# Patient Record
Sex: MALE | Race: BLACK OR AFRICAN AMERICAN
[De-identification: names, ages, dates, MRNs, and addresses within clinical notes are randomized per-mention and may not be internally consistent; named-entity substitution may affect disease eponyms.]

---

## 2017-01-01 ENCOUNTER — HOSPITAL ENCOUNTER (EMERGENCY)
Dept: HOSPITAL 71 - ER | Age: 22
Discharge: HOME | End: 2017-01-01
Payer: OTHER GOVERNMENT

## 2017-01-01 DIAGNOSIS — K52.9: Primary | ICD-10-CM

## 2018-03-07 ENCOUNTER — OFFICE VISIT CONVERTED (OUTPATIENT)
Dept: PODIATRY | Facility: CLINIC | Age: 23
End: 2018-03-07
Attending: PODIATRIST

## 2018-07-10 ENCOUNTER — OFFICE VISIT CONVERTED (OUTPATIENT)
Dept: PODIATRY | Facility: CLINIC | Age: 23
End: 2018-07-10
Attending: PODIATRIST

## 2019-03-28 ENCOUNTER — OFFICE VISIT CONVERTED (OUTPATIENT)
Dept: FAMILY MEDICINE CLINIC | Facility: CLINIC | Age: 24
End: 2019-03-28
Attending: FAMILY MEDICINE

## 2019-03-28 ENCOUNTER — CONVERSION ENCOUNTER (OUTPATIENT)
Dept: FAMILY MEDICINE CLINIC | Facility: CLINIC | Age: 24
End: 2019-03-28

## 2019-05-08 ENCOUNTER — OFFICE VISIT CONVERTED (OUTPATIENT)
Dept: FAMILY MEDICINE CLINIC | Facility: CLINIC | Age: 24
End: 2019-05-08
Attending: FAMILY MEDICINE

## 2019-05-15 ENCOUNTER — OFFICE VISIT CONVERTED (OUTPATIENT)
Dept: PODIATRY | Facility: CLINIC | Age: 24
End: 2019-05-15
Attending: PODIATRIST

## 2019-06-27 ENCOUNTER — OFFICE VISIT CONVERTED (OUTPATIENT)
Dept: FAMILY MEDICINE CLINIC | Facility: CLINIC | Age: 24
End: 2019-06-27
Attending: FAMILY MEDICINE

## 2019-06-27 ENCOUNTER — HOSPITAL ENCOUNTER (OUTPATIENT)
Dept: FAMILY MEDICINE CLINIC | Facility: CLINIC | Age: 24
Discharge: HOME | End: 2019-06-27
Attending: FAMILY MEDICINE

## 2019-06-27 LAB
ALBUMIN SERPL-MCNC: 3.8 G/DL (ref 3.5–5)
ALBUMIN/GLOB SERPL: 1.2 {RATIO} (ref 1.4–2.6)
ALP SERPL-CCNC: 68 U/L (ref 53–128)
ALT SERPL-CCNC: 27 U/L (ref 10–40)
ANION GAP SERPL CALC-SCNC: 20 MMOL/L (ref 8–19)
AST SERPL-CCNC: 30 U/L (ref 15–50)
BASOPHILS # BLD AUTO: 0.08 10*3/UL (ref 0–0.2)
BASOPHILS NFR BLD AUTO: 1.8 % (ref 0–3)
BILIRUB SERPL-MCNC: 0.51 MG/DL (ref 0.2–1.3)
BUN SERPL-MCNC: 14 MG/DL (ref 5–25)
BUN/CREAT SERPL: 9 {RATIO} (ref 6–20)
CALCIUM SERPL-MCNC: 9.1 MG/DL (ref 8.7–10.4)
CHLORIDE SERPL-SCNC: 100 MMOL/L (ref 99–111)
CONV ABS IMM GRAN: 0.02 10*3/UL (ref 0–0.2)
CONV CO2: 26 MMOL/L (ref 22–32)
CONV IMMATURE GRAN: 0.4 % (ref 0–1.8)
CONV TOTAL PROTEIN: 7 G/DL (ref 6.3–8.2)
CREAT UR-MCNC: 1.49 MG/DL (ref 0.7–1.2)
DEPRECATED RDW RBC AUTO: 56.2 FL (ref 35.1–43.9)
EOSINOPHIL # BLD AUTO: 0.03 10*3/UL (ref 0–0.7)
EOSINOPHIL # BLD AUTO: 0.7 % (ref 0–7)
ERYTHROCYTE [DISTWIDTH] IN BLOOD BY AUTOMATED COUNT: 15.4 % (ref 11.6–14.4)
GFR SERPLBLD BASED ON 1.73 SQ M-ARVRAT: >60 ML/MIN/{1.73_M2}
GLOBULIN UR ELPH-MCNC: 3.2 G/DL (ref 2–3.5)
GLUCOSE SERPL-MCNC: 82 MG/DL (ref 70–99)
HBA1C MFR BLD: 17.9 G/DL (ref 14–18)
HCT VFR BLD AUTO: 56.9 % (ref 42–52)
LYMPHOCYTES # BLD AUTO: 1.44 10*3/UL (ref 1–5)
MCH RBC QN AUTO: 31.1 PG (ref 27–31)
MCHC RBC AUTO-ENTMCNC: 31.5 G/DL (ref 33–37)
MCV RBC AUTO: 98.8 FL (ref 80–96)
MONOCYTES # BLD AUTO: 0.61 10*3/UL (ref 0.2–1.2)
MONOCYTES NFR BLD AUTO: 13.6 % (ref 3–10)
NEUTROPHILS # BLD AUTO: 2.3 10*3/UL (ref 2–8)
NEUTROPHILS NFR BLD AUTO: 51.4 % (ref 30–85)
NRBC CBCN: 0 % (ref 0–0.7)
OSMOLALITY SERPL CALC.SUM OF ELEC: 292 MOSM/KG (ref 273–304)
PLATELET # BLD AUTO: 198 10*3/UL (ref 130–400)
PMV BLD AUTO: 11.4 FL (ref 9.4–12.4)
POTASSIUM SERPL-SCNC: 5.1 MMOL/L (ref 3.5–5.3)
RBC # BLD AUTO: 5.76 10*6/UL (ref 4.7–6.1)
SODIUM SERPL-SCNC: 141 MMOL/L (ref 135–147)
VARIANT LYMPHS NFR BLD MANUAL: 32.1 % (ref 20–45)
WBC # BLD AUTO: 4.48 10*3/UL (ref 4.8–10.8)

## 2019-08-07 ENCOUNTER — HOSPITAL ENCOUNTER (OUTPATIENT)
Dept: FAMILY MEDICINE CLINIC | Facility: CLINIC | Age: 24
Discharge: HOME | End: 2019-08-07
Attending: FAMILY MEDICINE

## 2019-09-26 ENCOUNTER — OFFICE VISIT CONVERTED (OUTPATIENT)
Dept: FAMILY MEDICINE CLINIC | Facility: CLINIC | Age: 24
End: 2019-09-26
Attending: FAMILY MEDICINE

## 2019-09-30 ENCOUNTER — HOSPITAL ENCOUNTER (OUTPATIENT)
Dept: ULTRASOUND IMAGING | Facility: HOSPITAL | Age: 24
Discharge: HOME | End: 2019-09-30
Attending: FAMILY MEDICINE

## 2019-09-30 LAB
ALBUMIN SERPL-MCNC: 4.3 G/DL (ref 3.5–5)
ALBUMIN/GLOB SERPL: 1.3 {RATIO} (ref 1.4–2.6)
ALP SERPL-CCNC: 67 U/L (ref 53–128)
ALT SERPL-CCNC: 34 U/L (ref 10–40)
ANION GAP SERPL CALC-SCNC: 19 MMOL/L (ref 8–19)
AST SERPL-CCNC: 30 U/L (ref 15–50)
BASOPHILS # BLD AUTO: 0.07 10*3/UL (ref 0–0.2)
BASOPHILS NFR BLD AUTO: 2.2 % (ref 0–3)
BILIRUB SERPL-MCNC: 0.65 MG/DL (ref 0.2–1.3)
BUN SERPL-MCNC: 13 MG/DL (ref 5–25)
BUN/CREAT SERPL: 9 {RATIO} (ref 6–20)
CALCIUM SERPL-MCNC: 9.1 MG/DL (ref 8.7–10.4)
CHLORIDE SERPL-SCNC: 100 MMOL/L (ref 99–111)
CONV ABS IMM GRAN: 0.01 10*3/UL (ref 0–0.2)
CONV CO2: 28 MMOL/L (ref 22–32)
CONV IMMATURE GRAN: 0.3 % (ref 0–1.8)
CONV TOTAL PROTEIN: 7.7 G/DL (ref 6.3–8.2)
CREAT UR-MCNC: 1.52 MG/DL (ref 0.7–1.2)
DEPRECATED RDW RBC AUTO: 55.2 FL (ref 35.1–43.9)
EOSINOPHIL # BLD AUTO: 0.03 10*3/UL (ref 0–0.7)
EOSINOPHIL # BLD AUTO: 0.9 % (ref 0–7)
ERYTHROCYTE [DISTWIDTH] IN BLOOD BY AUTOMATED COUNT: 17.1 % (ref 11.6–14.4)
GFR SERPLBLD BASED ON 1.73 SQ M-ARVRAT: >60 ML/MIN/{1.73_M2}
GLOBULIN UR ELPH-MCNC: 3.4 G/DL (ref 2–3.5)
GLUCOSE SERPL-MCNC: 92 MG/DL (ref 70–99)
HCT VFR BLD AUTO: 57 % (ref 42–52)
HGB BLD-MCNC: 18.6 G/DL (ref 14–18)
LYMPHOCYTES # BLD AUTO: 1.59 10*3/UL (ref 1–5)
LYMPHOCYTES NFR BLD AUTO: 50.2 % (ref 20–45)
MCH RBC QN AUTO: 30.4 PG (ref 27–31)
MCHC RBC AUTO-ENTMCNC: 32.6 G/DL (ref 33–37)
MCV RBC AUTO: 93.3 FL (ref 80–96)
MONOCYTES # BLD AUTO: 0.39 10*3/UL (ref 0.2–1.2)
MONOCYTES NFR BLD AUTO: 12.3 % (ref 3–10)
NEUTROPHILS # BLD AUTO: 1.08 10*3/UL (ref 2–8)
NEUTROPHILS NFR BLD AUTO: 34.1 % (ref 30–85)
NRBC CBCN: 0 % (ref 0–0.7)
OSMOLALITY SERPL CALC.SUM OF ELEC: 296 MOSM/KG (ref 273–304)
PLATELET # BLD AUTO: 243 10*3/UL (ref 130–400)
PMV BLD AUTO: 10.6 FL (ref 9.4–12.4)
POTASSIUM SERPL-SCNC: 4.4 MMOL/L (ref 3.5–5.3)
RBC # BLD AUTO: 6.11 10*6/UL (ref 4.7–6.1)
SODIUM SERPL-SCNC: 143 MMOL/L (ref 135–147)
WBC # BLD AUTO: 3.17 10*3/UL (ref 4.8–10.8)

## 2019-11-19 ENCOUNTER — HOSPITAL ENCOUNTER (OUTPATIENT)
Dept: ONCOLOGY | Facility: HOSPITAL | Age: 24
Discharge: HOME | End: 2019-11-19

## 2019-11-19 ENCOUNTER — OFFICE VISIT CONVERTED (OUTPATIENT)
Dept: ONCOLOGY | Facility: HOSPITAL | Age: 24
End: 2019-11-19

## 2019-11-19 LAB
BASOPHILS # BLD AUTO: 0.1 10*3/UL (ref 0–0.2)
BASOPHILS # BLD: 3 % (ref 0–3)
BASOPHILS NFR BLD AUTO: 2.4 % (ref 0–3)
CONV ABS BANDS: 0 % (ref 1–5)
CONV ABS IMM GRAN: 0.03 10*3/UL (ref 0–0.2)
CONV ANISOCYTES: SLIGHT
CONV ATYPICAL LYMPHOCYTES: 6 % (ref 0–5)
CONV IMMATURE GRAN: 0.7 % (ref 0–1.8)
CONV SEGMENTED NEUTROPHILS: 58 % (ref 45–70)
DEPRECATED RDW RBC AUTO: 54.9 FL (ref 35.1–43.9)
EOSINOPHIL # BLD AUTO: 0.04 10*3/UL (ref 0–0.7)
EOSINOPHIL # BLD AUTO: 1 % (ref 0–7)
EOSINOPHIL NFR BLD AUTO: 2 % (ref 0–7)
ERYTHROCYTE [DISTWIDTH] IN BLOOD BY AUTOMATED COUNT: 16.3 % (ref 11.6–14.4)
HCT VFR BLD AUTO: 55.9 % (ref 42–52)
HGB BLD-MCNC: 18.6 G/DL (ref 14–18)
LYMPHOCYTES # BLD AUTO: 1.3 10*3/UL (ref 1–5)
LYMPHOCYTES NFR BLD AUTO: 30.9 % (ref 20–45)
MACROCYTES BLD QL SMEAR: SLIGHT
MCH RBC QN AUTO: 31.3 PG (ref 27–31)
MCHC RBC AUTO-ENTMCNC: 33.3 G/DL (ref 33–37)
MCV RBC AUTO: 93.9 FL (ref 80–96)
MONOCYTES # BLD AUTO: 0.51 10*3/UL (ref 0.2–1.2)
MONOCYTES NFR BLD AUTO: 12.1 % (ref 3–10)
MONOCYTES NFR BLD MANUAL: 9 % (ref 3–10)
NEUTROPHILS # BLD AUTO: 2.23 10*3/UL (ref 2–8)
NEUTROPHILS NFR BLD AUTO: 52.9 % (ref 30–85)
NRBC CBCN: 0 % (ref 0–0.7)
NUC CELL # PRT MANUAL: 0 /100{WBCS}
PLAT MORPH BLD: NORMAL
PLATELET # BLD AUTO: 197 10*3/UL (ref 130–400)
PMV BLD AUTO: 10.7 FL (ref 9.4–12.4)
POLYCHROMASIA BLD QL SMEAR: ABNORMAL
RBC # BLD AUTO: 5.95 10*6/UL (ref 4.7–6.1)
SMALL PLATELETS BLD QL SMEAR: ADEQUATE
VARIANT LYMPHS NFR BLD MANUAL: 22 % (ref 20–45)
WBC # BLD AUTO: 4.21 10*3/UL (ref 4.8–10.8)

## 2019-11-21 LAB — COHGB MFR BLD: 2.5 % (ref 0–3.6)

## 2019-11-22 LAB — JAK2 GENE QUAL: NORMAL

## 2019-12-02 LAB — JAK2 EXONS 12-15 MUT DET PCR: NORMAL

## 2020-01-07 ENCOUNTER — OFFICE VISIT CONVERTED (OUTPATIENT)
Dept: FAMILY MEDICINE CLINIC | Facility: CLINIC | Age: 25
End: 2020-01-07
Attending: FAMILY MEDICINE

## 2020-02-04 ENCOUNTER — HOSPITAL ENCOUNTER (OUTPATIENT)
Dept: OTHER | Facility: HOSPITAL | Age: 25
Discharge: HOME | End: 2020-02-04
Attending: INTERNAL MEDICINE

## 2020-02-04 LAB
25(OH)D3 SERPL-MCNC: 9.3 NG/ML (ref 30–100)
APPEARANCE UR: CLEAR
BASOPHILS # BLD AUTO: 0.01 10*3/UL (ref 0–0.2)
BASOPHILS NFR BLD AUTO: 0.1 % (ref 0–3)
BILIRUB UR QL: NEGATIVE
COLOR UR: YELLOW
CONV ABS IMM GRAN: 0.04 10*3/UL (ref 0–0.2)
CONV COLLECTION SOURCE (UA): NORMAL
CONV CREATININE URINE, RANDOM: 88.4 MG/DL (ref 10–300)
CONV IMMATURE GRAN: 0.4 % (ref 0–1.8)
CONV PROTEIN TO CREATININE RATIO (RANDOM URINE): 0.06 {RATIO} (ref 0–0.1)
CONV UROBILINOGEN IN URINE BY AUTOMATED TEST STRIP: 0.2 {EHRLICHU}/DL (ref 0.1–1)
DEPRECATED RDW RBC AUTO: 53 FL (ref 35.1–43.9)
EOSINOPHIL # BLD AUTO: 0 % (ref 0–7)
EOSINOPHIL # BLD AUTO: 0 10*3/UL (ref 0–0.7)
ERYTHROCYTE [DISTWIDTH] IN BLOOD BY AUTOMATED COUNT: 15.3 % (ref 11.6–14.4)
GLUCOSE UR QL: NEGATIVE MG/DL
HCT VFR BLD AUTO: 54.7 % (ref 42–52)
HGB BLD-MCNC: 18.5 G/DL (ref 14–18)
HGB UR QL STRIP: NEGATIVE
KETONES UR QL STRIP: NEGATIVE MG/DL
LEUKOCYTE ESTERASE UR QL STRIP: NEGATIVE
LYMPHOCYTES # BLD AUTO: 1.34 10*3/UL (ref 1–5)
LYMPHOCYTES NFR BLD AUTO: 14.5 % (ref 20–45)
MCH RBC QN AUTO: 32 PG (ref 27–31)
MCHC RBC AUTO-ENTMCNC: 33.8 G/DL (ref 33–37)
MCV RBC AUTO: 94.5 FL (ref 80–96)
MONOCYTES # BLD AUTO: 0.78 10*3/UL (ref 0.2–1.2)
MONOCYTES NFR BLD AUTO: 8.5 % (ref 3–10)
NEUTROPHILS # BLD AUTO: 7.04 10*3/UL (ref 2–8)
NEUTROPHILS NFR BLD AUTO: 76.5 % (ref 30–85)
NITRITE UR QL STRIP: NEGATIVE
NRBC CBCN: 0 % (ref 0–0.7)
PH UR STRIP.AUTO: 5.5 [PH] (ref 5–8)
PLATELET # BLD AUTO: 237 10*3/UL (ref 130–400)
PMV BLD AUTO: 11 FL (ref 9.4–12.4)
PROT UR QL: NEGATIVE MG/DL
PROT UR-MCNC: 5.2 MG/DL
PTH-INTACT SERPL-MCNC: 32.8 PG/ML (ref 11.1–79.5)
RBC # BLD AUTO: 5.79 10*6/UL (ref 4.7–6.1)
SP GR UR: 1.01 (ref 1–1.03)
WBC # BLD AUTO: 9.21 10*3/UL (ref 4.8–10.8)

## 2020-03-05 ENCOUNTER — HOSPITAL ENCOUNTER (OUTPATIENT)
Dept: ONCOLOGY | Facility: HOSPITAL | Age: 25
Discharge: HOME | End: 2020-03-05
Attending: INTERNAL MEDICINE

## 2020-03-05 ENCOUNTER — OFFICE VISIT CONVERTED (OUTPATIENT)
Dept: ONCOLOGY | Facility: HOSPITAL | Age: 25
End: 2020-03-05
Attending: INTERNAL MEDICINE

## 2020-07-07 ENCOUNTER — CONVERSION ENCOUNTER (OUTPATIENT)
Dept: FAMILY MEDICINE CLINIC | Facility: CLINIC | Age: 25
End: 2020-07-07

## 2020-07-07 ENCOUNTER — OFFICE VISIT CONVERTED (OUTPATIENT)
Dept: FAMILY MEDICINE CLINIC | Facility: CLINIC | Age: 25
End: 2020-07-07
Attending: FAMILY MEDICINE

## 2020-07-27 ENCOUNTER — HOSPITAL ENCOUNTER (OUTPATIENT)
Dept: OTHER | Facility: HOSPITAL | Age: 25
Discharge: HOME | End: 2020-07-27
Attending: FAMILY MEDICINE

## 2020-07-27 LAB
ALBUMIN SERPL-MCNC: 4.1 G/DL (ref 3.5–5)
ALBUMIN/GLOB SERPL: 1.2 {RATIO} (ref 1.4–2.6)
ALP SERPL-CCNC: 64 U/L (ref 53–128)
ALT SERPL-CCNC: 42 U/L (ref 10–40)
ANION GAP SERPL CALC-SCNC: 19 MMOL/L (ref 8–19)
AST SERPL-CCNC: 29 U/L (ref 15–50)
BASOPHILS # BLD AUTO: 0.08 10*3/UL (ref 0–0.2)
BASOPHILS NFR BLD AUTO: 2.4 % (ref 0–3)
BILIRUB SERPL-MCNC: 0.63 MG/DL (ref 0.2–1.3)
BUN SERPL-MCNC: 15 MG/DL (ref 5–25)
BUN/CREAT SERPL: 10 {RATIO} (ref 6–20)
CALCIUM SERPL-MCNC: 10.1 MG/DL (ref 8.7–10.4)
CHLORIDE SERPL-SCNC: 102 MMOL/L (ref 99–111)
CHOLEST SERPL-MCNC: 134 MG/DL (ref 107–200)
CHOLEST/HDLC SERPL: 4.1 {RATIO} (ref 3–6)
CONV ABS IMM GRAN: 0 10*3/UL (ref 0–0.2)
CONV CO2: 28 MMOL/L (ref 22–32)
CONV IMMATURE GRAN: 0 % (ref 0–1.8)
CONV TOTAL PROTEIN: 7.4 G/DL (ref 6.3–8.2)
CREAT UR-MCNC: 1.51 MG/DL (ref 0.7–1.2)
DEPRECATED RDW RBC AUTO: 51.7 FL (ref 35.1–43.9)
EOSINOPHIL # BLD AUTO: 0.02 10*3/UL (ref 0–0.7)
EOSINOPHIL # BLD AUTO: 0.6 % (ref 0–7)
ERYTHROCYTE [DISTWIDTH] IN BLOOD BY AUTOMATED COUNT: 15 % (ref 11.6–14.4)
FOLATE SERPL-MCNC: 18 NG/ML (ref 4.8–20)
GFR SERPLBLD BASED ON 1.73 SQ M-ARVRAT: >60 ML/MIN/{1.73_M2}
GLOBULIN UR ELPH-MCNC: 3.3 G/DL (ref 2–3.5)
GLUCOSE SERPL-MCNC: 100 MG/DL (ref 70–99)
HCT VFR BLD AUTO: 54.6 % (ref 42–52)
HDLC SERPL-MCNC: 33 MG/DL (ref 40–60)
HGB BLD-MCNC: 18.4 G/DL (ref 14–18)
LDLC SERPL CALC-MCNC: 86 MG/DL (ref 70–100)
LYMPHOCYTES # BLD AUTO: 1.33 10*3/UL (ref 1–5)
LYMPHOCYTES NFR BLD AUTO: 40.1 % (ref 20–45)
MCH RBC QN AUTO: 31.4 PG (ref 27–31)
MCHC RBC AUTO-ENTMCNC: 33.7 G/DL (ref 33–37)
MCV RBC AUTO: 93.2 FL (ref 80–96)
MONOCYTES # BLD AUTO: 0.41 10*3/UL (ref 0.2–1.2)
MONOCYTES NFR BLD AUTO: 12.3 % (ref 3–10)
NEUTROPHILS # BLD AUTO: 1.48 10*3/UL (ref 2–8)
NEUTROPHILS NFR BLD AUTO: 44.6 % (ref 30–85)
NRBC CBCN: 0 % (ref 0–0.7)
OSMOLALITY SERPL CALC.SUM OF ELEC: 301 MOSM/KG (ref 273–304)
PLATELET # BLD AUTO: 199 10*3/UL (ref 130–400)
PMV BLD AUTO: 10.7 FL (ref 9.4–12.4)
POTASSIUM SERPL-SCNC: 4.4 MMOL/L (ref 3.5–5.3)
RBC # BLD AUTO: 5.86 10*6/UL (ref 4.7–6.1)
SODIUM SERPL-SCNC: 145 MMOL/L (ref 135–147)
TRIGL SERPL-MCNC: 74 MG/DL (ref 40–150)
URATE SERPL-MCNC: 8.1 MG/DL (ref 3.5–8.5)
VIT B12 SERPL-MCNC: 955 PG/ML (ref 211–911)
VLDLC SERPL-MCNC: 15 MG/DL (ref 5–37)
WBC # BLD AUTO: 3.32 10*3/UL (ref 4.8–10.8)

## 2020-07-29 LAB — CONV VITAMIN B6, TOTAL PLASMA: 154.1 UG/L (ref 5.3–46.7)

## 2020-09-10 ENCOUNTER — HOSPITAL ENCOUNTER (OUTPATIENT)
Dept: OTHER | Facility: HOSPITAL | Age: 25
Discharge: HOME | End: 2020-09-10
Attending: INTERNAL MEDICINE

## 2020-09-10 LAB
BASOPHILS # BLD AUTO: 0.08 10*3/UL (ref 0–0.2)
BASOPHILS NFR BLD AUTO: 1.8 % (ref 0–3)
CONV ABS IMM GRAN: 0.02 10*3/UL (ref 0–0.2)
CONV IMMATURE GRAN: 0.5 % (ref 0–1.8)
DEPRECATED RDW RBC AUTO: 52.9 FL (ref 35.1–43.9)
EOSINOPHIL # BLD AUTO: 0.06 10*3/UL (ref 0–0.7)
EOSINOPHIL # BLD AUTO: 1.4 % (ref 0–7)
ERYTHROCYTE [DISTWIDTH] IN BLOOD BY AUTOMATED COUNT: 15.6 % (ref 11.6–14.4)
HCT VFR BLD AUTO: 57 % (ref 42–52)
HGB BLD-MCNC: 19 G/DL (ref 14–18)
LYMPHOCYTES # BLD AUTO: 1.51 10*3/UL (ref 1–5)
LYMPHOCYTES NFR BLD AUTO: 34.1 % (ref 20–45)
MCH RBC QN AUTO: 31.4 PG (ref 27–31)
MCHC RBC AUTO-ENTMCNC: 33.3 G/DL (ref 33–37)
MCV RBC AUTO: 94.2 FL (ref 80–96)
MONOCYTES # BLD AUTO: 0.68 10*3/UL (ref 0.2–1.2)
MONOCYTES NFR BLD AUTO: 15.3 % (ref 3–10)
NEUTROPHILS # BLD AUTO: 2.08 10*3/UL (ref 2–8)
NEUTROPHILS NFR BLD AUTO: 46.9 % (ref 30–85)
NRBC CBCN: 0 % (ref 0–0.7)
PLATELET # BLD AUTO: 207 10*3/UL (ref 130–400)
PMV BLD AUTO: 10.5 FL (ref 9.4–12.4)
RBC # BLD AUTO: 6.05 10*6/UL (ref 4.7–6.1)
WBC # BLD AUTO: 4.43 10*3/UL (ref 4.8–10.8)

## 2020-09-11 LAB — EPO SERPL-ACNC: 38 MIU/ML (ref 2.6–18.5)

## 2020-09-17 ENCOUNTER — OFFICE VISIT CONVERTED (OUTPATIENT)
Dept: ONCOLOGY | Facility: HOSPITAL | Age: 25
End: 2020-09-17
Attending: NURSE PRACTITIONER

## 2020-09-21 ENCOUNTER — HOSPITAL ENCOUNTER (OUTPATIENT)
Dept: GENERAL RADIOLOGY | Facility: HOSPITAL | Age: 25
Discharge: HOME | End: 2020-09-21
Attending: FAMILY MEDICINE

## 2020-09-21 ENCOUNTER — OFFICE VISIT CONVERTED (OUTPATIENT)
Dept: FAMILY MEDICINE CLINIC | Facility: CLINIC | Age: 25
End: 2020-09-21
Attending: FAMILY MEDICINE

## 2020-09-29 ENCOUNTER — OFFICE VISIT CONVERTED (OUTPATIENT)
Dept: FAMILY MEDICINE CLINIC | Facility: CLINIC | Age: 25
End: 2020-09-29
Attending: FAMILY MEDICINE

## 2020-10-13 ENCOUNTER — OFFICE VISIT CONVERTED (OUTPATIENT)
Dept: PODIATRY | Facility: CLINIC | Age: 25
End: 2020-10-13
Attending: PODIATRIST

## 2021-01-07 ENCOUNTER — OFFICE VISIT CONVERTED (OUTPATIENT)
Dept: FAMILY MEDICINE CLINIC | Facility: CLINIC | Age: 26
End: 2021-01-07
Attending: FAMILY MEDICINE

## 2021-04-06 ENCOUNTER — OFFICE VISIT CONVERTED (OUTPATIENT)
Dept: FAMILY MEDICINE CLINIC | Facility: CLINIC | Age: 26
End: 2021-04-06
Attending: FAMILY MEDICINE

## 2021-04-06 ENCOUNTER — CONVERSION ENCOUNTER (OUTPATIENT)
Dept: FAMILY MEDICINE CLINIC | Facility: CLINIC | Age: 26
End: 2021-04-06

## 2021-04-06 ENCOUNTER — HOSPITAL ENCOUNTER (OUTPATIENT)
Dept: FAMILY MEDICINE CLINIC | Facility: CLINIC | Age: 26
Discharge: HOME | End: 2021-04-06
Attending: FAMILY MEDICINE

## 2021-04-06 LAB
ALBUMIN SERPL-MCNC: 3.8 G/DL (ref 3.5–5)
ALBUMIN/GLOB SERPL: 1.2 {RATIO} (ref 1.4–2.6)
ALP SERPL-CCNC: 68 U/L (ref 53–128)
ALT SERPL-CCNC: 29 U/L (ref 10–40)
ANION GAP SERPL CALC-SCNC: 18 MMOL/L (ref 8–19)
AST SERPL-CCNC: 31 U/L (ref 15–50)
BASOPHILS # BLD AUTO: 0.07 10*3/UL (ref 0–0.2)
BASOPHILS NFR BLD AUTO: 2.1 % (ref 0–3)
BILIRUB SERPL-MCNC: 0.53 MG/DL (ref 0.2–1.3)
BUN SERPL-MCNC: 18 MG/DL (ref 5–25)
BUN/CREAT SERPL: 11 {RATIO} (ref 6–20)
CALCIUM SERPL-MCNC: 9.2 MG/DL (ref 8.7–10.4)
CHLORIDE SERPL-SCNC: 102 MMOL/L (ref 99–111)
CONV ABS IMM GRAN: 0.01 10*3/UL (ref 0–0.2)
CONV CO2: 28 MMOL/L (ref 22–32)
CONV IMMATURE GRAN: 0.3 % (ref 0–1.8)
CONV TOTAL PROTEIN: 6.9 G/DL (ref 6.3–8.2)
CREAT UR-MCNC: 1.64 MG/DL (ref 0.7–1.2)
DEPRECATED RDW RBC AUTO: 54.4 FL (ref 35.1–43.9)
EOSINOPHIL # BLD AUTO: 0.02 10*3/UL (ref 0–0.7)
EOSINOPHIL # BLD AUTO: 0.6 % (ref 0–7)
ERYTHROCYTE [DISTWIDTH] IN BLOOD BY AUTOMATED COUNT: 15.3 % (ref 11.6–14.4)
GFR SERPLBLD BASED ON 1.73 SQ M-ARVRAT: >60 ML/MIN/{1.73_M2}
GLOBULIN UR ELPH-MCNC: 3.1 G/DL (ref 2–3.5)
GLUCOSE SERPL-MCNC: 84 MG/DL (ref 70–99)
HCT VFR BLD AUTO: 53.9 % (ref 42–52)
HGB BLD-MCNC: 17.7 G/DL (ref 14–18)
LYMPHOCYTES # BLD AUTO: 1.32 10*3/UL (ref 1–5)
LYMPHOCYTES NFR BLD AUTO: 39.2 % (ref 20–45)
MCH RBC QN AUTO: 31.9 PG (ref 27–31)
MCHC RBC AUTO-ENTMCNC: 32.8 G/DL (ref 33–37)
MCV RBC AUTO: 97.1 FL (ref 80–96)
MONOCYTES # BLD AUTO: 0.45 10*3/UL (ref 0.2–1.2)
MONOCYTES NFR BLD AUTO: 13.4 % (ref 3–10)
NEUTROPHILS # BLD AUTO: 1.5 10*3/UL (ref 2–8)
NEUTROPHILS NFR BLD AUTO: 44.4 % (ref 30–85)
NRBC CBCN: 0 % (ref 0–0.7)
OSMOLALITY SERPL CALC.SUM OF ELEC: 297 MOSM/KG (ref 273–304)
PLATELET # BLD AUTO: 218 10*3/UL (ref 130–400)
PMV BLD AUTO: 10.5 FL (ref 9.4–12.4)
POTASSIUM SERPL-SCNC: 4.9 MMOL/L (ref 3.5–5.3)
RBC # BLD AUTO: 5.55 10*6/UL (ref 4.7–6.1)
SODIUM SERPL-SCNC: 143 MMOL/L (ref 135–147)
T4 FREE SERPL-MCNC: 1.1 NG/DL (ref 0.9–1.8)
TSH SERPL-ACNC: 2.02 M[IU]/L (ref 0.27–4.2)
URATE SERPL-MCNC: 9.2 MG/DL (ref 3.5–8.5)
WBC # BLD AUTO: 3.37 10*3/UL (ref 4.8–10.8)

## 2021-04-08 ENCOUNTER — HOSPITAL ENCOUNTER (OUTPATIENT)
Dept: GENERAL RADIOLOGY | Facility: HOSPITAL | Age: 26
Discharge: HOME | End: 2021-04-08
Attending: FAMILY MEDICINE

## 2021-04-14 ENCOUNTER — CONVERSION ENCOUNTER (OUTPATIENT)
Dept: FAMILY MEDICINE CLINIC | Facility: CLINIC | Age: 26
End: 2021-04-14

## 2021-04-14 ENCOUNTER — OFFICE VISIT CONVERTED (OUTPATIENT)
Dept: FAMILY MEDICINE CLINIC | Facility: CLINIC | Age: 26
End: 2021-04-14
Attending: NURSE PRACTITIONER

## 2021-04-15 ENCOUNTER — HOSPITAL ENCOUNTER (OUTPATIENT)
Dept: OTHER | Facility: HOSPITAL | Age: 26
Discharge: HOME | End: 2021-04-15
Attending: FAMILY MEDICINE

## 2021-04-22 LAB — CONV VITAMIN B6, TOTAL PLASMA: 105.2 UG/L (ref 5.3–46.7)

## 2021-04-29 ENCOUNTER — OFFICE VISIT CONVERTED (OUTPATIENT)
Dept: PODIATRY | Facility: CLINIC | Age: 26
End: 2021-04-29
Attending: PODIATRIST

## 2021-05-13 NOTE — PROGRESS NOTES
Progress Note      Patient Name: Kathi Bernard   Patient ID: [id number]   Sex: Male   YOB: 1995    Primary Care Provider: Jesus Manuel Sloan DO   Referring Provider: Jesus Manuel Sloan DO    Visit Date: October 13, 2020    Provider: Josias Daniel DPM   Location: [facility]   Location Address: [address] [place] [address]  [id number]   Location Phone: [phone]          Chief Complaint  · Right Foot Pain      History Of Present Illness  Kathi Bernard is a 25 year old /Black male who presents to the [facility] today a follow up for:      New, Established, New Problem: Established  Location: Right medial midfoot  Duration:  3 weeks  Onset: Insidious  Nature: Sore, achy  Stable, worsening, improving: Stable  Aggravating factors:   Patient relates pain is aggravated by shoe gear and ambulation.   Previous Treatment: Patient's mother relates that he is using a cam walker from her previous injury.  She states he has a current prescription full for Voltaren gel.  She states that he was recently prescribed new custom-made orthotics but has not been molded for them yet.    Patient denies any fevers, chills, nausea, vomiting, shortness of breathe, nor any other constitutional signs nor symptoms.         Past Medical History  Acquired bilateral flat feet; Anemia; Ankle pain; Arthritis pain; Asthma; Athletes foot; Blood clot in vein; Bronchitis; Diabetes mellitus; Difficulty walking; Down syndrome; Fatigue; Flat feet, bilateral; Foot pain, left; Gout; Heel pain; Hypothyroidism; Mental disability; Obesity; Primary osteoarthritis, left ankle and foot; Right knee pain, chronic; Sinus trouble         Past Surgical History  EAR Surgery         Medication List  allopurinol 100 mg oral tablet; aspirin 81 mg oral tablet,delayed release (DR/EC); Boudreauxs Butt Paste 16 % topical ointment; Stevea-Klealessandro Gentle topical cleanser; Claritin 10 mg oral tablet;  clindamycin phosphate 1 % topical lotion; Custom made orthotics; cyanocobalamin (vitamin B-12) 1,000 mcg oral tablet; docusate sodium 100 mg oral capsule; folic acid 1 mg oral tablet; Lotrisone 1-0.05 % topical cream; Pataday 0.1 % ophthalmic (eye) drops; pyridoxine (vitamin B6) 50 mg oral tablet; Synthroid 25 mcg oral tablet; urea 20 % topical cream; Voltaren 1 % topical gel         Allergy List  NO KNOWN DRUG ALLERGIES         Family Medical History  Sickle-cell/Hb-C Disease; Diabetes, unspecified type; Bone Neoplasm, Malignant; Family history of certain chronic disabling diseases; arthritis         Social History  Alcohol Use (Never); Claustophobic (Unknown); lives with parents; Recreational Drug Use (Never); Student.; Tobacco (Never)         Immunizations  Name Date Admin   Influenza 09/21/2020   Influenza 11/06/2019   Tdap 03/28/2019         Review of Systems  · Constitutional  o Denies  o : fatigue, night sweats  · Eyes  o Denies  o : double vision, blurred vision  · HENT  o Denies  o : vertigo, recent head injury  · Cardiovascular  o Denies  o : chest pain, irregular heart beats  · Respiratory  o Denies  o : shortness of breath, productive cough  · Gastrointestinal  o Denies  o : nausea, vomiting  · Genitourinary  o Denies  o : dysuria, urinary retention  · Integument  o Denies  o : hair growth change, new skin lesions  · Neurologic  o Denies  o : altered mental status, seizures  · Musculoskeletal  o * See HPI  · Endocrine  o Denies  o : cold intolerance, heat intolerance  · Heme-Lymph  o Denies  o : petechiae, lymph node enlargement or tenderness  · Allergic-Immunologic  o Denies  o : frequent illnesses      Vitals  Date Time BP Position Site L\R Cuff Size HR RR TEMP (F) WT  HT  BMI kg/m2 BSA m2 O2 Sat FR L/min FiO2        10/13/2020 07:44 /84 Sitting    77 - R  97.1 312lbs 16oz 5'   61.13 2.45 99 %            Physical Examination  · Constitutional  o Appearance  o : overweight, well developed.  Down's Syndrome.  · Cardiovascular  o Peripheral Vascular System  o :   § Pedal Pulses  § : pulses 2 + and symmetrical  § Extremities  § : no edema in lower extremities  · Musculoskeletal  o General  o :   § General Musculoskeletal  § : Lower extremity muscle and strength and range of motion is equal and symmetrical bilaterally. The knees are noted to be normal in alignment. Significant for pes planus b/l. Ambulating in CAM walker on Right.  · Skin and Subcutaneous Tissue  o General Inspection  o : Skin is noted to have normal texture and turgor, with no excrescences noted.   o Digits and Nails  o : The toenails are noted to be without disese.  · Neurologic  o Sensation  o : Epicritic sensations intact bilaterally.  · Right Ankle/Foot  o Inspection  o : Right foot: Positive tenderness to palpation to posterior tibial tendon. No signs of edema, erythema, lymphangitis, nor signs of infection.   o Neurovascular  o : Sharp/dull sensation is decreased metatarsal bilaterally. Monofilament sensation examination of the right foot is decreased Monofilament sensation examination of the left foot is decreased   o Special Tests  o : +TTP to posterior tibial tendon along right medial ankle and foot.      Dr. Daniel reviewed radiographs and results from [facility] and discussed them with the patient.  These are significant for no periosteal reactions nor osteolytic changes seen.  No occult fractures seen.           Assessment  · Foot pain, right     729.5/M79.671  · Tibial tendonitis, posterior, right     726.72/M76.821      Plan  · Orders  o PHYSICAL THERAPY CONSULTATION (PHYT) - - 10/13/2020  · Medications  o Medications have been Reconciled  o Transition of Care or Provider Policy  · Instructions  o Follow Up in 3 weeks.  o Discuss Findings: I have discussed the findings of this evaluation with the patient. The discussion included a complete verbal explanation of any changes in the examination results,  diagnosis, and the current treatment plan. A schedule for future care needs was explained. If any questions should arise after returning home, I have encouraged the patient to feel free to contact Dr. Daniel. The patient states understanding and agreement with this plan.  o Continue CAM walker as needed on Right.  o Instructed to use Voltaren Gel QID PRN.  o RICE Therapy  o Electronically Identified Patient Education Materials Provided Electronically  · Disposition  o Call or Return if symptoms worsen or persist.  · Referrals  o ID: [id number] Date: 09/29/2020 Type: Inbound  Specialty: Podiatry            Electronically Signed by: Josias Daniel DPM -Author on October 13, 2020 01:05:13 PM

## 2021-05-13 NOTE — PROGRESS NOTES
Progress Note      Patient Name: Kathi Bernard   Patient ID: [id number]   Sex: Male   YOB: 1995    Primary Care Provider: Jesus Manuel Sloan DO   Referring Provider: Jesus Manuel Sloan DO    Visit Date: July 7, 2020    Provider: Jesus Manuel Sloan DO   Location: [facility]   Location Address: [address] [place] [address], [address]  [id number]   Location Phone: [phone]          Chief Complaint  · check up      History Of Present Illness  Kathi Bernard is a 25 year old /Black male who presents for evaluation and treatment of:      Patient presents today requesting refills.  He is accompanied by his mother, Sun.  Patient overall is doing well.  He is using his CPAP now and he is more awake throughout the day.  I encouraged them to continue with compliance for this.  He is still seeing ENT for ruptured tympanic membrane on the right.  Patient is still taking clindamycin phosphate for hidradenitis suppurativa.  If he has a start of the lesion he puts this on and it takes care of it.  They are requesting a refill of this.  He is due for labs.  He was seeing nephrology before the COVID pandemic occurred.  They will schedule follow-up appointment.  His creatinine is 1.52 when I last checked it back in September.  We will repeat this today.  He is being followed by hematology as well.  He last saw Dr. Crawley back in March 2020.  His hemoglobin was elevated around 18.5 and is likely secondary to obstructive sleep apnea.  He will have a follow-up in September to reassess.  No further work-up planned for leukopenia per hematology at this time.  His last WBC count was within normal limits at 9.21.  His last hemoglobin was 18.5.       Past Medical History  Acquired bilateral flat feet; Anemia; Ankle pain; Arthritis pain; Asthma; Athletes foot; Blood clot in vein; Bronchitis; Diabetes mellitus; Difficulty walking; Down syndrome; Fatigue; Flat feet, bilateral; Foot pain, left;  Gout; Heel pain; Hypothyroidism; Mental disability; Obesity; Primary osteoarthritis, left ankle and foot; Right knee pain, chronic; Sinus trouble         Past Surgical History  EAR Surgery         Medication List  allopurinol 100 mg oral tablet; aspirin 81 mg oral tablet,delayed release (DR/EC); Boudreauxs Butt Paste 16 % topical ointment; Stevea-Klealessandro Gentle topical cleanser; Claritin 10 mg oral tablet; clindamycin phosphate 1 % topical lotion; Custom made orthotics; cyanocobalamin (vitamin B-12) 1,000 mcg oral tablet; docusate sodium 100 mg oral capsule; folic acid 1 mg oral tablet; Lotrisone 1-0.05 % topical cream; pyridoxine (vitamin B6) 50 mg oral tablet; Synthroid 25 mcg oral tablet; urea 20 % topical cream; Voltaren 1 % topical gel         Allergy List  NO KNOWN DRUG ALLERGIES       Allergies Reconciled  Family Medical History  Sickle-cell/Hb-C Disease; Diabetes, unspecified type; Bone Neoplasm, Malignant; Family history of certain chronic disabling diseases; arthritis         Social History  Alcohol Use (Never); Claustophobic (Unknown); lives with parents; Recreational Drug Use (Never); Student.; Tobacco (Never)         Immunizations  Name Date Admin   Influenza    Tdap          Review of Systems     General: Denies any fever, chills  HEENT:  Denies any vision or hearing changes. Denies any neck tenderness. Denies any headaches. Denies nasal congestion  Cardiovascular: Denies any chest pain or palpitations  respiratory: Denies any cough or wheezing. Denies any shortness of breath  Gastrointestinal: Denies any nausea vomiting or diarrhea, Denies constipation  Extremities: Denies any edema  Psychiatric: Denies any changes in mood or affect  Neurologic: Denies any neurologic deficits  skin: Denies any rashes or lesions.  endocrine: Denies any weight loss, fever, night sweats  Musculoskeletal: Denies any weakness       Vitals  Date Time BP Position Site L\R Cuff Size HR RR TEMP (F) WT  HT  BMI kg/m2 BSA m2 O2 Sat         07/07/2020 10:10 /82 Sitting    78 - R  98.1 324lbs 2oz 5'   63.3 2.49 99 %          Physical Examination     General: no acute distress, pleasant  HEENT: Normocephalic, atraumatic  Cardiovascular: Regular rate and rhythm without appreciable murmur  Respiratory: Clear to auscultation bilaterally no RRW  Gastrointestinal: Soft nontender nondistended with bowel sounds present  extremities: No clubbing, cyanosis or edema  Neurologic: CN II through XII grossly intact   Psychiatric: Normal mood and affect           Assessment  · Screening for depression     V79.0/Z13.89  · Hypothyroid     244.9/E03.9  · Elevated hemoglobin     282.7/D58.2  · Leukopenia     288.50/D72.819  · TAMIKO (obstructive sleep apnea)     327.23/G47.33  · Hidradenitis suppurativa     705.83/L73.2    Problems Reconciled  Plan  · Orders  o Thyroid Profile (53193, 90812, THYII) - 244.9/E03.9 - 07/07/2020  o ACO-39: Current medications updated and reviewed () - - 07/07/2020  o ACO-18: Negative screen for clinical depression using a standardized tool () - V79.0/Z13.89 - 07/07/2020  · Medications  o Boudreauxs Butt Paste 16 % topical ointment   SIG: apply to affected area by external route QD PRN   DISP: (1) 113 gm tube with 3 refills  Refilled on 07/07/2020     o Claritin 10 mg oral tablet   SIG: take 1 tablet (10 mg) by oral route once daily for 90 days   DISP: (90) tablets with 3 refills  Refilled on 07/07/2020     o clindamycin phosphate 1 % topical lotion   SIG: apply thin layer to groin area by topical route BID PRN   DISP: (1) 60 ml bottle with 4 refills  Refilled on 07/07/2020     o cetirizine 10 mg oral tablet   SIG: take 1 tablet (10 mg) by oral route once daily for 90 days   DISP: (90) tablet with 3 refills  Discontinued on 07/07/2020     o Medications have been Reconciled  o Transition of Care or Provider Policy  · Instructions  o Depression Screen completed and scanned into the EMR under the designated folder within the  patient's documents.  o Today's PHQ-9 result is _0__  o Patient was educated/instructed on their diagnosis, treatment and medications prior to discharge from the clinic today.  o Patient instructed to seek medical attention urgently for new or worsening symptoms.  o Call the office with any concerns or questions.  o Depression screening has been reviewed today and it is negative. Plan as documented above. Medications to be refilled. I will stop his cetirizine as he is not always taking this and the Claritin seems to work better. He was taking Claritin in the morning and Zyrtec at nighttime. Discussed with him that this is redundant. They are agreeable to stopping the cetirizine. I will see patient back in 6 months or sooner if needed. They are to call with any questions or concerns.  · Disposition  o Follow Up in 6 months.            Electronically Signed by: Jesus Manuel Sloan DO -Author on July 7, 2020 12:15:51 PM

## 2021-05-13 NOTE — PROGRESS NOTES
Progress Note      Patient Name: Kathi Bernard   Patient ID: [id number]   Sex: Male   YOB: 1995    Primary Care Provider: Jesus Manuel Sloan DO   Referring Provider: Jesus Manuel Sloan DO    Visit Date: September 29, 2020    Provider: Jesus Manuel Sloan DO   [place] [facility]   Location Address: [address] [place] [address], [address]  [id number]   Location Phone: [phone]          Chief Complaint  · right foot pain      History Of Present Illness  Kathi Bernard is a 25 year old /Black male who presents for evaluation and treatment of:      Presents today for one-week follow-up.  He is still having pain in his right foot.  It is unchanged.  Reviewing his history he does have a history of pes planus.  He is not currently using any arch supports.  I will give him a prescription for this.  He was previous seeing podiatry so we will work on getting him back in to be seen by Dr. Daniel.  X-ray of the right foot showed no acute fracture or dislocation.  Stable pes planus.  Denies any worsening leg swelling.  Pain that he has he points to the bottom of the right foot near the calcaneus heel but most of his pain is in the midfoot on the dorsal aspect.       Past Medical History  Acquired bilateral flat feet; Anemia; Ankle pain; Arthritis pain; Asthma; Athletes foot; Blood clot in vein; Bronchitis; Diabetes mellitus; Difficulty walking; Down syndrome; Fatigue; Flat feet, bilateral; Foot pain, left; Gout; Heel pain; Hypothyroidism; Mental disability; Obesity; Primary osteoarthritis, left ankle and foot; Right knee pain, chronic; Sinus trouble         Past Surgical History  EAR Surgery         Medication List  allopurinol 100 mg oral tablet; aspirin 81 mg oral tablet,delayed release (/EC); Boudreauxs Butt Paste 16 % topical ointment; Stevea-Maximus Gentle topical cleanser; Claritin 10 mg oral tablet; clindamycin phosphate 1 % topical lotion; Custom made  orthotics; cyanocobalamin (vitamin B-12) 1,000 mcg oral tablet; docusate sodium 100 mg oral capsule; folic acid 1 mg oral tablet; Lotrisone 1-0.05 % topical cream; Pataday 0.1 % ophthalmic (eye) drops; pyridoxine (vitamin B6) 50 mg oral tablet; Synthroid 25 mcg oral tablet; urea 20 % topical cream; Voltaren 1 % topical gel         Allergy List  NO KNOWN DRUG ALLERGIES         Family Medical History  Sickle-cell/Hb-C Disease; Diabetes, unspecified type; Bone Neoplasm, Malignant; Family history of certain chronic disabling diseases; arthritis         Social History  Alcohol Use (Never); Claustophobic (Unknown); lives with parents; Recreational Drug Use (Never); Student.; Tobacco (Never)         Immunizations  Name Date Admin   Influenza    Influenza    Tdap          Review of Systems     Gen: Denies any fever, chills, or weight changes  HEENT: Denies any changes in vision or hearing, denies nasal congestion, denies any neck tenderness or lymphadenopathy  Extremities: Denies edema  Psychiatric: Denies any changes in mood or affect  Neurologic: Denies any deficits  Skin: Denies any rashes  Musculoskeletal: Right foot pain as discussed above       Vitals  Date Time BP Position Site L\R Cuff Size HR RR TEMP (F) WT  HT  BMI kg/m2 BSA m2 O2 Sat        09/29/2020 09:35 /72 Sitting    103 - R  97.1 311lbs 0oz 5'   60.74 2.44 94 %          Physical Examination     General: AAO 3, no acute distress, pleasant  HEENT: Normocephalic, atraumatic  Musculoskeletal: Tenderness to palpation over the calcaneus of the right foot on the plantar aspect.  He also has tenderness to palpation over the midfoot on the dorsal aspect.  Flatfoot noted  extremities: No clubbing, cyanosis or edema  Neurologic: CN II through XII grossly intact   Psychiatric: Normal mood and affect           Assessment  · Pes planus of both feet       Flat foot [pes planus] (acquired), right foot     734/M21.41  Flat foot [pes planus] (acquired), left  foot     734/M21.42  · Plantar fasciitis of right foot     728.71/M72.2    Problems Reconciled  Plan  · Orders  o ACO-39: Current medications updated and reviewed (1159F, ) - - 09/29/2020  o PODIATRY CONSULTATION (PODIA) - 734/M21.41, 734/M21.42, 728.71/M72.2 - 09/29/2020   needs renewal of referral to dr. Daniel and appointment  · Medications  o Medications have been Reconciled  o Transition of Care or Provider Policy  · Instructions  o Patient was educated/instructed on their diagnosis, treatment and medications prior to discharge from the clinic today.  o Patient instructed to seek medical attention urgently for new or worsening symptoms.  o Call the office with any concerns or questions.  o Discussed continued use Voltaren gel as needed. I will give him prescription for arch supports. Discussed getting him back into see podiatry. No fracture or dislocation. I will see him back for his next regular scheduled appointment.  · Disposition  o Call or Return if symptoms worsen or persist.  o Keep next appointment            Electronically Signed by: Jesus Manuel Sloan DO -Author on September 29, 2020 10:11:01 AM

## 2021-05-13 NOTE — PROGRESS NOTES
Progress Note      Patient Name: Kathi Bernard   Patient ID: [id number]   Sex: Male   YOB: 1995    Primary Care Provider: Jesus Manuel Sloan DO   Referring Provider: Jesus Manuel Sloan DO    Visit Date: September 21, 2020    Provider: Jesus Manuel Sloan DO   [place] [facility] [place] [address] [place] [address], [address] [place] [address]  [id number]   Location Phone: [phone]          Chief Complaint  · right foot pain      History Of Present Illness  Kathi Bernard is a 25 year old /Black male who presents for evaluation and treatment of:      Patient presents today for an acute visit.  He is accompanied by his mother.  They report for the past week and a half he has experienced right foot pain.  Patient points to the pain on the midfoot on the dorsal aspect but also pain around the calcaneus on the plantar aspect of his right foot.  He did have a history of a blood clot remotely several years ago.  He has not had any recurrence of this.  Denies any right leg swelling.  Denies any right calf pain or tenderness.  He does have flat feet and was seeing Dr. Daniel.  Last seen on 5/15/2019.  At that time he was diagnosed with flatfeet and tinea pedis as well as a foot sprain.  He was given a cam walking boot but has not needed it.  About a week and a half ago he woke up with the pain.  First steps in the morning are painful particularly around his right heel.  He is walking better today than he was.  They are using the cam walker boot.  They are interested in the flu vaccine today.       Past Medical History  Acquired bilateral flat feet; Anemia; Ankle pain; Arthritis pain; Asthma; Athletes foot; Blood clot in vein; Bronchitis; Diabetes mellitus; Difficulty walking; Down syndrome; Fatigue; Flat feet, bilateral; Foot pain, left; Gout; Heel pain; Hypothyroidism; Mental disability; Obesity; Primary osteoarthritis, left ankle and foot; Right knee pain, chronic;  Sinus trouble         Past Surgical History  EAR Surgery         Medication List  allopurinol 100 mg oral tablet; aspirin 81 mg oral tablet,delayed release (DR/EC); Boudreauxs Butt Paste 16 % topical ointment; Stevea-Maximus Gentle topical cleanser; Claritin 10 mg oral tablet; clindamycin phosphate 1 % topical lotion; Custom made orthotics; cyanocobalamin (vitamin B-12) 1,000 mcg oral tablet; docusate sodium 100 mg oral capsule; folic acid 1 mg oral tablet; Lotrisone 1-0.05 % topical cream; Pataday 0.1 % ophthalmic (eye) drops; pyridoxine (vitamin B6) 50 mg oral tablet; Synthroid 25 mcg oral tablet; urea 20 % topical cream; Voltaren 1 % topical gel         Allergy List  NO KNOWN DRUG ALLERGIES       Allergies Reconciled  Family Medical History  Sickle-cell/Hb-C Disease; Diabetes, unspecified type; Bone Neoplasm, Malignant; Family history of certain chronic disabling diseases; arthritis         Social History  Alcohol Use (Never); Claustophobic (Unknown); lives with parents; Recreational Drug Use (Never); Student.; Tobacco (Never)         Immunizations  Name Date Admin   Influenza    Influenza    Tdap          Review of Systems     Gen: Denies any fever, chills, or weight changes  HEENT: Denies any changes in vision or hearing, denies nasal congestion, denies any neck tenderness or lymphadenopathy  Extremities: Denies edema  Psychiatric: Denies any changes in mood or affect  Neurologic: Denies any deficits  Skin: Denies any rashes       Vitals  Date Time BP Position Site L\R Cuff Size HR RR TEMP (F) WT  HT  BMI kg/m2 BSA m2 O2 Sat        09/21/2020 08:31 /84 Sitting    81 - R   319lbs 0oz 5'   62.3 2.47 98 %          Physical Examination     General: AAO 3, no acute distress, pleasant  HEENT: Normocephalic, atraumatic  Extremities: Pedal pulses intact on the right.  Good capillary refill.  Tenderness to palpation near the calcaneus on the plantar aspect of his right foot.  He is flat-footed.  No significant pain  to palpation where he has pain noted on the dorsal aspect of the mid right foot.  No calf tenderness.  No pitting edema.           Assessment  · Need for influenza vaccination     V04.81/Z23  · Right foot pain     729.5/M79.671  · Plantar fasciitis of right foot     728.71/M72.2    Problems Reconciled  Plan  · Orders  o Immunization Admin Fee (Single) ([facility]) (79266) - V04.81/Z23 - 09/21/2020  o Fluzone Quadrivalent Vaccine, age 6 months + (81050) - V04.81/Z23 - 09/21/2020   Vaccine - Influenza; Dose: 0.5; Site: Left Deltoid; Route: Intramuscular; Date: 09/21/2020 09:12:00; Exp: 06/30/2020; Lot: [id number]; Mfg: sanofi pasteur; TradeName: Fluzone Quadrivalent; Administered By: Fariha Ken MA; Comment: TOLERATED WELL, LEFT STABLE  o ACO-39: Current medications updated and reviewed () - - 09/21/2020  o Xray foot right [facility] Preferred View (15372-SQ) - 729.5/M79.671, 728.71/M72.2 - 09/21/2020   1 1/2 weeks pain on mid foot and near calcaneus.  · Medications  o Medications have been Reconciled  o Transition of Care or Provider Policy  · Instructions  o Patient was educated/instructed on their diagnosis, treatment and medications prior to discharge from the clinic today.  o Patient instructed to seek medical attention urgently for new or worsening symptoms.  o Call the office with any concerns or questions.  o Discussed getting x-ray of his right foot. I suspect he has plantar fasciitis. Discussed conservative treatment for plantar fasciitis including heel cups, stretching, and exercises. If symptoms continue to persist may consider corticosteroid injection. I would like to see him back in 1 week for close follow-up. I will get an x-ray to rule out any fractures. He is to bear weight as tolerated. For now he will continue with his walker boot. He does have inserts for his flat feet so I encouraged them to use these.  o I do not suspect at this time that he has a DVT in the right lower extremity. There is no calf  tenderness. No abnormal edema. His pain is localized. If symptoms worsen he is instructed to call or return. Mother is present today and verbalized understanding and is in agreement with treatment and management plan.  · Disposition  o Follow Up in 1 week            Electronically Signed by: Jesus Manuel Sloan DO -Author on September 21, 2020 09:30:32 AM

## 2021-05-14 VITALS
DIASTOLIC BLOOD PRESSURE: 70 MMHG | OXYGEN SATURATION: 84 % | HEART RATE: 111 BPM | WEIGHT: 315 LBS | BODY MASS INDEX: 61.84 KG/M2 | TEMPERATURE: 99.1 F | SYSTOLIC BLOOD PRESSURE: 102 MMHG | HEIGHT: 60 IN

## 2021-05-14 VITALS
TEMPERATURE: 97.1 F | HEART RATE: 103 BPM | SYSTOLIC BLOOD PRESSURE: 104 MMHG | HEIGHT: 60 IN | OXYGEN SATURATION: 94 % | DIASTOLIC BLOOD PRESSURE: 72 MMHG | WEIGHT: 311 LBS | BODY MASS INDEX: 61.06 KG/M2

## 2021-05-14 VITALS
HEIGHT: 60 IN | OXYGEN SATURATION: 95 % | TEMPERATURE: 97.3 F | DIASTOLIC BLOOD PRESSURE: 45 MMHG | HEART RATE: 88 BPM | SYSTOLIC BLOOD PRESSURE: 108 MMHG

## 2021-05-14 VITALS
OXYGEN SATURATION: 97 % | DIASTOLIC BLOOD PRESSURE: 72 MMHG | TEMPERATURE: 97.9 F | WEIGHT: 315 LBS | SYSTOLIC BLOOD PRESSURE: 118 MMHG | HEART RATE: 94 BPM | HEIGHT: 60 IN | BODY MASS INDEX: 61.84 KG/M2

## 2021-05-14 VITALS
WEIGHT: 315 LBS | HEIGHT: 60 IN | SYSTOLIC BLOOD PRESSURE: 128 MMHG | HEART RATE: 81 BPM | OXYGEN SATURATION: 98 % | BODY MASS INDEX: 61.84 KG/M2 | DIASTOLIC BLOOD PRESSURE: 84 MMHG

## 2021-05-14 VITALS
HEIGHT: 60 IN | SYSTOLIC BLOOD PRESSURE: 129 MMHG | OXYGEN SATURATION: 99 % | WEIGHT: 313 LBS | BODY MASS INDEX: 61.45 KG/M2 | TEMPERATURE: 97.1 F | HEART RATE: 77 BPM | DIASTOLIC BLOOD PRESSURE: 84 MMHG

## 2021-05-14 VITALS
WEIGHT: 300 LBS | OXYGEN SATURATION: 99 % | HEIGHT: 60 IN | HEART RATE: 85 BPM | SYSTOLIC BLOOD PRESSURE: 130 MMHG | TEMPERATURE: 98.4 F | DIASTOLIC BLOOD PRESSURE: 82 MMHG | BODY MASS INDEX: 58.9 KG/M2

## 2021-05-14 NOTE — PROGRESS NOTES
Progress Note      Patient Name: Kathi Bernard   Patient ID: [id number]   Sex: Male   YOB: 1995    Primary Care Provider: Jesus Manuel Sloan DO   Referring Provider: Jesus Manuel Sloan DO    Visit Date: January 7, 2021    Provider: Jesus Manuel Sloan DO   [place] [facility] [place]   Location Address: [address] [place] [address], [address] [place] [address]  [id number]   Location Phone: [phone]          Chief Complaint  · check up      History Of Present Illness  Kathi Bernard is a 25 year old /Black male who presents for evaluation and treatment of:      Presents for checkup.  He is accompanied by his mother, Sun.  Overall doing well.  He is having some dry skin on his back.  He scratches a lot to the point where sometimes he will bleed.  He does need meds refilled today.  He will be due for labs when he returns for follow-up in 3 months.  He is still seeing Dr. Daniel for right foot pain.  He still has Voltaren gel.       Past Medical History  Acquired bilateral flat feet; Anemia; Ankle pain; Arthritis pain; Asthma; Athletes foot; Blood clot in vein; Bronchitis; Diabetes mellitus; Difficulty walking; Down syndrome; Fatigue; Flat feet, bilateral; Foot pain, left; Gout; Heel pain; Hypothyroidism; Mental disability; Obesity; Primary osteoarthritis, left ankle and foot; Right knee pain, chronic; Sinus trouble         Past Surgical History  EAR Surgery         Medication List  allopurinol 100 mg oral tablet; aspirin 81 mg oral tablet,delayed release (DR/EC); Boudreauxs Butt Paste 16 % topical ointment; Ceta-Kleabigailz Gentle topical cleanser; Claritin 10 mg oral tablet; clindamycin phosphate 1 % topical lotion; Custom made orthotics; cyanocobalamin (vitamin B-12) 1,000 mcg oral tablet; docusate sodium 100 mg oral capsule; folic acid 1 mg oral tablet; Lotrisone 1-0.05 % topical cream; Pataday 0.1 % ophthalmic (eye) drops; pyridoxine (vitamin B6) 50 mg oral tablet; Synthroid 25  mcg oral tablet; urea 20 % topical cream; Voltaren 1 % topical gel         Allergy List  NO KNOWN DRUG ALLERGIES         Family Medical History  Sickle-cell/Hb-C Disease; Diabetes, unspecified type; Bone Neoplasm, Malignant; Family history of certain chronic disabling diseases; arthritis         Social History  Alcohol Use (Never); Claustophobic (Unknown); lives with parents; Recreational Drug Use (Never); Student.; Tobacco (Never)         Immunizations  Name Date Admin   Influenza 09/21/2020   Influenza 11/06/2019   Tdap 03/28/2019         Review of Systems     Gen: Denies any fever, chills, or weight changes  HEENT: Reports having some congestion as well as a cough at nighttime.  He has not been taking his Claritin regularly  Extremities: Denies edema  Psychiatric: Denies any changes in mood or affect  Neurologic: Denies any deficits  Skin: As discussed above       Vitals  Date Time BP Position Site L\R Cuff Size HR RR TEMP (F) WT  HT  BMI kg/m2 BSA m2 O2 Sat FR L/min FiO2 HC       01/07/2021 09:55 /82 Sitting    85 - R  98.4 300lbs 0oz 5'   58.59 2.4 99 %            Physical Examination     General: AAO 3, no acute distress, pleasant  HEENT: Normocephalic, atraumatic. Nasal congestion noted with no oropharyngeal erythema or exudates.  There is Oral candidiasis noted  Cardiovascular: Regular rate and rhythm without appreciable murmur  Respiratory: Clear to auscultation bilaterally no RRW  Gastrointestinal: Soft nontender nondistended with bowel sounds present  Skin: Dry skin noted on his upper back area.  No excoriations noted.  extremities: No edema  Neurologic: CN II through XII grossly intact   Psychiatric: Normal mood and affect           Assessment  · Dry skin     701.1/L85.3  · Medication monitoring encounter     V58.83/Z51.81  · Allergic rhinitis     477.9/J30.9  · Oral candidiasis     112.0/B37.0  · Hypothyroid     244.9/E03.9  · Vitamin B6  deficiency     266.1/E53.1  · Gout     274.9/M10.9  · Elevated hemoglobin     282.7/D58.2      Plan  · Orders  o CBC with Auto Diff [facility] (60440) - V58.83/Z51.81, 282.7/D58.2 - 04/07/2021  o CMP [facility] (77434) - V58.83/Z51.81, 274.9/M10.9, 282.7/D58.2 - 04/07/2021  o Thyroid Profile (72202, 43532, THYII) - 244.9/E03.9 - 04/07/2021  o ACO-14: Influenza immunization administered or previously received [facility] () - - 01/07/2021  o ACO-39: Current medications updated and reviewed (, 1159F) - - 01/07/2021  o Uric Acid Serum [facility] (93681) - 274.9/M10.9 - 04/07/2021  o Vitamin B6 measurement (83367) - 266.1/E53.1 - 04/07/2021  · Medications  o Cetaphil topical cream   SIG: apply to affected area(s) by topical route 2 times a day for 30 days   DISP: (16) Ounce with 2 refills  Prescribed on 01/07/2021     o nystatin 100,000 unit/mL oral suspension   SIG: take 5 milliliters (500,000 unit) by oral route swish and swallow 4 times per day   DISP: (140) Milliliter with 0 refills  Prescribed on 01/07/2021     o Boudreauxs Butt Paste 16 % topical ointment   SIG: apply to affected area by external route QD PRN   DISP: (1) Tube with 3 refills  Refilled on 01/07/2021     o Ceta-Klenz Gentle topical cleanser   SIG: apply to affected area(s) by topical route daily for 30 days   DISP: (2) Bottle with 2 refills  Refilled on 01/07/2021     o Claritin 10 mg oral tablet   SIG: take 1 tablet (10 mg) by oral route once daily for 90 days   DISP: (90) Tablet with 3 refills  Refilled on 01/07/2021     o Medications have been Reconciled  o Transition of Care or Provider Policy  · Instructions  o Patient was educated/instructed on their diagnosis, treatment and medications prior to discharge from the clinic today.  o Patient instructed to seek medical attention urgently for new or worsening symptoms.  o Call the office with any concerns or questions.  o I will send in nystatin swish and swallow. I also given Cetaphil for dry skin. Patient will have  labs updated prior to next appointment. He is instructed to call with any questions or concerns.  · Disposition  o Follow Up in 3 months.            Electronically Signed by: Jesus Manuel Sloan DO - on January 7, 2021 10:30:32 AM

## 2021-05-14 NOTE — PROGRESS NOTES
Progress Note      Patient Name: Kathi Bernard   Patient ID: [id number]   Sex: Male   YOB: 1995    Primary Care Provider: Jesus Manuel Sloan DO   Referring Provider: Jesus Manuel Sloan DO    Visit Date: April 14, 2021    Provider: BERTHA Colon   Location: [facility]   Location Address: [address] [place] [address], [address] [place] [address]  [id number]   Location Phone: [phone]          Chief Complaint  · ACUTE       History Of Present Illness  Kathi Bernard is a 25 year old /Black male who presents for evaluation and treatment of:      He is here for an acute visit today, he is a patient of Dr. Sloan.  Patient has down syndrome was accompained by mother.   Mother states that patient has been avoiding using the right foot for 3-4 days and has noticed that it has been swollen. Mother states that she doesn't think that he injured the foot and that they had been seen for a similar issue last year and were told that there were no fractures. She states that they have tried elevating foot, have tried putting voltaren gel without relief. Mother stated that she was unsure if she could use ibuprofen or tylenol for pain management.  He has been wearing a walking boot that he received from the podiatrist in the past.    Patient was also seen by Ilene Mcneill, NP student.       Past Medical History  Disease Name Date Onset Notes   Acquired bilateral flat feet 07/10/2018 --    Anemia --  --    Ankle pain --  --    Arthritis pain --  --    Asthma --  --    Athletes foot --  --    Blood clot in vein --  --    Bronchitis --  --    Diabetes mellitus --  --    Difficulty walking --  --    Down syndrome --  --    Fatigue --  --    Flat feet, bilateral --  --    Foot pain, left 07/10/2018 --    Gout --  --    Heel pain --  --    Hidradenitis suppurativa 04/06/2021 --    Hypothyroidism --  --    Mental disability --  --    Obesity --  --    Primary osteoarthritis, left  ankle and foot --  --    Right knee pain, chronic 08/14/2017 --    Sinus trouble --  --          Past Surgical History  Procedure Name Date Notes   EAR Surgery 1999/2005 --          Medication List  Name Date Started Instructions   allopurinol 100 mg oral tablet 07/16/2020 take 1 tablet (100 mg) by oral route once daily for 90 days   aspirin 81 mg oral tablet,delayed release (DR/EC) 07/16/2020 take 1 tablet (81 mg) by oral route once daily for 90 days   azithromycin 250 mg oral tablet 04/08/2021 take 2 tablets (500 mg) by oral route once daily for 1 day then 1 tablet (250 mg) by oral route once daily for 4 days   Boudreauxs Butt Paste 16 % topical ointment 01/07/2021 apply to affected area by external route QD PRN   Ceta-Klenz Gentle topical cleanser 01/07/2021 apply to affected area(s) by topical route daily for 30 days   Cetaphil topical cream 01/07/2021 apply to affected area(s) by topical route 2 times a day for 30 days   Claritin 10 mg oral tablet 01/07/2021 take 1 tablet (10 mg) by oral route once daily for 90 days   clindamycin phosphate 1 % topical lotion 04/06/2021 apply thin layer to affected area by topical route BID PRN   Custom made orthotics 03/07/2018 1 pair of custom made orthotics to be worn daily.   cyanocobalamin (vitamin B-12) 1,000 mcg oral tablet 07/16/2020 take 1 tablet by oral route daily for 90 days   docusate sodium 100 mg oral capsule 07/16/2020 take 1 capsule (100 mg) by oral route once daily for 90 days   [place] 952-1,347 mg topical powder in packet 04/06/2021 dissolve 1 packet in 1 quart of warm water- soak daily for 15 minutes   folic acid 1 mg oral tablet 07/16/2020 take 1 tablet (1 mg) by oral route once daily for 90 days   Lotrisone 1-0.05 % topical cream 05/15/2019 apply to affected area(s) by topical route BID.   nystatin 100,000 unit/mL oral suspension 01/07/2021 take 5 milliliters (500,000 unit) by oral route swish and swallow 4 times per day   Pataday 0.1 % ophthalmic (eye)  drops  --    ProAir HFA 90 mcg/actuation inhalation HFA aerosol inhaler 04/06/2021 inhale 1 - 2 puffs (90 - 180 mcg) by inhalation route every 6 hours as needed for cough/shortness of breath   pyridoxine (vitamin B6) 50 mg oral tablet 07/16/2020 take 1 tablet by oral route daily for 90 days   Synthroid 25 mcg oral tablet 07/16/2020 take 1 tablet (25 mcg) by oral route once daily for 90 days   urea 20 % topical cream 07/16/2020 apply to affected area(s) by topical route daily for 30 days   Voltaren 1 % topical gel 07/16/2020 apply 2 gram to the affected area(s) by topical route 4 times per day for 30 days         Allergy List  Allergen Name Date Reaction Notes   NO KNOWN DRUG ALLERGIES --  --  --        Allergies Reconciled  Family Medical History  Disease Name Relative/Age Notes   Sickle-cell/Hb-C Disease Brother/   --    Diabetes, unspecified type Father/  Mother/   Mother; Father   Bone Neoplasm, Malignant  grandparent   Family history of certain chronic disabling diseases; arthritis Father/  Mother/   Mother; Father         Social History  Finding Status Start/Stop Quantity Notes   Alcohol Use Never --/-- --  does not drink   Claustophobic Unknown --/-- --  yes   lives with parents --  --/-- --  --    Recreational Drug Use Never --/-- --  no   Student. --  --/-- --  --    Tobacco Never --/-- --  never smoker         Immunizations  NameDate Admin Mfg Trade Name Lot Number Route Inj VIS Given VIS Publication   Jnixaigck11/21/2020 PMC Fluzone Quadrivalent [id number] IM LD 09/21/2020    Comments: TOLERATED WELL, LEFT STABLE   Tdap03/28/2019 [facility] BOOSTRIX [id number] RD 03/28/2019    Comments: TOLERATED WELL, STABLE CONDITION         Review of Systems  · Constitutional  o Denies  o : fever, fatigue, weight loss, weight gain  · Cardiovascular  o Denies  o : lower extremity edema, claudication, chest pressure, palpitations  · Respiratory  o Denies  o : shortness of breath, wheezing, cough, hemoptysis, dyspnea on  exertion  · Gastrointestinal  o Denies  o : nausea, vomiting, diarrhea, constipation, abdominal pain  · Integument  o Denies  o : rash, itching  · Musculoskeletal  o Admits  o : joint swelling, foot pain  · Endocrine  o Denies  o : polyuria, polydipsia, cold intolerance, heat intolerance  · Psychiatric  o Denies  o : anxiety, depression      Vitals  Date Time BP Position Site L\R Cuff Size HR RR TEMP (F) WT  HT  BMI kg/m2 BSA m2 O2 Sat FR L/min FiO2 HC       04/14/2021 10:11 /70 Sitting    111 - R  99.1 321lbs 0oz 5'   62.69 2.48 84 %            Physical Examination  · Constitutional  o Appearance  o : no acute distress, well-nourished  · Head and Face  o Head  o :   § Inspection  § : atraumatic, normocephalic  · Neck  o Thyroid  o : gland size normal, nontender, no nodules or masses present on palpation, symmetric  · Respiratory  o Respiratory Effort  o : breathing comfortably, symmetric chest rise  o Auscultation of Lungs  o : clear to asculatation bilaterally, no wheezes, rales, or rhonchii  · Cardiovascular  o Heart  o :   § Auscultation of Heart  § : regular rate and rhythm, no murmurs, rubs, or gallops  o Peripheral Vascular System  o :   § Extremities  § : no edema  · Lymphatic  o Neck  o : no lymphadenopathy present  · Psychiatric  o General  o : normal mood and affect  · Right Ankle/Foot  o Inspection  o : no redness, swelling present, no atrophy  o Palpation  o : non-tender          Assessment  · Right foot pain     729.5/M79.671  Patient referred to podiatrist Dr. Salmeron. Advised to try OTC tylenol for pain.      Plan  · Orders  o ACO-39: Current medications updated and reviewed (1159F, ) - - 04/14/2021  o PODIATRY CONSULTATION (PODIA) - 729.5/M79.671 - 04/14/2021   Dr. Daniel  · Medications  o Medications have been Reconciled  o Transition of Care or Provider Policy  · Instructions  o Patient was educated/instructed on their diagnosis, treatment and medications prior to discharge from the  clinic today.  o Patient instructed to seek medical attention urgently for new or worsening symptoms.  o Call the office with any concerns or questions.  · Disposition  o Call or Return if symptoms worsen or persist.            Electronically Signed by: BERTHA Colon -Author on April 14, 2021 05:15:58 PM

## 2021-05-14 NOTE — PROGRESS NOTES
Progress Note      Patient Name: Kathi Bernard   Patient ID: [id number]   Sex: Male   YOB: 1995    Primary Care Provider: Jesus Manuel Sloan DO   Referring Provider: Jesus Manuel Sloan DO    Visit Date: April 29, 2021    Provider: Josias Daniel DPM   Location: [facility]   Location Address: [address] [place] [address]  [id number]   Location Phone: [phone]          Chief Complaint  · Right Foot Pain      History Of Present Illness  Kathi Bernard is a 25 year old /Black male who presents to the [facility] today a follow up for:      New, Established, New Problem: Established  Location: Right medial midfoot  Duration:    Onset: Insidious  Nature: Sore, achy  Stable, worsening, improving: recurring  Aggravating factors:   Patient relates pain is aggravated by shoe gear and ambulation.   Previous Treatment: Voltaren Gel, Physical Therapy, CAM walker (current one is worn out)    Patient denies any fevers, chills, nausea, vomiting, shortness of breathe, nor any other constitutional signs nor symptoms.    Patient relates no medical changes since their last visit.         Past Medical History  Acquired bilateral flat feet; Anemia; Ankle pain; Arthritis pain; Asthma; Athletes foot; Blood clot in vein; Bronchitis; Diabetes mellitus; Difficulty walking; Down syndrome; Fatigue; Flat feet, bilateral; Foot pain, left; Gout; Heel pain; Hidradenitis suppurativa; Hypothyroidism; Mental disability; Obesity; Primary osteoarthritis, left ankle and foot; Right knee pain, chronic; Sinus trouble         Past Surgical History  EAR Surgery         Medication List  allopurinol 100 mg oral tablet; aspirin 81 mg oral tablet,delayed release (DR/EC); azithromycin 250 mg oral tablet; Boudreauxs Butt Paste 16 % topical ointment; Ceta-Klenz Gentle topical cleanser; Cetaphil topical cream; Claritin 10 mg oral tablet; clindamycin phosphate 1 % topical lotion; Custom  made orthotics; cyanocobalamin (vitamin B-12) 1,000 mcg oral tablet; docusate sodium 100 mg oral capsule; Domeboro 952-1,347 mg topical powder in packet; folic acid 1 mg oral tablet; Lotrisone 1-0.05 % topical cream; nystatin 100,000 unit/mL oral suspension; Pataday 0.1 % ophthalmic (eye) drops; ProAir HFA 90 mcg/actuation inhalation HFA aerosol inhaler; pyridoxine (vitamin B6) 50 mg oral tablet; Synthroid 25 mcg oral tablet; urea 20 % topical cream; Voltaren 1 % topical gel         Allergy List  NO KNOWN DRUG ALLERGIES       Allergies Reconciled  Family Medical History  Sickle-cell/Hb-C Disease; Diabetes, unspecified type; Bone Neoplasm, Malignant; Family history of certain chronic disabling diseases; arthritis         Social History  Alcohol Use (Never); Claustophobic (Unknown); lives with parents; Recreational Drug Use (Never); Student.; Tobacco (Never)         Immunizations  Name Date Admin   Influenza 09/21/2020   Influenza 11/06/2019   Tdap 03/28/2019         Review of Systems  · Constitutional  o Denies  o : fatigue, night sweats  · Eyes  o Denies  o : double vision, blurred vision  · HENT  o Denies  o : vertigo, recent head injury  · Cardiovascular  o Denies  o : chest pain, irregular heart beats  · Respiratory  o Denies  o : shortness of breath, productive cough  · Gastrointestinal  o Denies  o : nausea, vomiting  · Genitourinary  o Denies  o : dysuria, urinary retention  · Integument  o Denies  o : hair growth change, new skin lesions  · Neurologic  o Denies  o : altered mental status, seizures  · Musculoskeletal  o * See HPI  · Endocrine  o Denies  o : cold intolerance, heat intolerance  · Heme-Lymph  o Denies  o : petechiae, lymph node enlargement or tenderness  · Allergic-Immunologic  o Denies  o : frequent illnesses      Vitals  Date Time BP Position Site L\R Cuff Size HR RR TEMP (F) WT  HT  BMI kg/m2 BSA m2 O2 Sat FR L/min FiO2        04/29/2021 09:17 /45 Sitting    88 - R  97.3  5'     95 %             Physical Examination  · Constitutional  o Appearance  o : overweight, well developed. Down's Syndrome.  · Cardiovascular  o Peripheral Vascular System  o :   § Pedal Pulses  § : pulses 2 + and symmetrical  § Extremities  § : no edema in lower extremities  · Musculoskeletal  o General  o :   § General Musculoskeletal  § : Lower extremity muscle and strength and range of motion is equal and symmetrical bilaterally. The knees are noted to be normal in alignment. Significant for pes planus b/l. Ambulating in CAM walker on Right.  · Skin and Subcutaneous Tissue  o General Inspection  o : Skin is noted to have normal texture and turgor, with no excrescences noted.   o Digits and Nails  o : The toenails are noted to be without disese.  · Neurologic  o Sensation  o : Epicritic sensations intact bilaterally.  · Right Ankle/Foot  o Inspection  o : Right foot: Positive tenderness to palpation to posterior tibial tendon. No signs of edema, erythema, lymphangitis, nor signs of infection.   o Neurovascular  o : Sharp/dull sensation is decreased metatarsal bilaterally. Monofilament sensation examination of the right foot is decreased Monofilament sensation examination of the left foot is decreased   o Special Tests  o : +TTP to posterior tibial tendon along right medial ankle and foot.           Assessment  · Foot pain, right     729.5/M79.671  · Tibial tendonitis, posterior, right     726.72/M76.821      Plan  · Orders  o PHYSICAL THERAPY CONSULTATION (PHYTH) - - 04/29/2021  · Medications  o [place] Brace   SIG: Please make custom Hong Braces for both ankles.   DISP: (1) with 0 refills  Prescribed on 04/29/2021     o Medications have been Reconciled  o Transition of Care or Provider Policy  · Instructions  o Follow Up in 4 weeks.  o Discuss Findings: I have discussed the findings of this evaluation with the patient. The discussion included a complete verbal explanation of any changes in the examination results,  diagnosis, and the current treatment plan. A schedule for future care needs was explained. If any questions should arise after returning home, I have encouraged the patient to feel free to contact Dr. Daniel. The patient states understanding and agreement with this plan.  o Instructed to use Voltaren Gel QID PRN.  o RICE Therapy  o CAM walker applied to Right lower leg. Pt instructed to utilize for partial-weight bearing at all time with CAM walker. The patient states understanding and agreement with this plan.   o Electronically Identified Patient Education Materials Provided Electronically  · Disposition  o Call or Return if symptoms worsen or persist.  · Referrals  o ID: [id number] Date: 09/29/2020 Type: Inbound  Specialty: Podiatry            Electronically Signed by: Josias Daniel DPM -Author on April 29, 2021 09:47:17 AM

## 2021-05-14 NOTE — PROGRESS NOTES
Progress Note      Patient Name: Kathi Bernard   Patient ID: [id number]   Sex: Male   YOB: 1995    Primary Care Provider: Jesus Manuel Sloan DO   Referring Provider: Jesus Manuel Sloan DO    Visit Date: April 6, 2021    Provider: Jesus Manuel Sloan DO   Location: [facility]   Location Address: [address] [place] [address], [address] [place] [address]  [id number]   Location Phone: [phone]          Chief Complaint  · cough      History Of Present Illness  Kathi Bernard is a 25 year old /Black male who presents for evaluation and treatment of:      Patient presents today to discuss a chronic cough that he has had for the past couple months.  He does have a history of asthma.  His father presents today.  He is not coughing up anything.  He has not had a chest x-ray to further evaluate this.  He does need a refill of Domeboro which he uses to help with the irritation from athlete's foot.  He is due for labs so they will go ahead and get these done today.  Patient does have hidradenitis suppurativa.  They are requesting refill of clindamycin which has worked well for him.  He does have some new lesions in the left axillary region as well as in the groin.       Past Medical History  Acquired bilateral flat feet; Anemia; Ankle pain; Arthritis pain; Asthma; Athletes foot; Blood clot in vein; Bronchitis; Diabetes mellitus; Difficulty walking; Down syndrome; Fatigue; Flat feet, bilateral; Foot pain, left; Gout; Heel pain; Hypothyroidism; Mental disability; Obesity; Primary osteoarthritis, left ankle and foot; Right knee pain, chronic; Sinus trouble         Past Surgical History  EAR Surgery         Medication List  allopurinol 100 mg oral tablet; aspirin 81 mg oral tablet,delayed release (/EC); Boudreauxs Butt Paste 16 % topical ointment; Stevea-Klealessandro Gentle topical cleanser; Cetaphil topical cream; Claritin 10 mg oral tablet; clindamycin phosphate 1 % topical lotion; Custom made  orthotics; cyanocobalamin (vitamin B-12) 1,000 mcg oral tablet; docusate sodium 100 mg oral capsule; folic acid 1 mg oral tablet; Lotrisone 1-0.05 % topical cream; nystatin 100,000 unit/mL oral suspension; Pataday 0.1 % ophthalmic (eye) drops; pyridoxine (vitamin B6) 50 mg oral tablet; Synthroid 25 mcg oral tablet; urea 20 % topical cream; Voltaren 1 % topical gel         Allergy List  NO KNOWN DRUG ALLERGIES         Family Medical History  Sickle-cell/Hb-C Disease; Diabetes, unspecified type; Bone Neoplasm, Malignant; Family history of certain chronic disabling diseases; arthritis         Social History  Alcohol Use (Never); Claustophobic (Unknown); lives with parents; Recreational Drug Use (Never); Student.; Tobacco (Never)         Immunizations  Name Date Admin   Influenza 09/21/2020   Influenza 11/06/2019   Tdap 03/28/2019         Review of Systems     Gen: Denies any fever, chills, or weight changes  HEENT: Denies any changes in vision or hearing, denies nasal congestion, denies any neck tenderness or lymphadenopathy  Respiratory: Denies any shortness of breath.  Cough noted in the morning.  Extremities: Denies edema  Psychiatric: Denies any changes in mood or affect  Neurologic: Denies any deficits  Skin: As discussed above.       Vitals  Date Time BP Position Site L\R Cuff Size HR RR TEMP (F) WT  HT  BMI kg/m2 BSA m2 O2 Sat FR L/min FiO2 HC       04/06/2021 02:09 /72 Sitting    94 - R  97.9 321lbs 1oz 5'   62.7 2.48 97 %            Physical Examination     General: AAO 3, no acute distress, pleasant  HEENT: Normocephalic, atraumatic  Cardiovascular: Regular rate and rhythm without appreciable murmur  Respiratory: Clear to auscultation bilaterally no RRW  Gastrointestinal: Soft nontender nondistended with bowel sounds present  Skin: Patient has nodular erythematous areas noted on the left axillary area as well as in the groin area.  No active drainage.  Lesions appear consistent with hidradenitis  suppurativa  extremities: No edema  Neurologic: CN II through XII grossly intact   Psychiatric: Normal mood and affect           Assessment  · Cough     786.2/R05  · Hidradenitis suppurativa     705.83/L73.2  · Hypothyroid     244.9/E03.9      Plan  · Orders  o ACO-39: Current medications updated and reviewed (1159F, ) - - 04/06/2021  o Xray chest 2 views [facility] Preferred View (90355) - 786.2/R05 - 04/06/2021   cough x 2 months  · Medications  o ProAir HFA 90 mcg/actuation inhalation HFA aerosol inhaler   SIG: inhale 1 - 2 puffs (90 - 180 mcg) by inhalation route every 6 hours as needed for cough/shortness of breath   DISP: (1) Inhaler with 3 refills  Prescribed on 04/06/2021     o Domeboro 952-1,347 mg topical powder in packet   SIG: dissolve 1 packet in 1 quart of warm water- soak daily for 15 minutes   DISP: (90) Packet with 0 refills  Prescribed on 04/06/2021     o clindamycin phosphate 1 % topical lotion   SIG: apply thin layer to affected area by topical route BID PRN   DISP: (60) Milliliter with 4 refills  Adjusted on 04/06/2021     · Instructions  o Patient was educated/instructed on their diagnosis, treatment and medications prior to discharge from the clinic today.  o Patient instructed to seek medical attention urgently for new or worsening symptoms.  o Call the office with any concerns or questions.  o Discussed getting a chest x-ray for further evaluation. Plan on seeing patient back in 3 months or sooner if needed. I will give him an albuterol inhaler. I will also refill his clindamycin which has worked well.  · Disposition  o Follow Up in 3 months.            Electronically Signed by: Jesus Manuel Sloan DO -Author on April 6, 2021 05:08:03 PM

## 2021-05-15 VITALS
HEIGHT: 60 IN | DIASTOLIC BLOOD PRESSURE: 62 MMHG | SYSTOLIC BLOOD PRESSURE: 114 MMHG | BODY MASS INDEX: 61.45 KG/M2 | HEART RATE: 80 BPM | WEIGHT: 313 LBS | TEMPERATURE: 98.3 F | OXYGEN SATURATION: 97 %

## 2021-05-15 VITALS
HEART RATE: 100 BPM | BODY MASS INDEX: 61.84 KG/M2 | HEIGHT: 60 IN | WEIGHT: 315 LBS | SYSTOLIC BLOOD PRESSURE: 124 MMHG | DIASTOLIC BLOOD PRESSURE: 68 MMHG | OXYGEN SATURATION: 94 % | TEMPERATURE: 98.3 F

## 2021-05-15 VITALS
OXYGEN SATURATION: 94 % | HEIGHT: 60 IN | SYSTOLIC BLOOD PRESSURE: 122 MMHG | BODY MASS INDEX: 61.84 KG/M2 | HEART RATE: 96 BPM | TEMPERATURE: 98.1 F | WEIGHT: 315 LBS | DIASTOLIC BLOOD PRESSURE: 74 MMHG

## 2021-05-15 VITALS
TEMPERATURE: 98.1 F | OXYGEN SATURATION: 99 % | HEIGHT: 60 IN | SYSTOLIC BLOOD PRESSURE: 118 MMHG | DIASTOLIC BLOOD PRESSURE: 82 MMHG | HEART RATE: 78 BPM | WEIGHT: 315 LBS | BODY MASS INDEX: 61.84 KG/M2

## 2021-05-15 VITALS
WEIGHT: 301 LBS | OXYGEN SATURATION: 96 % | HEIGHT: 60 IN | HEART RATE: 95 BPM | BODY MASS INDEX: 59.09 KG/M2 | DIASTOLIC BLOOD PRESSURE: 65 MMHG | SYSTOLIC BLOOD PRESSURE: 120 MMHG

## 2021-05-15 VITALS
OXYGEN SATURATION: 93 % | HEIGHT: 60 IN | TEMPERATURE: 98.6 F | WEIGHT: 315 LBS | DIASTOLIC BLOOD PRESSURE: 74 MMHG | BODY MASS INDEX: 61.84 KG/M2 | HEART RATE: 111 BPM | SYSTOLIC BLOOD PRESSURE: 126 MMHG

## 2021-05-15 VITALS
HEART RATE: 99 BPM | HEIGHT: 60 IN | SYSTOLIC BLOOD PRESSURE: 120 MMHG | TEMPERATURE: 98.1 F | DIASTOLIC BLOOD PRESSURE: 68 MMHG | OXYGEN SATURATION: 95 %

## 2021-05-16 VITALS — BODY MASS INDEX: 58.11 KG/M2 | HEIGHT: 60 IN | HEART RATE: 98 BPM | WEIGHT: 296 LBS | OXYGEN SATURATION: 96 %

## 2021-05-16 VITALS — WEIGHT: 298 LBS | OXYGEN SATURATION: 97 % | BODY MASS INDEX: 58.51 KG/M2 | HEIGHT: 60 IN | HEART RATE: 97 BPM

## 2021-05-28 ENCOUNTER — OFFICE VISIT CONVERTED (OUTPATIENT)
Dept: PODIATRY | Facility: CLINIC | Age: 26
End: 2021-05-28
Attending: PODIATRIST

## 2021-05-28 VITALS
SYSTOLIC BLOOD PRESSURE: 111 MMHG | BODY MASS INDEX: 62.99 KG/M2 | DIASTOLIC BLOOD PRESSURE: 74 MMHG | WEIGHT: 315 LBS | RESPIRATION RATE: 20 BRPM | TEMPERATURE: 97.8 F | HEART RATE: 97 BPM | OXYGEN SATURATION: 99 %

## 2021-05-28 VITALS
HEART RATE: 77 BPM | HEIGHT: 62 IN | SYSTOLIC BLOOD PRESSURE: 135 MMHG | WEIGHT: 315 LBS | OXYGEN SATURATION: 95 % | DIASTOLIC BLOOD PRESSURE: 73 MMHG | TEMPERATURE: 97.8 F | BODY MASS INDEX: 57.97 KG/M2

## 2021-05-28 NOTE — PROGRESS NOTES
Patient: NING NEWELL     Acct: [id number]     Report: #[id number]  UNIT #: [id number]     : 1995    Encounter Date:2019  PRIMARY CARE: B[facility] CORNELI[facility]  ***Signed***  --------------------------------------------------------------------------------------------------------------------  NURSE INTAKE      Visit Type      New Patient Visit            Chief Complaint      LEUKOPENIA AND POLYCYTHEMIA            History and Present Illness      Past History      2019: First visit for this 24-year-old with leukopenia and erythrocytosis.     He has Down syndrome and obstructive sleep apnea.  He was treated approximately    2 years ago for a lower extremity DVT.            [facility] - Hematology Interim      This is a 24-year-old -American male with Down's syndrome and obstructive    sleep apnea.  We have been asked to evaluate his abnormal blood counts including    leukopenia and erythrocytosis.  History is obtained from the father and from the    medical record.  His father is unaware of any previous hematologic     abnormalities.  The father does note that he has a chronic benign leukopenia.      He notes that the patient does not wear his CPAP consistently.  Mr. Newell     does not note any other symptoms and his father confirms this.  He did have a     deep vein thrombosis a couple of years ago and his father describes treatment     with Lovenox injections for 3 months.            Most Recent Lab Findings      2019 hemoglobin 18.6 with hematocrit 57 with white count 3.2      2019 hemoglobin 17.9 with hematocrit 56.9 with white count 4.5      2017 hemoglobin 18 with hematocrit 52.9 with white blood count 3.3      2016 hemoglobin 15.6 with hematocrit 47.7 with white blood count 4.2            PAST, FAMILY   Past Medical History      Past Medical History:  Sleep Apnea      Hematology/Oncology (M):  Leukopenia            Tobacco Use      Tobacco status:  Never  smoker            Alcohol Use      Alcohol intake:  OCCASSIONALLY            Substance Use      Substance use:  Denies use            REVIEW OF SYSTEMS      General:  Admits: Fatigue;          Denies: Appetite Change, Fever, Night Sweats, Weight Gain, Weight Loss      Eye:  Denies Blurred Vision, Denies Corrective Lenses, Denies Diplopia, Denies     Vision Changes      ENT:  Denies Headache, Denies Hearing Loss, Denies Hoarseness, Denies Sore     Throat      Cardiovascular:  Denies Chest Pain, Denies Palpitations      Respiratory:  Denies: Coughing Blood, Productive Cough, Shortness of Air,     Wheezing      Gastrointestinal:  Denies Bloody Stools, Denies Constipation, Denies Diarrhea,     Denies Nausea/Vomiting, Denies Problem Swallowing, Denies Unable to Control     Bowels      Genitourinary:  Denies Blood in Urine, Denies Incontinence, Denies Painful     Urination      Musculoskeletal:  Denies Back Pain, Denies Muscle Pain, Denies Painful Joints      Integumentary:  Denies Itching, Denies Lesions, Denies Rash      Neurologic:  Denies Dizziness, Denies Numbness\Tingling, Denies Seizures      Psychiatric:  Denies Anxiety, Denies Depression      Endocrine:  Denies Cold Intolerance, Denies Heat Intolerance      Hematologic/Lymphatic:  Denies Bruising, Denies Bleeding, Denies Enlarged Lymph     Nodes            VITAL SIGNS AND SCORES      Vitals      Height 5 ft 2.28 in / 158.2 cm      Weight 324 lbs 8.274 oz / 147.2 kg      BSA 2.36 m2      BMI 58.8 kg/m2      Temperature 97.8 F / 36.56 C - Temporal      Pulse 77      Blood Pressure 135/73 Sitting, Right Arm      Pulse Oximetry 95%, ROOM AIR            Pain Score      Experiencing any pain?:  No      Pain Scale Used:  Numerical      Pain Intensity:  0            Fatigue Score      Experiencing any fatigue?:  Yes      Fatigue (0-10 scale):  5            EXAM      General Appearance:  Positive for: Alert, Cooperative, Obese;          Negative for: Acute Distress       Other      Down's syndrome features      Eye:  Positive for: Anicteric Sclerae, Reactive to Light, Size      HEENT:  Positive for: Oropharynx clear;          Negative for: Erythema, Exudates      Neck:  Positive for: Supple;          Negative for: Masses      Respiratory:  Positive for: CTAB, Normal Respiratory Effort;          Negative for: Crackles, Diminished Breath      Abdomen/Gastro:  Positive for: Soft;          Negative for: Distention, Hepatosplenomegaly, Tenderness      Cardiovascular:  Positive for: RRR;          Negative for: Gallop, Murmur      Skin:  Negative for: Lesions, Rash, Ulcers      Musculoskeletal:  Positive for: Full ROM Upper Extremety, Full Muscle Strength,     Full Muscle Tone      Lower Extremities:  Positive for: Normal Gait and Station;          Negative for: Deformities, Edema      Upper Extremities:  Negative for: Clubbing, Digital Cyanosis      Lymphatic:  Negative for: Axillary, Cervical, Supraclavicular            PREVENTION      Hx Influenza Vaccination:  Yes      Date Influenza Vaccine Given:  Oct 1, 2019      Influenza Vaccine Declined:  No      2 or More Falls Past Year?:  No      Fall Past Year with Injury?:  No      Hx Pneumococcal Vaccination:  No      Encouraged to follow-up with:  PCP regarding preventative exams.      Chart initiated by      JUAN JOSÉ SIN [facility]            ALLERGY/MEDS      Allergies      Coded Allergies:             No Known Allergies (Unverified , 11/19/19)            Medications            Urea (Uramaxin Cream) 255 Gm Cream..g.      1 APL TOPICAL, #255 GM         Reported         11/19/19       Cetirizine Hcl (ZyrTEC) 5 Mg Tablet      10 MG PO QDAY for 30 Days, #60 TAB         Reported         11/19/19       Loratadine (Claritin) 5 Mg Tab.rapdis      5 MG PO QDAY, TAB         Reported         11/19/19       Levothyroxine (Synthroid) 0.025 Mg      25 MG PO QDAY, #15 TAB 0 Refills         Reported         11/19/19       Benzonatate (Benzonatate) 100 Mg  Capsule      100 MG PO Q8H PRN for COUGH, #30 CAP         Reported         19       Docusate Calcium (Docusate Calcium) 240 Mg Capsule      100 MG PO QDAY, CAP         Reported         19       Cyanocobalamin (B-12 DOTS) 500 Mcg Tab      1000 MCG PO QDAY, #30 TAB         Reported         19       Folic Acid (Folic Acid) 1 Mg Tablet      1 MG PO QDAY, #30 TAB 0 Refills         Reported         19      Medications Reviewed:  Changes made to meds            IMPRESSION/PLAN      Impression      1.  Erythrocytosis.  This could represent polycythemia vera but I suspect this     represents a sleep apnea.  We will want to rule out polycythemia vera before     attributing to his sleep apnea and I do want to rule out carbon monoxide     toxicity also.      2.  Leukopenia.  This is almost certainly familial.  He has had a stable mild     leukopenia with no episodes of severe leukopenia or granulocytopenia and this is    similar to the history his father reports.            Diagnosis      Polycythemia - D75.1            Notes      New Medications      * Folic Acid 1 MG TABLET: 1 MG PO QDAY #30      * CYANOCOBALAMIN (B-12 DOTS) 500 MCG TAB: 1,000 MCG PO QDAY #30      * Docusate Calcium 240 MG CAPSULE: 100 MG PO QDAY      * Benzonatate 100 MG CAPSULE: 100 MG PO Q8H PRN COUGH #30      * Levothyroxine (Synthroid) 0.025 M MG PO QDAY #15      * Loratadine (Claritin) 5 MG TAB.RAPDIS: 5 MG PO QDAY      * CETIRIZINE HCL (ZyrTEC) 5 MG TABLET: 10 MG PO QDAY 30 Days #60      * Urea (Uramaxin Cream) 255 GM CREAM..G.: 1 APL TOPICAL #255      New Diagnostics      * CBC, Routine         Dx: Polycythemia - D75.1      * Path Review Peripheral Smear, Routine         Dx: Polycythemia - D75.1      * Jak2 V617f and Jak2 Exon 12-14, Routine         Dx: Polycythemia - D75.1            Plan      1.  I will check a Louis 2 and venous carboxyhemoglobin level along with a CBC     with review of the peripheral blood smear.  We  will call if any of these require    immediate attention.      2.  If these are all acceptable, I would attribute his erythrocytosis to his     sleep apnea and recommend that they speak with the physician managing his     treatment for this.  Unless he has other findings, I would attribute the     leukopenia to a benign familial neutropenia and this can simply be monitored as     part of his regular health maintenance.      3.  I have asked him to return here in about a month to review all these     findings.  I doubt he will need regular follow-up here after that.            Patient Education      Patient Education Provided:  Yes            Electronically signed by SANTOS AVALOS  11/19/2019 12:54       Disclaimer: Converted document may not contain table formatting or lab diagrams. Please see [employer] for the authenticated document.

## 2021-05-28 NOTE — PROGRESS NOTES
Patient: NING NEWELL     Acct: [id number]     Report: #[id number]  UNIT #: [id number]     : 1995    Encounter Date:2020  PRIMARY CARE: BAM CORNELIUS  ***Signed***  --------------------------------------------------------------------------------------------------------------------  NURSE INTAKE      Visit Type      Established Patient Visit            Chief Complaint      LEUKOPENIA            History and Present Illness      Past History      Mr. Newell is a 24-year-old male with Down syndrome and obstructive sleep     apnea who continues to be cared for by his parents.  He was initially seen in     this clinic in 2019 for leukopenia and erythrocytosis.  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            Of note he was treated in 2017 for a lower extremity DVT.            [facility] - Hematology Interim      Patient comes in today with his mom to discuss the results of his recent labs.      His white blood cell count increased to 9.21 from a baseline of around 4.  His     hemoglobin has remained about the same 18.5 with hematocrit of 54.7.  His MCV is    normal at 94.5.  His platelet count remains within normal limits at 237.  The     rest of the differential is also normal.            The patient remained asleep and snoring too much of the conversation with his     mother.  She reports that he has no new concerns in his health.  He continues to    suffer from obstructive sleep apnea and refuses to wear his CPAP.  He is sleepy     much of the time and does little activity.            PAST, FAMILY   Past Medical History      Past Medical History:  Sleep Apnea      Hematology/Oncology (M):  Leukopenia            Tobacco Use      Tobacco status:  Never smoker            Alcohol Use      Alcohol intake:  OCCASSIONALLY            Substance Use      Substance use:   Denies use            REVIEW OF SYSTEMS      General:  Denies: Appetite Change, Fatigue, Fever, Night Sweats, Weight Gain,     Weight Loss      Other      Constant sleepiness      Eye:  Denies Blurred Vision, Denies Corrective Lenses, Denies Diplopia, Denies     Vision Changes      ENT:  Denies Headache, Denies Hearing Loss, Denies Hoarseness, Denies Sore     Throat      Cardiovascular:  Denies Chest Pain, Denies Palpitations      Respiratory:  Denies: Coughing Blood, Productive Cough, Shortness of Air,     Wheezing      Gastrointestinal:  Denies Bloody Stools, Denies Constipation, Denies Diarrhea,     Denies Nausea/Vomiting, Denies Problem Swallowing, Denies Unable to Control     Bowels      Genitourinary:  Denies Blood in Urine, Denies Incontinence, Denies Painful     Urination      Musculoskeletal:  Denies Back Pain, Denies Muscle Pain, Denies Painful Joints      Integumentary:  Denies Itching, Denies Lesions, Denies Rash      Neurologic:  Denies Dizziness, Denies Numbness\Tingling, Denies Seizures      Psychiatric:  Denies Anxiety, Denies Depression      Endocrine:  Denies Cold Intolerance, Denies Heat Intolerance      Hematologic/Lymphatic:  Denies Bruising, Denies Bleeding, Denies Enlarged Lymph     Nodes            VITAL SIGNS AND SCORES      Vitals      Weight 322 lbs 8.528 oz / 146.3 kg      Temperature 97.8 F / 36.56 C - Temporal      Pulse 97      Respirations 20      Blood Pressure 111/74 Sitting, Left Arm      Pulse Oximetry 99%, ROOM AIR            Pain Score      Experiencing any pain?:  No      Pain Scale Used:  Numerical      Pain Intensity:  0            Fatigue Score      Experiencing any fatigue?:  No      Fatigue (0-10 scale):  0 (none)            EXAM      General: Alert, cooperative, no acute distress, obese      Eyes: Anicteric sclera, PERRLA      HEENT: Oropharynx clear, no exudates      Respiratory: CTAB, normal respiratory effort but snoring while sleeping      Abdomen: Normal active bowel  sounds, no tenderness, no distention      Cardiovascular: RRR, no murmur, no peripheral edema      Skin: Normal tone, no rash, no lesions      Psychiatric: Appropriate affect, intact judgment      Neurologic: No focal sensory or motor deficits, falling asleep easily during     conversation      Musculoskeletal: Normal muscle strength, normal muscle tone      Extremities: No clubbing, cyanosis, or deformities            PREVENTION      Hx Influenza Vaccination:  Yes      Date Influenza Vaccine Given:  Oct 1, 2019      Influenza Vaccine Declined:  No      2 or More Falls Past Year?:  No      Fall Past Year with Injury?:  No      Hx Pneumococcal Vaccination:  No      Encouraged to follow-up with:  PCP regarding preventative exams.      Chart initiated by      HAYDER SCALES [facility]            ALLERGY/MEDS      Allergies      Coded Allergies:             NO KNOWN ALLERGIES (Unverified , 3/5/20)            Medications      Last Reconciled on 3/5/20 10:39 by RUTH FOREMAN      Pyridoxine (Pyridoxine) 50 Mg Tablet      50 MG PO QDAY for 30 Days, #30 TAB         Reported         3/5/20       Aspirin Chew (Aspirin Baby) 81 Mg Tab.chew      81 MG PO QDAY, #30 TAB.CHEW 0 Refills         Reported         3/5/20       Urea (Uramaxin Cream) 255 Gm Cream..g.      1 APL TOPICAL, #255 GM         Reported         11/19/19       Cetirizine Hcl (zyrTEC) 5 Mg Tablet      10 MG PO QDAY for 30 Days, #60 TAB         Reported         11/19/19       Loratadine (Claritin) 5 Mg Tab.rapdis      5 MG PO QDAY, TAB         Reported         11/19/19       Levothyroxine (Synthroid) 0.025 Mg      25 MG PO QDAY, #15 TAB 0 Refills         Reported         11/19/19       Docusate Calcium (Docusate Calcium) 240 Mg Capsule      100 MG PO QDAY, CAP         Reported         11/19/19       Cyanocobalamin (B-12 DOTS) 500 Mcg Tab      1000 MCG PO QDAY, #30 TAB         Reported         11/19/19       Folic Acid (Folic Acid) 1 Mg Tablet      1 MG PO QDAY, #30 TAB 0  Refills         Reported         11/19/19      Medications Reviewed:  Changes made to meds            IMPRESSION/PLAN      Impression      24-year-old male with Down syndrome and obstructive sleep apnea here for     evaluation regarding leukopenia and erythrocytosis.            Diagnosis      Elevated hemoglobin - D58.2            Abnormal CBC - R79.89            Notes      New Medications      * Aspirin Chew (Aspirin Baby) 81 MG TAB.CHEW: 81 MG PO QDAY #30      * Pyridoxine 50 MG TABLET: 50 MG PO QDAY 30 Days #30      New Diagnostics      * CBC With Auto Diff, 6 Months         Dx: Elevated hemoglobin - D58.2      * Erythropoietin, 6 Months         Dx: Elevated hemoglobin - D58.2            Plan      Erythrocytosis: The patient's hemoglobin has remained elevated around 18.5 for     least 3 years.  This is likely secondary to obstructive sleep apnea since the     patient is noncompliant with CPAP.  He was previously evaluated for Louis 2     mutations but this was negative.  Carboxyhemoglobin was also checked in the past    was found to be normal.  Patient will return to clinic in 6 months for repeat     CBC with differential and erythropoietin level.  If his erythropoietin level is     elevated then this is further evidence that his erythrocytosis is secondary to     TAMIKO.  However, if his erythropoietin level is suppressed then I will continue     evaluation for myeloproliferative disorders including test for MPL and BLAIR R as     well as BCR-ABL.            Leukopenia: Current labs show the patient's leukopenia has resolved but I     suspect this will be transient.  He was previously diagnosed with benign     familial neutropenia.  While this diagnosis is likely given his father's     reported history of leukopenia, it is also possible he has leukopenia as result     of his diagnosis of Down syndrome.  For unknown reasons Down syndrome patients     do often have hematologic abnormalities including persistent  leukopenia.  This     is unlikely to cause any significant morbidity or increase the risk of     significant infections.  No further evaluation is needed at this time.            Down syndrome: Down syndrome patients are known to have hematologic     abnormalities.  I briefly discussed this with the patient's mother so that she     is aware.  His Down syndrome complicates aspects of his care including     compliance with CPAP.            Patient Education      Patient Education Provided:  Yes            Electronically signed by RUTH FOREMAN  03/05/2020 10:39       Disclaimer: Converted document may not contain table formatting or lab diagrams. Please see [employer] for the authenticated document.   No

## 2021-05-28 NOTE — PROGRESS NOTES
Patient: NING BERNARD     Acct: [id number]     Report: #[id number]  UNIT #: [id number]     : 1995    Encounter Date:2020  PRIMARY CARE: BAM CORNELIUS  ***Signed***  --------------------------------------------------------------------------------------------------------------------  TELEHEALTH NOTE      Past Oncology Illness History      Mr. Bernard is a 24-year-old male with Down syndrome and obstructive sleep     apnea who continues to be cared for by his parents.  He was initially seen in     this clinic in 2019 for leukopenia and erythrocytosis.  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            Of note he was treated in 2017 for a lower extremity DVT.            History of Present Illness            Chief Complaint: (leukopenia)            Ning Bernard is presenting for evaluation via Telehealth visit by phone.     Verbal consent obtained before beginning visit.            Provider spent (10 minutes) minutes with the patient during telehealth visit.            The following staff were present during the visit: (Lindsey Motley, BERTHA)                         Past Med History      Mr. Bernard is a 24-year-old male with Down syndrome and obstructive sleep     apnea who continues to be cared for by his parents.  He was initially seen in     this clinic in 2019 for leukopenia and erythrocytosis.  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            Of note he was treated in 2017 for a lower extremity DVT.      Overview of Symptoms      1) Secondary Polycythemia:             This is a telehealth visit with the patient's mom. The patient has a history of     Down's syndrome. He was initially seen in the past by Dr. Stover. Most      recently seen by Dr. Crawley in March of 2020. He has a history of obstructive     sleep apnea and is not compliant with his CPAP. Now, has  intermittent elevated     hemoglobin/hematocrit dating back to 2017. This has been chronic now since for     several lab draws. His most recent lab values show an elevated hemoglobin up to     19.0 and hematocrit up to 57%. EPO level was elevated which shows this is likely    secondary to his poorly controlled sleep apnea. His mom does report he takes a     baby ASA daily.             I have discussed the etiologies of the polycythemia and have discussed with her     he needs to have better compliance with CPAP at least 4-6 hours a nite. In     addition, I have discussed they may want to follow up with pulmonary for any     other suggestions for compliance.             2) Leukopenia: He has a known history of leukopenia in the past which is likely     benign ethnic neutropenia and likely related to Down Syndrome. Mom denies son     has any fevers, chills, or recent or chronic infections.            Most Recent Lab Findings      Laboratory Tests      9/10/20 10:00            Allergies/Medications      Allergies:        Coded Allergies:             NO KNOWN ALLERGIES (Unverified , 9/17/20)      Medications    Last Reconciled on 9/17/20 11:03 by ELADIO MCFARLANE      Pyridoxine (Pyridoxine) 50 Mg Tablet      50 MG PO QDAY for 30 Days, #30 TAB         Reported         3/5/20       Aspirin Chew (Aspirin Baby) 81 Mg Tab.chew      81 MG PO QDAY, #30 TAB.CHEW 0 Refills         Reported         3/5/20       Urea (Uramaxin Cream) 255 Gm Cream..g.      1 APL TOPICAL, #255 GM         Reported         11/19/19       Cetirizine Hcl (zyrTEC) 5 Mg Tablet      10 MG PO QDAY for 30 Days, #60 TAB         Reported         11/19/19       Loratadine (Claritin) 5 Mg Tab.rapdis      5 MG PO QDAY, TAB         Reported         11/19/19       Levothyroxine (Synthroid) 0.025 Mg      25 MG PO QDAY,  #15 TAB 0 Refills         Reported         11/19/19       Docusate Calcium (Docusate Calcium) 240 Mg Capsule      100 MG PO QDAY, CAP         Reported         11/19/19       Cyanocobalamin (B-12 DOTS) 500 Mcg Tab      1000 MCG PO QDAY, #30 TAB         Reported         11/19/19       Folic Acid (Folic Acid) 1 Mg Tablet      1 MG PO QDAY, #30 TAB 0 Refills         Reported         11/19/19            Plan/Instructions      Ambulatory Assessment/Plan:        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 Patient has an elevated     epo level which is likely secondary to his poorly controlled sleep apnea. If he     had a low EPO, then this would be an indication to check for myelproliferative     disorders.             She is going to try to make him have better compliance. In addition, I discussed    they should probably follow up with pulmonary to see if there are any additional    options for his sleep apnea.             He will continue the baby ASA.             I also suggested they donate blood monthly, if possible.             He will follow up in 6 months with CBC / smear prior to appointment.             Follow up with pulmonary.             * Plan Of Care: ()            * Chronic conditions reviewed and taken into consideration for today's treatment       plan.      * Patient instructed to seek medical attention urgently for new or worsening       symptoms.      * Patient was educated/instructed on their diagnosis, treatment and medications       prior to discharge from the clinic today.      Codes:  Phone Eval 5-10 min [id number]            Electronically signed by ELADIO MCFARLANE   09/17/2020 11:03       Disclaimer: Converted document may not contain table formatting or lab diagrams. Please see [employer] for the authenticated document.

## 2021-06-05 NOTE — PROGRESS NOTES
Progress Note      Patient Name: Kathi Bernard   Patient ID: [id number]   Sex: Male   YOB: 1995    Primary Care Provider: Jesus Manuel Sloan DO   Referring Provider: Jesus Manuel Sloan DO    Visit Date: May 28, 2021    Provider: Josias Daniel DPM   Location: [facility]   Location Address: [address] [place] [address]  [id number]   Location Phone: [phone]          Chief Complaint  · Right Foot Pain      History Of Present Illness  Kathi Bernard is a 26 year old /Black male who presents to the [facility] today a follow up for:      New, Established, New Problem: Established  Location: Right medial midfoot  Duration:    Onset: Insidious  Nature: Sore, achy  Stable, worsening, improving: recurring  Aggravating factors:   Patient relates pain is aggravated by shoe gear and ambulation.   Previous Treatment: Voltaren Gel, Physical Therapy, CAM walker     Patient denies any fevers, chills, nausea, vomiting, shortness of breathe, nor any other constitutional signs nor symptoms.    Patient relates no medical changes since their last visit.    His mother states that he has been improving with physical therapy.  She states she is starting to transition him back to regular shoes.  She also states that he was casted for his braces have not come in yet.       Past Medical History  Acquired bilateral flat feet; Anemia; Ankle pain; Arthritis pain; Asthma; Athletes foot; Blood clot in vein; Bronchitis; Diabetes mellitus; Difficulty walking; Down syndrome; Fatigue; Flat feet, bilateral; Foot pain, left; Gout; Heel pain; Hidradenitis suppurativa; Hypothyroidism; Mental disability; Obesity; Primary osteoarthritis, left ankle and foot; Right knee pain, chronic; Sinus trouble         Past Surgical History  EAR Surgery         Medication List  allopurinol 100 mg oral tablet; aspirin 81 mg oral tablet,delayed release (DR/EC); azithromycin 250 mg oral  tablet; Boudreauxs Butt Paste 16 % topical ointment; Richard Gentle topical cleanser; Claritin 10 mg oral tablet; clindamycin phosphate 1 % topical lotion; Custom made orthotics; cyanocobalamin (vitamin B-12) 1,000 mcg oral tablet; docusate sodium 100 mg oral capsule; Domeboro 952-1,347 mg topical powder in packet; folic acid 1 mg oral tablet; Lotrisone 1-0.05 % topical cream; nystatin 100,000 unit/mL oral suspension; Pataday 0.1 % ophthalmic (eye) drops; ProAir HFA 90 mcg/actuation inhalation HFA aerosol inhaler; pyridoxine (vitamin B6) 50 mg oral tablet; [place] Brace; Synthroid 25 mcg oral tablet; urea 20 % topical cream; Voltaren 1 % topical gel         Allergy List  NO KNOWN DRUG ALLERGIES       Allergies Reconciled  Family Medical History  Sickle-cell/Hb-C Disease; Diabetes, unspecified type; Bone Neoplasm, Malignant; Family history of certain chronic disabling diseases; arthritis         Social History  Alcohol Use (Never); Claustophobic (Unknown); lives with parents; Recreational Drug Use (Never); Student.; Tobacco (Never)         Immunizations  Name Date Admin   Influenza 09/21/2020   Influenza 11/06/2019   Tdap 03/28/2019         Review of Systems  · Constitutional  o Denies  o : fatigue, night sweats  · Eyes  o Denies  o : double vision, blurred vision  · HENT  o Denies  o : vertigo, recent head injury  · Cardiovascular  o Denies  o : chest pain, irregular heart beats  · Respiratory  o Denies  o : shortness of breath, productive cough  · Gastrointestinal  o Denies  o : nausea, vomiting  · Genitourinary  o Denies  o : dysuria, urinary retention  · Integument  o Denies  o : hair growth change, new skin lesions  · Neurologic  o Denies  o : altered mental status, seizures  · Musculoskeletal  o * See HPI  · Endocrine  o Denies  o : cold intolerance, heat intolerance  · Heme-Lymph  o Denies  o : petechiae, lymph node enlargement or tenderness  · Allergic-Immunologic  o Denies  o : frequent  illnesses      Vitals  Date Time BP Position Site L\R Cuff Size HR RR TEMP (F) WT  HT  BMI kg/m2 BSA m2 O2 Sat FR L/min FiO2 HC       05/28/2021 10:14 /65 Sitting    87 - R  97.1  5'     97 %            Physical Examination  · Constitutional  o Appearance  o : overweight, well developed. Down's Syndrome.  · Cardiovascular  o Peripheral Vascular System  o :   § Pedal Pulses  § : pulses 2 + and symmetrical  § Extremities  § : no edema in lower extremities  · Musculoskeletal  o General  o :   § General Musculoskeletal  § : Lower extremity muscle and strength and range of motion is equal and symmetrical bilaterally. The knees are noted to be normal in alignment. Significant for pes planus b/l. Ambulating in CAM walker on Right.  · Skin and Subcutaneous Tissue  o General Inspection  o : Skin is noted to have normal texture and turgor, with no excrescences noted.   o Digits and Nails  o : The toenails are noted to be without disese.  · Neurologic  o Sensation  o : Epicritic sensations intact bilaterally.  · Right Ankle/Foot  o Inspection  o : Right foot: Minimal tenderness to palpation to posterior tibial tendon. No signs of edema, erythema, lymphangitis, nor signs of infection.   o Neurovascular  o : Sharp/dull sensation is decreased metatarsal bilaterally. Monofilament sensation examination of the right foot is decreased Monofilament sensation examination of the left foot is decreased   o Special Tests  o : +TTP to posterior tibial tendon along right medial ankle and foot.           Assessment  · Foot pain, right     729.5/M79.671  · Tibial tendonitis, posterior, right     726.72/M76.821      Plan  · Medications  o Medications have been Reconciled  o Transition of Care or Provider Policy  · Instructions  o Follow Up in 4 weeks.  o Discuss Findings: I have discussed the findings of this evaluation with the patient. The discussion included a complete verbal explanation of any changes in the examination results,  diagnosis, and the current treatment plan. A schedule for future care needs was explained. If any questions should arise after returning home, I have encouraged the patient to feel free to contact Dr. Daniel. The patient states understanding and agreement with this plan.  o Instructed to use Voltaren Gel QID PRN.  o RICE Therapy  o Patient's mother was advised for the patient to complete physical therapy and agreement with this plan.  o Electronically Identified Patient Education Materials Provided Electronically  · Disposition  o Call or Return if symptoms worsen or persist.  · Referrals  o ID: [id number] Date: 09/29/2020 Type: Inbound  Specialty: Podiatry            Electronically Signed by: Josias Daniel DPM -Author on May 28, 2021 11:07:43 AM

## 2021-07-15 VITALS
HEART RATE: 87 BPM | TEMPERATURE: 97.1 F | SYSTOLIC BLOOD PRESSURE: 102 MMHG | HEIGHT: 60 IN | OXYGEN SATURATION: 97 % | BODY MASS INDEX: 62.69 KG/M2 | DIASTOLIC BLOOD PRESSURE: 65 MMHG

## 2021-07-19 ENCOUNTER — TELEPHONE (OUTPATIENT)
Dept: FAMILY MEDICINE CLINIC | Facility: CLINIC | Age: 26
End: 2021-07-19

## 2021-07-19 NOTE — TELEPHONE ENCOUNTER
Hub staff attempted to follow warm transfer process and was unsuccessful     Caller: [facility]    Relationship to patient: REFERRAL    Best call back number: [phone]    Patient is needing: PATIENT NEEDS HIS REFERRAL TO [facility] (DIAGNOSTIC CODE ) UPDATED THROUGH HIS [employer] INSURANCE AS SOON AS POSSIBLE. I ATTEMPTED TO WARM TRANSFER AND WAS UNSUCCESSFUL. THE REPRESENTATIVE ON THE PHONE STATED THE PATIENT WAS MEANT TO BE SEEN TODAY AND SHE WILL NOW HAVE TO RESCHEDULE HIM

## 2021-08-19 ENCOUNTER — TELEPHONE (OUTPATIENT)
Dept: FAMILY MEDICINE CLINIC | Facility: CLINIC | Age: 26
End: 2021-08-19

## 2021-08-19 DIAGNOSIS — H72.91 RIGHT OTITIS MEDIA WITH SPONTANEOUS RUPTURE OF EARDRUM: Primary | ICD-10-CM

## 2021-08-19 DIAGNOSIS — H66.91 RIGHT OTITIS MEDIA WITH SPONTANEOUS RUPTURE OF EARDRUM: Primary | ICD-10-CM

## 2021-08-19 NOTE — TELEPHONE ENCOUNTER
Caller: [facility]     Relationship: MCKAYLA    Erick call back number: [phone]    What orders are you requesting (i.e. lab or imaging): OTITUS MEDIA OF RIGHT EAR     DIAGNOSIS CODE:     Where will you receive your lab/imaging services: [facility]

## 2021-08-23 NOTE — TELEPHONE ENCOUNTER
Is this a referral request? If so please go ahead and place referral order and I will cosign.  Thank you.

## 2021-10-04 ENCOUNTER — CLINICAL SUPPORT (OUTPATIENT)
Dept: FAMILY MEDICINE CLINIC | Facility: CLINIC | Age: 26
End: 2021-10-04

## 2021-10-04 DIAGNOSIS — Z23 NEED FOR INFLUENZA VACCINATION: Primary | ICD-10-CM

## 2021-10-04 PROCEDURE — 90471 IMMUNIZATION ADMIN: CPT | Performed by: FAMILY MEDICINE

## 2021-10-04 PROCEDURE — 90686 IIV4 VACC NO PRSV 0.5 ML IM: CPT | Performed by: FAMILY MEDICINE

## 2021-12-15 ENCOUNTER — OFFICE VISIT (OUTPATIENT)
Dept: FAMILY MEDICINE CLINIC | Facility: CLINIC | Age: 26
End: 2021-12-15

## 2021-12-15 VITALS
HEIGHT: 63 IN | WEIGHT: 315 LBS | OXYGEN SATURATION: 96 % | HEART RATE: 88 BPM | TEMPERATURE: 98.3 F | SYSTOLIC BLOOD PRESSURE: 102 MMHG | DIASTOLIC BLOOD PRESSURE: 62 MMHG | BODY MASS INDEX: 55.81 KG/M2

## 2021-12-15 DIAGNOSIS — E53.1 VITAMIN B6 DEFICIENCY: ICD-10-CM

## 2021-12-15 DIAGNOSIS — R21 RASH: Primary | ICD-10-CM

## 2021-12-15 DIAGNOSIS — M10.9 GOUT, UNSPECIFIED CAUSE, UNSPECIFIED CHRONICITY, UNSPECIFIED SITE: ICD-10-CM

## 2021-12-15 DIAGNOSIS — L73.2 HIDRADENITIS SUPPURATIVA: ICD-10-CM

## 2021-12-15 DIAGNOSIS — E03.9 HYPOTHYROIDISM, UNSPECIFIED TYPE: ICD-10-CM

## 2021-12-15 DIAGNOSIS — Z51.81 MEDICATION MONITORING ENCOUNTER: ICD-10-CM

## 2021-12-15 PROCEDURE — 99214 OFFICE O/P EST MOD 30 MIN: CPT | Performed by: FAMILY MEDICINE

## 2021-12-15 RX ORDER — BUDESONIDE 0.5 MG/2ML
2 INHALANT ORAL
COMMUNITY

## 2021-12-15 RX ORDER — SODIUM PHOSPHATE,MONO-DIBASIC 19G-7G/118
3 ENEMA (ML) RECTAL DAILY
COMMUNITY
End: 2022-06-01

## 2021-12-15 RX ORDER — ASPIRIN 325 MG
TABLET ORAL
COMMUNITY
End: 2022-06-01 | Stop reason: SDUPTHER

## 2021-12-15 RX ORDER — LANOLIN ALCOHOL/MO/W.PET/CERES
CREAM (GRAM) TOPICAL
COMMUNITY
End: 2022-09-06 | Stop reason: SDUPTHER

## 2021-12-15 RX ORDER — KETOCONAZOLE 20 MG/G
CREAM TOPICAL
COMMUNITY
End: 2022-03-16 | Stop reason: SDUPTHER

## 2021-12-15 RX ORDER — MENTHOL 40 MG/ML
GEL TOPICAL
COMMUNITY
End: 2022-06-01

## 2021-12-15 RX ORDER — ALBUTEROL SULFATE 1.25 MG/3ML
1 SOLUTION RESPIRATORY (INHALATION)
COMMUNITY

## 2021-12-15 RX ORDER — MINOCYCLINE HYDROCHLORIDE 50 MG/1
100 CAPSULE ORAL
COMMUNITY
End: 2022-06-01

## 2021-12-15 RX ORDER — FOLIC ACID 1 MG/1
TABLET ORAL
COMMUNITY
End: 2022-06-01 | Stop reason: SDUPTHER

## 2021-12-15 RX ORDER — SKIN CLEANSER COMB NO.42
1 CLEANSER (ML) TOPICAL DAILY
Qty: 237 ML | Refills: 3 | Status: SHIPPED | OUTPATIENT
Start: 2021-12-15 | End: 2022-06-01

## 2021-12-15 RX ORDER — OLOPATADINE HYDROCHLORIDE 1 MG/ML
SOLUTION/ DROPS OPHTHALMIC
COMMUNITY

## 2021-12-15 RX ORDER — PSEUDOEPHEDRINE HCL 30 MG
100 TABLET ORAL
COMMUNITY
End: 2023-01-17 | Stop reason: SDUPTHER

## 2021-12-15 RX ORDER — IPRATROPIUM BROMIDE 21 UG/1
2 SPRAY, METERED NASAL
COMMUNITY

## 2021-12-15 RX ORDER — LEVOTHYROXINE SODIUM 0.03 MG/1
TABLET ORAL
COMMUNITY
End: 2022-09-06 | Stop reason: SDUPTHER

## 2021-12-15 RX ORDER — CETIRIZINE HYDROCHLORIDE 10 MG/1
TABLET ORAL
COMMUNITY
End: 2023-01-17 | Stop reason: SDUPTHER

## 2021-12-15 RX ORDER — ALLOPURINOL 100 MG/1
100 TABLET ORAL DAILY
Qty: 90 TABLET | Refills: 3 | Status: SHIPPED | OUTPATIENT
Start: 2021-12-15 | End: 2023-02-09 | Stop reason: SDUPTHER

## 2021-12-15 RX ORDER — ALLOPURINOL 100 MG/1
TABLET ORAL
COMMUNITY
End: 2021-12-15 | Stop reason: SDUPTHER

## 2021-12-15 RX ORDER — AMOXICILLIN AND CLAVULANATE POTASSIUM 875; 125 MG/1; MG/1
1 TABLET, FILM COATED ORAL
COMMUNITY
End: 2021-12-15

## 2021-12-15 RX ORDER — DICYCLOMINE HCL 20 MG
TABLET ORAL
COMMUNITY
End: 2022-06-01

## 2021-12-15 RX ORDER — UBIDECARENONE 75 MG
CAPSULE ORAL
COMMUNITY

## 2021-12-15 RX ORDER — LEVOTHYROXINE SODIUM 0.05 MG/1
50 TABLET ORAL DAILY
COMMUNITY
End: 2021-12-15

## 2021-12-15 RX ORDER — LORATADINE 10 MG/1
TABLET ORAL
COMMUNITY

## 2021-12-15 RX ORDER — COLCHICINE 0.6 MG/1
TABLET ORAL
COMMUNITY
End: 2023-02-06 | Stop reason: SDUPTHER

## 2021-12-16 ENCOUNTER — TELEPHONE (OUTPATIENT)
Dept: FAMILY MEDICINE CLINIC | Facility: CLINIC | Age: 26
End: 2021-12-16

## 2021-12-16 NOTE — TELEPHONE ENCOUNTER
Patient mother came in today, the medication prescribed to the patient pharmacy [facility] does not carry it. They would like to know if we can send it to the [facility] here in [place].  CETAPHILL GENTLE CLEANSER EXTERNAL LIQUID.

## 2022-03-01 ENCOUNTER — OFFICE VISIT (OUTPATIENT)
Dept: FAMILY MEDICINE CLINIC | Facility: CLINIC | Age: 27
End: 2022-03-01

## 2022-03-01 VITALS
OXYGEN SATURATION: 96 % | WEIGHT: 315 LBS | TEMPERATURE: 97.3 F | HEIGHT: 63 IN | BODY MASS INDEX: 55.81 KG/M2 | DIASTOLIC BLOOD PRESSURE: 72 MMHG | HEART RATE: 94 BPM | SYSTOLIC BLOOD PRESSURE: 116 MMHG

## 2022-03-01 DIAGNOSIS — M79.672 FOOT PAIN, LEFT: Primary | ICD-10-CM

## 2022-03-01 PROCEDURE — 99213 OFFICE O/P EST LOW 20 MIN: CPT | Performed by: NURSE PRACTITIONER

## 2022-03-01 NOTE — PROGRESS NOTES
"Chief Complaint  Foot Pain    Subjective          Mi-Junie Bernard presents to [facility]   Presents today for for an acute visit for left foot pain.  Foot Pain  This is a new problem. The current episode started in the past 7 days. The problem occurs constantly. The problem has been gradually improving. The symptoms are aggravated by walking. He has tried ice and NSAIDs for the symptoms. The treatment provided moderate relief.   She has been applying ice to his foot and giving him ibuprofen.  Symptoms are improving.  He was using a walker and now is using a cane.  Pain is on the top left side of foot.  Mom states there is some swelling no redness.  She is also been applying Voltaren gel.    Objective   Vital Signs:   /72   Pulse 94   Temp 97.3 °F (36.3 °C)   Ht 160 cm (63\")   Wt (!) 153 kg (338 lb 6.4 oz)   SpO2 96%   BMI 59.94 kg/m²     Physical Exam  Vitals reviewed.   Constitutional:       Appearance: Normal appearance. He is well-developed. He is obese.   HENT:      Head: Normocephalic and atraumatic.      Right Ear: External ear normal.      Left Ear: External ear normal.      Mouth/Throat:      Pharynx: No oropharyngeal exudate.   Eyes:      Conjunctiva/sclera: Conjunctivae normal.      Pupils: Pupils are equal, round, and reactive to light.   Cardiovascular:      Rate and Rhythm: Normal rate and regular rhythm.      Heart sounds: No murmur heard.  No friction rub. No gallop.    Pulmonary:      Effort: Pulmonary effort is normal.      Breath sounds: Normal breath sounds. No wheezing or rhonchi.   Musculoskeletal:      Left foot: Normal range of motion. Bunion and tenderness present. No swelling, deformity, foot drop or laceration.   Skin:     General: Skin is warm and dry.   Neurological:      Mental Status: He is alert and oriented to person, place, and time.   Psychiatric:         Mood and Affect: Affect normal.        Result Review :               "   Assessment and Plan    Diagnoses and all orders for this visit:    1. Foot pain, left (Primary)  -     XR Foot 3+ View Left; Future    Will order an x-ray of his left foot.  Discussed RICE, rest, ice, compress, and elevate.  His creatinine levels were elevated 10 months ago and previously the year before.  Instructed her not to give him ibuprofen.  May take Tylenol 1000 mg 4 times daily as needed for pain relief.  He has a follow-up appointment with Dr. Sloan in 2 weeks.      Follow Up   Return in about 1 week (around 3/8/2022), or if symptoms worsen or fail to improve, for Next scheduled follow up with Dr. Sloan.  Patient was given instructions and counseling regarding his condition or for health maintenance advice. Please see specific information pulled into the AVS if appropriate.

## 2022-03-02 ENCOUNTER — HOSPITAL ENCOUNTER (OUTPATIENT)
Dept: GENERAL RADIOLOGY | Facility: HOSPITAL | Age: 27
Discharge: HOME | End: 2022-03-02

## 2022-03-02 ENCOUNTER — LAB (OUTPATIENT)
Dept: LAB | Facility: HOSPITAL | Age: 27
End: 2022-03-02

## 2022-03-02 DIAGNOSIS — Z51.81 MEDICATION MONITORING ENCOUNTER: ICD-10-CM

## 2022-03-02 DIAGNOSIS — M10.9 GOUT, UNSPECIFIED CAUSE, UNSPECIFIED CHRONICITY, UNSPECIFIED SITE: ICD-10-CM

## 2022-03-02 DIAGNOSIS — M79.672 FOOT PAIN, LEFT: ICD-10-CM

## 2022-03-02 DIAGNOSIS — E53.1 VITAMIN B6 DEFICIENCY: ICD-10-CM

## 2022-03-02 DIAGNOSIS — E03.9 HYPOTHYROIDISM, UNSPECIFIED TYPE: ICD-10-CM

## 2022-03-02 LAB
ALBUMIN SERPL-MCNC: 3.5 G/DL (ref 3.5–5.2)
ALBUMIN/GLOB SERPL: 1 G/DL
ALP SERPL-CCNC: 64 U/L (ref 39–117)
ALT SERPL W P-5'-P-CCNC: 31 U/L (ref 1–41)
ANION GAP SERPL CALCULATED.3IONS-SCNC: 11 MMOL/L (ref 5–15)
AST SERPL-CCNC: 27 U/L (ref 1–40)
BASOPHILS # BLD AUTO: 0.07 10*3/MM3 (ref 0–0.2)
BASOPHILS NFR BLD AUTO: 1.9 % (ref 0–1.5)
BILIRUB SERPL-MCNC: 0.5 MG/DL (ref 0–1.2)
BUN SERPL-MCNC: 14 MG/DL (ref 6–20)
BUN/CREAT SERPL: 9.3 (ref 7–25)
CALCIUM SPEC-SCNC: 9.3 MG/DL (ref 8.6–10.5)
CHLORIDE SERPL-SCNC: 103 MMOL/L (ref 98–107)
CO2 SERPL-SCNC: 28 MMOL/L (ref 22–29)
CREAT SERPL-MCNC: 1.5 MG/DL (ref 0.76–1.27)
DEPRECATED RDW RBC AUTO: 48.1 FL (ref 37–54)
EGFRCR SERPLBLD CKD-EPI 2021: 66.3 ML/MIN/1.73
EOSINOPHIL # BLD AUTO: 0.06 10*3/MM3 (ref 0–0.4)
EOSINOPHIL NFR BLD AUTO: 1.6 % (ref 0.3–6.2)
ERYTHROCYTE [DISTWIDTH] IN BLOOD BY AUTOMATED COUNT: 14.7 % (ref 12.3–15.4)
GLOBULIN UR ELPH-MCNC: 3.5 GM/DL
GLUCOSE SERPL-MCNC: 90 MG/DL (ref 65–99)
HCT VFR BLD AUTO: 52.1 % (ref 37.5–51)
HGB BLD-MCNC: 17.9 G/DL (ref 13–17.7)
IMM GRANULOCYTES # BLD AUTO: 0.02 10*3/MM3 (ref 0–0.05)
IMM GRANULOCYTES NFR BLD AUTO: 0.5 % (ref 0–0.5)
LYMPHOCYTES # BLD AUTO: 1.3 10*3/MM3 (ref 0.7–3.1)
LYMPHOCYTES NFR BLD AUTO: 34.7 % (ref 19.6–45.3)
MCH RBC QN AUTO: 31.1 PG (ref 26.6–33)
MCHC RBC AUTO-ENTMCNC: 34.4 G/DL (ref 31.5–35.7)
MCV RBC AUTO: 90.6 FL (ref 79–97)
MONOCYTES # BLD AUTO: 0.47 10*3/MM3 (ref 0.1–0.9)
MONOCYTES NFR BLD AUTO: 12.5 % (ref 5–12)
NEUTROPHILS NFR BLD AUTO: 1.83 10*3/MM3 (ref 1.7–7)
NEUTROPHILS NFR BLD AUTO: 48.8 % (ref 42.7–76)
NRBC BLD AUTO-RTO: 0 /100 WBC (ref 0–0.2)
PLATELET # BLD AUTO: 217 10*3/MM3 (ref 140–450)
PMV BLD AUTO: 10.9 FL (ref 6–12)
POTASSIUM SERPL-SCNC: 4.3 MMOL/L (ref 3.5–5.2)
PROT SERPL-MCNC: 7 G/DL (ref 6–8.5)
RBC # BLD AUTO: 5.75 10*6/MM3 (ref 4.14–5.8)
SODIUM SERPL-SCNC: 142 MMOL/L (ref 136–145)
T4 FREE SERPL-MCNC: 1.14 NG/DL (ref 0.93–1.7)
TSH SERPL DL<=0.05 MIU/L-ACNC: 1.75 UIU/ML (ref 0.27–4.2)
URATE SERPL-MCNC: 8.6 MG/DL (ref 3.4–7)
WBC NRBC COR # BLD: 3.75 10*3/MM3 (ref 3.4–10.8)

## 2022-03-02 PROCEDURE — 85025 COMPLETE CBC W/AUTO DIFF WBC: CPT

## 2022-03-02 PROCEDURE — 84207 ASSAY OF VITAMIN B-6: CPT

## 2022-03-02 PROCEDURE — 80053 COMPREHEN METABOLIC PANEL: CPT

## 2022-03-02 PROCEDURE — 84439 ASSAY OF FREE THYROXINE: CPT

## 2022-03-02 PROCEDURE — 84550 ASSAY OF BLOOD/URIC ACID: CPT

## 2022-03-02 PROCEDURE — 84443 ASSAY THYROID STIM HORMONE: CPT

## 2022-03-02 PROCEDURE — 73630 X-RAY EXAM OF FOOT: CPT

## 2022-03-07 ENCOUNTER — TELEPHONE (OUTPATIENT)
Dept: FAMILY MEDICINE CLINIC | Facility: CLINIC | Age: 27
End: 2022-03-07

## 2022-03-07 NOTE — TELEPHONE ENCOUNTER
Caller: RAFIA NEWELL    Relationship: Mother    Best call back number: [phone]    Caller requesting test results: YES     What test was performed: XRAY AND LABS     When was the test performed: 2022    Where was the test performed: [facility]     Additional notes: MOTHER STATES THAT SHE RECEIVED TWO PHONE CALLS FROM THE OFFICE WHILE SHE WAS OUT TOWN ATTENDING A     RAFIA NEWELL IS NOT ON A  VERBAL

## 2022-03-08 ENCOUNTER — TELEPHONE (OUTPATIENT)
Dept: FAMILY MEDICINE CLINIC | Facility: CLINIC | Age: 27
End: 2022-03-08

## 2022-03-08 LAB — VIT B6 SERPL-MCNC: 70.4 UG/L (ref 5.3–46.7)

## 2022-03-08 NOTE — TELEPHONE ENCOUNTER
PATIENT'S MOTHER, RAFIA NEWELL IS REQUESTING RESULTS OF LABS AND XRAYS. PLEASE CALL HER BACK [phone].

## 2022-03-10 NOTE — TELEPHONE ENCOUNTER
Phone call placed to patient with request of labs and xray results with informing patient that the results are not released yet with stating she is fine checking at her appointment next week.

## 2022-03-16 ENCOUNTER — OFFICE VISIT (OUTPATIENT)
Dept: FAMILY MEDICINE CLINIC | Facility: CLINIC | Age: 27
End: 2022-03-16

## 2022-03-16 VITALS
DIASTOLIC BLOOD PRESSURE: 74 MMHG | OXYGEN SATURATION: 99 % | BODY MASS INDEX: 55.81 KG/M2 | SYSTOLIC BLOOD PRESSURE: 118 MMHG | WEIGHT: 315 LBS | TEMPERATURE: 98.2 F | HEART RATE: 94 BPM | HEIGHT: 63 IN

## 2022-03-16 DIAGNOSIS — M21.41 PES PLANUS OF BOTH FEET: ICD-10-CM

## 2022-03-16 DIAGNOSIS — M21.42 PES PLANUS OF BOTH FEET: ICD-10-CM

## 2022-03-16 DIAGNOSIS — M10.9 GOUT, UNSPECIFIED CAUSE, UNSPECIFIED CHRONICITY, UNSPECIFIED SITE: ICD-10-CM

## 2022-03-16 DIAGNOSIS — E03.9 HYPOTHYROIDISM, UNSPECIFIED TYPE: Primary | ICD-10-CM

## 2022-03-16 DIAGNOSIS — R73.09 ABNORMAL GLUCOSE: ICD-10-CM

## 2022-03-16 DIAGNOSIS — G47.33 OSA (OBSTRUCTIVE SLEEP APNEA): ICD-10-CM

## 2022-03-16 DIAGNOSIS — D75.1 POLYCYTHEMIA: ICD-10-CM

## 2022-03-16 DIAGNOSIS — E53.1 VITAMIN B6 DEFICIENCY: ICD-10-CM

## 2022-03-16 DIAGNOSIS — L73.2 HIDRADENITIS SUPPURATIVA: ICD-10-CM

## 2022-03-16 DIAGNOSIS — E53.8 B12 DEFICIENCY: ICD-10-CM

## 2022-03-16 PROCEDURE — 99214 OFFICE O/P EST MOD 30 MIN: CPT | Performed by: FAMILY MEDICINE

## 2022-03-16 RX ORDER — KETOCONAZOLE 20 MG/G
CREAM TOPICAL DAILY
Qty: 60 G | Refills: 1 | Status: SHIPPED | OUTPATIENT
Start: 2022-03-16

## 2022-03-16 RX ORDER — CLINDAMYCIN PHOSPHATE 11.9 MG/ML
SOLUTION TOPICAL 2 TIMES DAILY
Qty: 60 ML | Refills: 3 | Status: SHIPPED | OUTPATIENT
Start: 2022-03-16

## 2022-03-16 RX ORDER — ALBUTEROL SULFATE 90 UG/1
2 AEROSOL, METERED RESPIRATORY (INHALATION) EVERY 4 HOURS PRN
Qty: 8.5 G | Refills: 3 | Status: SHIPPED | OUTPATIENT
Start: 2022-03-16

## 2022-03-16 NOTE — PROGRESS NOTES
Chief Complaint  Rash in groin  Left foot pain/swelling  Hypothyroidism    Subjective          Mi-Junie JAKE Bernard presents to [facility]  History of Present Illness  Patient presents today for 3-month follow-up for hypothyroidism.  He is accompanied by his mother, Sun.  He takes 25 mcg of levothyroxine daily.  We discussed continue current management as his most recent TSH and free T4 levels are within appropriate limits.  His B6 level is still elevated.  He takes 1 B6 of the 50 mg every other day.  We discussed reducing this to a half a tablet every other day.  Plan to repeat labs when he returns for follow-up.  Still on B12 supplementation once daily.  Plan to repeat this again with future labs.  Uric acid level is improved at 8.6.  He is taking allopurinol.  Renal function is stable.  He has no electrolyte abnormality with normal LFTs.  His hemoglobin is elevated at 17.9 and hematocrit is 52.1.  He was previously seen by hematology/oncology.  He has secondary polycythemia with excess erythropoietin which is due to poorly controlled sleep apnea.  He has a CPAP but he does not use it and now the machine has been recalled.  He does need to get back and see sleep medicine.  Discussed placing consultation order.  He does have a rash in the groin area.  He has previously diagnosed with hidradenitis suppurativa.  He is not currently have any clindamycin which is previously helped.  They are using Bactroban.  Depression screening has been reviewed and is negative at 0 points.  He does have pain and some swelling in his left foot.  He does have flat feet.  He has previously had physical therapy for the right foot which should help out.  He had an x-ray of the left foot done recently on 3/2/2022 with the report being read as suspected pes planus with diffuse soft tissue swelling and no acute osseous abnormality.  Discussed setting up patient with physical therapy.  Objective   Vital  "Signs:   /74   Pulse 94   Temp 98.2 °F (36.8 °C)   Ht 160 cm (63\")   Wt (!) 151 kg (333 lb 3.2 oz)   SpO2 99%   BMI 59.02 kg/m²     Physical Exam   General: AAO ×3, no acute distress, pleasant  HEENT: Normocephalic, atraumatic  Cardiovascular: Regular rate and rhythm without appreciable murmur  Respiratory: Clear to auscultation bilaterally no RRW  Gastrointestinal: Soft nontender nondistended with bowel sounds present  extremities: No edema noted in the lower extremities.  Musculoskeletal: Pes planus noted of the left foot.  No mass or deformity appreciated.  No soft tissue swelling or edema noted.  Neurologic: CN II through XII grossly intact   Psychiatric: Normal mood and affect  Result Review :                 Assessment and Plan    Diagnoses and all orders for this visit:    1. Hypothyroidism, unspecified type (Primary)  -     Comprehensive Metabolic Panel; Future  -     CBC & Differential; Future    2. Hidradenitis suppurativa    3. Vitamin B6 deficiency  -     Vitamin B6; Future    4. Gout, unspecified cause, unspecified chronicity, unspecified site    5. TAMIKO (obstructive sleep apnea)  -     Ambulatory Referral to Sleep Medicine    6. Polycythemia, secondary  -     CBC & Differential; Future    7. Pes planus of both feet  -     Ambulatory Referral to Physical Therapy Evaluate and treat; Stretching, Strengthening    8. B12 deficiency  -     Vitamin B12; Future    9. Abnormal glucose  -     Hemoglobin A1c; Future    Other orders  -     albuterol sulfate  (90 Base) MCG/ACT inhaler; Inhale 2 puffs Every 4 (Four) Hours As Needed for Wheezing or Shortness of Air.  Dispense: 8.5 g; Refill: 3  -     metFORMIN (GLUCOPHAGE) 500 MG tablet; Take 1 tablet by mouth Daily.  Dispense: 90 tablet; Refill: 3  -     ketoconazole (NIZORAL) 2 % cream; Apply  topically to the appropriate area as directed Daily.  Dispense: 60 g; Refill: 1  -     mineral oil-hydrophilic petrolatum (AQUAPHOR) ointment; Apply 1 " application topically to the appropriate area as directed As Needed for Dry Skin.  Dispense: 396 g; Refill: 3  -     mupirocin (BACTROBAN) 2 % ointment; Apply  topically to the appropriate area as directed 2 (Two) Times a Day.  Dispense: 30 g; Refill: 3  -     clindamycin (Cleocin-T) 1 % external solution; Apply  topically to the appropriate area as directed 2 (Two) Times a Day.  Dispense: 60 mL; Refill: 3    Discussed continue current management for hypothyroidism.  Plan as documented above for treatment of vitamin B6 deficiency.  He does have hidradenitis suppurativa.  Discussed treatment with clindamycin.  Medications have been refilled today.  Referral has been made for physical therapy for pes planus.  Discussed checking A1c with his next lab work.  He is taking Metformin to help keep down glucose levels as there has previously been concerned about hyperglycemia.    Follow Up   Return in about 3 months (around 6/16/2022) for pes planus.  Patient was given instructions and counseling regarding his condition or for health maintenance advice. Please see specific information pulled into the AVS if appropriate.

## 2022-03-18 ENCOUNTER — TELEPHONE (OUTPATIENT)
Dept: FAMILY MEDICINE CLINIC | Facility: CLINIC | Age: 27
End: 2022-03-18

## 2022-03-18 NOTE — TELEPHONE ENCOUNTER
Mother called and stated that she had just called physical therapy and they are saying they don't have the referral yet. Please fax again.

## 2022-05-23 ENCOUNTER — OFFICE VISIT (OUTPATIENT)
Dept: SLEEP MEDICINE | Facility: HOSPITAL | Age: 27
End: 2022-05-23

## 2022-05-23 VITALS
DIASTOLIC BLOOD PRESSURE: 57 MMHG | SYSTOLIC BLOOD PRESSURE: 119 MMHG | HEART RATE: 105 BPM | BODY MASS INDEX: 55.81 KG/M2 | WEIGHT: 315 LBS | HEIGHT: 63 IN | OXYGEN SATURATION: 91 %

## 2022-05-23 DIAGNOSIS — G47.10 HYPERSOMNIA: ICD-10-CM

## 2022-05-23 DIAGNOSIS — G47.33 OSA TREATED WITH BIPAP: Primary | ICD-10-CM

## 2022-05-23 DIAGNOSIS — E66.01 CLASS 3 SEVERE OBESITY DUE TO EXCESS CALORIES WITHOUT SERIOUS COMORBIDITY WITH BODY MASS INDEX (BMI) OF 60.0 TO 69.9 IN ADULT: ICD-10-CM

## 2022-05-23 PROBLEM — E66.813 CLASS 3 SEVERE OBESITY DUE TO EXCESS CALORIES WITHOUT SERIOUS COMORBIDITY WITH BODY MASS INDEX (BMI) OF 60.0 TO 69.9 IN ADULT: Status: ACTIVE | Noted: 2022-05-23

## 2022-05-23 PROCEDURE — 99204 OFFICE O/P NEW MOD 45 MIN: CPT | Performed by: INTERNAL MEDICINE

## 2022-05-23 PROCEDURE — G0463 HOSPITAL OUTPT CLINIC VISIT: HCPCS | Performed by: INTERNAL MEDICINE

## 2022-05-23 NOTE — PROGRESS NOTES
[facility]  [address] [place], [place] [id number]  [place]   [place] [address]  Phone: [phone]  Fax: [phone]      Kathi Bernard  [id number]   1995  27 y.o.  male      Referring physician/provider and PCP Jesus Manuel Sloan DO    Type of service: Initial Sleep Medicine Consult.  Date of service: 5/23/2022      Chief Complaint   Patient presents with   • Sleep Apnea   • Obesity       History of present illness;  Thank you for asking to see Kathi Bernard, 27 y.o.. The patient was seen today on 5/23/2022 at [facility].  The patient presents today for management of obstructive sleep apnea.  He is accompanied by his mother.    I have reviewed his sleep study which was done at [facility].  This study shows very severe sleep apnea with AHI of 120/h and is on a BiPAP of 18/13.  Unfortunately he stopped using the BiPAP last year because of the recall.  His symptoms are getting worse with the daytime excessive sleepiness.  He is a new patient to [facility].    In fact patient is very sleepy in the waiting room and they will have to wake him up several times to ask questions.  He is tired and exhausted.  He has not used the so clean machine.  But when he was using the BiPAP he was feeling much better.    Patient gives the following sleep history.  Sleep schedule:  Bedtime: 11 PM  Wake time: 9 AM  Normally takes about less than 2 minutes minutes to fall asleep  Average hours of sleep 10 to 11 hours  Number of naps per day multiple naps  Symptoms   In addition to snoring, nonrestorative sleep and witnessed apneas patient gives the following associated symptoms.  Have you ever awakened gasping for breath, coughing, choking: Yes   Change in weight,  Yes   Morning headaches  Yes   Awaken with a sore throat or dry mouth  No   Leg jerking at night:  No   Crawly feeling/urge sensation to move in the legs: No   Teeth grinding:Yes   Have you ever awakened at night  "with a sour taste or burning sensation in your chest:  No   Do you have muscle weakness with laughing or anger or sleep paralysis:  No   Have you ever felt paralyzed while going to sleep or waking up:  No   Sleepwalking, nightmares, No   Nocturia (urination at night): 3 times per night  Memory Problem:Yes     MEDICAL CONDITIONS (PMH)  1. Sleep apnea    Social history:  Do you drive a commercial vehicle:  No   Shift work:  No   Tobacco use:  No   Alcohol use: 0 per week  Caffeinated drinks: 3 soda     Family Hx (your parents and siblings)  1. Sleep apnea    Medications: reviewed    Review of systems:  Sleep: Positve for snoring,witnessed apnea and daytime excessive sleepiness with [place] Sleepiness Scale of Total score: 11   Kidney/ Bladder  Difficulty In Urination: negative  Bed Wetting: negative  Frequent Urination: negative  HEENT  Recurrent Nose Bleeds: negative  Ear pain: negative  Sores In Mouth: negative  Persistent Hoarseness: negative  Nasal Congestion: negative  Post Nasal Drip: negative  Musculoskeletal:  Neck Pain: negative  Temporomandibular Joint Pain: negative  General:  Fever: negative  Fatigue: positive  Cardiovascular:  Irregular Heartbeat: negative  Swollen Ankles Or Legs: positive  Respiratory:  Shortness Of Breath: positive  Wheezing: negative  Neuro/Paych:  Fainting Spells: negative  Dizziness: negative  Anxiety: negative  Depression: negative  Gastrointestinal:  Problem Swallowing: negative  Frequent Heartburn: negative  Abdominal Bloating: negative  Skin:  Rash: negative  Change In Nails: negative  Endocrine:  Excessive Thirst: negative  Always Too Cold: negative  Always Too Warm: negative  Hem/Lymphatic:  Swollen Glands: negative  Burses/ Bleeds Easily: negative    Physical exam:  CONSTITUTINONAL:  Vitals:    05/23/22 1100   BP: 119/57   Pulse: 105   SpO2: 91%   Weight: (!) 159 kg (350 lb)   Height: 160 cm (63\")    Body mass index is 62 kg/m².   HEAD: atraumatic, normocephalic   EYES:pupils " are round, equal and reacting to light and accommodation, conjunctiva normal  NOSE:no nasal septal defects, nasal passages are clear, no nasal polyps,   THROAT: tonsils are nonenlarged, tongue normal size, oral airway Mallampati class 4  NECK: , trachea is in the midline, thyroid not enlarged  RESPIRATORY SYSTEM: Breath sounds are normal, there are no wheezes  CARDIOVASULAR SYSTEM: Heart sounds are regular rhythm and normal rate, there are no murmurs or thrills, no edema  GASTROINTESTINAL: Soft and non-tender,liver not enlarged, no evidence of ascites  MUSCULOSKELETAL SYSTEM: Full range of motion's in all the 4 extremities, neck movement not restricted, temporomandibular joint movement normal and no tenderness  SKIN: Warm and moist, no cyanosis, no clubbing,  NEUROLOGICAL SYSTEM: Oriented x 3, no gross neurological defects, No speech defect, gait is normal  PSYCHIATRIC SYSTEM: Mood is normal, thought content is normal    Office notes from care team reviewed. Office note dated March 16, 2022,reviewed    I also reviewed the sleep study which was done at [facility] which shows severe sleep apnea with AHI of 120/h and is on a BiPAP of 18/13    Labs reviewed.  TSH Results:  TSH    TSH 3/2/22   TSH 1.750                 Assessment and plan:  · Obstructive sleep apnea ( G 47.33).  Patient with severe obstructive sleep apnea with AHI of 120/h.  Unfortunately stopped using the BiPAP because of the recall.  I talked to the mother about the recall and the patient and there is no problem with the BiPAP and he needs to use the BiPAP as it was medically necessary and helps him.  I also discussed the recall and given information to register with the [employer] registry for a replacement until then he needs to use the BiPAP.  I will see him in about 3 months for follow-up and I have sent an order to [facility] to get the supplies.    .Without proper control of sleep apnea and good compliance there is a increased risk for  hypertension, diabetes mellitus and nonrestorative sleep with hypersomnia which can increase risk for motor vehicle accidents.  Untreated sleep apnea is also a risk factor for development of atrial fibrillation, pulmonary hypertension and stroke.  · Snoring (R06.83), snoring is the sound created by turbulent airflow vibrating upper airway soft tissue due to limitation of inspiratory airflow. I have also discussed factors affecting snoring including sleep deprivation, sleeping on the back and alcohol ingestion. To minimize snoring, patient is advised to have adequate sleep, sleep on the side and avoid alcohol and sedative medications before bedtime  · Daytime excessive sleepiness .  It was assessed with [place] Sleepiness Scale of Total score: 11.  There are many causes for daytime excessive sleepiness including sleep depression, shiftwork syndrome, depression and other medical disorders including heart, kidney and liver failure.  The most serious cause of excessive sleepiness is due to neurological conditions like narcolepsy/cataplexy.  But the most common cause of excessive sleepiness is due to sleep apnea with frequent awakenings during sleep time.  I have discussed safety of driving and to remain vigilant while driving.  · Obesity morbid, patient's BMI is Body mass index is 62 kg/m².. I have discussed the relationship between weight and sleep apnea.There is direct correlation between weight and severity of sleep apnea.  Weight reduction is encouraged, as it is going to reduce the severity of sleep apnea. I have also discussed with the patient diet and exercise to achieve ideal body weight. Class 3 Severe Obesity (BMI >=40). Obesity-related health conditions include the following: obstructive sleep apnea. Obesity is improving with lifestyle modifications. BMI is is above average; BMI management plan is completed. We discussed portion control and increasing exercise.    I have also discussed with the patient the  following  • Sleep hygiene: Maintaining a regular bedtime and wake time, not to watch television or work in bed, limit caffeine-containing beverages before bed time and avoid naps during the day  • Adequate amount of sleep.  Generally most people needs about 7 to 8 hours of sleep.    Return in about 3 months (around 8/23/2022) for Recheck with smart card down load..  Patient's questions were answered      I once again thank you for asking me to see this patient in consultation and I have forwarded my opinion and treatment plan.  Please do not hesitate to call me if you have any questions.     Julieth Faith MD  Sleep Medicine  Medical Director, [facility]  5/23/2022 ,

## 2022-06-01 ENCOUNTER — OFFICE VISIT (OUTPATIENT)
Dept: FAMILY MEDICINE CLINIC | Facility: CLINIC | Age: 27
End: 2022-06-01

## 2022-06-01 VITALS
HEIGHT: 63 IN | WEIGHT: 315 LBS | SYSTOLIC BLOOD PRESSURE: 120 MMHG | OXYGEN SATURATION: 94 % | TEMPERATURE: 98.2 F | DIASTOLIC BLOOD PRESSURE: 78 MMHG | BODY MASS INDEX: 55.81 KG/M2 | HEART RATE: 111 BPM

## 2022-06-01 DIAGNOSIS — J30.9 ALLERGIC RHINITIS, UNSPECIFIED SEASONALITY, UNSPECIFIED TRIGGER: ICD-10-CM

## 2022-06-01 DIAGNOSIS — R05.9 COUGH: Primary | ICD-10-CM

## 2022-06-01 PROCEDURE — 99213 OFFICE O/P EST LOW 20 MIN: CPT | Performed by: FAMILY MEDICINE

## 2022-06-01 RX ORDER — FOLIC ACID 1 MG/1
1 TABLET ORAL DAILY
Qty: 90 TABLET | Refills: 3 | Status: SHIPPED | OUTPATIENT
Start: 2022-06-01

## 2022-06-01 RX ORDER — ASPIRIN 325 MG
325 TABLET ORAL DAILY
Qty: 90 TABLET | Refills: 3 | Status: SHIPPED | OUTPATIENT
Start: 2022-06-01 | End: 2022-06-10

## 2022-06-01 RX ORDER — MONTELUKAST SODIUM 10 MG/1
10 TABLET ORAL NIGHTLY
Qty: 90 TABLET | Refills: 1 | Status: SHIPPED | OUTPATIENT
Start: 2022-06-01 | End: 2022-09-06 | Stop reason: SDUPTHER

## 2022-06-01 NOTE — PROGRESS NOTES
"Chief Complaint  Cough  Med refills    Subjective        MiSanjeev Bernard presents to [facility]  History of Present Illness  Patient presents today accompanied by his mother to discuss a cough.  This has been a persistent issue for him for the past several months.  I reviewed an office visit from 4/6/2021 where we discussed a cough that has been going on for couple months.  At that time I did order a chest x-ray.  Chest x-ray revealed that patient had per report central pneumonitis/bronchitis.  I discussed having the chest x-ray repeated today.  He does have some issues with nasal congestion.  Medication refills are requested.  Objective   Vital Signs:  /78 (BP Location: Right arm, Patient Position: Sitting, Cuff Size: Adult)   Pulse 111   Temp 98.2 °F (36.8 °C) (Oral)   Ht 160 cm (63\")   Wt (!) 151 kg (332 lb)   SpO2 94%   BMI 58.81 kg/m²           Physical Exam   General: AAO ×3, no acute distress, pleasant  HEENT: Normocephalic, atraumatic, nasal congestion noted bilaterally right worse than left, no maxillary or frontal sinus tenderness to palpation, TM intact bilaterally with no erythema, no cervical tenderness or lymphadenopathy  Cardiovascular: Regular rate and rhythm without appreciable murmur  Respiratory: Clear to auscultation bilaterally no RRW  extremities: No edema  Neurologic: CN II through XII grossly intact   Psychiatric: Normal mood and affect  Result Review :                Assessment and Plan {CC Problem List  Visit Diagnosis   ROS  Review (Popup)  Health Maintenance  Quality  BestPractice  Medications  SmartSets  SnapShot Encounters  Media :23}  Diagnoses and all orders for this visit:    1. Cough (Primary)  -     XR Chest PA & Lateral; Future    2. Allergic rhinitis, unspecified seasonality, unspecified trigger    Other orders  -     aspirin 325 MG tablet; Take 1 tablet by mouth Daily.  Dispense: 90 tablet; Refill: 3  -     Zinc Oxide " 16 % ointment; Apply 1 application topically to the appropriate area as directed Daily As Needed (rash).  Dispense: 113 g; Refill: 3  -     folic acid (FOLVITE) 1 MG tablet; Take 1 tablet by mouth Daily.  Dispense: 90 tablet; Refill: 3  -     montelukast (Singulair) 10 MG tablet; Take 1 tablet by mouth Every Night.  Dispense: 90 tablet; Refill: 1    I discussed getting a chest x-ray for further evaluation.  I will also add Singulair for treatment of allergic rhinitis.  Plan to see patient back in 3 months or sooner if needed.  Further recommendations to follow once chest x-ray returns.         Follow Up   Return in about 3 months (around 9/1/2022) for cough.  Patient was given instructions and counseling regarding his condition or for health maintenance advice. Please see specific information pulled into the AVS if appropriate.

## 2022-06-02 ENCOUNTER — HOSPITAL ENCOUNTER (OUTPATIENT)
Dept: GENERAL RADIOLOGY | Facility: HOSPITAL | Age: 27
Discharge: HOME | End: 2022-06-02
Admitting: FAMILY MEDICINE

## 2022-06-02 DIAGNOSIS — R05.9 COUGH: ICD-10-CM

## 2022-06-02 DIAGNOSIS — R93.89 ABNORMAL CHEST X-RAY: Primary | ICD-10-CM

## 2022-06-02 PROCEDURE — 71046 X-RAY EXAM CHEST 2 VIEWS: CPT

## 2022-06-02 RX ORDER — BENZONATATE 200 MG/1
200 CAPSULE ORAL 3 TIMES DAILY PRN
Qty: 30 CAPSULE | Refills: 0 | Status: SHIPPED | OUTPATIENT
Start: 2022-06-02 | End: 2022-06-12

## 2022-06-02 RX ORDER — LEVOFLOXACIN 750 MG/1
750 TABLET ORAL DAILY
Qty: 10 TABLET | Refills: 0 | Status: SHIPPED | OUTPATIENT
Start: 2022-06-02 | End: 2023-02-06

## 2022-06-09 ENCOUNTER — APPOINTMENT (OUTPATIENT)
Dept: CARDIOLOGY | Facility: HOSPITAL | Age: 27
End: 2022-06-09

## 2022-06-09 ENCOUNTER — HOSPITAL ENCOUNTER (EMERGENCY)
Facility: HOSPITAL | Age: 27
Discharge: HOME | End: 2022-06-09
Attending: EMERGENCY MEDICINE | Admitting: EMERGENCY MEDICINE

## 2022-06-09 ENCOUNTER — TELEPHONE (OUTPATIENT)
Dept: FAMILY MEDICINE CLINIC | Facility: CLINIC | Age: 27
End: 2022-06-09

## 2022-06-09 VITALS
OXYGEN SATURATION: 93 % | TEMPERATURE: 98.4 F | WEIGHT: 315 LBS | DIASTOLIC BLOOD PRESSURE: 60 MMHG | SYSTOLIC BLOOD PRESSURE: 119 MMHG | RESPIRATION RATE: 18 BRPM | HEART RATE: 97 BPM | BODY MASS INDEX: 55.81 KG/M2 | HEIGHT: 63 IN

## 2022-06-09 DIAGNOSIS — I82.462 ACUTE DEEP VEIN THROMBOSIS (DVT) OF CALF MUSCLE VEIN OF LEFT LOWER EXTREMITY: Primary | ICD-10-CM

## 2022-06-09 LAB
ALBUMIN SERPL-MCNC: 3.8 G/DL (ref 3.5–5.2)
ALBUMIN/GLOB SERPL: 1.2 G/DL
ALP SERPL-CCNC: 80 U/L (ref 39–117)
ALT SERPL W P-5'-P-CCNC: 26 U/L (ref 1–41)
ANION GAP SERPL CALCULATED.3IONS-SCNC: 9.7 MMOL/L (ref 5–15)
AST SERPL-CCNC: 25 U/L (ref 1–40)
BASOPHILS # BLD AUTO: 0.09 10*3/MM3 (ref 0–0.2)
BASOPHILS NFR BLD AUTO: 2.2 % (ref 0–1.5)
BH CV LOW VAS LEFT LESSER SAPH VESSEL: 1
BH CV LOW VAS LEFT POPLITEAL SPONT: 1
BH CV LOW VAS LEFT POSTERIOR TIBIAL VESSEL: 1
BH CV LOWER VASCULAR LEFT COMMON FEMORAL AUGMENT: NORMAL
BH CV LOWER VASCULAR LEFT COMMON FEMORAL COMPRESS: NORMAL
BH CV LOWER VASCULAR LEFT COMMON FEMORAL PHASIC: NORMAL
BH CV LOWER VASCULAR LEFT COMMON FEMORAL SPONT: NORMAL
BH CV LOWER VASCULAR LEFT DISTAL FEMORAL COMPRESS: NORMAL
BH CV LOWER VASCULAR LEFT GREATER SAPH AK COMPRESS: NORMAL
BH CV LOWER VASCULAR LEFT LESSER SAPH COMPRESS: NORMAL
BH CV LOWER VASCULAR LEFT MID FEMORAL AUGMENT: NORMAL
BH CV LOWER VASCULAR LEFT MID FEMORAL COMPRESS: NORMAL
BH CV LOWER VASCULAR LEFT MID FEMORAL PHASIC: NORMAL
BH CV LOWER VASCULAR LEFT MID FEMORAL SPONT: NORMAL
BH CV LOWER VASCULAR LEFT POPLITEAL AUGMENT: NORMAL
BH CV LOWER VASCULAR LEFT POPLITEAL COMPRESS: NORMAL
BH CV LOWER VASCULAR LEFT POPLITEAL PHASIC: NORMAL
BH CV LOWER VASCULAR LEFT POPLITEAL SPONT: NORMAL
BH CV LOWER VASCULAR LEFT POSTERIOR TIBIAL AUGMENT: NORMAL
BH CV LOWER VASCULAR LEFT POSTERIOR TIBIAL COMPRESS: NORMAL
BH CV LOWER VASCULAR LEFT POSTERIOR TIBIAL SPONT: NORMAL
BH CV LOWER VASCULAR LEFT PROXIMAL FEMORAL COMPRESS: NORMAL
BH CV LOWER VASCULAR RIGHT COMMON FEMORAL AUGMENT: NORMAL
BH CV LOWER VASCULAR RIGHT COMMON FEMORAL COMPETENT: NORMAL
BH CV LOWER VASCULAR RIGHT COMMON FEMORAL COMPRESS: NORMAL
BH CV LOWER VASCULAR RIGHT COMMON FEMORAL PHASIC: NORMAL
BH CV LOWER VASCULAR RIGHT COMMON FEMORAL SPONT: NORMAL
BILIRUB SERPL-MCNC: 0.6 MG/DL (ref 0–1.2)
BUN SERPL-MCNC: 14 MG/DL (ref 6–20)
BUN/CREAT SERPL: 9.7 (ref 7–25)
CALCIUM SPEC-SCNC: 9.3 MG/DL (ref 8.6–10.5)
CHLORIDE SERPL-SCNC: 100 MMOL/L (ref 98–107)
CO2 SERPL-SCNC: 28.3 MMOL/L (ref 22–29)
CREAT SERPL-MCNC: 1.45 MG/DL (ref 0.76–1.27)
DEPRECATED RDW RBC AUTO: 55.3 FL (ref 37–54)
EGFRCR SERPLBLD CKD-EPI 2021: 68.6 ML/MIN/1.73
EOSINOPHIL # BLD AUTO: 0.02 10*3/MM3 (ref 0–0.4)
EOSINOPHIL NFR BLD AUTO: 0.5 % (ref 0.3–6.2)
ERYTHROCYTE [DISTWIDTH] IN BLOOD BY AUTOMATED COUNT: 17.2 % (ref 12.3–15.4)
GLOBULIN UR ELPH-MCNC: 3.2 GM/DL
GLUCOSE SERPL-MCNC: 109 MG/DL (ref 65–99)
HCT VFR BLD AUTO: 53.1 % (ref 37.5–51)
HGB BLD-MCNC: 18.1 G/DL (ref 13–17.7)
HOLD SPECIMEN: NORMAL
HOLD SPECIMEN: NORMAL
IMM GRANULOCYTES # BLD AUTO: 0.02 10*3/MM3 (ref 0–0.05)
IMM GRANULOCYTES NFR BLD AUTO: 0.5 % (ref 0–0.5)
LYMPHOCYTES # BLD AUTO: 1.28 10*3/MM3 (ref 0.7–3.1)
LYMPHOCYTES NFR BLD AUTO: 31.8 % (ref 19.6–45.3)
MAXIMAL PREDICTED HEART RATE: 195 BPM
MCH RBC QN AUTO: 31.2 PG (ref 26.6–33)
MCHC RBC AUTO-ENTMCNC: 34.1 G/DL (ref 31.5–35.7)
MCV RBC AUTO: 91.4 FL (ref 79–97)
MONOCYTES # BLD AUTO: 0.49 10*3/MM3 (ref 0.1–0.9)
MONOCYTES NFR BLD AUTO: 12.2 % (ref 5–12)
NEUTROPHILS NFR BLD AUTO: 2.12 10*3/MM3 (ref 1.7–7)
NEUTROPHILS NFR BLD AUTO: 52.8 % (ref 42.7–76)
NRBC BLD AUTO-RTO: 0 /100 WBC (ref 0–0.2)
PLATELET # BLD AUTO: 196 10*3/MM3 (ref 140–450)
PMV BLD AUTO: 9.8 FL (ref 6–12)
POTASSIUM SERPL-SCNC: 5.1 MMOL/L (ref 3.5–5.2)
PROT SERPL-MCNC: 7 G/DL (ref 6–8.5)
RBC # BLD AUTO: 5.81 10*6/MM3 (ref 4.14–5.8)
SODIUM SERPL-SCNC: 138 MMOL/L (ref 136–145)
STRESS TARGET HR: 166 BPM
WBC NRBC COR # BLD: 4.02 10*3/MM3 (ref 3.4–10.8)
WHOLE BLOOD HOLD COAG: NORMAL
WHOLE BLOOD HOLD SPECIMEN: NORMAL

## 2022-06-09 PROCEDURE — 93971 EXTREMITY STUDY: CPT

## 2022-06-09 PROCEDURE — 80053 COMPREHEN METABOLIC PANEL: CPT | Performed by: EMERGENCY MEDICINE

## 2022-06-09 PROCEDURE — 99282 EMERGENCY DEPT VISIT SF MDM: CPT

## 2022-06-09 PROCEDURE — 85025 COMPLETE CBC W/AUTO DIFF WBC: CPT

## 2022-06-09 PROCEDURE — 93971 EXTREMITY STUDY: CPT | Performed by: SURGERY

## 2022-06-09 PROCEDURE — 36415 COLL VENOUS BLD VENIPUNCTURE: CPT

## 2022-06-09 NOTE — ED PROVIDER NOTES
Time: 7:23 PM EDT  Arrived by: private car  Chief Complaint: Left leg pain and swelling  History provided by: mother  History is limited by: intellectual development and speech issues    History of Present Illness:  Patient is a 27 y.o. year old male who presents to the emergency department with left leg pain and swelling.  The patient has Down's syndrome and a past history of DVT.  The patient has no chest pain or shortness of breath per his mother.    HPI    Similar Symptoms Previously: yes  Recently seen:yes      Patient Care Team  Primary Care Provider: Jesus Manuel Sloan DO    Past Medical History:     No Known Allergies  Past Medical History:   Diagnosis Date   • Asthma    • Diabetes mellitus (HCC)    • Disease of thyroid gland    • Down's syndrome      History reviewed. No pertinent surgical history.  History reviewed. No pertinent family history.    Home Medications:  Prior to Admission medications    Medication Sig Start Date End Date Taking? Authorizing Provider   albuterol (ACCUNEB) 1.25 MG/3ML nebulizer solution Inhale 1 ampule.    Madison Crow MD   albuterol sulfate  (90 Base) MCG/ACT inhaler Inhale 2 puffs Every 4 (Four) Hours As Needed for Wheezing or Shortness of Air. 3/16/22   Jesus Manuel Sloan DO   allopurinol (ZYLOPRIM) 100 MG tablet Take 1 tablet by mouth Daily. 12/15/21   Jesus Manuel Sloan DO   ALUM SULFATE-CA ACETATE EX Apply  topically to the appropriate area as directed.    Madison Crow MD   aspirin 325 MG tablet Take 1 tablet by mouth Daily. 6/1/22   Jesus Manuel Sloan DO   benzonatate (TESSALON) 200 MG capsule Take 1 capsule by mouth 3 (Three) Times a Day As Needed for Cough for up to 10 days. 6/2/22 6/12/22  Jesus Manuel Sloan DO   budesonide (PULMICORT) 0.5 MG/2ML nebulizer solution 2 mL.    Madison Crow MD   cetirizine (zyrTEC) 10 MG tablet Zyrtec 10 mg oral tablet take 1 tablet (10 mg) by oral route once daily   Suspended    Madison Crow MD    clindamycin (Cleocin-T) 1 % external solution Apply  topically to the appropriate area as directed 2 (Two) Times a Day. 3/16/22   Jesus Manuel Sloan DO   colchicine 0.6 MG tablet colchicine 0.6 mg oral tablet take 1 tablet (0.6 mg) by oral route once daily   Suspended    Madison Crow MD   Diclofenac Sodium (VOLTAREN) 1 % gel gel diclofenac sodium 1 % topical gel apply 2 gram to the affected area(s) by topical route 4 times per day   Suspended    Madison Crow MD   docusate sodium 100 MG capsule Take 100 mg by mouth.    Madison Crow MD   folic acid (FOLVITE) 1 MG tablet Take 1 tablet by mouth Daily. 6/1/22   Jesus Manuel Sloan DO   ipratropium (ATROVENT) 0.03 % nasal spray 2 sprays into the nostril(s) as directed by provider.    Madison Crow MD   ketoconazole (NIZORAL) 2 % cream Apply  topically to the appropriate area as directed Daily. 3/16/22   Jesus Manuel Sloan DO   levoFLOXacin (Levaquin) 750 MG tablet Take 1 tablet by mouth Daily. 6/2/22   Jesus Manuel Sloan DO   levothyroxine (SYNTHROID, LEVOTHROID) 25 MCG tablet Take 1 PO daily    Madison Crow MD   loratadine (CLARITIN) 10 MG tablet loratadine 10 mg oral tablet take 1 tablet (10 mg) by oral route once daily   Suspended    Madison Crow MD   metFORMIN (GLUCOPHAGE) 500 MG tablet Take 1 tablet by mouth Daily. 3/16/22   Jesus Manuel Sloan DO   mineral oil-hydrophilic petrolatum (AQUAPHOR) ointment Apply 1 application topically to the appropriate area as directed As Needed for Dry Skin. 3/16/22   Jesus Manuel Sloan DO   montelukast (Singulair) 10 MG tablet Take 1 tablet by mouth Every Night. 6/1/22   Jesus Manuel Sloan DO   mupirocin (BACTROBAN) 2 % ointment Apply  topically to the appropriate area as directed 2 (Two) Times a Day. 3/16/22   Jesus Manuel Sloan DO   olopatadine (PATANOL) 0.1 % ophthalmic solution     Madison Crow MD   Urea 50 % suspension Urea Nail Stick 50 % topical solution apply to  "nail tissue by topical route 2 times per day   Suspended    Madison Crow MD   vitamin B-12 (CYANOCOBALAMIN) 100 MCG tablet Vitamin B-12 oral takes 1 tablet daily   Suspended    Madison Crow MD   vitamin B-6 (PYRIDOXINE) 50 MG tablet Take 1/2 tablet PO QOD    Madison Crow MD   Zinc Oxide 16 % ointment Apply 1 application topically to the appropriate area as directed Daily As Needed (rash). 6/1/22   Jesus Manuel Sloan DO        Social History:   Social History     Tobacco Use   • Smoking status: Never Smoker   • Smokeless tobacco: Never Used   Vaping Use   • Vaping Use: Never used   Substance Use Topics   • Alcohol use: Never     Recent travel: no     Review of Systems:  Review of Systems   Unable to perform ROS: Other (down syndrome and poor verbal skills)        Physical Exam:  /60 (BP Location: Right arm)   Pulse 97   Temp 98.4 °F (36.9 °C) (Oral)   Resp 18   Ht 160 cm (63\")   Wt (!) 162 kg (358 lb 0.4 oz)   SpO2 93%   BMI 63.42 kg/m²     Physical Exam  Vitals and nursing note reviewed.   Constitutional:       Appearance: He is obese.   HENT:      Head: Normocephalic and atraumatic.      Right Ear: External ear normal.      Left Ear: External ear normal.      Nose: Nose normal.      Mouth/Throat:      Mouth: Mucous membranes are dry.      Pharynx: Oropharynx is clear.   Eyes:      Extraocular Movements: Extraocular movements intact.      Conjunctiva/sclera: Conjunctivae normal.      Pupils: Pupils are equal, round, and reactive to light.   Cardiovascular:      Rate and Rhythm: Normal rate and regular rhythm.      Pulses: Normal pulses.      Heart sounds: Normal heart sounds.   Pulmonary:      Effort: Pulmonary effort is normal.      Breath sounds: Normal breath sounds.   Abdominal:      General: Abdomen is flat. Bowel sounds are normal.      Palpations: Abdomen is soft.   Musculoskeletal:         General: Swelling present. No tenderness.      Cervical back: Normal range of " motion and neck supple.      Comments: Left calf edema with mild erythema   Skin:     General: Skin is warm.      Findings: Erythema present.   Neurological:      General: No focal deficit present.      Mental Status: He is alert. Mental status is at baseline.   Psychiatric:         Mood and Affect: Mood normal.         Behavior: Behavior normal.                Medications in the Emergency Department:  Medications - No data to display     Labs  Lab Results (last 24 hours)     Procedure Component Value Units Date/Time    CBC & Differential [[id number]]  (Abnormal) Collected: 06/09/22 1710    Specimen: Blood Updated: 06/09/22 1723    Narrative:      The following orders were created for panel order CBC & Differential.  Procedure                               Abnormality         Status                     ---------                               -----------         ------                     CBC Auto Differential[[id number]]        Abnormal            Final result                 Please view results for these tests on the individual orders.    Comprehensive Metabolic Panel [[id number]]  (Abnormal) Collected: 06/09/22 1710    Specimen: Blood Updated: 06/09/22 1748     Glucose 109 mg/dL      BUN 14 mg/dL      Creatinine 1.45 mg/dL      Sodium 138 mmol/L      Potassium 5.1 mmol/L      Chloride 100 mmol/L      CO2 28.3 mmol/L      Calcium 9.3 mg/dL      Total Protein 7.0 g/dL      Albumin 3.80 g/dL      ALT (SGPT) 26 U/L      AST (SGOT) 25 U/L      Alkaline Phosphatase 80 U/L      Total Bilirubin 0.6 mg/dL      Globulin 3.2 gm/dL      A/G Ratio 1.2 g/dL      BUN/Creatinine Ratio 9.7     Anion Gap 9.7 mmol/L      eGFR 68.6 mL/min/1.73      Comment: National Kidney Foundation and American Society of Nephrology (ASN) Task Force recommended calculation based on the Chronic Kidney Disease Epidemiology Collaboration (CKD-EPI) equation refit without adjustment for race.       Narrative:      GFR Normal >60  Chronic Kidney Disease  <60  Kidney Failure <15      CBC Auto Differential [[id number]]  (Abnormal) Collected: 06/09/22 1710    Specimen: Blood Updated: 06/09/22 1723     WBC 4.02 10*3/mm3      RBC 5.81 10*6/mm3      Hemoglobin 18.1 g/dL      Hematocrit 53.1 %      MCV 91.4 fL      MCH 31.2 pg      MCHC 34.1 g/dL      RDW 17.2 %      RDW-SD 55.3 fl      MPV 9.8 fL      Platelets 196 10*3/mm3      Neutrophil % 52.8 %      Lymphocyte % 31.8 %      Monocyte % 12.2 %      Eosinophil % 0.5 %      Basophil % 2.2 %      Immature Grans % 0.5 %      Neutrophils, Absolute 2.12 10*3/mm3      Lymphocytes, Absolute 1.28 10*3/mm3      Monocytes, Absolute 0.49 10*3/mm3      Eosinophils, Absolute 0.02 10*3/mm3      Basophils, Absolute 0.09 10*3/mm3      Immature Grans, Absolute 0.02 10*3/mm3      nRBC 0.0 /100 WBC            Imaging:    Duplex Venous Lower Extremity - Left CAR    Result Date: 6/9/2022  Narrative: · Technically limited study due to patient's breath-holding with compression maneuvers. The popliteal and posterior tibial veins do show areas of poor compressibility of high suspicion for acute thrombus. · Acute left lower extremity deep vein thrombosis noted in the popliteal and posterial tibial. · Acute-on-chronic left lower extremity superficial thrombophlebitis noted in the small saphenous.      XR Chest PA & Lateral    Result Date: 6/2/2022  Narrative: PROCEDURE: XR CHEST PA AND LATERAL  COMPARISON: [facility], CR, CHEST PA/AP & LAT 2V, 8/26/2016, 23:28.  [facility], CR, CHEST PA/AP & LAT 2V, 10/28/2017, 21:25.  [facility], CR, CHEST PA/AP & LAT 2V, 12/31/2017, 12:15.  [facility], CR, CHEST PA/AP & LAT 2V, 4/08/2021, 11:28.  INDICATIONS: cough x 6 months  FINDINGS:  There appear to be new opacities in the left upper lobe.  There is also new opacity in the medial right base along the right heart border.  This has a somewhat masslike circumscribed morphology which may be seen with  pericardial cysts.  Hernia versus right middle lobe infiltrate or mass may also be considered in the differential.  No definite pleural effusion or pneumothorax is seen.  Heart size appears grossly normal.  Mediastinal contour appears within normal limits.  No acute osseous abnormality is seen.      Impression:   1. New left upper lobe opacities which may indicate pneumonia or other infiltrate given patient's symptoms. 2. New masslike opacity in the medial right base.  Differential diagnosis may include pericardial cyst, hernia, or right middle lobe infiltrate or mass.  Recommend further evaluation with contrast enhanced chest CT.     BILLY CORONADO MD       Electronically Signed and Approved By: BILLY CORONADO MD on 6/02/2022 at 16:10               Duplex Venous Lower Extremity - Left CAR    Result Date: 6/9/2022  · Technically limited study due to patient's breath-holding with compression maneuvers. The popliteal and posterior tibial veins do show areas of poor compressibility of high suspicion for acute thrombus. · Acute left lower extremity deep vein thrombosis noted in the popliteal and posterial tibial. · Acute-on-chronic left lower extremity superficial thrombophlebitis noted in the small saphenous.        Procedures:  Procedures    Progress                            Medical Decision Making:  MDM     Final diagnoses:   Acute deep vein thrombosis (DVT) of calf muscle vein of left lower extremity (HCC)        Disposition:  ED Disposition     ED Disposition   Discharge    Condition   Stable    Comment   --             This medical record created using voice recognition software.           Gennaro Conway DO  06/10/22 1109

## 2022-06-09 NOTE — TELEPHONE ENCOUNTER
Patients mother called back for an answer. I just directed patients mother to take her son to Urgent Care or ER. She stated that she understood and will take him.

## 2022-06-09 NOTE — TELEPHONE ENCOUNTER
Patients mother called today and stated that patients legs have been swollen all night and that she has iced it. To try and get the swelling to go down. Patients mother stated that he has had a blood clot in his leg before and she is worried that this time that is what is going on . We tried to get patient an appointment to see a different provider but she is insisting that you have put the Venous Doppler order in for her before. Patient stated that this was discussed at last visit. Looked at progress note and don't see anything. Looked in [facility] and don't see where previously ordered. Please advise.

## 2022-06-09 NOTE — ED NOTES
Discharge instructions reviewed with pt family. Pt family verbalized understanding. Pt wheeled to exit in wheelchair by family.

## 2022-06-10 ENCOUNTER — HOSPITAL ENCOUNTER (OUTPATIENT)
Dept: CT IMAGING | Facility: HOSPITAL | Age: 27
Discharge: HOME | End: 2022-06-10
Admitting: FAMILY MEDICINE

## 2022-06-10 DIAGNOSIS — R05.9 COUGH: ICD-10-CM

## 2022-06-10 DIAGNOSIS — R93.89 ABNORMAL CHEST X-RAY: ICD-10-CM

## 2022-06-10 DIAGNOSIS — Q79.0 MORGAGNI HERNIA: Primary | ICD-10-CM

## 2022-06-10 PROCEDURE — 0 IOPAMIDOL PER 1 ML: Performed by: FAMILY MEDICINE

## 2022-06-10 PROCEDURE — 71260 CT THORAX DX C+: CPT

## 2022-06-10 RX ADMIN — IOPAMIDOL 100 ML: 755 INJECTION, SOLUTION INTRAVENOUS at 14:04

## 2022-06-16 ENCOUNTER — TELEPHONE (OUTPATIENT)
Dept: FAMILY MEDICINE CLINIC | Facility: CLINIC | Age: 27
End: 2022-06-16

## 2022-06-16 NOTE — TELEPHONE ENCOUNTER
MOTHER CAME IN TO LET DR. CORNELIUS KNOW THAT DR. VARELA SAID THAT THEY DIDN'T NEED TO WORRY ABOUT SURGERY FOR AT LEAST A YEAR FOR THE HERNIA

## 2022-07-07 ENCOUNTER — TELEPHONE (OUTPATIENT)
Dept: FAMILY MEDICINE CLINIC | Facility: CLINIC | Age: 27
End: 2022-07-07

## 2022-07-07 DIAGNOSIS — J30.9 ALLERGIC RHINITIS, UNSPECIFIED SEASONALITY, UNSPECIFIED TRIGGER: Primary | ICD-10-CM

## 2022-07-07 NOTE — TELEPHONE ENCOUNTER
Patient's mother is requesting a referral for doctor's office. Was Brain Nath, now Aria Yu. July 13th @ 2:00 is his appointment. Please call when this is done [phone].

## 2022-07-07 NOTE — TELEPHONE ENCOUNTER
Phone call placed to mother to clarify what type of provider the refferal needs to be with. Mother stated a ENT to clean the ears out on the patient. Referral placed.

## 2022-09-02 NOTE — DISCHARGE INSTRUCTIONS
[facility] - Emergency Dept  Cardiology  Consult Note    Patient Name: Reji Pond Jr.  MRN: [id number]  Admission Date: 9/2/2022  Hospital Length of Stay: 0 days  Code Status: Full Code   Attending Provider: Stevenson Girard MD   Consulting Provider: Syed Mujica MD  Primary Care Physician: Deangelo Rasheed MD  Principal Problem:Syncope    Patient information was obtained from patient and ER records.     Inpatient consult to Cardiology  Consult performed by: Syed Mujica MD  Consult ordered by: Daja Mann PA-C        Subjective:     Chief Complaint:  Syncope, elevated trop     HPI:   39 y.o. male with a past medical history of HTN who presented to the ED this morning s/p LOC at home.     Pt says that he was urinating when suddenly he fell to the floor.  Denies any palpitations, N/V sensation prior to syncope.  He was on the floor when his girlfriend found him and he was shaking/having awkward movements.  This lasted about 2-3min.  His girlfriend called 911 to bring him to the ED.  Denies any drug use, recent alcohol use.  In the ED he was hypertensive to 210 SBP.  Trop obtained and elevated to .04 to .05.  Ddimer was elevated but CTA negative for PE.  Cardiology consulted for elevated troponin.      Past Medical History:   Diagnosis Date    Elevated liver function tests 10/25/2018    Fatty liver 10/25/2018    Hypertension     Kidney stones        Past Surgical History:   Procedure Laterality Date    CHONDROPLASTY OF KNEE Left 7/28/2020    Procedure: CHONDROPLASTY, KNEE;  Surgeon: Santo Shepherd MD;  Location: [facility] OR;  Service: Orthopedics;  Laterality: Left;    KNEE ARTHROSCOPY W/ MENISCECTOMY Left 7/28/2020    Procedure: ARTHROSCOPY, KNEE, WITH MENISCECTOMY;  Surgeon: Santo Shepherd MD;  Location: [facility] OR;  Service: Orthopedics;  Laterality: Left;    KNEE ARTHROSCOPY W/ OATS PROCEDURE Left 7/28/2020    Procedure: REPAIR, KNEE, ARTHROSCOPIC, WITH OSTEOCHONDRAL GRAFT TRANSFER;  Surgeon:  Take medications as directed.  Return for worsening symptoms.  Follow-up with your doctor in 1 to 2 days.   Santo Shepherd MD;  Location: [facility];  Service: Orthopedics;  Laterality: Left;  Regional W/ Catheter: Epidural, Spinal, Adductor  TRACI 50cc        Review of patient's allergies indicates:  No Known Allergies    No current facility-administered medications on file prior to encounter.     Current Outpatient Medications on File Prior to Encounter   Medication Sig    amLODIPine (NORVASC) 10 MG tablet Take 1 tablet (10 mg total) by mouth once daily.    benzocaine (HURRICAINE) 20 % Gel Use as directed 1 application in the mouth or throat 4 (four) times daily as needed.    ibuprofen (ADVIL,MOTRIN) 600 MG tablet Take 1 tablet (600 mg total) by mouth every 6 (six) hours as needed for Pain.    meloxicam (MOBIC) 15 MG tablet Take 1 tablet (15 mg total) by mouth once daily.     Family History       Problem Relation (Age of Onset)    Heart failure Father          Tobacco Use    Smoking status: Former    Smokeless tobacco: Never   Substance and Sexual Activity    Alcohol use: Yes     Comment: occasionally     Drug use: No    Sexual activity: Yes     Partners: Female     Review of Systems   Constitutional: Negative for fever and malaise/fatigue.   HENT:  Negative for congestion and sore throat.    Eyes:  Negative for blurred vision, vision loss in left eye and vision loss in right eye.   Cardiovascular:  Negative for chest pain, dyspnea on exertion, irregular heartbeat, leg swelling, near-syncope, palpitations and syncope.   Respiratory:  Negative for shortness of breath and wheezing.    Endocrine: Negative.    Hematologic/Lymphatic: Negative.    Skin: Negative.    Musculoskeletal:  Negative for arthritis, back pain, joint pain and myalgias.   Gastrointestinal:  Negative for abdominal pain, hematemesis, hematochezia and melena.   Genitourinary: Negative.    Neurological:  Positive for light-headedness. Negative for loss of balance.   Psychiatric/Behavioral: Negative.     Objective:     Vital Signs (Most Recent):  Temp:  98.5 °F (36.9 °C) (09/02/22 1543)  Pulse: 67 (09/02/22 1543)  Resp: 18 (09/02/22 1543)  BP: (!) 172/92 (09/02/22 1543)  SpO2: 98 % (09/02/22 1543)   Vital Signs (24h Range):  Temp:  [98.2 °F (36.8 °C)-98.5 °F (36.9 °C)] 98.5 °F (36.9 °C)  Pulse:  [48-75] 67  Resp:  [18-24] 18  SpO2:  [95 %-99 %] 98 %  BP: (138-210)/() 172/92     Weight: 104.3 kg (230 lb)  Body mass index is 31.19 kg/m².    SpO2: 98 %  O2 Device (Oxygen Therapy): room air    No intake or output data in the 24 hours ending 09/02/22 1628    Lines/Drains/Airways       Epidural Line  Duration                  Perineural Analgesia/Anesthesia Assessment (using dermatomes) 07/28/20 1137 766 days              Peripheral Intravenous Line  Duration                  Peripheral IV - Single Lumen 09/02/22 0719 20 G Left Antecubital <1 day                    Physical Exam  Vitals and nursing note reviewed.   Constitutional:       Appearance: Normal appearance. He is normal weight. He is not toxic-appearing.   HENT:      Head: Normocephalic and atraumatic.   Eyes:      General: No scleral icterus.     Extraocular Movements: Extraocular movements intact.   Neck:      Vascular: No carotid bruit.   Cardiovascular:      Rate and Rhythm: Normal rate and regular rhythm.      Pulses: Normal pulses.      Heart sounds: Normal heart sounds. No murmur heard.    No gallop.   Pulmonary:      Effort: Pulmonary effort is normal. No respiratory distress.      Breath sounds: Normal breath sounds. No wheezing or rales.   Abdominal:      General: Abdomen is flat. Bowel sounds are normal.      Palpations: Abdomen is soft. There is no mass.   Musculoskeletal:      Cervical back: Normal range of motion.      Right lower leg: No edema.      Left lower leg: No edema.   Skin:     General: Skin is warm and dry.      Capillary Refill: Capillary refill takes less than 2 seconds.      Findings: No rash.   Neurological:      General: No focal deficit present.      Mental Status: He is  "alert and oriented to person, place, and time. Mental status is at baseline.       Significant Labs: All pertinent lab results from the last 24 hours have been reviewed.    Significant Imaging: Echocardiogram: Transthoracic echo (TTE) complete (Cupid Only):   Results for orders placed or performed during the hospital encounter of 09/02/22   Echo   Result Value Ref Range    Ascending aorta 2.78 cm    STJ 2.36 cm    AV mean gradient 4 mmHg    Ao peak blair 1.48 m/s    Ao VTI 26.83 cm    IVS 0.95 0.6 - 1.1 cm    LA size 4.05 cm    Left Atrium Major Axis 5.11 cm    Left Atrium Minor Axis 5.10 cm    LVIDd 5.12 3.5 - 6.0 cm    LVIDs 3.38 2.1 - 4.0 cm    LVOT diameter 2.01 cm    LVOT peak VTI 18.72 cm    Posterior Wall 1.00 0.6 - 1.1 cm    MV Peak A Blair 0.76 m/s    E wave deceleration time 206.72 msec    MV Peak E Blair 0.94 m/s    PV Peak D Blair 0.57 m/s    PV Peak S Blair 0.44 m/s    RA Major Axis 4.50 cm    RA Width 3.39 cm    RVDD 3.48 cm    Sinus 2.61 cm    TAPSE 1.88 cm    TDI LATERAL 0.10 m/s    TDI SEPTAL 0.08 m/s    LA WIDTH 4.31 cm    MV stenosis pressure 1/2 time 59.95 ms    LV Diastolic Volume 124.80 mL    LV Systolic Volume 46.89 mL    RV S' 10.73 cm/s    LVOT peak blair 0.87 m/s    LA volume (mod) 54.81 cm3    MV "A" wave duration 10.85 msec    LV LATERAL E/E' RATIO 9.40 m/s    LV SEPTAL E/E' RATIO 11.75 m/s    FS 34 %    LA volume 75.74 cm3    LV mass 182.95 g    Left Ventricle Relative Wall Thickness 0.39 cm    AV valve area 2.21 cm2    AV Velocity Ratio 0.59     AV index (prosthetic) 0.70     MV valve area p 1/2 method 3.67 cm2    E/A ratio 1.24     Mean e' 0.09 m/s    Pulm vein S/D ratio 0.77     LVOT area 3.2 cm2    LVOT stroke volume 59.37 cm3    AV peak gradient 9 mmHg    E/E' ratio 10.44 m/s    LV Systolic Volume Index 20.7 mL/m2    LV Diastolic Volume Index 55.22 mL/m2    LA Volume Index 33.5 mL/m2    LV Mass Index 81 g/m2    LA Volume Index (Mod) 24.3 mL/m2    BSA 2.3 m2    Right Atrial Pressure (from IVC) 3 " mmHg    EF 65 %    Narrative    · The left ventricle is normal in size with normal systolic function. The   estimated ejection fraction is 65%.  · Normal right ventricular size with normal right ventricular systolic   function.  · Normal left ventricular diastolic function.  · Normal central venous pressure (3 mmHg).        Assessment and Plan:     * Syncope  Unclear etiology however top ddx includes seizure vs vasovagal (post micturation) and not cardiogenic  - recommend neurological evaluation for seizures  - monitor on telemetry  - not unreasonable to send home with event monitor (can call cardiology consult for help ordering)    HTN (hypertension)  His BP was 210 systolic on arrival and likely the cause of his elevated troponin  - recommend tighter BP control  - no cardiac intervention necessary        VTE Risk Mitigation (From admission, onward)         Ordered     IP VTE LOW RISK PATIENT  Once         09/02/22 1053                Thank you for your consult. I will follow-up with patient. Please contact us if you have any additional questions.    Syed Mujica MD  Cardiology   [facility] - Emergency Dept

## 2022-09-06 ENCOUNTER — OFFICE VISIT (OUTPATIENT)
Dept: FAMILY MEDICINE CLINIC | Facility: CLINIC | Age: 27
End: 2022-09-06

## 2022-09-06 VITALS
OXYGEN SATURATION: 99 % | HEART RATE: 88 BPM | WEIGHT: 315 LBS | BODY MASS INDEX: 55.81 KG/M2 | TEMPERATURE: 98.2 F | HEIGHT: 63 IN | SYSTOLIC BLOOD PRESSURE: 108 MMHG | DIASTOLIC BLOOD PRESSURE: 62 MMHG

## 2022-09-06 DIAGNOSIS — E53.8 B12 DEFICIENCY: ICD-10-CM

## 2022-09-06 DIAGNOSIS — R73.09 ABNORMAL GLUCOSE: ICD-10-CM

## 2022-09-06 DIAGNOSIS — I26.99 OTHER PULMONARY EMBOLISM WITHOUT ACUTE COR PULMONALE, UNSPECIFIED CHRONICITY: ICD-10-CM

## 2022-09-06 DIAGNOSIS — E53.1 VITAMIN B6 DEFICIENCY: Primary | ICD-10-CM

## 2022-09-06 DIAGNOSIS — D75.1 POLYCYTHEMIA: ICD-10-CM

## 2022-09-06 DIAGNOSIS — E03.9 HYPOTHYROIDISM, UNSPECIFIED TYPE: ICD-10-CM

## 2022-09-06 DIAGNOSIS — G47.33 OSA (OBSTRUCTIVE SLEEP APNEA): ICD-10-CM

## 2022-09-06 DIAGNOSIS — I82.4Y2 DEEP VEIN THROMBOSIS (DVT) OF PROXIMAL VEIN OF LEFT LOWER EXTREMITY, UNSPECIFIED CHRONICITY: ICD-10-CM

## 2022-09-06 PROCEDURE — 99214 OFFICE O/P EST MOD 30 MIN: CPT | Performed by: FAMILY MEDICINE

## 2022-09-06 RX ORDER — LEVOTHYROXINE SODIUM 0.03 MG/1
25 TABLET ORAL DAILY
Qty: 90 TABLET | Refills: 3 | Status: SHIPPED | OUTPATIENT
Start: 2022-09-06

## 2022-09-06 RX ORDER — MONTELUKAST SODIUM 10 MG/1
10 TABLET ORAL NIGHTLY
Qty: 90 TABLET | Refills: 3 | Status: SHIPPED | OUTPATIENT
Start: 2022-09-06 | End: 2022-10-14 | Stop reason: SDUPTHER

## 2022-09-06 RX ORDER — ERYTHROMYCIN 5 MG/G
OINTMENT OPHTHALMIC
Qty: 3.5 G | Refills: 0 | Status: SHIPPED | OUTPATIENT
Start: 2022-09-06

## 2022-09-06 RX ORDER — LANOLIN ALCOHOL/MO/W.PET/CERES
25 CREAM (GRAM) TOPICAL EVERY OTHER DAY
Qty: 23 TABLET | Refills: 3 | Status: SHIPPED | OUTPATIENT
Start: 2022-09-06

## 2022-09-06 NOTE — PROGRESS NOTES
Chief Complaint  Follow up for DVT  PE  TAMIKO    Subjective        Mi Junie Bernard presents to [facility]  History of Present Illness  Patient presents today accompanied by his mother, Sun for follow-up for DVT and pulmonary emboli.  After the last visit we had discussed cough.  I had ordered a chest x-ray to further evaluate.  This showed per report a new masslike opacity in the right medial base with the differential including pericardial cyst, hernia or right middle lobe infiltrate or mass.  Chest CT was performed which showed a large fat-containing Morgagni hernia as well as filling defects in the bilateral upper lobes and left lower lobe pulmonary arteries compatible with pulmonary emboli.  Around that time he had also gone to the emergency room for left lower extremity edema.  This showed per report that he had acute left lower extremity DVT noted in the popliteal and posterior tibial veins as well as acute on chronic left lower extremity superficial thrombophlebitis noted in the small saphenous vein.  Patient was started on Eliquis per the emergency room.  I did discuss this with the patient's mother.  We discussed repeating a chest CT in 3 months as well as patient having been referred to general surgery for his Morgagni hernia.  He was placed on a 1 year recall for the follow-up for the hernia  Patient does not report any worsening lower extremity edema on the left.  Denies any shortness of breath.  He reports compliance with Eliquis 5 mg twice daily.  He is due for labs today.  Patient previously noted to have polycythemia which was reviewed by hematology/oncology most recently on 9/17/2020.  This was felt to be due to obstructive sleep apnea.  He does have a machine but they have not started treatment yet.  I encouraged him to start treatment.  I did discuss with them that this can contribute to DVT and PEs having elevated hemoglobin and hematocrit.  They are  "requesting refill of levothyroxine 25 mcg for hypothyroidism.  Most recently back in March 2022 TSH and free T4 levels were within normal limits.  He does need a refill B6 for deficiency today as well.  Objective   Vital Signs:  /62   Pulse 88   Temp 98.2 °F (36.8 °C)   Ht 160 cm (63\")   Wt (!) 161 kg (354 lb 3.2 oz)   SpO2 99%   BMI 62.74 kg/m²   Estimated body mass index is 62.74 kg/m² as calculated from the following:    Height as of this encounter: 160 cm (63\").    Weight as of this encounter: 161 kg (354 lb 3.2 oz).          Physical Exam   General: AAO ×3, no acute distress, pleasant  HEENT: Normocephalic, atraumatic  Cardiovascular: Regular rate and rhythm without appreciable murmur  Respiratory: Clear to auscultation bilaterally no RRW  Gastrointestinal: Soft nontender nondistended with bowel sounds present  extremities: No edema  Neurologic: CN II through XII grossly intact   Psychiatric: Normal mood and affect  Result Review :                Assessment and Plan   Diagnoses and all orders for this visit:    1. Vitamin B6 deficiency (Primary)  -     Vitamin B6    2. Polycythemia, secondary  -     CBC & Differential    3. Hypothyroidism, unspecified type  -     Comprehensive Metabolic Panel  -     CBC & Differential    4. Abnormal glucose  -     Hemoglobin A1c    5. Other pulmonary embolism without acute cor pulmonale, unspecified chronicity (HCC)  -     CT Angiogram Chest With Contrast; Future    6. TAMIKO (obstructive sleep apnea)    7. B12 deficiency  -     Vitamin B12    8. Deep vein thrombosis (DVT) of proximal vein of left lower extremity, unspecified chronicity (HCC)    Other orders  -     vitamin B-6 (PYRIDOXINE) 50 MG tablet; Take 0.5 tablets by mouth Every Other Day.  Dispense: 23 tablet; Refill: 3  -     montelukast (Singulair) 10 MG tablet; Take 1 tablet by mouth Every Night.  Dispense: 90 tablet; Refill: 3  -     levothyroxine (SYNTHROID, LEVOTHROID) 25 MCG tablet; Take 1 tablet by mouth " Daily.  Dispense: 90 tablet; Refill: 3  -     erythromycin (ROMYCIN) 5 MG/GM ophthalmic ointment; Instill 1 cm ribbon in each eye QID  Dispense: 3.5 g; Refill: 0    We discussed getting lab work done today.  Plan to continue with Eliquis indefinitely given that patient has a history of prior DVT as well as now having a new DVT on top of pulmonary emboli.  I did encourage them to start CPAP treatment for obstructive sleep apnea to help lower hematocrit and hemoglobin to reduce risk of recurrence of DVT/PE.  Plan to see patient back in 2 months for close follow-up.  They are instructed to call with any questions or concerns.         Follow Up   Return in about 2 months (around 11/6/2022) for DVT/PE.  Patient was given instructions and counseling regarding his condition or for health maintenance advice. Please see specific information pulled into the AVS if appropriate.

## 2022-09-10 ENCOUNTER — APPOINTMENT (OUTPATIENT)
Dept: GENERAL RADIOLOGY | Facility: HOSPITAL | Age: 27
End: 2022-09-10

## 2022-09-10 ENCOUNTER — HOSPITAL ENCOUNTER (EMERGENCY)
Facility: HOSPITAL | Age: 27
Discharge: HOME | End: 2022-09-10
Attending: EMERGENCY MEDICINE | Admitting: EMERGENCY MEDICINE

## 2022-09-10 VITALS
OXYGEN SATURATION: 96 % | HEART RATE: 114 BPM | RESPIRATION RATE: 20 BRPM | BODY MASS INDEX: 66.93 KG/M2 | TEMPERATURE: 97.8 F | SYSTOLIC BLOOD PRESSURE: 95 MMHG | DIASTOLIC BLOOD PRESSURE: 61 MMHG | HEIGHT: 61 IN

## 2022-09-10 DIAGNOSIS — T67.1XXA HEAT SYNCOPE, INITIAL ENCOUNTER: Primary | ICD-10-CM

## 2022-09-10 LAB
ALBUMIN SERPL-MCNC: 4.1 G/DL (ref 3.5–5.2)
ALBUMIN/GLOB SERPL: 1 G/DL
ALP SERPL-CCNC: 72 U/L (ref 39–117)
ALT SERPL W P-5'-P-CCNC: 35 U/L (ref 1–41)
ANION GAP SERPL CALCULATED.3IONS-SCNC: 12.4 MMOL/L (ref 5–15)
AST SERPL-CCNC: 39 U/L (ref 1–40)
BASOPHILS # BLD AUTO: 0.07 10*3/MM3 (ref 0–0.2)
BASOPHILS NFR BLD AUTO: 1.8 % (ref 0–1.5)
BILIRUB SERPL-MCNC: 0.7 MG/DL (ref 0–1.2)
BILIRUB UR QL STRIP: NEGATIVE
BUN SERPL-MCNC: 17 MG/DL (ref 6–20)
BUN/CREAT SERPL: 10.8 (ref 7–25)
CALCIUM SPEC-SCNC: 9.5 MG/DL (ref 8.6–10.5)
CHLORIDE SERPL-SCNC: 97 MMOL/L (ref 98–107)
CLARITY UR: CLEAR
CO2 SERPL-SCNC: 27.6 MMOL/L (ref 22–29)
COLOR UR: YELLOW
CREAT SERPL-MCNC: 1.58 MG/DL (ref 0.76–1.27)
DEPRECATED RDW RBC AUTO: 52.8 FL (ref 37–54)
EGFRCR SERPLBLD CKD-EPI 2021: 61.9 ML/MIN/1.73
EOSINOPHIL # BLD AUTO: 0.01 10*3/MM3 (ref 0–0.4)
EOSINOPHIL NFR BLD AUTO: 0.3 % (ref 0.3–6.2)
ERYTHROCYTE [DISTWIDTH] IN BLOOD BY AUTOMATED COUNT: 17 % (ref 12.3–15.4)
GLOBULIN UR ELPH-MCNC: 4 GM/DL
GLUCOSE SERPL-MCNC: 115 MG/DL (ref 65–99)
GLUCOSE UR STRIP-MCNC: NEGATIVE MG/DL
HCT VFR BLD AUTO: 55.6 % (ref 37.5–51)
HGB BLD-MCNC: 18.9 G/DL (ref 13–17.7)
HGB UR QL STRIP.AUTO: NEGATIVE
HOLD SPECIMEN: NORMAL
HOLD SPECIMEN: NORMAL
IMM GRANULOCYTES # BLD AUTO: 0.02 10*3/MM3 (ref 0–0.05)
IMM GRANULOCYTES NFR BLD AUTO: 0.5 % (ref 0–0.5)
KETONES UR QL STRIP: NEGATIVE
LEUKOCYTE ESTERASE UR QL STRIP.AUTO: NEGATIVE
LYMPHOCYTES # BLD AUTO: 1.17 10*3/MM3 (ref 0.7–3.1)
LYMPHOCYTES NFR BLD AUTO: 30.5 % (ref 19.6–45.3)
MAGNESIUM SERPL-MCNC: 2.2 MG/DL (ref 1.6–2.6)
MCH RBC QN AUTO: 30.4 PG (ref 26.6–33)
MCHC RBC AUTO-ENTMCNC: 34 G/DL (ref 31.5–35.7)
MCV RBC AUTO: 89.5 FL (ref 79–97)
MONOCYTES # BLD AUTO: 0.39 10*3/MM3 (ref 0.1–0.9)
MONOCYTES NFR BLD AUTO: 10.2 % (ref 5–12)
NEUTROPHILS NFR BLD AUTO: 2.17 10*3/MM3 (ref 1.7–7)
NEUTROPHILS NFR BLD AUTO: 56.7 % (ref 42.7–76)
NITRITE UR QL STRIP: NEGATIVE
NRBC BLD AUTO-RTO: 0 /100 WBC (ref 0–0.2)
PH UR STRIP.AUTO: 6 [PH] (ref 5–8)
PLATELET # BLD AUTO: 217 10*3/MM3 (ref 140–450)
PMV BLD AUTO: 9.9 FL (ref 6–12)
POTASSIUM SERPL-SCNC: 4.5 MMOL/L (ref 3.5–5.2)
PROT SERPL-MCNC: 8.1 G/DL (ref 6–8.5)
PROT UR QL STRIP: NEGATIVE
RBC # BLD AUTO: 6.21 10*6/MM3 (ref 4.14–5.8)
SODIUM SERPL-SCNC: 137 MMOL/L (ref 136–145)
SP GR UR STRIP: 1.01 (ref 1–1.03)
TROPONIN T SERPL-MCNC: <0.01 NG/ML (ref 0–0.03)
UROBILINOGEN UR QL STRIP: NORMAL
WBC NRBC COR # BLD: 3.83 10*3/MM3 (ref 3.4–10.8)
WHOLE BLOOD HOLD COAG: NORMAL
WHOLE BLOOD HOLD SPECIMEN: NORMAL

## 2022-09-10 PROCEDURE — 83735 ASSAY OF MAGNESIUM: CPT | Performed by: EMERGENCY MEDICINE

## 2022-09-10 PROCEDURE — 84484 ASSAY OF TROPONIN QUANT: CPT | Performed by: EMERGENCY MEDICINE

## 2022-09-10 PROCEDURE — 71045 X-RAY EXAM CHEST 1 VIEW: CPT

## 2022-09-10 PROCEDURE — 81003 URINALYSIS AUTO W/O SCOPE: CPT

## 2022-09-10 PROCEDURE — 80053 COMPREHEN METABOLIC PANEL: CPT | Performed by: EMERGENCY MEDICINE

## 2022-09-10 PROCEDURE — 93005 ELECTROCARDIOGRAM TRACING: CPT

## 2022-09-10 PROCEDURE — 85025 COMPLETE CBC W/AUTO DIFF WBC: CPT | Performed by: EMERGENCY MEDICINE

## 2022-09-10 PROCEDURE — 99283 EMERGENCY DEPT VISIT LOW MDM: CPT

## 2022-09-10 PROCEDURE — 93005 ELECTROCARDIOGRAM TRACING: CPT | Performed by: EMERGENCY MEDICINE

## 2022-09-10 RX ORDER — SODIUM CHLORIDE 0.9 % (FLUSH) 0.9 %
10 SYRINGE (ML) INJECTION AS NEEDED
Status: DISCONTINUED | OUTPATIENT
Start: 2022-09-10 | End: 2022-09-11 | Stop reason: HOSPADM

## 2022-09-10 RX ADMIN — SODIUM CHLORIDE 1000 ML: 9 INJECTION, SOLUTION INTRAVENOUS at 22:20

## 2022-09-11 LAB — QT INTERVAL: 332 MS

## 2022-09-11 NOTE — DISCHARGE INSTRUCTIONS
Please drink lots of fluid and take medications from PCP as prescribed   Please follow up with PCP as needed    If any worsening symptoms or new concerns please return to ED

## 2022-09-11 NOTE — ED PROVIDER NOTES
Subjective   PIT    History of Present Illness  Patient is a 27-year-old male accompanied by both his parents due to presyncope.  Patient has a history of Down syndrome and diabetes.  Parents state that he was at a special needs prom earlier tonight and he was standing in line to get pictures taken when all of a sudden he became hot and diaphoretic.  Mother states that they sat him down and put cool towels on his neck.  Mother states that he looked like he was about to pass out.  Patient did not lose consciousness.  Patient is on Eliquis as of June 22 for blood clots and that is the newest medication per the mother. Pt denies any pain.   Patient is a 27-year-old male accompanied by both his parents due to presyncope.  Patient has a history of Down syndrome and diabetes.  Parents state that he was at a special needs prom earlier tonight and he was standing in line to get pictures taken when all of a sudden he became hot and diaphoretic.  Mother states that they sat him down and put cool towels on his neck.  Mother states that he looked like he was about to pass out.  Patient did not lose consciousness.  Patient is on Eliquis as of June 22 for blood clots and that is the newest medication per the mother. Pt denies any pain.       History provided by:  Parent      Review of Systems   Constitutional: Negative.    HENT: Negative.    Eyes: Negative.    Respiratory: Negative.    Cardiovascular: Negative.    Gastrointestinal: Negative.    Endocrine: Negative.    Genitourinary: Negative.    Musculoskeletal: Negative.    Skin: Negative.    Allergic/Immunologic: Negative.    Neurological: Positive for syncope (presyncope).   Hematological: Negative.    Psychiatric/Behavioral: Negative.        Past Medical History:   Diagnosis Date   • Anemia    • Asthma    • Diabetes mellitus (HCC)    • Disease of thyroid gland    • Down's syndrome    • Gout        No Known Allergies    Past Surgical History:   Procedure Laterality Date   •  CYST REMOVAL         History reviewed. No pertinent family history.    Social History     Socioeconomic History   • Marital status: Single   Tobacco Use   • Smoking status: Never Smoker   • Smokeless tobacco: Never Used   Vaping Use   • Vaping Use: Never used   Substance and Sexual Activity   • Alcohol use: Never           Objective   Physical Exam  Vitals and nursing note reviewed.   Constitutional:       General: He is not in acute distress.     Appearance: Normal appearance. He is obese. He is not ill-appearing, toxic-appearing or diaphoretic.   HENT:      Head: Normocephalic and atraumatic.      Right Ear: Tympanic membrane normal.      Left Ear: Tympanic membrane normal.      Nose: Nose normal.      Mouth/Throat:      Mouth: Mucous membranes are moist.   Eyes:      Extraocular Movements: Extraocular movements intact.      Conjunctiva/sclera: Conjunctivae normal.      Pupils: Pupils are equal, round, and reactive to light.   Cardiovascular:      Rate and Rhythm: Normal rate and regular rhythm.      Heart sounds: Normal heart sounds.   Pulmonary:      Effort: Pulmonary effort is normal.      Breath sounds: Normal breath sounds.   Musculoskeletal:         General: Normal range of motion.      Cervical back: Normal range of motion and neck supple.   Skin:     General: Skin is warm and dry.   Neurological:      General: No focal deficit present.      Mental Status: He is alert and oriented to person, place, and time.      Cranial Nerves: No cranial nerve deficit.      Sensory: No sensory deficit.      Motor: No weakness.   Psychiatric:         Mood and Affect: Mood normal.         Behavior: Behavior normal.         Procedures           ED Course                                           MDM  Number of Diagnoses or Management Options  Diagnosis management comments: I do not believe that the patient has an acute emergency medical condition requiring additional emergency management at this time. The patient is  currently stable for outpatient treatment and continuation of care. Important signs and symptoms that would warrant return to the emergency department were reviewed. The patient was provided the opportunity to ask questions. All questions were addressed and the patient was discharged from the ED. The patient demonstrated understanding and agreed to plan.       Pt is laying in bed comfortably, no diaphoresis or pain during the visit.        Amount and/or Complexity of Data Reviewed  Clinical lab tests: reviewed  Tests in the radiology section of CPT®: reviewed  Tests in the medicine section of CPT®: reviewed  Decide to obtain previous medical records or to obtain history from someone other than the patient: yes  Obtain history from someone other than the patient: yes  Independent visualization of images, tracings, or specimens: yes    Risk of Complications, Morbidity, and/or Mortality  Presenting problems: low  Diagnostic procedures: low  Management options: low    Patient Progress  Patient progress: stable      Final diagnoses:   Heat syncope, initial encounter (presyncope)       ED Disposition  ED Disposition     ED Disposition   Discharge    Condition   Stable    Comment   --             No follow-up provider specified.       Medication List      No changes were made to your prescriptions during this visit.          Jann Lazar PA-C  09/10/22 [id number]       Jann Lazar PA-C  09/23/22 0102

## 2022-09-21 ENCOUNTER — CLINICAL SUPPORT (OUTPATIENT)
Dept: FAMILY MEDICINE CLINIC | Facility: CLINIC | Age: 27
End: 2022-09-21

## 2022-09-21 DIAGNOSIS — Z23 NEED FOR INFLUENZA VACCINATION: Primary | ICD-10-CM

## 2022-09-21 PROCEDURE — 90471 IMMUNIZATION ADMIN: CPT | Performed by: FAMILY MEDICINE

## 2022-09-21 PROCEDURE — 90686 IIV4 VACC NO PRSV 0.5 ML IM: CPT | Performed by: FAMILY MEDICINE

## 2022-09-23 ENCOUNTER — HOSPITAL ENCOUNTER (OUTPATIENT)
Dept: CT IMAGING | Facility: HOSPITAL | Age: 27
Discharge: HOME | End: 2022-09-23
Admitting: FAMILY MEDICINE

## 2022-09-23 DIAGNOSIS — I26.99 OTHER PULMONARY EMBOLISM WITHOUT ACUTE COR PULMONALE, UNSPECIFIED CHRONICITY: ICD-10-CM

## 2022-09-23 PROCEDURE — 71275 CT ANGIOGRAPHY CHEST: CPT

## 2022-09-23 PROCEDURE — 0 IOPAMIDOL PER 1 ML: Performed by: FAMILY MEDICINE

## 2022-09-23 RX ADMIN — IOPAMIDOL 100 ML: 755 INJECTION, SOLUTION INTRAVENOUS at 09:46

## 2022-09-27 ENCOUNTER — TELEPHONE (OUTPATIENT)
Dept: FAMILY MEDICINE CLINIC | Facility: CLINIC | Age: 27
End: 2022-09-27

## 2022-09-27 NOTE — TELEPHONE ENCOUNTER
Phone call with mother who requested results of CT call  with follow up from provider with voiced understanding.

## 2022-09-28 ENCOUNTER — TELEPHONE (OUTPATIENT)
Dept: FAMILY MEDICINE CLINIC | Facility: CLINIC | Age: 27
End: 2022-09-28

## 2022-09-28 NOTE — TELEPHONE ENCOUNTER
CONTACTED PATIENT TO RESCHEDULE 11/8 APPOINTMENT DUE TO PROVIDER BEING OUT OF OFFICE THAT DAY. WAS ABLE TO RESCHEDULE APPOINTMENT FOR 11/9 @ 9:45AM.

## 2022-10-14 RX ORDER — MONTELUKAST SODIUM 10 MG/1
10 TABLET ORAL NIGHTLY
Qty: 90 TABLET | Refills: 3 | Status: SHIPPED | OUTPATIENT
Start: 2022-10-14

## 2022-10-14 NOTE — TELEPHONE ENCOUNTER
Caller: RAFIA NEWELL    Relationship: Mother    Best call back number: [phone]    Requested Prescriptions:   Requested Prescriptions     Pending Prescriptions Disp Refills   • montelukast (Singulair) 10 MG tablet 90 tablet 3     Sig: Take 1 tablet by mouth Every Night.        Pharm[facility] where request should be sent[facility] [place] [place]  [address] [facility] - [phone] [facility] [phone] FX     Additional details provided by patient:     Does the patient have less than a 3 day supply:  [x] Yes  [] No    Mark Mcdonough Rep   10/14/22 09:01 EDT

## 2022-11-09 ENCOUNTER — OFFICE VISIT (OUTPATIENT)
Dept: FAMILY MEDICINE CLINIC | Facility: CLINIC | Age: 27
End: 2022-11-09

## 2022-11-09 VITALS
HEART RATE: 88 BPM | SYSTOLIC BLOOD PRESSURE: 120 MMHG | HEIGHT: 61 IN | TEMPERATURE: 98.2 F | WEIGHT: 315 LBS | BODY MASS INDEX: 59.47 KG/M2 | OXYGEN SATURATION: 97 % | DIASTOLIC BLOOD PRESSURE: 74 MMHG

## 2022-11-09 DIAGNOSIS — I82.4Y2 DEEP VEIN THROMBOSIS (DVT) OF PROXIMAL VEIN OF LEFT LOWER EXTREMITY, UNSPECIFIED CHRONICITY: ICD-10-CM

## 2022-11-09 DIAGNOSIS — G47.33 OSA (OBSTRUCTIVE SLEEP APNEA): ICD-10-CM

## 2022-11-09 DIAGNOSIS — E66.01 CLASS 3 SEVERE OBESITY DUE TO EXCESS CALORIES WITH BODY MASS INDEX (BMI) OF 60.0 TO 69.9 IN ADULT, UNSPECIFIED WHETHER SERIOUS COMORBIDITY PRESENT: Primary | ICD-10-CM

## 2022-11-09 DIAGNOSIS — I26.99 OTHER PULMONARY EMBOLISM WITHOUT ACUTE COR PULMONALE, UNSPECIFIED CHRONICITY: ICD-10-CM

## 2022-11-09 DIAGNOSIS — Q79.0 MORGAGNI HERNIA: ICD-10-CM

## 2022-11-09 DIAGNOSIS — M10.9 GOUT, UNSPECIFIED CAUSE, UNSPECIFIED CHRONICITY, UNSPECIFIED SITE: ICD-10-CM

## 2022-11-09 DIAGNOSIS — R73.09 ABNORMAL GLUCOSE: ICD-10-CM

## 2022-11-09 DIAGNOSIS — E03.9 HYPOTHYROIDISM, UNSPECIFIED TYPE: ICD-10-CM

## 2022-11-09 DIAGNOSIS — D75.1 POLYCYTHEMIA: ICD-10-CM

## 2022-11-09 PROCEDURE — 99214 OFFICE O/P EST MOD 30 MIN: CPT | Performed by: FAMILY MEDICINE

## 2022-11-09 NOTE — PROGRESS NOTES
"Chief Complaint  Follow up for PE/DVT  TAMIKO  Polycythemia    Subjective        Mi Junie Bernard presents to [facility]  History of Present Illness  Patient presents today to follow-up for DVT and PE.  I saw him for a visit on 9/6/2022.  He is accompanied by his mother, Sun.  Patient has previously been evaluated by hematology/oncology for polycythemia which was felt to be due to obstructive sleep apnea.  He is using his CPAP about every other night now but he could be doing better with this.  I am concerned due to his hemoglobin level staying elevated that he may benefit from therapeutic phlebotomy as keeping an level such as this can increase risk of DVT and PE recurrence.  He has had 2 DVTs in the past.  His mother reports that the first 1 was several years ago on the left leg.  He had a venous Doppler done on 6/9/2022 that showed an acute left lower extremity deep venous thrombosis in the popliteal and posterior tibial veins.  I did discuss with them referral to hematology/oncology to consider therapeutic phlebotomy as well as to discuss recurrent DVT as well as PE.  He is currently taking Eliquis 5 mg twice daily and reports compliance with this.  He did have a CTA of the chest that was completed recently on 9/23/2022 that showed a small amount of residual clot burden with chronic pulmonary embolus in the segmental branches of the lower lobe pulmonary arteries which has significantly improved since 6/10/2022.  This is per report.  He does have a large Morgagni hernia which I have previously referred patient to Dr. Davidson in this regard and patient is on a 1 year recall to follow-up for the hernia.  He is taking allopurinol 100 mg daily to help out with gout.  Plan to check uric acid levels with his next blood work.  Objective   Vital Signs:  /74   Pulse 88   Temp 98.2 °F (36.8 °C)   Ht 154.9 cm (61\")   Wt (!) 154 kg (340 lb)   SpO2 97%   BMI 64.24 kg/m² " "  Estimated body mass index is 64.24 kg/m² as calculated from the following:    Height as of this encounter: 154.9 cm (61\").    Weight as of this encounter: 154 kg (340 lb).          Physical Exam   General: AAO ×3, no acute distress, pleasant  HEENT: Normocephalic, atraumatic  Cardiovascular: Regular rate and rhythm without appreciable murmur  Respiratory: Clear to auscultation bilaterally no RRW  Gastrointestinal: Soft nontender nondistended with bowel sounds present  extremities: No edema  Neurologic: CN II through XII grossly intact   Psychiatric: Normal mood and affect  Result Review :                Assessment and Plan   Diagnoses and all orders for this visit:    1. Class 3 severe obesity due to excess calories with body mass index (BMI) of 60.0 to 69.9 in adult, unspecified whether serious comorbidity present (HCC) (Primary)    2. Morgagni hernia    3. TAMIKO (obstructive sleep apnea)    4. Polycythemia, secondary  -     CBC & Differential; Future  -     Comprehensive Metabolic Panel; Future  -     Ambulatory Referral to Hematology / Oncology    5. Other pulmonary embolism without acute cor pulmonale, unspecified chronicity (HCC)  -     Ambulatory Referral to Hematology / Oncology    6. Deep vein thrombosis (DVT) of proximal vein of left lower extremity, unspecified chronicity ([facility])  -     Ambulatory Referral to Hematology / Oncology    7. Gout, unspecified cause, unspecified chronicity, unspecified site  -     Uric Acid; Future    8. Abnormal glucose  -     Hemoglobin A1c; Future    9. Hypothyroidism, unspecified type  -     TSH+Free T4; Future    Patient has lost 14 pounds since last visit which is great to see.  He is exercising more.  I did discuss with patient and mother continuing current management for recurrent DVT with Eliquis at this time.  I did discuss with him referral to hematology/oncology to consider possible therapeutic phlebotomy.  We discussed continue current management for hypothyroidism.  " I also plan to check an A1c with his next lab work.         Follow Up   Return in about 3 months (around 2/9/2023) for DVT/PE.  Patient was given instructions and counseling regarding his condition or for health maintenance advice. Please see specific information pulled into the AVS if appropriate.

## 2022-11-28 ENCOUNTER — TELEPHONE (OUTPATIENT)
Dept: FAMILY MEDICINE CLINIC | Facility: CLINIC | Age: 27
End: 2022-11-28

## 2022-11-28 NOTE — TELEPHONE ENCOUNTER
Received call from patient's guardian stating that the patient had a cough, runny nose, and mild fever. Patient's guardian was inquiring if ibuprofen, aspirin, or acetaminophen would be good for patient to take.  Consulted RN and told patient's guardian that ibuprofen would be okay to give him. Voiced understanding

## 2022-12-06 ENCOUNTER — OFFICE VISIT (OUTPATIENT)
Dept: ONCOLOGY | Facility: HOSPITAL | Age: 27
End: 2022-12-06

## 2022-12-06 ENCOUNTER — LAB (OUTPATIENT)
Dept: ONCOLOGY | Facility: HOSPITAL | Age: 27
End: 2022-12-06

## 2022-12-06 VITALS
TEMPERATURE: 97.6 F | SYSTOLIC BLOOD PRESSURE: 113 MMHG | WEIGHT: 315 LBS | BODY MASS INDEX: 66.19 KG/M2 | RESPIRATION RATE: 16 BRPM | DIASTOLIC BLOOD PRESSURE: 74 MMHG | HEART RATE: 101 BPM | OXYGEN SATURATION: 90 %

## 2022-12-06 DIAGNOSIS — G47.33 OSA TREATED WITH BIPAP: ICD-10-CM

## 2022-12-06 DIAGNOSIS — D75.1 ERYTHROCYTOSIS: ICD-10-CM

## 2022-12-06 DIAGNOSIS — D75.1 ERYTHROCYTOSIS: Primary | ICD-10-CM

## 2022-12-06 PROBLEM — R53.83 FATIGUE: Status: ACTIVE | Noted: 2022-12-06

## 2022-12-06 PROBLEM — M25.569 KNEE PAIN: Status: ACTIVE | Noted: 2017-08-14

## 2022-12-06 PROBLEM — R26.2 DISABILITY OF WALKING: Status: ACTIVE | Noted: 2022-12-06

## 2022-12-06 PROBLEM — J34.9 SINUS TROUBLE: Status: ACTIVE | Noted: 2022-12-06

## 2022-12-06 PROBLEM — M79.672 FOOT PAIN, LEFT: Status: ACTIVE | Noted: 2018-07-10

## 2022-12-06 PROBLEM — B35.3 ATHLETES FOOT: Status: ACTIVE | Noted: 2022-12-06

## 2022-12-06 PROBLEM — M25.579 ANKLE PAIN: Status: ACTIVE | Noted: 2022-12-06

## 2022-12-06 PROBLEM — M12.9 ARTHROPATHY: Status: ACTIVE | Noted: 2022-12-06

## 2022-12-06 PROBLEM — J40 BRONCHITIS: Status: ACTIVE | Noted: 2022-12-06

## 2022-12-06 PROBLEM — Q90.9 COMPLETE TRISOMY 21 SYNDROME: Status: ACTIVE | Noted: 2022-12-06

## 2022-12-06 PROBLEM — M21.40 PES PLANUS: Status: ACTIVE | Noted: 2018-07-10

## 2022-12-06 PROBLEM — J45.909 ASTHMA: Status: ACTIVE | Noted: 2022-12-06

## 2022-12-06 PROBLEM — E11.9 DIABETES MELLITUS: Status: ACTIVE | Noted: 2022-12-06

## 2022-12-06 PROBLEM — L73.2 HIDRADENITIS SUPPURATIVA: Status: ACTIVE | Noted: 2021-04-06

## 2022-12-06 LAB
BASOPHILS # BLD AUTO: 0.05 10*3/MM3 (ref 0–0.2)
BASOPHILS NFR BLD AUTO: 1.5 % (ref 0–1.5)
DEPRECATED RDW RBC AUTO: 55.5 FL (ref 37–54)
EOSINOPHIL # BLD AUTO: 0.02 10*3/MM3 (ref 0–0.4)
EOSINOPHIL NFR BLD AUTO: 0.6 % (ref 0.3–6.2)
ERYTHROCYTE [DISTWIDTH] IN BLOOD BY AUTOMATED COUNT: 18 % (ref 12.3–15.4)
HCT VFR BLD AUTO: 55.7 % (ref 37.5–51)
HGB BLD-MCNC: 18.6 G/DL (ref 13–17.7)
IMM GRANULOCYTES # BLD AUTO: 0.01 10*3/MM3 (ref 0–0.05)
IMM GRANULOCYTES NFR BLD AUTO: 0.3 % (ref 0–0.5)
LYMPHOCYTES # BLD AUTO: 1.51 10*3/MM3 (ref 0.7–3.1)
LYMPHOCYTES NFR BLD AUTO: 46.3 % (ref 19.6–45.3)
MCH RBC QN AUTO: 30.1 PG (ref 26.6–33)
MCHC RBC AUTO-ENTMCNC: 33.4 G/DL (ref 31.5–35.7)
MCV RBC AUTO: 90.3 FL (ref 79–97)
MONOCYTES # BLD AUTO: 0.42 10*3/MM3 (ref 0.1–0.9)
MONOCYTES NFR BLD AUTO: 12.9 % (ref 5–12)
NEUTROPHILS NFR BLD AUTO: 1.25 10*3/MM3 (ref 1.7–7)
NEUTROPHILS NFR BLD AUTO: 38.4 % (ref 42.7–76)
NRBC BLD AUTO-RTO: 0 /100 WBC (ref 0–0.2)
PLATELET # BLD AUTO: 267 10*3/MM3 (ref 140–450)
PMV BLD AUTO: 10.1 FL (ref 6–12)
RBC # BLD AUTO: 6.17 10*6/MM3 (ref 4.14–5.8)
WBC NRBC COR # BLD: 3.26 10*3/MM3 (ref 3.4–10.8)

## 2022-12-06 PROCEDURE — G0463 HOSPITAL OUTPT CLINIC VISIT: HCPCS | Performed by: NURSE PRACTITIONER

## 2022-12-06 PROCEDURE — 99214 OFFICE O/P EST MOD 30 MIN: CPT | Performed by: NURSE PRACTITIONER

## 2022-12-06 PROCEDURE — 36415 COLL VENOUS BLD VENIPUNCTURE: CPT

## 2022-12-06 PROCEDURE — 85025 COMPLETE CBC W/AUTO DIFF WBC: CPT

## 2022-12-06 NOTE — PROGRESS NOTES
Chief Complaint/Reason for Referral:  DVT, polycythemia    Jesus Manuel Sloan, Jesus Manuel Handley,        [facility] HEMATOLOGY & ONCOLOGY for polycythemia      Subjective    History of Present Illness   Mr. Rosaline Bernard presents with his mother for follow up for polycythemia. He was seen in the past as consult by Dr. Crawley and saw me in follow up. He has not been seen in follow up since 9/17/2020. At his prior visit, he was negative for any BENNIE 2 mutations and the  Elevated EPO level was consistent with further evidence the erythrocytosis was likely to be related to the sleep apnea. Mom does report he has lost 14 pounds recently.     He has a history of Down Syndrome, obesity, TAMIKO. He is sleeping at his visit today and snoring as well. His mother can arouse him easily. He has history of 2 prior DVT's in the past and is on Eliquis lifelong. His mother reports he uses CPAP machine at least every other night and apparently does not take on / off.     Current labs: 9/10/2022: WBC 3.83, Hemoglobin 18.9, hematocrit of 55.6. 6/9/2022: Hemoglobin 18.1, hematocrit of 53.1.     Oncology/Hematology History    No history exists.       Review of Systems   Constitutional: Positive for fatigue (PT fell asleep while I was asking Questions ). Negative for appetite change, diaphoresis, fever, unexpected weight gain and unexpected weight loss.   HENT: Negative for hearing loss, sore throat and voice change.    Eyes: Negative for blurred vision, double vision, pain, redness and visual disturbance.   Respiratory: Negative for cough, shortness of breath and wheezing.    Cardiovascular: Negative for chest pain, palpitations and leg swelling.   Endocrine: Negative for cold intolerance, heat intolerance, polydipsia and polyuria.   Genitourinary: Negative for decreased urine volume, difficulty urinating, frequency and urinary incontinence.   Musculoskeletal: Negative for arthralgias, back pain, joint swelling and  myalgias.   Skin: Negative for color change, rash, skin lesions and wound.   Neurological: Negative for dizziness, seizures, numbness and headache.   Hematological: Negative for adenopathy. Does not bruise/bleed easily.   Psychiatric/Behavioral: Negative for depressed mood. The patient is not nervous/anxious.    All other systems reviewed and are negative.      Current Outpatient Medications on File Prior to Visit   Medication Sig Dispense Refill   • albuterol (ACCUNEB) 1.25 MG/3ML nebulizer solution Inhale 1 ampule.     • albuterol sulfate  (90 Base) MCG/ACT inhaler Inhale 2 puffs Every 4 (Four) Hours As Needed for Wheezing or Shortness of Air. 8.5 g 3   • allopurinol (ZYLOPRIM) 100 MG tablet Take 1 tablet by mouth Daily. 90 tablet 3   • ALUM SULFATE-CA ACETATE EX Apply  topically to the appropriate area as directed.     • apixaban (ELIQUIS) 5 MG tablet tablet Take 1 tablet by mouth 2 (Two) Times a Day. 180 tablet 1   • budesonide (PULMICORT) 0.5 MG/2ML nebulizer solution 2 mL.     • cetirizine (zyrTEC) 10 MG tablet Zyrtec 10 mg oral tablet take 1 tablet (10 mg) by oral route once daily   Suspended     • clindamycin (Cleocin-T) 1 % external solution Apply  topically to the appropriate area as directed 2 (Two) Times a Day. 60 mL 3   • colchicine 0.6 MG tablet colchicine 0.6 mg oral tablet take 1 tablet (0.6 mg) by oral route once daily   Suspended     • Diclofenac Sodium (VOLTAREN) 1 % gel gel diclofenac sodium 1 % topical gel apply 2 gram to the affected area(s) by topical route 4 times per day   Suspended     • docusate sodium 100 MG capsule Take 100 mg by mouth.     • erythromycin (ROMYCIN) 5 MG/GM ophthalmic ointment Instill 1 cm ribbon in each eye QID 3.5 g 0   • folic acid (FOLVITE) 1 MG tablet Take 1 tablet by mouth Daily. 90 tablet 3   • ipratropium (ATROVENT) 0.03 % nasal spray 2 sprays into the nostril(s) as directed by provider.     • ketoconazole (NIZORAL) 2 % cream Apply  topically to the  appropriate area as directed Daily. 60 g 1   • levoFLOXacin (Levaquin) 750 MG tablet Take 1 tablet by mouth Daily. 10 tablet 0   • levothyroxine (SYNTHROID, LEVOTHROID) 25 MCG tablet Take 1 tablet by mouth Daily. 90 tablet 3   • loratadine (CLARITIN) 10 MG tablet loratadine 10 mg oral tablet take 1 tablet (10 mg) by oral route once daily   Suspended     • metFORMIN (GLUCOPHAGE) 500 MG tablet Take 1 tablet by mouth Daily. 90 tablet 3   • mineral oil-hydrophilic petrolatum (AQUAPHOR) ointment Apply 1 application topically to the appropriate area as directed As Needed for Dry Skin. 396 g 3   • montelukast (Singulair) 10 MG tablet Take 1 tablet by mouth Every Night. 90 tablet 3   • mupirocin (BACTROBAN) 2 % ointment Apply  topically to the appropriate area as directed 2 (Two) Times a Day. 30 g 3   • olopatadine (PATANOL) 0.1 % ophthalmic solution      • Urea 50 % suspension Urea Nail Stick 50 % topical solution apply to nail tissue by topical route 2 times per day   Suspended     • vitamin B-12 (CYANOCOBALAMIN) 100 MCG tablet Vitamin B-12 oral takes 1 tablet daily   Suspended     • vitamin B-6 (PYRIDOXINE) 50 MG tablet Take 0.5 tablets by mouth Every Other Day. 23 tablet 3   • Zinc Oxide 16 % ointment Apply 1 application topically to the appropriate area as directed Daily As Needed (rash). 113 g 3     No current facility-administered medications on file prior to visit.       No Known Allergies  Past Medical History:   Diagnosis Date   • Anemia    • Asthma    • Diabetes mellitus (HCC)    • Disease of thyroid gland    • Down's syndrome    • Gout      Past Surgical History:   Procedure Laterality Date   • CYST REMOVAL       Social History     Socioeconomic History   • Marital status: Single   Tobacco Use   • Smoking status: Never   • Smokeless tobacco: Never   Vaping Use   • Vaping Use: Never used   Substance and Sexual Activity   • Alcohol use: Never     History reviewed. No pertinent family history.  Immunization History    Administered Date(s) Administered   • COVID-19 (PFIZER) PURPLE CAP 03/02/2021, 03/23/2021, 11/18/2021   • FluLaval/Fluzone >6mos 10/04/2021, 09/21/2022   • Fluzone Quad >6mos (Multi-dose) 11/06/2019   • Influenza Injectable Mdck Pf Quad 09/21/2022   • Influenza, Unspecified 09/23/2016, 09/21/2020   • Tdap 03/28/2019       Tobacco Use: Low Risk    • Smoking Tobacco Use: Never   • Smokeless Tobacco Use: Never   • Passive Exposure: Not on file       Objective     Physical Exam  Vitals and nursing note reviewed.   Constitutional:       Appearance: Normal appearance. He is obese.   HENT:      Head: Normocephalic.      Mouth/Throat:      Mouth: Mucous membranes are moist.   Eyes:      Pupils: Pupils are equal, round, and reactive to light.   Cardiovascular:      Rate and Rhythm: Normal rate and regular rhythm.      Pulses: Normal pulses.      Heart sounds: Normal heart sounds.   Pulmonary:      Effort: Respiratory distress ([id number] throughout lung fields. ) present.      Breath sounds: Normal breath sounds.   Abdominal:      Palpations: Abdomen is soft.   Musculoskeletal:         General: Normal range of motion.      Cervical back: Normal range of motion and neck supple.   Skin:     General: Skin is warm and dry.      Capillary Refill: Capillary refill takes less than 2 seconds.   Neurological:      General: No focal deficit present.      Mental Status: He is oriented to person, place, and time.   Psychiatric:         Mood and Affect: Mood normal.         Behavior: Behavior normal.         Thought Content: Thought content normal.         Judgment: Judgment normal.         Vitals:    12/06/22 1355   BP: 113/74   Pulse: 101   Resp: 16   Temp: 97.6 °F (36.4 °C)   TempSrc: Temporal   SpO2: 90%   Weight: (!) 159 kg (350 lb 5 oz)   PainSc: 0-No pain       Wt Readings from Last 3 Encounters:   12/06/22 (!) 159 kg (350 lb 5 oz)   11/09/22 (!) 154 kg (340 lb)   09/06/22 (!) 161 kg (354 lb 3.2 oz)        Class 3 Severe  Obesity (BMI >=40). Obesity-related health conditions include the following: obstructive sleep apnea and polycythemia. Obesity is unchanged. BMI is is above average; BMI management plan is completed. We discussed low calorie, low carb based diet program, portion control and increasing exercise.     BMI is 66.2. today.     ECOG score: 2         ECOG: (1) Restricted in Physically Strenuous Activity, Ambulatory & Able to Do Work of Light Nature  Fall Risk Assessment was completed, and patient is at moderate risk for falls.  PHQ-9 Total Score: 0       The patient is  experiencing fatigue. Fatigue score: 10    PT/OT Functional Screening: PT fx screen: No needs identified  Speech Functional Screening: Speech fx screen: No needs identified  Rehab to be ordered: Rehab to be ordered: No needs identified        Result Review :  The following data was reviewed by: BERTHA Blair on 12/06/2022:  Lab Results   Component Value Date    HGB 18.9 (H) 09/10/2022    HCT 55.6 (H) 09/10/2022    MCV 89.5 09/10/2022     09/10/2022    WBC 3.83 09/10/2022    NEUTROABS 2.17 09/10/2022    LYMPHSABS 1.17 09/10/2022    MONOSABS 0.39 09/10/2022    EOSABS 0.01 09/10/2022    BASOSABS 0.07 09/10/2022     Lab Results   Component Value Date    GLUCOSE 115 (H) 09/10/2022    BUN 17 09/10/2022    CREATININE 1.58 (H) 09/10/2022     09/10/2022    K 4.5 09/10/2022    CL 97 (L) 09/10/2022    CO2 27.6 09/10/2022    CALCIUM 9.5 09/10/2022    PROTEINTOT 8.1 09/10/2022    ALBUMIN 4.10 09/10/2022    BILITOT 0.7 09/10/2022    ALKPHOS 72 09/10/2022    AST 39 09/10/2022    ALT 35 09/10/2022       XR Chest 1 View    Result Date: 9/10/2022    No acute infiltrate is appreciated.     COMMENT:  Part of this note is an electronic transcription of spoken language to printed text. The electronic translation/transcription may permit erroneous, or at times, nonsensical (or even sensical) words or phrases to be inadvertently transcribed or omitted;  this  has reviewed the note for such errors (as well as additional errors); however, some may still exist.  YAMIL HENDERSON JR, MD       Electronically Signed and Approved By: YAMIL HENDERSON JR, MD on 9/10/2022 at 21:59              CT Angiogram Chest Pulmonary Embolism    Result Date: 9/23/2022   1. There is a small amount of residual clot burden, chronic pulmonary embolus in the segmental branches of the lower lobe pulmonary arteries which has significantly improved since 6/10/2022.  No acute intrathoracic abnormalities. 2. Large Morgagni hernia and small sliding hiatal hernia.      NADEGE VÁZQUEZ DO       Electronically Signed and Approved By: NADEGE VÁZQUEZ DO on 9/23/2022 at 13:07                    Assessment and Plan:  Diagnoses and all orders for this visit:    1. Erythrocytosis (Primary)  -     CBC & Differential; Future  -     CBC & Differential; Future    2. TAMIKO treated with BiPAP    We discussed to continue the Eliquis lifelong. We also discussed this is secondary polycythemia secondary to obstructive sleep apnea not compliant with CPAP use. He was negative for decreased EPO level in the past and BENNIE 2 mutation testing was negative. He has been using CPAP every other night. We discussed further weight loss efforts. Also discussed compliant CPAP use every night as this will improve his secondary polycythemia.  I also suggested they contact the [employer] for blood donation PRN as well. I also encouraged them to follow up with the pulmonary specialist for the sleep apnea to see if there are any additional therapies he could qualify for. Consider testing him for narcolepsy as well.     We will recheck his CBC today.     I spent 20 minutes caring for Rosaline Zaman on this date of service. This time includes time spent by me in the following activities:preparing for the visit, reviewing tests, obtaining and/or reviewing a separately obtained history, performing a medically appropriate examination  and/or evaluation , counseling and educating the patient/family/caregiver, ordering medications, tests, or procedures, referring and communicating with other health care professionals , documenting information in the medical record and independently interpreting results and communicating that information with the patient/family/caregiver    Patient Follow Up: 4 months with MD.     Patient was given instructions and counseling regarding his condition or for health maintenance advice. Please see specific information pulled into the AVS if appropriate.     Terra Motley, APRN    12/6/2022    .tob

## 2023-01-17 ENCOUNTER — TELEPHONE (OUTPATIENT)
Dept: FAMILY MEDICINE CLINIC | Facility: CLINIC | Age: 28
End: 2023-01-17
Payer: MEDICARE

## 2023-01-17 RX ORDER — PSEUDOEPHEDRINE HCL 30 MG
100 TABLET ORAL 2 TIMES DAILY PRN
Qty: 90 EACH | Refills: 3 | Status: SHIPPED | OUTPATIENT
Start: 2023-01-17

## 2023-01-17 RX ORDER — CETIRIZINE HYDROCHLORIDE 10 MG/1
10 TABLET ORAL DAILY
Qty: 90 TABLET | Refills: 3 | Status: SHIPPED | OUTPATIENT
Start: 2023-01-17

## 2023-01-17 NOTE — TELEPHONE ENCOUNTER
PATIENT IS REQUESTING A REFILL OF THE FOLLOWING PRESCRIPTIONS;    CETIFIL   APIXABAN 5 MG ORAL  COLACE    PLEASE ADVISE,

## 2023-02-06 ENCOUNTER — LAB (OUTPATIENT)
Dept: LAB | Facility: HOSPITAL | Age: 28
End: 2023-02-06
Payer: MEDICARE

## 2023-02-06 ENCOUNTER — HOSPITAL ENCOUNTER (OUTPATIENT)
Dept: CARDIOLOGY | Facility: HOSPITAL | Age: 28
Discharge: HOME | End: 2023-02-06
Payer: MEDICARE

## 2023-02-06 ENCOUNTER — OFFICE VISIT (OUTPATIENT)
Dept: FAMILY MEDICINE CLINIC | Facility: CLINIC | Age: 28
End: 2023-02-06
Payer: MEDICARE

## 2023-02-06 VITALS
OXYGEN SATURATION: 96 % | SYSTOLIC BLOOD PRESSURE: 132 MMHG | BODY MASS INDEX: 59.47 KG/M2 | HEART RATE: 111 BPM | DIASTOLIC BLOOD PRESSURE: 74 MMHG | HEIGHT: 61 IN | TEMPERATURE: 98.4 F | WEIGHT: 315 LBS

## 2023-02-06 DIAGNOSIS — R60.0 EDEMA OF LEFT LOWER EXTREMITY: ICD-10-CM

## 2023-02-06 DIAGNOSIS — E03.9 HYPOTHYROIDISM, UNSPECIFIED TYPE: ICD-10-CM

## 2023-02-06 DIAGNOSIS — D75.1 POLYCYTHEMIA: ICD-10-CM

## 2023-02-06 DIAGNOSIS — R73.09 ABNORMAL GLUCOSE: ICD-10-CM

## 2023-02-06 DIAGNOSIS — M10.9 ACUTE GOUT OF LEFT ANKLE, UNSPECIFIED CAUSE: Primary | ICD-10-CM

## 2023-02-06 DIAGNOSIS — M10.9 GOUT, UNSPECIFIED CAUSE, UNSPECIFIED CHRONICITY, UNSPECIFIED SITE: ICD-10-CM

## 2023-02-06 LAB
BASOPHILS # BLD AUTO: 0.07 10*3/MM3 (ref 0–0.2)
BASOPHILS NFR BLD AUTO: 1.3 % (ref 0–1.5)
BH CV LOWER VASCULAR LEFT COMMON FEMORAL AUGMENT: NORMAL
BH CV LOWER VASCULAR LEFT COMMON FEMORAL COMPETENT: NORMAL
BH CV LOWER VASCULAR LEFT COMMON FEMORAL COMPRESS: NORMAL
BH CV LOWER VASCULAR LEFT COMMON FEMORAL PHASIC: NORMAL
BH CV LOWER VASCULAR LEFT COMMON FEMORAL SPONT: NORMAL
BH CV LOWER VASCULAR LEFT DISTAL FEMORAL COMPRESS: NORMAL
BH CV LOWER VASCULAR LEFT GASTRONEMIUS COMPRESS: NORMAL
BH CV LOWER VASCULAR LEFT GREATER SAPH AK COMPRESS: NORMAL
BH CV LOWER VASCULAR LEFT GREATER SAPH BK COMPRESS: NORMAL
BH CV LOWER VASCULAR LEFT LESSER SAPH COMPRESS: NORMAL
BH CV LOWER VASCULAR LEFT MID FEMORAL AUGMENT: NORMAL
BH CV LOWER VASCULAR LEFT MID FEMORAL COMPETENT: NORMAL
BH CV LOWER VASCULAR LEFT MID FEMORAL COMPRESS: NORMAL
BH CV LOWER VASCULAR LEFT MID FEMORAL PHASIC: NORMAL
BH CV LOWER VASCULAR LEFT MID FEMORAL SPONT: NORMAL
BH CV LOWER VASCULAR LEFT PERONEAL COMPRESS: NORMAL
BH CV LOWER VASCULAR LEFT POPLITEAL AUGMENT: NORMAL
BH CV LOWER VASCULAR LEFT POPLITEAL COMPETENT: NORMAL
BH CV LOWER VASCULAR LEFT POPLITEAL COMPRESS: NORMAL
BH CV LOWER VASCULAR LEFT POPLITEAL PHASIC: NORMAL
BH CV LOWER VASCULAR LEFT POPLITEAL SPONT: NORMAL
BH CV LOWER VASCULAR LEFT POSTERIOR TIBIAL COMPRESS: NORMAL
BH CV LOWER VASCULAR LEFT PROXIMAL FEMORAL COMPRESS: NORMAL
BH CV LOWER VASCULAR RIGHT COMMON FEMORAL AUGMENT: NORMAL
BH CV LOWER VASCULAR RIGHT COMMON FEMORAL COMPETENT: NORMAL
BH CV LOWER VASCULAR RIGHT COMMON FEMORAL COMPRESS: NORMAL
BH CV LOWER VASCULAR RIGHT COMMON FEMORAL PHASIC: NORMAL
BH CV LOWER VASCULAR RIGHT COMMON FEMORAL SPONT: NORMAL
DEPRECATED RDW RBC AUTO: 51.5 FL (ref 37–54)
EOSINOPHIL # BLD AUTO: 0 10*3/MM3 (ref 0–0.4)
EOSINOPHIL NFR BLD AUTO: 0 % (ref 0.3–6.2)
ERYTHROCYTE [DISTWIDTH] IN BLOOD BY AUTOMATED COUNT: 16 % (ref 12.3–15.4)
HBA1C MFR BLD: 5.4 % (ref 4.8–5.6)
HCT VFR BLD AUTO: 51.6 % (ref 37.5–51)
HGB BLD-MCNC: 17.6 G/DL (ref 13–17.7)
IMM GRANULOCYTES # BLD AUTO: 0.02 10*3/MM3 (ref 0–0.05)
IMM GRANULOCYTES NFR BLD AUTO: 0.4 % (ref 0–0.5)
LYMPHOCYTES # BLD AUTO: 1.17 10*3/MM3 (ref 0.7–3.1)
LYMPHOCYTES NFR BLD AUTO: 22 % (ref 19.6–45.3)
MAXIMAL PREDICTED HEART RATE: 195 BPM
MCH RBC QN AUTO: 30.6 PG (ref 26.6–33)
MCHC RBC AUTO-ENTMCNC: 34.1 G/DL (ref 31.5–35.7)
MCV RBC AUTO: 89.6 FL (ref 79–97)
MONOCYTES # BLD AUTO: 0.63 10*3/MM3 (ref 0.1–0.9)
MONOCYTES NFR BLD AUTO: 11.9 % (ref 5–12)
NEUTROPHILS NFR BLD AUTO: 3.42 10*3/MM3 (ref 1.7–7)
NEUTROPHILS NFR BLD AUTO: 64.4 % (ref 42.7–76)
NRBC BLD AUTO-RTO: 0 /100 WBC (ref 0–0.2)
PLATELET # BLD AUTO: 220 10*3/MM3 (ref 140–450)
PMV BLD AUTO: 10.9 FL (ref 6–12)
RBC # BLD AUTO: 5.76 10*6/MM3 (ref 4.14–5.8)
STRESS TARGET HR: 166 BPM
WBC NRBC COR # BLD: 5.31 10*3/MM3 (ref 3.4–10.8)

## 2023-02-06 PROCEDURE — 96372 THER/PROPH/DIAG INJ SC/IM: CPT | Performed by: FAMILY MEDICINE

## 2023-02-06 PROCEDURE — 83036 HEMOGLOBIN GLYCOSYLATED A1C: CPT | Performed by: FAMILY MEDICINE

## 2023-02-06 PROCEDURE — 93971 EXTREMITY STUDY: CPT

## 2023-02-06 PROCEDURE — 84443 ASSAY THYROID STIM HORMONE: CPT | Performed by: FAMILY MEDICINE

## 2023-02-06 PROCEDURE — 93971 EXTREMITY STUDY: CPT | Performed by: SURGERY

## 2023-02-06 PROCEDURE — 99213 OFFICE O/P EST LOW 20 MIN: CPT | Performed by: FAMILY MEDICINE

## 2023-02-06 PROCEDURE — 84439 ASSAY OF FREE THYROXINE: CPT | Performed by: FAMILY MEDICINE

## 2023-02-06 PROCEDURE — 85025 COMPLETE CBC W/AUTO DIFF WBC: CPT | Performed by: FAMILY MEDICINE

## 2023-02-06 PROCEDURE — 80053 COMPREHEN METABOLIC PANEL: CPT | Performed by: FAMILY MEDICINE

## 2023-02-06 PROCEDURE — 84550 ASSAY OF BLOOD/URIC ACID: CPT | Performed by: FAMILY MEDICINE

## 2023-02-06 RX ORDER — COLCHICINE 0.6 MG/1
TABLET ORAL
Qty: 30 TABLET | Refills: 3 | Status: SHIPPED | OUTPATIENT
Start: 2023-02-06

## 2023-02-06 RX ORDER — TRIAMCINOLONE ACETONIDE 40 MG/ML
40 INJECTION, SUSPENSION INTRA-ARTICULAR; INTRAMUSCULAR ONCE
Status: COMPLETED | OUTPATIENT
Start: 2023-02-06 | End: 2023-02-06

## 2023-02-06 RX ORDER — METHYLPREDNISOLONE 4 MG/1
TABLET ORAL
Qty: 21 EACH | Refills: 0 | Status: SHIPPED | OUTPATIENT
Start: 2023-02-06

## 2023-02-06 RX ORDER — KETOROLAC TROMETHAMINE 30 MG/ML
60 INJECTION, SOLUTION INTRAMUSCULAR; INTRAVENOUS ONCE
Status: COMPLETED | OUTPATIENT
Start: 2023-02-06 | End: 2023-02-06

## 2023-02-06 RX ADMIN — KETOROLAC TROMETHAMINE 60 MG: 30 INJECTION, SOLUTION INTRAMUSCULAR; INTRAVENOUS at 16:18

## 2023-02-06 RX ADMIN — TRIAMCINOLONE ACETONIDE 40 MG: 40 INJECTION, SUSPENSION INTRA-ARTICULAR; INTRAMUSCULAR at 16:19

## 2023-02-06 NOTE — PROGRESS NOTES
"Chief Complaint  Left foot pain and swelling     Subjective        Mi Junie Bernard presents to [facility]  History of Present Illness  Patient presents today accompanied by his parents complaining of left ankle pain/foot pain for the past week.  He has had swelling as well.  He does have gout.  He is currently taking allopurinol 100 mg daily.  He does have colchicine but has not taken any lately.  His last uric acid level was 8.6.  There is also concern that he may have had some Redness and swelling yesterday but his mom did use an ice pack which seemed to help out.  He did have a venous Doppler done back on 6/9/2022 showing an acute left lower extremity deep vein thrombosis as well as an acute on chronic left lower extremity superficial thrombophlebitis noted in the small saphenous.  He has had previous DVTs.  He does take Eliquis 5 mg twice daily mother reports that he has been compliant with this.  He is due for labs today so we discussed getting these done.  He has an appointment on Thursday which I would like to still see him for for close follow-up.  Objective   Vital Signs:  /74   Pulse 111   Temp 98.4 °F (36.9 °C)   Ht 154.9 cm (61\")   Wt (!) 151 kg (333 lb 14.4 oz)   SpO2 96%   BMI 63.09 kg/m²   Estimated body mass index is 63.09 kg/m² as calculated from the following:    Height as of this encounter: 154.9 cm (61\").    Weight as of this encounter: 151 kg (333 lb 14.4 oz).             Physical Exam   General: AAO ×3, no acute distress, pleasant  HEENT: Normocephalic, atraumatic  Cardiovascular: Regular rate and rhythm without appreciable murmur  Respiratory: Clear to auscultation bilaterally no RRW  Extremities: Edema noted in the left lower extremity particularly on the ankle and the dorsum of the left foot.  No calf tenderness to palpation of the left lower extremity.  Skin: No evidence to suggest cellulitis of the left lower extremity  Result Review " :                   Assessment and Plan   Diagnoses and all orders for this visit:    1. Acute gout of left ankle, unspecified cause (Primary)    2. Edema of left lower extremity  -     Duplex Venous Lower Extremity - Left CAR; Future    3. Polycythemia, secondary  -     CBC & Differential  -     Comprehensive Metabolic Panel    4. Hypothyroidism, unspecified type  -     TSH+Free T4    5. Abnormal glucose  -     Hemoglobin A1c    6. Gout, unspecified cause, unspecified chronicity, unspecified site  -     Uric Acid    Other orders  -     methylPREDNISolone (MEDROL) 4 MG dose pack; Take as directed on package instructions.  Dispense: 21 each; Refill: 0  -     colchicine 0.6 MG tablet; Take 2 PO at onset of gout flare, then take 1 tablet PO daily until symptoms resolve  Dispense: 30 tablet; Refill: 3    Patient has a Wells score of 2.  Given previous DVT I did discuss getting another Doppler done.  I do suspect that he is having a gout flare at this time.  We will treat him with Toradol 60 mg and Kenalog 40 mg and place him on a Medrol Dosepak and given colchicine.  I would like to see him back for close follow-up this week.         Follow Up   Return if symptoms worsen or fail to improve.  Patient was given instructions and counseling regarding his condition or for health maintenance advice. Please see specific information pulled into the AVS if appropriate.

## 2023-02-07 LAB
ALBUMIN SERPL-MCNC: 3.9 G/DL (ref 3.5–5.2)
ALBUMIN/GLOB SERPL: 1.1 G/DL
ALP SERPL-CCNC: 69 U/L (ref 39–117)
ALT SERPL W P-5'-P-CCNC: 30 U/L (ref 1–41)
ANION GAP SERPL CALCULATED.3IONS-SCNC: 8.4 MMOL/L (ref 5–15)
AST SERPL-CCNC: 34 U/L (ref 1–40)
BILIRUB SERPL-MCNC: 1 MG/DL (ref 0–1.2)
BUN SERPL-MCNC: 17 MG/DL (ref 6–20)
BUN/CREAT SERPL: 10.9 (ref 7–25)
CALCIUM SPEC-SCNC: 9.3 MG/DL (ref 8.6–10.5)
CHLORIDE SERPL-SCNC: 98 MMOL/L (ref 98–107)
CO2 SERPL-SCNC: 28.6 MMOL/L (ref 22–29)
CREAT SERPL-MCNC: 1.56 MG/DL (ref 0.76–1.27)
EGFRCR SERPLBLD CKD-EPI 2021: 62.8 ML/MIN/1.73
GLOBULIN UR ELPH-MCNC: 3.6 GM/DL
GLUCOSE SERPL-MCNC: 98 MG/DL (ref 65–99)
POTASSIUM SERPL-SCNC: 4.7 MMOL/L (ref 3.5–5.2)
PROT SERPL-MCNC: 7.5 G/DL (ref 6–8.5)
SODIUM SERPL-SCNC: 135 MMOL/L (ref 136–145)
T4 FREE SERPL-MCNC: 1.29 NG/DL (ref 0.93–1.7)
TSH SERPL DL<=0.05 MIU/L-ACNC: 2.46 UIU/ML (ref 0.27–4.2)
URATE SERPL-MCNC: 10.6 MG/DL (ref 3.4–7)

## 2023-02-09 ENCOUNTER — OFFICE VISIT (OUTPATIENT)
Dept: FAMILY MEDICINE CLINIC | Facility: CLINIC | Age: 28
End: 2023-02-09
Payer: MEDICARE

## 2023-02-09 VITALS
WEIGHT: 315 LBS | DIASTOLIC BLOOD PRESSURE: 72 MMHG | SYSTOLIC BLOOD PRESSURE: 128 MMHG | HEIGHT: 61 IN | OXYGEN SATURATION: 98 % | HEART RATE: 82 BPM | TEMPERATURE: 97.9 F | BODY MASS INDEX: 59.47 KG/M2

## 2023-02-09 DIAGNOSIS — E03.9 HYPOTHYROIDISM, UNSPECIFIED TYPE: ICD-10-CM

## 2023-02-09 DIAGNOSIS — G47.33 OSA (OBSTRUCTIVE SLEEP APNEA): ICD-10-CM

## 2023-02-09 DIAGNOSIS — M10.9 ACUTE GOUT OF LEFT ANKLE, UNSPECIFIED CAUSE: Primary | ICD-10-CM

## 2023-02-09 DIAGNOSIS — D75.1 POLYCYTHEMIA: ICD-10-CM

## 2023-02-09 PROCEDURE — 99214 OFFICE O/P EST MOD 30 MIN: CPT | Performed by: FAMILY MEDICINE

## 2023-02-09 RX ORDER — ALLOPURINOL 100 MG/1
200 TABLET ORAL DAILY
Qty: 180 TABLET | Refills: 3 | Status: SHIPPED | OUTPATIENT
Start: 2023-02-09

## 2023-02-09 NOTE — PROGRESS NOTES
"Chief Complaint  Follow up for gout    Subjective        Mi Junie Bernard presents to [facility]  History of Present Illness  Patient presents today accompanied by his mother to follow-up for gout flare left ankle.  He is doing much better now.  He still having some pain but it has gotten better.  I saw him on 2/6/2023.  He was given a Medrol Dosepak as well as colchicine.  He was also given an injection of Toradol 60 and Kenalog 40 mg.  He had labs drawn so we reviewed these.  His uric acid level is at 10.6.  I will increase the allopurinol to take 200 mg daily.  His secondary polycythemia has improved some which is good to see.  He is using his CPAP more for obstructive sleep apnea.  He has previously been evaluated by hematology and polycythemia that was felt to be secondary to untreated sleep apnea.  Patient did have a Doppler of the left lower extremity showing chronic left lower extremity postphlebitic changes noted in the popliteal where he previously had a DVT in June 2022.  There was tibial deep venous insufficiency with the other veins being normal.  His TSH and free T4 levels were within normal limits.  He is currently taking 25 mcg of levothyroxine daily.  Plan to continue current management.  Objective   Vital Signs:  /72 (BP Location: Right arm, Patient Position: Sitting)   Pulse 82   Temp 97.9 °F (36.6 °C) (Oral)   Ht 154.9 cm (61\")   Wt (!) 159 kg (350 lb 9.6 oz)   SpO2 98%   BMI 66.25 kg/m²   Estimated body mass index is 66.25 kg/m² as calculated from the following:    Height as of this encounter: 154.9 cm (61\").    Weight as of this encounter: 159 kg (350 lb 9.6 oz).             Physical Exam   General: AAO ×3, no acute distress, pleasant  HEENT: Normocephalic, atraumatic  Cardiovascular: Regular rate and rhythm without appreciable murmur  Respiratory: Clear to auscultation bilaterally no RRW  Skin: No erythema noted on the foot/ankle area of the " left lower extremity.  Edema has resolved.  extremities: No edema  Neurologic: CN II through XII grossly intact   Psychiatric: Normal mood and affect  Result Review :                   Assessment and Plan   Diagnoses and all orders for this visit:    1. Acute gout of left ankle, unspecified cause (Primary)    2. TAMIKO (obstructive sleep apnea)    3. Polycythemia, secondary    4. Hypothyroidism, unspecified type    Other orders  -     allopurinol (ZYLOPRIM) 100 MG tablet; Take 2 tablets by mouth Daily.  Dispense: 180 tablet; Refill: 3    I discussed with patient and mother a low purine diet.  We discussed taking allopurinol 200 mg daily which is an increase from the 100 mg.  Plan to see him back in 3 months for follow-up.         Follow Up   Return in about 3 months (around 5/9/2023) for gout.  Patient was given instructions and counseling regarding his condition or for health maintenance advice. Please see specific information pulled into the AVS if appropriate.

## 2023-02-10 ENCOUNTER — TELEPHONE (OUTPATIENT)
Dept: FAMILY MEDICINE CLINIC | Facility: CLINIC | Age: 28
End: 2023-02-10
Payer: MEDICARE

## 2023-02-10 RX ORDER — GLY/DIMETH/PETROLAT,WHT/WATER
1 CREAM (GRAM) TOPICAL 2 TIMES DAILY
Qty: 453 G | Refills: 3 | Status: SHIPPED | OUTPATIENT
Start: 2023-02-10

## 2023-02-10 NOTE — TELEPHONE ENCOUNTER
Received phone call from patient's mother stating that patient was supposed to have Cetaphil sent into the pharmacy but the pharmacy had not received an order. Mother stated that the other medications that were sent in had all been received, but the Cetaphil had not. Patient's mother requested that it be sent to [place].

## 2023-04-03 NOTE — PROGRESS NOTES
Chief Complaint/Care Team   DVT    No ref. provider found  Jesus Manuel Sloan DO    History of Present Illness     Diagnosis: Recurrent DVT    Secondary Polycythemia (pt with TAMIKO, using machine every other night), JAK2 V617F and exon 12 mutation testing negative    H/o Down Syndrome    Current Treatment: Eliquis (lifelong)  Previous Treatment: None    Rosaline Bernard is a 27 y.o. male who presents to [facility] HEMATOLOGY [facility] ONCOLOGY for follow-up regarding history of recurrent DVT as well as secondary positive anemia from obstructive sleep apnea.  Patient also history of Down syndrome and is here with his mother who provides additional medical history.  Patient's mother reports he has been compliant with taking Eliquis.  No report of blood clots, lower extremity swelling, difficulty breathing or chest discomfort.  Patient reports overall patient is tolerating Eliquis medication well.    His mother is also attempting to have him wear his sleep apnea machine consistently.  She has not been able to follow-up with pulmonology to assess for other devices/machines that would increase his compliance with use of TAMIKO machine.  Of note, per patient's mother he states up late at night until the early morning, but her and his father has been trying to have him wear the TAMIKO machine more frequently.    Review of Systems   Constitutional: Positive for fatigue (PT stays up all night so he feels fatigue throughout the day). Negative for appetite change, diaphoresis, fever, unexpected weight gain and unexpected weight loss.   HENT: Negative for hearing loss, mouth sores, sore throat, swollen glands, trouble swallowing and voice change.    Eyes: Negative for blurred vision.   Respiratory: Negative for cough, shortness of breath and wheezing.    Cardiovascular: Negative for chest pain and palpitations.   Gastrointestinal: Negative for abdominal pain, blood in stool, constipation, diarrhea, nausea and vomiting.    Endocrine: Negative for cold intolerance and heat intolerance.   Genitourinary: Negative for difficulty urinating, dysuria, frequency, hematuria and urinary incontinence.   Musculoskeletal: Negative for arthralgias, back pain and myalgias.   Skin: Negative for rash, skin lesions and wound.   Neurological: Negative for dizziness, seizures, weakness, numbness and headache.   Hematological: Does not bruise/bleed easily.   Psychiatric/Behavioral: Negative for depressed mood. The patient is not nervous/anxious.    All other systems reviewed and are negative.       Oncology/Hematology History    No history exists.       Objective     Vitals:    04/04/23 0944   BP: 118/77   Pulse: 109   Resp: 16   Temp: 98.2 °F (36.8 °C)   TempSrc: Temporal   SpO2: 93%   Weight: (!) 161 kg (354 lb 8 oz)   PainSc: 0-No pain     ECOG score: 1         PHQ-9 Total Score:         Physical Exam  Exam conducted with a chaperone present.   Constitutional:       General: He is not in acute distress.     Appearance: Normal appearance. He is obese.   HENT:      Head: Normocephalic and atraumatic.   Eyes:      Extraocular Movements: Extraocular movements intact.      Conjunctiva/sclera: Conjunctivae normal.   Cardiovascular:      Pulses: Normal pulses.      Heart sounds: Normal heart sounds.   Pulmonary:      Effort: Pulmonary effort is normal.      Breath sounds: Normal breath sounds. No rhonchi or rales.   Abdominal:      General: Bowel sounds are normal. There is no distension.      Palpations: Abdomen is soft. There is no mass.      Tenderness: There is no abdominal tenderness.   Musculoskeletal:      Cervical back: Normal range of motion and neck supple.   Skin:     General: Skin is warm and dry.   Neurological:      Mental Status: He is oriented to person, place, and time.           Past Medical History     Past Medical History:   Diagnosis Date   • Anemia    • Asthma    • Diabetes mellitus    • Disease of thyroid gland    • Down's syndrome     • Gout      Current Outpatient Medications on File Prior to Visit   Medication Sig Dispense Refill   • albuterol (ACCUNEB) 1.25 MG/3ML nebulizer solution Inhale 3 mL.     • albuterol sulfate  (90 Base) MCG/ACT inhaler Inhale 2 puffs Every 4 (Four) Hours As Needed for Wheezing or Shortness of Air. 8.5 g 3   • allopurinol (ZYLOPRIM) 100 MG tablet Take 2 tablets by mouth Daily. 180 tablet 3   • ALUM SULFATE-CA ACETATE EX Apply  topically to the appropriate area as directed.     • apixaban (ELIQUIS) 5 MG tablet tablet Take 1 tablet by mouth 2 (Two) Times a Day. 180 tablet 1   • budesonide (PULMICORT) 0.5 MG/2ML nebulizer solution 2 mL.     • cetirizine (zyrTEC) 10 MG tablet Take 1 tablet by mouth Daily. 90 tablet 3   • clindamycin (Cleocin-T) 1 % external solution Apply  topically to the appropriate area as directed 2 (Two) Times a Day. 60 mL 3   • colchicine 0.6 MG tablet Take 2 PO at onset of gout flare, then take 1 tablet PO daily until symptoms resolve 30 tablet 3   • Diclofenac Sodium (VOLTAREN) 1 % gel gel diclofenac sodium 1 % topical gel apply 2 gram to the affected area(s) by topical route 4 times per day   Suspended     • docusate sodium 100 MG capsule Take 1 capsule by mouth 2 (Two) Times a Day As Needed (constipation). 90 each 3   • Emollient (cetaphil) cream Apply 1 application topically to the appropriate area as directed 2 (Two) Times a Day. 453 g 3   • erythromycin (ROMYCIN) 5 MG/GM ophthalmic ointment Instill 1 cm ribbon in each eye QID 3.5 g 0   • folic acid (FOLVITE) 1 MG tablet Take 1 tablet by mouth Daily. 90 tablet 3   • ipratropium (ATROVENT) 0.03 % nasal spray 2 sprays into the nostril(s) as directed by provider.     • ketoconazole (NIZORAL) 2 % cream Apply  topically to the appropriate area as directed Daily. 60 g 1   • levothyroxine (SYNTHROID, LEVOTHROID) 25 MCG tablet Take 1 tablet by mouth Daily. 90 tablet 3   • loratadine (CLARITIN) 10 MG tablet loratadine 10 mg oral tablet take 1  tablet (10 mg) by oral route once daily   Suspended     • metFORMIN (GLUCOPHAGE) 500 MG tablet Take 1 tablet by mouth Daily. 90 tablet 3   • methylPREDNISolone (MEDROL) 4 MG dose pack Take as directed on package instructions. 21 each 0   • mineral oil-hydrophilic petrolatum (AQUAPHOR) ointment Apply 1 application topically to the appropriate area as directed As Needed for Dry Skin. 396 g 3   • montelukast (Singulair) 10 MG tablet Take 1 tablet by mouth Every Night. 90 tablet 3   • mupirocin (BACTROBAN) 2 % ointment Apply  topically to the appropriate area as directed 2 (Two) Times a Day. 30 g 3   • olopatadine (PATANOL) 0.1 % ophthalmic solution      • Urea 50 % suspension Urea Nail Stick 50 % topical solution apply to nail tissue by topical route 2 times per day   Suspended     • vitamin B-12 (CYANOCOBALAMIN) 100 MCG tablet Vitamin B-12 oral takes 1 tablet daily   Suspended     • vitamin B-6 (PYRIDOXINE) 50 MG tablet Take 0.5 tablets by mouth Every Other Day. 23 tablet 3   • Zinc Oxide 16 % ointment Apply 1 application topically to the appropriate area as directed Daily As Needed (rash). 113 g 3     No current facility-administered medications on file prior to visit.      No Known Allergies  Past Surgical History:   Procedure Laterality Date   • CYST REMOVAL       Social History     Socioeconomic History   • Marital status: Single   Tobacco Use   • Smoking status: Never   • Smokeless tobacco: Never   Vaping Use   • Vaping Use: Never used   Substance and Sexual Activity   • Alcohol use: Never     History reviewed. No pertinent family history.    Results     Result Review   The following data was reviewed by: Federico Sullivan MD on 04/04/2023:  Lab Results   Component Value Date    HGB 18.7 (H) 04/04/2023    HCT 55.9 (H) 04/04/2023    MCV 91.2 04/04/2023     04/04/2023    WBC 4.15 04/04/2023    NEUTROABS 1.99 04/04/2023    LYMPHSABS 1.54 04/04/2023    MONOSABS 0.53 04/04/2023    EOSABS 0.03 04/04/2023    BASOSABS  0.05 04/04/2023     Lab Results   Component Value Date    GLUCOSE 98 02/06/2023    BUN 17 02/06/2023    CREATININE 1.56 (H) 02/06/2023     (L) 02/06/2023    K 4.7 02/06/2023    CL 98 02/06/2023    CO2 28.6 02/06/2023    CALCIUM 9.3 02/06/2023    PROTEINTOT 7.5 02/06/2023    ALBUMIN 3.9 02/06/2023    BILITOT 1.0 02/06/2023    ALKPHOS 69 02/06/2023    AST 34 02/06/2023    ALT 30 02/06/2023     Lab Results   Component Value Date    MG 2.2 09/10/2022    FREET4 1.29 02/06/2023    TSH 2.460 02/06/2023           No radiology results for the last day       Assessment & Plan     Diagnoses and all orders for this visit:    1. Secondary polycythemia (Primary)    2. Deep vein thrombosis (DVT) of proximal lower extremity, unspecified chronicity, unspecified laterality  -     CBC & Differential; Future        Rosaline Bernard is a 27 y.o. male who presents to [facility]EMATOLOGY [facility] ONCOLOGY for polycythemia and history of recurrent DVT.  Patient is compliant with his Eliquis medication, mother states he does not need a refill at this time.  She is aware to contact our office if he requires refill in the future.  Of note, regarding his polycythemia recommend evaluation by pulmonologist to assess for other machines or devices where patient is could be more compliant in order to help treat his polycythemia.  Patient and patient's mother both agreed to help increase his compliance with use of the sleep apnea machine.  Plan for patient follow-up in 4 weeks to recheck RBC count and assess for response with consistent use of his TAMIKO machine.    Patient follow-up in 4 weeks with CBC.    Please note that portions of this note were completed with a voice recognition program.    Electronically signed by Federico Sullivan MD, 04/04/23, 10:11 AM EDT.        Follow Up     I spent 30 minutes caring for Rosaline Zaman on this date of service. This time includes time spent by me in the following activities:preparing for the  visit, reviewing tests, obtaining and/or reviewing a separately obtained history, performing a medically appropriate examination and/or evaluation , counseling and educating the patient/family/caregiver, ordering medications, tests, or procedures, documenting information in the medical record and care coordination.    This is an acute or chronic illness that poses a threat to life or bodily function. The above treatment plan involves a high risk of complications and/or mortality of patient management.    The patient was seen and examined. Work by the provider also included review and/or ordering of lab tests, review and/or ordering of radiology tests, review and/or ordering of medicine tests, discussion with other physicians or providers, independent review of data, obtaining old records, review/summation of old records, and/or other review.    I have reviewed the family history, social history, and past medical history for this patient. Previous information and data has been reviewed and updated as needed. I have reviewed and verified the chief complaint, history, and other documentation. The patient was interviewed and examined in the clinic and the chart reviewed. The previous observations, recommendations, and conclusions were reviewed including those of other providers.     The plan was discussed with the patient and/or family. The patient was given time to ask questions and these questions were answered. At the conclusion of their visit they had no additional questions or concerns and all questions were answered to their satisfaction.    Patient was given instructions and counseling regarding his condition or for health maintenance advice. Please see specific information pulled into the AVS if appropriate.

## 2023-04-04 ENCOUNTER — OFFICE VISIT (OUTPATIENT)
Dept: ONCOLOGY | Facility: HOSPITAL | Age: 28
End: 2023-04-04
Payer: MEDICARE

## 2023-04-04 ENCOUNTER — LAB (OUTPATIENT)
Dept: ONCOLOGY | Facility: HOSPITAL | Age: 28
End: 2023-04-04
Payer: MEDICARE

## 2023-04-04 VITALS
DIASTOLIC BLOOD PRESSURE: 77 MMHG | TEMPERATURE: 98.2 F | WEIGHT: 315 LBS | BODY MASS INDEX: 66.98 KG/M2 | HEART RATE: 109 BPM | RESPIRATION RATE: 16 BRPM | OXYGEN SATURATION: 93 % | SYSTOLIC BLOOD PRESSURE: 118 MMHG

## 2023-04-04 DIAGNOSIS — I82.4Y9 DEEP VEIN THROMBOSIS (DVT) OF PROXIMAL LOWER EXTREMITY, UNSPECIFIED CHRONICITY, UNSPECIFIED LATERALITY: ICD-10-CM

## 2023-04-04 DIAGNOSIS — D75.1 ERYTHROCYTOSIS: ICD-10-CM

## 2023-04-04 DIAGNOSIS — D75.1 SECONDARY POLYCYTHEMIA: Primary | ICD-10-CM

## 2023-04-04 LAB
BASOPHILS # BLD AUTO: 0.05 10*3/MM3 (ref 0–0.2)
BASOPHILS NFR BLD AUTO: 1.2 % (ref 0–1.5)
DEPRECATED RDW RBC AUTO: 58.2 FL (ref 37–54)
EOSINOPHIL # BLD AUTO: 0.03 10*3/MM3 (ref 0–0.4)
EOSINOPHIL NFR BLD AUTO: 0.7 % (ref 0.3–6.2)
ERYTHROCYTE [DISTWIDTH] IN BLOOD BY AUTOMATED COUNT: 18.3 % (ref 12.3–15.4)
HCT VFR BLD AUTO: 55.9 % (ref 37.5–51)
HGB BLD-MCNC: 18.7 G/DL (ref 13–17.7)
IMM GRANULOCYTES # BLD AUTO: 0.01 10*3/MM3 (ref 0–0.05)
IMM GRANULOCYTES NFR BLD AUTO: 0.2 % (ref 0–0.5)
LYMPHOCYTES # BLD AUTO: 1.54 10*3/MM3 (ref 0.7–3.1)
LYMPHOCYTES NFR BLD AUTO: 37.1 % (ref 19.6–45.3)
MCH RBC QN AUTO: 30.5 PG (ref 26.6–33)
MCHC RBC AUTO-ENTMCNC: 33.5 G/DL (ref 31.5–35.7)
MCV RBC AUTO: 91.2 FL (ref 79–97)
MONOCYTES # BLD AUTO: 0.53 10*3/MM3 (ref 0.1–0.9)
MONOCYTES NFR BLD AUTO: 12.8 % (ref 5–12)
NEUTROPHILS NFR BLD AUTO: 1.99 10*3/MM3 (ref 1.7–7)
NEUTROPHILS NFR BLD AUTO: 48 % (ref 42.7–76)
PLATELET # BLD AUTO: 198 10*3/MM3 (ref 140–450)
PMV BLD AUTO: 10.1 FL (ref 6–12)
RBC # BLD AUTO: 6.13 10*6/MM3 (ref 4.14–5.8)
WBC NRBC COR # BLD: 4.15 10*3/MM3 (ref 3.4–10.8)

## 2023-04-04 PROCEDURE — 36415 COLL VENOUS BLD VENIPUNCTURE: CPT

## 2023-04-04 PROCEDURE — 1126F AMNT PAIN NOTED NONE PRSNT: CPT | Performed by: INTERNAL MEDICINE

## 2023-04-04 PROCEDURE — 99214 OFFICE O/P EST MOD 30 MIN: CPT | Performed by: INTERNAL MEDICINE

## 2023-04-04 PROCEDURE — 85025 COMPLETE CBC W/AUTO DIFF WBC: CPT

## 2023-04-04 PROCEDURE — G0463 HOSPITAL OUTPT CLINIC VISIT: HCPCS | Performed by: INTERNAL MEDICINE

## 2023-05-01 NOTE — PROGRESS NOTES
Chief Complaint/Care Team   SECONDARY POLYCYTHEMIA    Jesus Manuel Sloan DO Loesevitz, Thomas, DO    History of Present Illness     Diagnosis: Recurrent DVT    Secondary Polycythemia (pt with TAMIKO, using machine every other night), JAK2 V617F and exon 12 mutation testing negative    H/o Down Syndrome    Current Treatment: Eliquis 5 mg p.o. twice daily (lifelong)  Previous Treatment: None    Rosaline Bernard is a 27 y.o. male who presents to [facility] HEMATOLOGY [facility] ONCOLOGY for follow-up regarding history of recurrent DVT as well as secondary positive anemia from obstructive sleep apnea.  Patient also history of Down syndrome and is here with his mother who provides additional medical history.  Patient's mother reports he has been compliant with taking Eliquis.  No report of blood clots, lower extremity swelling, difficulty breathing or chest discomfort.  Patient reports overall patient is tolerating Eliquis medication well.    His mother is also attempting to have him wear his sleep apnea machine consistently.  She has not been able to follow-up with pulmonology to assess for other devices/machines that would increase his compliance with use of TAMIKO machine.  Of note, per patient's mother he states up late at night until the early morning, but her and his father has been trying to have him wear the TAMIKO machine more frequently.    Interval history: Patient here with his mother to follow-up regarding secondary polycythemia.  Per patient's mother patient is wearing CPAP machine 25 out of the last 30 days.  No report of blood loss, melena, or blood clots.  Patient in need of refill of Eliquis 5 mg twice daily.      Review of Systems   Constitutional: Positive for fatigue (PT stays up all night so he feels fatigue throughout the day). Negative for appetite change, diaphoresis, fever, unexpected weight gain and unexpected weight loss.   HENT: Negative for hearing loss, mouth sores, sore throat, swollen  "glands, trouble swallowing and voice change.    Eyes: Negative for blurred vision.   Respiratory: Negative for cough, shortness of breath and wheezing.    Cardiovascular: Negative for chest pain and palpitations.   Gastrointestinal: Negative for abdominal pain, blood in stool, constipation, diarrhea, nausea and vomiting.   Endocrine: Negative for cold intolerance and heat intolerance.   Genitourinary: Negative for difficulty urinating, dysuria, frequency, hematuria and urinary incontinence.   Musculoskeletal: Negative for arthralgias, back pain and myalgias.   Skin: Negative for rash, skin lesions and wound.   Neurological: Negative for dizziness, seizures, weakness, numbness and headache.   Hematological: Does not bruise/bleed easily.   Psychiatric/Behavioral: Negative for depressed mood. The patient is not nervous/anxious.    All other systems reviewed and are negative.       Oncology/Hematology History    No history exists.       Objective     Vitals:    05/02/23 0919   BP: 128/83   Pulse: 90   Resp: 16   Temp: 97.7 °F (36.5 °C)   TempSrc: Temporal   SpO2: 93%   Weight: (!) 166 kg (365 lb 1.3 oz)   Height: 154.9 cm (60.98\")   PainSc:   2   PainLoc: Comment: left foot     ECOG score: 0         PHQ-9 Total Score:         Physical Exam  Constitutional:       General: He is not in acute distress.     Appearance: Normal appearance. He is obese.   HENT:      Head: Normocephalic and atraumatic.   Eyes:      Extraocular Movements: Extraocular movements intact.      Conjunctiva/sclera: Conjunctivae normal.   Cardiovascular:      Pulses: Normal pulses.      Heart sounds: Normal heart sounds.   Pulmonary:      Effort: Pulmonary effort is normal.      Breath sounds: Normal breath sounds. No rhonchi or rales.   Abdominal:      General: Bowel sounds are normal. There is no distension.      Palpations: Abdomen is soft. There is no mass.      Tenderness: There is no abdominal tenderness.   Musculoskeletal:      Cervical back: " Normal range of motion and neck supple.   Skin:     General: Skin is warm and dry.   Neurological:      Mental Status: He is oriented to person, place, and time.           Past Medical History     Past Medical History:   Diagnosis Date   • Anemia    • Asthma    • Diabetes mellitus    • Disease of thyroid gland    • Down's syndrome    • Gout      Current Outpatient Medications on File Prior to Visit   Medication Sig Dispense Refill   • albuterol (ACCUNEB) 1.25 MG/3ML nebulizer solution Inhale 3 mL.     • albuterol sulfate  (90 Base) MCG/ACT inhaler Inhale 2 puffs Every 4 (Four) Hours As Needed for Wheezing or Shortness of Air. 8.5 g 3   • allopurinol (ZYLOPRIM) 100 MG tablet Take 2 tablets by mouth Daily. 180 tablet 3   • ALUM SULFATE-CA ACETATE EX Apply  topically to the appropriate area as directed.     • budesonide (PULMICORT) 0.5 MG/2ML nebulizer solution 2 mL.     • cetirizine (zyrTEC) 10 MG tablet Take 1 tablet by mouth Daily. 90 tablet 3   • clindamycin (Cleocin-T) 1 % external solution Apply  topically to the appropriate area as directed 2 (Two) Times a Day. 60 mL 3   • colchicine 0.6 MG tablet Take 2 PO at onset of gout flare, then take 1 tablet PO daily until symptoms resolve 30 tablet 3   • Diclofenac Sodium (VOLTAREN) 1 % gel gel diclofenac sodium 1 % topical gel apply 2 gram to the affected area(s) by topical route 4 times per day   Suspended     • docusate sodium 100 MG capsule Take 1 capsule by mouth 2 (Two) Times a Day As Needed (constipation). 90 each 3   • Emollient (cetaphil) cream Apply 1 application topically to the appropriate area as directed 2 (Two) Times a Day. 453 g 3   • erythromycin (ROMYCIN) 5 MG/GM ophthalmic ointment Instill 1 cm ribbon in each eye QID 3.5 g 0   • folic acid (FOLVITE) 1 MG tablet Take 1 tablet by mouth Daily. 90 tablet 3   • ipratropium (ATROVENT) 0.03 % nasal spray 2 sprays into the nostril(s) as directed by provider.     • ketoconazole (NIZORAL) 2 % cream Apply   topically to the appropriate area as directed Daily. 60 g 1   • levothyroxine (SYNTHROID, LEVOTHROID) 25 MCG tablet Take 1 tablet by mouth Daily. 90 tablet 3   • loratadine (CLARITIN) 10 MG tablet loratadine 10 mg oral tablet take 1 tablet (10 mg) by oral route once daily   Suspended     • metFORMIN (GLUCOPHAGE) 500 MG tablet Take 1 tablet by mouth Daily. 90 tablet 3   • methylPREDNISolone (MEDROL) 4 MG dose pack Take as directed on package instructions. 21 each 0   • mineral oil-hydrophilic petrolatum (AQUAPHOR) ointment Apply 1 application topically to the appropriate area as directed As Needed for Dry Skin. 396 g 3   • montelukast (Singulair) 10 MG tablet Take 1 tablet by mouth Every Night. 90 tablet 3   • mupirocin (BACTROBAN) 2 % ointment Apply  topically to the appropriate area as directed 2 (Two) Times a Day. 30 g 3   • olopatadine (PATANOL) 0.1 % ophthalmic solution      • Soap & Cleansers (Cetaklenz) liquid      • Urea 50 % suspension Urea Nail Stick 50 % topical solution apply to nail tissue by topical route 2 times per day   Suspended     • vitamin B-12 (CYANOCOBALAMIN) 100 MCG tablet Vitamin B-12 oral takes 1 tablet daily   Suspended     • vitamin B-6 (PYRIDOXINE) 50 MG tablet Take 0.5 tablets by mouth Every Other Day. 23 tablet 3   • Zinc Oxide 16 % ointment Apply 1 application topically to the appropriate area as directed Daily As Needed (rash). 113 g 3   • [DISCONTINUED] apixaban (ELIQUIS) 5 MG tablet tablet Take 1 tablet by mouth 2 (Two) Times a Day. 180 tablet 1     No current facility-administered medications on file prior to visit.      No Known Allergies  Past Surgical History:   Procedure Laterality Date   • CYST REMOVAL       Social History     Socioeconomic History   • Marital status: Single   Tobacco Use   • Smoking status: Never   • Smokeless tobacco: Never   Vaping Use   • Vaping Use: Never used   Substance and Sexual Activity   • Alcohol use: Never     History reviewed. No pertinent  family history.    Results     Result Review   The following data was reviewed by: Federico Sullivan MD on 04/04/2023:  Lab Results   Component Value Date    HGB 18.9 (H) 05/02/2023    HCT 55.3 (H) 05/02/2023    MCV 92.5 05/02/2023     05/02/2023    WBC 3.45 05/02/2023    NEUTROABS 1.61 (L) 05/02/2023    LYMPHSABS 1.30 05/02/2023    MONOSABS 0.45 05/02/2023    EOSABS 0.03 05/02/2023    BASOSABS 0.05 05/02/2023     Lab Results   Component Value Date    GLUCOSE 98 02/06/2023    BUN 17 02/06/2023    CREATININE 1.56 (H) 02/06/2023     (L) 02/06/2023    K 4.7 02/06/2023    CL 98 02/06/2023    CO2 28.6 02/06/2023    CALCIUM 9.3 02/06/2023    PROTEINTOT 7.5 02/06/2023    ALBUMIN 3.9 02/06/2023    BILITOT 1.0 02/06/2023    ALKPHOS 69 02/06/2023    AST 34 02/06/2023    ALT 30 02/06/2023     Lab Results   Component Value Date    MG 2.2 09/10/2022    FREET4 1.29 02/06/2023    TSH 2.460 02/06/2023           No radiology results for the last day       Assessment & Plan     Diagnoses and all orders for this visit:    1. Erythrocytosis (Primary)  -     CBC & Differential; Future    Other orders  -     Discontinue: apixaban (ELIQUIS) 5 MG tablet tablet; Take 1 tablet by mouth 2 (Two) Times a Day.  Dispense: 180 tablet; Refill: 1  -     apixaban (ELIQUIS) 5 MG tablet tablet; Take 1 tablet by mouth 2 (Two) Times a Day.  Dispense: 180 tablet; Refill: 1        Mi Junie Bernard is a 27 y.o. male who presents to [facility] HEMATOLOGY [facility] ONCOLOGY for polycythemia and history of recurrent DVT.  Patient is compliant with his Eliquis medication, mother states he is in need of refill of his Eliquis medication, placed referral to patient's preferred pharmacy today. Of note, regarding his polycythemia recommend evaluation by pulmonologist to assess for other machines or devices where patient is could be more compliant in order to help treat his polycythemia.  Patient reports increased compliance with using CPAP  machine nightly, per patient's mom he has been using CPAP machine 25 over last 3 days.  Patient and patient's mother both agreed to continue with increase his compliance with use of the sleep apnea machine.  Plan for patient follow-up in 8 weeks to recheck hemoglobin count and assess for response with consistent use of his TAMIKO machine.    Plan for patient follow-up in 2 months with repeat CBC.    Patient verbalized understanding and agreed with plan.    Please note that portions of this note were completed with a voice recognition program.    Electronically signed by Federico Sullivan MD, 05/02/23, 4:02 PM EDT.          Follow Up     I spent 30 minutes caring for Rosaline Zaman on this date of service. This time includes time spent by me in the following activities:preparing for the visit, reviewing tests, obtaining and/or reviewing a separately obtained history, performing a medically appropriate examination and/or evaluation , counseling and educating the patient/family/caregiver, ordering medications, tests, or procedures, documenting information in the medical record and care coordination.    This is an acute or chronic illness that poses a threat to life or bodily function. The above treatment plan involves a high risk of complications and/or mortality of patient management.    The patient was seen and examined. Work by the provider also included review and/or ordering of lab tests, review and/or ordering of radiology tests, review and/or ordering of medicine tests, discussion with other physicians or providers, independent review of data, obtaining old records, review/summation of old records, and/or other review.    I have reviewed the family history, social history, and past medical history for this patient. Previous information and data has been reviewed and updated as needed. I have reviewed and verified the chief complaint, history, and other documentation. The patient was interviewed and examined in the clinic and  the chart reviewed. The previous observations, recommendations, and conclusions were reviewed including those of other providers.     The plan was discussed with the patient and/or family. The patient was given time to ask questions and these questions were answered. At the conclusion of their visit they had no additional questions or concerns and all questions were answered to their satisfaction.    Patient was given instructions and counseling regarding his condition or for health maintenance advice. Please see specific information pulled into the AVS if appropriate.

## 2023-05-02 ENCOUNTER — APPOINTMENT (OUTPATIENT)
Dept: ONCOLOGY | Facility: HOSPITAL | Age: 28
End: 2023-05-02
Payer: MEDICARE

## 2023-05-02 ENCOUNTER — OFFICE VISIT (OUTPATIENT)
Dept: ONCOLOGY | Facility: HOSPITAL | Age: 28
End: 2023-05-02
Payer: MEDICARE

## 2023-05-02 ENCOUNTER — LAB (OUTPATIENT)
Dept: ONCOLOGY | Facility: HOSPITAL | Age: 28
End: 2023-05-02
Payer: MEDICARE

## 2023-05-02 VITALS
SYSTOLIC BLOOD PRESSURE: 128 MMHG | RESPIRATION RATE: 16 BRPM | HEIGHT: 61 IN | DIASTOLIC BLOOD PRESSURE: 83 MMHG | TEMPERATURE: 97.7 F | HEART RATE: 90 BPM | BODY MASS INDEX: 59.47 KG/M2 | OXYGEN SATURATION: 93 % | WEIGHT: 315 LBS

## 2023-05-02 DIAGNOSIS — D75.1 ERYTHROCYTOSIS: Primary | ICD-10-CM

## 2023-05-02 DIAGNOSIS — I82.4Y9 DEEP VEIN THROMBOSIS (DVT) OF PROXIMAL LOWER EXTREMITY, UNSPECIFIED CHRONICITY, UNSPECIFIED LATERALITY: ICD-10-CM

## 2023-05-02 LAB
BASOPHILS # BLD AUTO: 0.05 10*3/MM3 (ref 0–0.2)
BASOPHILS NFR BLD AUTO: 1.4 % (ref 0–1.5)
DEPRECATED RDW RBC AUTO: 59.1 FL (ref 37–54)
EOSINOPHIL # BLD AUTO: 0.03 10*3/MM3 (ref 0–0.4)
EOSINOPHIL NFR BLD AUTO: 0.9 % (ref 0.3–6.2)
ERYTHROCYTE [DISTWIDTH] IN BLOOD BY AUTOMATED COUNT: 18.5 % (ref 12.3–15.4)
HCT VFR BLD AUTO: 55.3 % (ref 37.5–51)
HGB BLD-MCNC: 18.9 G/DL (ref 13–17.7)
IMM GRANULOCYTES # BLD AUTO: 0.01 10*3/MM3 (ref 0–0.05)
IMM GRANULOCYTES NFR BLD AUTO: 0.3 % (ref 0–0.5)
LYMPHOCYTES # BLD AUTO: 1.3 10*3/MM3 (ref 0.7–3.1)
LYMPHOCYTES NFR BLD AUTO: 37.7 % (ref 19.6–45.3)
MCH RBC QN AUTO: 31.6 PG (ref 26.6–33)
MCHC RBC AUTO-ENTMCNC: 34.2 G/DL (ref 31.5–35.7)
MCV RBC AUTO: 92.5 FL (ref 79–97)
MONOCYTES # BLD AUTO: 0.45 10*3/MM3 (ref 0.1–0.9)
MONOCYTES NFR BLD AUTO: 13 % (ref 5–12)
NEUTROPHILS NFR BLD AUTO: 1.61 10*3/MM3 (ref 1.7–7)
NEUTROPHILS NFR BLD AUTO: 46.7 % (ref 42.7–76)
PLATELET # BLD AUTO: 199 10*3/MM3 (ref 140–450)
PMV BLD AUTO: 10.3 FL (ref 6–12)
RBC # BLD AUTO: 5.98 10*6/MM3 (ref 4.14–5.8)
WBC NRBC COR # BLD: 3.45 10*3/MM3 (ref 3.4–10.8)

## 2023-05-02 PROCEDURE — 36415 COLL VENOUS BLD VENIPUNCTURE: CPT

## 2023-05-02 PROCEDURE — G0463 HOSPITAL OUTPT CLINIC VISIT: HCPCS | Performed by: INTERNAL MEDICINE

## 2023-05-02 PROCEDURE — 85025 COMPLETE CBC W/AUTO DIFF WBC: CPT

## 2023-05-02 RX ORDER — SKIN CLEANSER
CLEANSER (ML) TOPICAL
COMMUNITY
Start: 2023-04-30

## 2023-05-04 ENCOUNTER — TELEPHONE (OUTPATIENT)
Dept: ONCOLOGY | Facility: HOSPITAL | Age: 28
End: 2023-05-04
Payer: MEDICARE

## 2023-05-04 NOTE — TELEPHONE ENCOUNTER
Spoke with patient mother Sun, advised that his hemoglobin was essentially the same as last time it was checked. Advised that Dr. Sullivan really does highly recommend he follow up with pulmonary to check the setting and fittings for his CPAP to see if there are any changes that could be made to assist with him being more compliant with the machine. She states that she will work on getting a follow up and will keep the follow up date that we already have scheduled for our office. Sun verbalized understanding of all information discussed.

## 2023-05-10 ENCOUNTER — OFFICE VISIT (OUTPATIENT)
Dept: FAMILY MEDICINE CLINIC | Facility: CLINIC | Age: 28
End: 2023-05-10
Payer: MEDICARE

## 2023-05-10 VITALS
DIASTOLIC BLOOD PRESSURE: 70 MMHG | HEIGHT: 64 IN | WEIGHT: 315 LBS | SYSTOLIC BLOOD PRESSURE: 112 MMHG | BODY MASS INDEX: 53.78 KG/M2 | TEMPERATURE: 98.4 F | OXYGEN SATURATION: 95 % | HEART RATE: 92 BPM

## 2023-05-10 DIAGNOSIS — E11.65 TYPE 2 DIABETES MELLITUS WITH HYPERGLYCEMIA, WITHOUT LONG-TERM CURRENT USE OF INSULIN: ICD-10-CM

## 2023-05-10 DIAGNOSIS — E53.8 FOLATE DEFICIENCY: ICD-10-CM

## 2023-05-10 DIAGNOSIS — E53.8 B12 DEFICIENCY: ICD-10-CM

## 2023-05-10 DIAGNOSIS — M10.9 GOUT, UNSPECIFIED CAUSE, UNSPECIFIED CHRONICITY, UNSPECIFIED SITE: ICD-10-CM

## 2023-05-10 DIAGNOSIS — D75.1 POLYCYTHEMIA: ICD-10-CM

## 2023-05-10 DIAGNOSIS — Z11.59 NEED FOR HEPATITIS C SCREENING TEST: ICD-10-CM

## 2023-05-10 DIAGNOSIS — E03.9 HYPOTHYROIDISM, UNSPECIFIED TYPE: ICD-10-CM

## 2023-05-10 DIAGNOSIS — E53.1 VITAMIN B6 DEFICIENCY: ICD-10-CM

## 2023-05-10 DIAGNOSIS — J45.909 ASTHMA, UNSPECIFIED ASTHMA SEVERITY, UNSPECIFIED WHETHER COMPLICATED, UNSPECIFIED WHETHER PERSISTENT: ICD-10-CM

## 2023-05-10 DIAGNOSIS — G47.33 OBSTRUCTIVE SLEEP APNEA: Primary | ICD-10-CM

## 2023-05-10 DIAGNOSIS — I82.4Y2 DEEP VEIN THROMBOSIS (DVT) OF PROXIMAL VEIN OF LEFT LOWER EXTREMITY, UNSPECIFIED CHRONICITY: ICD-10-CM

## 2023-05-10 DIAGNOSIS — G47.33 OSA (OBSTRUCTIVE SLEEP APNEA): Primary | ICD-10-CM

## 2023-05-10 RX ORDER — ALBUTEROL SULFATE 90 UG/1
2 AEROSOL, METERED RESPIRATORY (INHALATION) EVERY 4 HOURS PRN
Qty: 8.5 G | Refills: 3 | Status: SHIPPED | OUTPATIENT
Start: 2023-05-10

## 2023-05-10 RX ORDER — BUDESONIDE 0.5 MG/2ML
0.5 INHALANT ORAL
Qty: 180 ML | Refills: 1 | Status: SHIPPED | OUTPATIENT
Start: 2023-05-10

## 2023-05-10 RX ORDER — ALBUTEROL SULFATE 1.25 MG/3ML
1 SOLUTION RESPIRATORY (INHALATION) EVERY 6 HOURS PRN
Qty: 360 ML | Refills: 3 | Status: SHIPPED | OUTPATIENT
Start: 2023-05-10

## 2023-05-10 NOTE — PROGRESS NOTES
"Chief Complaint  Med refills  Discuss sleep apnea    Subjective        Mi Junie Bernard presents to [facility]  History of Present Illness  Patient presents today accompanied by his mother for follow-up for sleep apnea and elevated hemoglobin/secondary polycythemia.  He is being seen by Dr. Sullivan.  He is now using his CPAP but does need to see sleep medicine as he had the CPAP prescribed several years ago.  Referral has been made today.  He also is being seen by hematology given recurrent DVT.  He continues to take Eliquis.  He is due for medications to be refilled today.  We discussed having labs done when he returns for follow-up.  He continues to take B6 and B12 supplementation as well as folate.  He continues to take levothyroxine 25 mcg daily.  His last TSH and free T4 levels were within normal limits.  Objective   Vital Signs:  /70 (BP Location: Right arm, Patient Position: Sitting)   Pulse 92   Temp 98.4 °F (36.9 °C) (Oral)   Ht 162.6 cm (64\")   Wt (!) 163 kg (360 lb)   SpO2 95%   BMI 61.79 kg/m²   Estimated body mass index is 61.79 kg/m² as calculated from the following:    Height as of this encounter: 162.6 cm (64\").    Weight as of this encounter: 163 kg (360 lb).             Physical Exam   General: AAO ×3, no acute distress, pleasant  HEENT: Normocephalic, atraumatic  Cardiovascular: Regular rate and rhythm without appreciable murmur  Respiratory: Clear to auscultation bilaterally no RRW  Gastrointestinal: Soft nontender nondistended with bowel sounds present  extremities: No edema  Neurologic: CN II through XII grossly intact   Psychiatric: Normal mood and affect  Result Review :                   Assessment and Plan   Diagnoses and all orders for this visit:    1. TAMIKO (obstructive sleep apnea) (Primary)  -     Ambulatory Referral to Sleep Medicine    2. Polycythemia, secondary    3. Hypothyroidism, unspecified type  -     CBC & Differential; Future  -   "   Comprehensive Metabolic Panel; Future  -     TSH+Free T4; Future    4. Folate deficiency  -     Folate; Future    5. Asthma, unspecified asthma severity, unspecified whether complicated, unspecified whether persistent    6. Type 2 diabetes mellitus with hyperglycemia, without long-term current use of insulin  -     Microalbumin / Creatinine Urine Ratio - Urine, Clean Catch; Future  -     Hemoglobin A1c; Future    7. B12 deficiency  -     Vitamin B12; Future    8. Vitamin B6 deficiency  -     Vitamin B6; Future    9. Need for hepatitis C screening test  -     Hepatitis C Antibody; Future    10. Gout, unspecified cause, unspecified chronicity, unspecified site  -     Uric Acid; Future    11. Deep vein thrombosis (DVT) of proximal vein of left lower extremity, unspecified chronicity    Other orders  -     Urea 50 % suspension; Apply topically to the affected areas TID  Dispense: 284 g; Refill: 3  -     Zinc Oxide 16 % ointment; Apply 1 application topically to the appropriate area as directed Daily As Needed (rash).  Dispense: 113 g; Refill: 3  -     budesonide (PULMICORT) 0.5 MG/2ML nebulizer solution; Take 2 mL by nebulization Daily.  Dispense: 180 mL; Refill: 1  -     albuterol (ACCUNEB) 1.25 MG/3ML nebulizer solution; Take 3 mL by nebulization Every 6 (Six) Hours As Needed for Wheezing.  Dispense: 360 mL; Refill: 3  -     albuterol sulfate  (90 Base) MCG/ACT inhaler; Inhale 2 puffs Every 4 (Four) Hours As Needed for Wheezing or Shortness of Air.  Dispense: 8.5 g; Refill: 3  -     mupirocin (BACTROBAN) 2 % ointment; Apply  topically to the appropriate area as directed 2 (Two) Times a Day.  Dispense: 30 g; Refill: 3    Plan as documented above.  I discussed with patient and mother having labs repeated prior to next appointment.  They are instructed to call with any questions or concerns.  Plan to see him back in 3 months or sooner if needed.         Follow Up   Return for diabetes.  Patient was given  instructions and counseling regarding his condition or for health maintenance advice. Please see specific information pulled into the AVS if appropriate.

## 2023-07-14 PROBLEM — D75.1 SECONDARY POLYCYTHEMIA: Status: ACTIVE | Noted: 2023-07-14

## 2023-07-31 ENCOUNTER — OFFICE VISIT (OUTPATIENT)
Dept: SLEEP MEDICINE | Facility: HOSPITAL | Age: 28
End: 2023-07-31
Payer: MEDICARE

## 2023-07-31 VITALS
SYSTOLIC BLOOD PRESSURE: 125 MMHG | DIASTOLIC BLOOD PRESSURE: 82 MMHG | HEIGHT: 63 IN | HEART RATE: 84 BPM | BODY MASS INDEX: 55.81 KG/M2 | OXYGEN SATURATION: 94 % | WEIGHT: 315 LBS

## 2023-07-31 DIAGNOSIS — I82.4Y9 DEEP VEIN THROMBOSIS (DVT) OF PROXIMAL LOWER EXTREMITY, UNSPECIFIED CHRONICITY, UNSPECIFIED LATERALITY: ICD-10-CM

## 2023-07-31 DIAGNOSIS — E66.01 CLASS 3 SEVERE OBESITY DUE TO EXCESS CALORIES WITHOUT SERIOUS COMORBIDITY WITH BODY MASS INDEX (BMI) OF 60.0 TO 69.9 IN ADULT: ICD-10-CM

## 2023-07-31 DIAGNOSIS — G47.33 OSA TREATED WITH BIPAP: Primary | ICD-10-CM

## 2023-07-31 DIAGNOSIS — Q90.9 COMPLETE TRISOMY 21 SYNDROME: ICD-10-CM

## 2023-07-31 DIAGNOSIS — D75.1 SECONDARY POLYCYTHEMIA: ICD-10-CM

## 2023-07-31 DIAGNOSIS — G47.10 HYPERSOMNIA: ICD-10-CM

## 2023-07-31 PROCEDURE — 1159F MED LIST DOCD IN RCRD: CPT | Performed by: INTERNAL MEDICINE

## 2023-07-31 PROCEDURE — 99213 OFFICE O/P EST LOW 20 MIN: CPT | Performed by: INTERNAL MEDICINE

## 2023-07-31 PROCEDURE — 1160F RVW MEDS BY RX/DR IN RCRD: CPT | Performed by: INTERNAL MEDICINE

## 2023-07-31 PROCEDURE — G0463 HOSPITAL OUTPT CLINIC VISIT: HCPCS

## 2023-08-09 ENCOUNTER — LAB (OUTPATIENT)
Dept: LAB | Facility: HOSPITAL | Age: 28
End: 2023-08-09
Payer: MEDICARE

## 2023-08-09 DIAGNOSIS — D75.1 SECONDARY POLYCYTHEMIA: ICD-10-CM

## 2023-08-09 DIAGNOSIS — M10.9 GOUT, UNSPECIFIED CAUSE, UNSPECIFIED CHRONICITY, UNSPECIFIED SITE: ICD-10-CM

## 2023-08-09 DIAGNOSIS — E53.1 VITAMIN B6 DEFICIENCY: ICD-10-CM

## 2023-08-09 DIAGNOSIS — D64.9 ANEMIA, UNSPECIFIED TYPE: ICD-10-CM

## 2023-08-09 DIAGNOSIS — E53.8 B12 DEFICIENCY: ICD-10-CM

## 2023-08-09 DIAGNOSIS — E11.65 TYPE 2 DIABETES MELLITUS WITH HYPERGLYCEMIA, WITHOUT LONG-TERM CURRENT USE OF INSULIN: ICD-10-CM

## 2023-08-09 DIAGNOSIS — Z11.59 NEED FOR HEPATITIS C SCREENING TEST: ICD-10-CM

## 2023-08-09 DIAGNOSIS — E03.9 HYPOTHYROIDISM, UNSPECIFIED TYPE: ICD-10-CM

## 2023-08-09 DIAGNOSIS — E53.8 FOLATE DEFICIENCY: ICD-10-CM

## 2023-08-09 LAB
ALBUMIN SERPL-MCNC: 3.6 G/DL (ref 3.5–5.2)
ALBUMIN UR-MCNC: <1.2 MG/DL
ALBUMIN/GLOB SERPL: 1.2 G/DL
ALP SERPL-CCNC: 79 U/L (ref 39–117)
ALT SERPL W P-5'-P-CCNC: 25 U/L (ref 1–41)
ANION GAP SERPL CALCULATED.3IONS-SCNC: 13.8 MMOL/L (ref 5–15)
AST SERPL-CCNC: 25 U/L (ref 1–40)
BASOPHILS # BLD AUTO: 0.07 10*3/MM3 (ref 0–0.2)
BASOPHILS NFR BLD AUTO: 2.1 % (ref 0–1.5)
BILIRUB SERPL-MCNC: 0.6 MG/DL (ref 0–1.2)
BUN SERPL-MCNC: 20 MG/DL (ref 6–20)
BUN/CREAT SERPL: 12.6 (ref 7–25)
CALCIUM SPEC-SCNC: 8.9 MG/DL (ref 8.6–10.5)
CHLORIDE SERPL-SCNC: 100 MMOL/L (ref 98–107)
CO2 SERPL-SCNC: 23.2 MMOL/L (ref 22–29)
CREAT SERPL-MCNC: 1.59 MG/DL (ref 0.76–1.27)
CREAT UR-MCNC: 147.3 MG/DL
DEPRECATED RDW RBC AUTO: 53.6 FL (ref 37–54)
EGFRCR SERPLBLD CKD-EPI 2021: 61 ML/MIN/1.73
EOSINOPHIL # BLD AUTO: 0.01 10*3/MM3 (ref 0–0.4)
EOSINOPHIL NFR BLD AUTO: 0.3 % (ref 0.3–6.2)
ERYTHROCYTE [DISTWIDTH] IN BLOOD BY AUTOMATED COUNT: 17.1 % (ref 12.3–15.4)
FERRITIN SERPL-MCNC: 120.5 NG/ML (ref 30–400)
FOLATE SERPL-MCNC: 7.05 NG/ML (ref 4.78–24.2)
GLOBULIN UR ELPH-MCNC: 3.1 GM/DL
GLUCOSE SERPL-MCNC: 113 MG/DL (ref 65–99)
HBA1C MFR BLD: 5.5 % (ref 4.8–5.6)
HCT VFR BLD AUTO: 54.6 % (ref 37.5–51)
HCV AB SER DONR QL: NORMAL
HGB BLD-MCNC: 18.8 G/DL (ref 13–17.7)
IMM GRANULOCYTES # BLD AUTO: 0.01 10*3/MM3 (ref 0–0.05)
IMM GRANULOCYTES NFR BLD AUTO: 0.3 % (ref 0–0.5)
IRON 24H UR-MRATE: 61 MCG/DL (ref 59–158)
IRON SATN MFR SERPL: 17 % (ref 20–50)
LYMPHOCYTES # BLD AUTO: 1.27 10*3/MM3 (ref 0.7–3.1)
LYMPHOCYTES NFR BLD AUTO: 37.2 % (ref 19.6–45.3)
MCH RBC QN AUTO: 31.3 PG (ref 26.6–33)
MCHC RBC AUTO-ENTMCNC: 34.4 G/DL (ref 31.5–35.7)
MCV RBC AUTO: 91 FL (ref 79–97)
MICROALBUMIN/CREAT UR: NORMAL MG/G{CREAT}
MONOCYTES # BLD AUTO: 0.44 10*3/MM3 (ref 0.1–0.9)
MONOCYTES NFR BLD AUTO: 12.9 % (ref 5–12)
NEUTROPHILS NFR BLD AUTO: 1.61 10*3/MM3 (ref 1.7–7)
NEUTROPHILS NFR BLD AUTO: 47.2 % (ref 42.7–76)
NRBC BLD AUTO-RTO: 0 /100 WBC (ref 0–0.2)
PLATELET # BLD AUTO: 227 10*3/MM3 (ref 140–450)
PMV BLD AUTO: 11.2 FL (ref 6–12)
POTASSIUM SERPL-SCNC: 4.1 MMOL/L (ref 3.5–5.2)
PROT SERPL-MCNC: 6.7 G/DL (ref 6–8.5)
RBC # BLD AUTO: 6 10*6/MM3 (ref 4.14–5.8)
SODIUM SERPL-SCNC: 137 MMOL/L (ref 136–145)
T4 FREE SERPL-MCNC: 1.11 NG/DL (ref 0.93–1.7)
TIBC SERPL-MCNC: 355 MCG/DL (ref 298–536)
TRANSFERRIN SERPL-MCNC: 238 MG/DL (ref 200–360)
TSH SERPL DL<=0.05 MIU/L-ACNC: 2.35 UIU/ML (ref 0.27–4.2)
URATE SERPL-MCNC: 11.2 MG/DL (ref 3.4–7)
VIT B12 BLD-MCNC: 378 PG/ML (ref 211–946)
WBC NRBC COR # BLD: 3.41 10*3/MM3 (ref 3.4–10.8)

## 2023-08-09 PROCEDURE — 80053 COMPREHEN METABOLIC PANEL: CPT

## 2023-08-09 PROCEDURE — 82746 ASSAY OF FOLIC ACID SERUM: CPT

## 2023-08-09 PROCEDURE — 82570 ASSAY OF URINE CREATININE: CPT

## 2023-08-09 PROCEDURE — 83036 HEMOGLOBIN GLYCOSYLATED A1C: CPT

## 2023-08-09 PROCEDURE — 82728 ASSAY OF FERRITIN: CPT

## 2023-08-09 PROCEDURE — 84443 ASSAY THYROID STIM HORMONE: CPT

## 2023-08-09 PROCEDURE — 85025 COMPLETE CBC W/AUTO DIFF WBC: CPT

## 2023-08-09 PROCEDURE — 83540 ASSAY OF IRON: CPT

## 2023-08-09 PROCEDURE — 82607 VITAMIN B-12: CPT

## 2023-08-09 PROCEDURE — 84439 ASSAY OF FREE THYROXINE: CPT

## 2023-08-09 PROCEDURE — 36415 COLL VENOUS BLD VENIPUNCTURE: CPT

## 2023-08-09 PROCEDURE — 82043 UR ALBUMIN QUANTITATIVE: CPT

## 2023-08-09 PROCEDURE — 84466 ASSAY OF TRANSFERRIN: CPT

## 2023-08-09 PROCEDURE — 84550 ASSAY OF BLOOD/URIC ACID: CPT

## 2023-08-09 PROCEDURE — 86803 HEPATITIS C AB TEST: CPT

## 2023-08-10 ENCOUNTER — TELEPHONE (OUTPATIENT)
Dept: ONCOLOGY | Facility: HOSPITAL | Age: 28
End: 2023-08-10

## 2023-08-10 ENCOUNTER — TELEPHONE (OUTPATIENT)
Dept: ONCOLOGY | Facility: HOSPITAL | Age: 28
End: 2023-08-10
Payer: MEDICARE

## 2023-08-10 DIAGNOSIS — D75.1 SECONDARY POLYCYTHEMIA: Primary | ICD-10-CM

## 2023-08-10 DIAGNOSIS — D64.9 ANEMIA, UNSPECIFIED TYPE: ICD-10-CM

## 2023-08-10 NOTE — TELEPHONE ENCOUNTER
Left message, advised that labs for Dr. Sullivan were actually drawn yesterday when other labs were completed. Instructed to maintain all follow up visits and to contact the office with any questions or concerns.

## 2023-08-10 NOTE — TELEPHONE ENCOUNTER
Caller: RAFIA NEWELL    Relationship: Mother    Best call back number: [phone]    What is the best time to reach you: ASAP    Who are you requesting to speak with (clinical staff, provider,  specific staff member): CLINICAL    What was the call regarding: REQUESTING A C/B FOR BLOOD WORK ORDERS    Is it okay if the provider responds through MyChart: N/A

## 2023-08-10 NOTE — TELEPHONE ENCOUNTER
Caller: RAFIA NEWELL    Relationship to patient: Mother    Best call back number: [phone]    Chief complaint: R/S    Type of visit: F/U    Requested date: 9-18 AROUND 10    If rescheduling, when is the original appointment: 8-16

## 2023-08-11 NOTE — TELEPHONE ENCOUNTER
Spoke with Sun, advised that we are working on getting patient scheduled for phlebotomy and she should receive a call from them to get that set up    Denies drug use

## 2023-08-11 NOTE — TELEPHONE ENCOUNTER
PATIENT'S MOTHER CALLED BACK, SHE STATES ITS NOT LABS HE NEEDS, HE ACTUALLY HAS TO GIVE BLOOD FOR AN HOUR AND THEY NEED AN ORDER FOR THAT. PATIENTS MOTHER STATES THE PLACE WHERE THE ORDER NEEDS TO GO IS THE BASEMENT OF DR OCONNOR OFFICE. PATIENT NEEDS TO GIVE BLOOD, FOR HIS RED BLOOD CELL COUNT           PLEASE CALL MOTHER [phone] TO ADVISE

## 2023-08-14 ENCOUNTER — OFFICE VISIT (OUTPATIENT)
Dept: FAMILY MEDICINE CLINIC | Facility: CLINIC | Age: 28
End: 2023-08-14
Payer: MEDICARE

## 2023-08-14 VITALS
DIASTOLIC BLOOD PRESSURE: 70 MMHG | BODY MASS INDEX: 55.81 KG/M2 | HEIGHT: 63 IN | TEMPERATURE: 98.6 F | SYSTOLIC BLOOD PRESSURE: 118 MMHG | WEIGHT: 315 LBS | HEART RATE: 84 BPM | OXYGEN SATURATION: 95 %

## 2023-08-14 DIAGNOSIS — Q90.9 DOWN SYNDROME: ICD-10-CM

## 2023-08-14 DIAGNOSIS — M21.41 FLAT FEET, BILATERAL: ICD-10-CM

## 2023-08-14 DIAGNOSIS — D75.1 POLYCYTHEMIA: ICD-10-CM

## 2023-08-14 DIAGNOSIS — E03.9 HYPOTHYROIDISM, UNSPECIFIED TYPE: ICD-10-CM

## 2023-08-14 DIAGNOSIS — G47.33 OBSTRUCTIVE SLEEP APNEA: ICD-10-CM

## 2023-08-14 DIAGNOSIS — M21.42 FLAT FEET, BILATERAL: ICD-10-CM

## 2023-08-14 DIAGNOSIS — I82.4Y2 DEEP VEIN THROMBOSIS (DVT) OF PROXIMAL VEIN OF LEFT LOWER EXTREMITY, UNSPECIFIED CHRONICITY: ICD-10-CM

## 2023-08-14 DIAGNOSIS — Z00.00 MEDICARE ANNUAL WELLNESS VISIT, SUBSEQUENT: Primary | ICD-10-CM

## 2023-08-14 DIAGNOSIS — E53.8 FOLATE DEFICIENCY: ICD-10-CM

## 2023-08-14 DIAGNOSIS — D75.1 SECONDARY POLYCYTHEMIA: ICD-10-CM

## 2023-08-14 DIAGNOSIS — M10.9 GOUT, UNSPECIFIED CAUSE, UNSPECIFIED CHRONICITY, UNSPECIFIED SITE: ICD-10-CM

## 2023-08-14 DIAGNOSIS — E53.8 B12 DEFICIENCY: ICD-10-CM

## 2023-08-14 RX ORDER — ALLOPURINOL 300 MG/1
300 TABLET ORAL DAILY
Qty: 90 TABLET | Refills: 1 | Status: SHIPPED | OUTPATIENT
Start: 2023-08-14

## 2023-08-14 RX ORDER — GLY/DIMETH/PETROLAT,WHT/WATER
1 CREAM (GRAM) TOPICAL 2 TIMES DAILY
Qty: 453 G | Refills: 3 | Status: SHIPPED | OUTPATIENT
Start: 2023-08-14

## 2023-08-14 RX ORDER — MONTELUKAST SODIUM 10 MG/1
10 TABLET ORAL NIGHTLY
Qty: 90 TABLET | Refills: 3 | Status: SHIPPED | OUTPATIENT
Start: 2023-08-14

## 2023-08-14 RX ORDER — COLCHICINE 0.6 MG/1
TABLET ORAL
Qty: 30 TABLET | Refills: 3 | Status: SHIPPED | OUTPATIENT
Start: 2023-08-14

## 2023-08-14 RX ORDER — ALBUTEROL SULFATE 90 UG/1
2 AEROSOL, METERED RESPIRATORY (INHALATION) EVERY 4 HOURS PRN
Qty: 8.5 G | Refills: 3 | Status: SHIPPED | OUTPATIENT
Start: 2023-08-14

## 2023-08-14 RX ORDER — LANOLIN ALCOHOL/MO/W.PET/CERES
1000 CREAM (GRAM) TOPICAL DAILY
Qty: 90 TABLET | Refills: 1 | Status: SHIPPED | OUTPATIENT
Start: 2023-08-14

## 2023-08-14 RX ORDER — LORATADINE 10 MG/1
10 TABLET ORAL DAILY
Qty: 90 TABLET | Refills: 3 | Status: SHIPPED | OUTPATIENT
Start: 2023-08-14

## 2023-08-14 NOTE — PROGRESS NOTES
The ABCs of the Annual Wellness Visit  Subsequent Medicare Wellness Visit    Subjective    Rosaline Bernard is a 28 y.o. male who presents for a Subsequent Medicare Wellness Visit.    The following portions of the patient's history were reviewed and   updated as appropriate: allergies, current medications, past family history, past medical history, past social history, past surgical history, and problem list.    Compared to one year ago, the patient feels his physical   health is the same.    Compared to one year ago, the patient feels his mental   health is the same.    Recent Hospitalizations:  He was not admitted to the hospital during the last year.       Current Medical Providers:  Patient Care Team:  Jesus Manuel Sloan,  as PCP - General (Family Medicine)    Outpatient Medications Prior to Visit   Medication Sig Dispense Refill    albuterol (ACCUNEB) 1.25 MG/3ML nebulizer solution Take 3 mL by nebulization Every 6 (Six) Hours As Needed for Wheezing. 360 mL 3    ALUM SULFATE-CA ACETATE EX Apply  topically to the appropriate area as directed.      budesonide (PULMICORT) 0.5 MG/2ML nebulizer solution Take 2 mL by nebulization Daily. 180 mL 1    clindamycin (Cleocin-T) 1 % external solution Apply  topically to the appropriate area as directed 2 (Two) Times a Day. 60 mL 3    Diclofenac Sodium (VOLTAREN) 1 % gel gel diclofenac sodium 1 % topical gel apply 2 gram to the affected area(s) by topical route 4 times per day   Suspended      docusate sodium 100 MG capsule Take 1 capsule by mouth 2 (Two) Times a Day As Needed (constipation). 90 each 3    erythromycin (ROMYCIN) 5 MG/GM ophthalmic ointment Instill 1 cm ribbon in each eye QID 3.5 g 0    folic acid (FOLVITE) 1 MG tablet Take 1 tablet by mouth Daily. 90 tablet 3    ipratropium (ATROVENT) 0.03 % nasal spray 2 sprays into the nostril(s) as directed by provider.      ketoconazole (NIZORAL) 2 % cream Apply  topically to the appropriate area as directed Daily. 60  g 1    levothyroxine (SYNTHROID, LEVOTHROID) 25 MCG tablet Take 1 tablet by mouth Daily. 90 tablet 3    olopatadine (PATANOL) 0.1 % ophthalmic solution       Soap & Cleansers (Cetaklenz) liquid       Urea 50 % suspension Apply topically to the affected areas  g 3    vitamin B-6 (PYRIDOXINE) 50 MG tablet Take 0.5 tablets by mouth Every Other Day. 23 tablet 3    Zinc Oxide 16 % ointment Apply 1 application topically to the appropriate area as directed Daily As Needed (rash). 113 g 3    albuterol sulfate  (90 Base) MCG/ACT inhaler Inhale 2 puffs Every 4 (Four) Hours As Needed for Wheezing or Shortness of Air. 8.5 g 3    allopurinol (ZYLOPRIM) 100 MG tablet Take 2 tablets by mouth Daily. 180 tablet 3    apixaban (ELIQUIS) 5 MG tablet tablet Take 1 tablet by mouth 2 (Two) Times a Day. 180 tablet 1    cetirizine (zyrTEC) 10 MG tablet Take 1 tablet by mouth Daily. 90 tablet 3    colchicine 0.6 MG tablet Take 2 PO at onset of gout flare, then take 1 tablet PO daily until symptoms resolve 30 tablet 3    Emollient (cetaphil) cream Apply 1 application topically to the appropriate area as directed 2 (Two) Times a Day. 453 g 3    loratadine (CLARITIN) 10 MG tablet loratadine 10 mg oral tablet take 1 tablet (10 mg) by oral route once daily   Suspended      montelukast (Singulair) 10 MG tablet Take 1 tablet by mouth Every Night. 90 tablet 3    mupirocin (BACTROBAN) 2 % ointment Apply  topically to the appropriate area as directed 2 (Two) Times a Day. 30 g 3    vitamin B-12 (CYANOCOBALAMIN) 100 MCG tablet Vitamin B-12 oral takes 1 tablet daily   Suspended       No facility-administered medications prior to visit.       No opioid medication identified on active medication list. I have reviewed chart for other potential  high risk medication/s and harmful drug interactions in the elderly.        Aspirin is not on active medication list.  Aspirin use is not indicated based on review of current medical condition/s. Risk  "of harm outweighs potential benefits.  .    Patient Active Problem List   Diagnosis    TAMIKO treated with BiPAP    Hypersomnia    Class 3 severe obesity due to excess calories without serious comorbidity with body mass index (BMI) of 60.0 to 69.9 in adult    Complete trisomy 21 syndrome    Syncope    Sinus trouble    Pes planus    Homocysteinemia    Hidradenitis suppurativa    Foot pain, left    Fatigue    Elevated serum creatinine    DVT (deep venous thrombosis)    Disability of walking    Diabetes mellitus    Bronchitis    Athletes foot    Asthma    Arthropathy    Arthritis of right knee    Knee pain    Ankle pain    Anemia    Secondary polycythemia     Advance Care Planning   Advance Care Planning     Advance Directive is not on file.  ACP discussion was held with the patient during this visit. Patient does not have an advance directive, declines further assistance.     Objective    Vitals:    08/14/23 1008   BP: 118/70   BP Location: Left arm   Patient Position: Sitting   Pulse: 84   Temp: 98.6 øF (37 øC)   TempSrc: Oral   SpO2: 95%   Weight: (!) 161 kg (354 lb 12.8 oz)   Height: 160 cm (63\")     Estimated body mass index is 62.85 kg/mý as calculated from the following:    Height as of this encounter: 160 cm (63\").    Weight as of this encounter: 161 kg (354 lb 12.8 oz).    Class 3 Severe Obesity (BMI >=40). Obesity-related health conditions include the following: obstructive sleep apnea. Obesity is improving with lifestyle modifications. BMI is is above average; BMI management plan is completed. We discussed portion control and increasing exercise.      Does the patient have evidence of cognitive impairment? Yes    Lab Results   Component Value Date    HGBA1C 5.50 08/09/2023        HEALTH RISK ASSESSMENT    Smoking Status:  Social History     Tobacco Use   Smoking Status Never   Smokeless Tobacco Never     Alcohol Consumption:  Social History     Substance and Sexual Activity   Alcohol Use Never     Fall Risk " Screen:    JOSI Fall Risk Assessment was completed, and patient is at MODERATE risk for falls. Assessment completed on:2023    Depression Screenin/14/2023    10:17 AM   PHQ-2/PHQ-9 Depression Screening   Little Interest or Pleasure in Doing Things 0-->not at all   Feeling Down, Depressed or Hopeless 0-->not at all   PHQ-9: Brief Depression Severity Measure Score 0       Health Habits and Functional and Cognitive Screenin/14/2023    10:42 AM   Functional & Cognitive Status   Are you deaf or do you have serious difficulty hearing?  No       Age-appropriate Screening Schedule:  Refer to the list below for future screening recommendations based on patient's age, sex and/or medical conditions. Orders for these recommended tests are listed in the plan section. The patient has been provided with a written plan.    Health Maintenance   Topic Date Due    Hepatitis B (1 of 3 - 3-dose series) Never done    Pneumococcal Vaccine 0-64 (1 - PCV) Never done    DIABETIC FOOT EXAM  Never done    DIABETIC EYE EXAM  Never done    COVID-19 Vaccine (4 - Pfizer series) 2022    INFLUENZA VACCINE  10/01/2023    HEMOGLOBIN A1C  2024    URINE MICROALBUMIN  2024    ANNUAL WELLNESS VISIT  2024    TDAP/TD VACCINES (2 - Td or Tdap) 2029    HEPATITIS C SCREENING  Completed                  CMS Preventative Services Quick Reference  Risk Factors Identified During Encounter  Polypharmacy: Medication List reviewed  The above risks/problems have been discussed with the patient.  Pertinent information has been shared with the patient in the After Visit Summary.  An After Visit Summary and PPPS were made available to the patient.    Follow Up:   Next Medicare Wellness visit to be scheduled in 1 year.       Additional E&M Note during same encounter follows:  Patient has multiple medical problems which are significant and separately identifiable that require additional work above and beyond the  "Medicare Wellness Visit.      Chief Complaint  Medicare Wellness-subsequent    Subjective        HPI  Patient presents today accompanied by his mother for a Medicare annual wellness visit.  They report he is overall doing well.  He did have labs drawn prior to the appointment so we reviewed these.  He does continue to have secondary polycythemia due to obstructive sleep apnea.  His mother reports that they will be doing therapeutic phlebotomy through office of Dr. Sullivan.  They are waiting to hear about getting this scheduled.  Uric acid level is elevated at 11.2.  We did discuss a low purine diet.  I will increase the allopurinol to take 300 mg daily.  CMP shows stable renal function.  CBC shows hemoglobin at 18.8 with hematocrit of 54.6%.  Platelets and white blood cell count are normal.  His A1c is within normal range at 5.5%.  B12 level was 378.  I will send in B12 supplementation as levels were previously better.  His TSH and free T4 levels are normal.  He continues to take levothyroxine 25 mcg daily.  He does have issues with flatfeet.  He was previously being seen by podiatry.  He continues to have pain particular in the left foot and they are requesting to see podiatry again.  He continues to take Eliquis given recurrent DVT.         Objective   Vital Signs:  /70 (BP Location: Left arm, Patient Position: Sitting)   Pulse 84   Temp 98.6 øF (37 øC) (Oral)   Ht 160 cm (63\")   Wt (!) 161 kg (354 lb 12.8 oz)   SpO2 95%   BMI 62.85 kg/mý     Physical Exam   General: AAO x3, no acute distress, pleasant  HEENT: Normocephalic, atraumatic, no discharge in the eyes, no discharge from the nose, no oropharyngeal erythema or exudates, and TMs intact bilaterally with no erythema, no cervical tenderness or lymphadenopathy  Cardiovascular: Regular rate and rhythm without appreciable murmur  Respiratory: Clear to auscultation bilaterally no RRW  Gastrointestinal: Soft nontender nondistended with bowel sounds " present  extremities: No edema  Musculoskeletal: Low arch noted on the left lower extremity.  Neurologic: CN II through XII grossly intact   Psychiatric: Normal mood and affect                 Assessment and Plan   Diagnoses and all orders for this visit:    1. Medicare annual wellness visit, subsequent (Primary)    2. Down syndrome    3. Polycythemia, secondary  -     CBC & Differential; Future  -     Comprehensive Metabolic Panel; Future    4. Hypothyroidism, unspecified type    5. B12 deficiency  -     Vitamin B12; Future    6. Folate deficiency    7. Gout, unspecified cause, unspecified chronicity, unspecified site  -     Uric Acid; Future    8. Secondary polycythemia    9. Obstructive sleep apnea    10. Flat feet, bilateral  -     Ambulatory Referral to Podiatry    11. Deep vein thrombosis (DVT) of proximal vein of left lower extremity, unspecified chronicity    Other orders  -     mupirocin (BACTROBAN) 2 % ointment; Apply  topically to the appropriate area as directed 2 (Two) Times a Day.  Dispense: 30 g; Refill: 3  -     albuterol sulfate  (90 Base) MCG/ACT inhaler; Inhale 2 puffs Every 4 (Four) Hours As Needed for Wheezing or Shortness of Air.  Dispense: 8.5 g; Refill: 3  -     apixaban (ELIQUIS) 5 MG tablet tablet; Take 1 tablet by mouth 2 (Two) Times a Day.  Dispense: 180 tablet; Refill: 1  -     colchicine 0.6 MG tablet; Take 2 PO at onset of gout flare, then take 1 tablet PO daily until symptoms resolve  Dispense: 30 tablet; Refill: 3  -     Emollient (cetaphil) cream; Apply 1 application  topically to the appropriate area as directed 2 (Two) Times a Day.  Dispense: 453 g; Refill: 3  -     montelukast (Singulair) 10 MG tablet; Take 1 tablet by mouth Every Night.  Dispense: 90 tablet; Refill: 3  -     mineral oil-hydrophilic petrolatum (AQUAPHOR) ointment; Apply 1 application  topically to the appropriate area as directed Daily.  Dispense: 396 g; Refill: 1  -     loratadine (CLARITIN) 10 MG tablet;  Take 1 tablet by mouth Daily.  Dispense: 90 tablet; Refill: 3  -     vitamin B-12 (CYANOCOBALAMIN) 1000 MCG tablet; Take 1 tablet by mouth Daily.  Dispense: 90 tablet; Refill: 1  -     allopurinol (Zyloprim) 300 MG tablet; Take 1 tablet by mouth Daily.  Dispense: 90 tablet; Refill: 1    Plan as documented above.  I discussed with patient and mother seeing him back in 3 months with labs drawn prior to next appointment.  Medications have been refilled today.  They are instructed to call with any questions or concerns.         Follow Up   Return in about 3 months (around 11/14/2023) for hypothyroidism.  Patient was given instructions and counseling regarding his condition or for health maintenance advice. Please see specific information pulled into the AVS if appropriate.

## 2023-08-16 ENCOUNTER — TELEPHONE (OUTPATIENT)
Dept: ONCOLOGY | Facility: HOSPITAL | Age: 28
End: 2023-08-16

## 2023-08-16 NOTE — TELEPHONE ENCOUNTER
Caller: RAFIA NEWELL    Relationship to patient: Mother    Best call back number: [phone]    Chief complaint:  OUT OF TOWN     Type of visit: LAB & INFUSION     Requested date: CALL RAFIA TO R/S APPTS     If rescheduling, when is the original appointment: 8/23/2023 & 8/25/2023

## 2023-08-17 RX ORDER — MAGNESIUM CARB/ALUMINUM HYDROX 105-160MG
1 TABLET,CHEWABLE ORAL AS NEEDED
Qty: 368 G | Refills: 3 | Status: SHIPPED | OUTPATIENT
Start: 2023-08-17

## 2023-08-30 ENCOUNTER — LAB (OUTPATIENT)
Dept: ONCOLOGY | Facility: HOSPITAL | Age: 28
End: 2023-08-30
Payer: MEDICARE

## 2023-08-30 DIAGNOSIS — D64.9 ANEMIA, UNSPECIFIED TYPE: ICD-10-CM

## 2023-08-30 DIAGNOSIS — D75.1 SECONDARY POLYCYTHEMIA: ICD-10-CM

## 2023-08-30 LAB
BASOPHILS # BLD AUTO: 0.06 10*3/MM3 (ref 0–0.2)
BASOPHILS NFR BLD AUTO: 1.8 % (ref 0–1.5)
DEPRECATED RDW RBC AUTO: 55.8 FL (ref 37–54)
EOSINOPHIL # BLD AUTO: 0.02 10*3/MM3 (ref 0–0.4)
EOSINOPHIL NFR BLD AUTO: 0.6 % (ref 0.3–6.2)
ERYTHROCYTE [DISTWIDTH] IN BLOOD BY AUTOMATED COUNT: 17.6 % (ref 12.3–15.4)
FERRITIN SERPL-MCNC: 155.5 NG/ML (ref 30–400)
HCT VFR BLD AUTO: 57.5 % (ref 37.5–51)
HGB BLD-MCNC: 19 G/DL (ref 13–17.7)
IMM GRANULOCYTES # BLD AUTO: 0.01 10*3/MM3 (ref 0–0.05)
IMM GRANULOCYTES NFR BLD AUTO: 0.3 % (ref 0–0.5)
IRON 24H UR-MRATE: 68 MCG/DL (ref 59–158)
IRON SATN MFR SERPL: 17 % (ref 20–50)
LYMPHOCYTES # BLD AUTO: 1.12 10*3/MM3 (ref 0.7–3.1)
LYMPHOCYTES NFR BLD AUTO: 33.9 % (ref 19.6–45.3)
MCH RBC QN AUTO: 30.6 PG (ref 26.6–33)
MCHC RBC AUTO-ENTMCNC: 33 G/DL (ref 31.5–35.7)
MCV RBC AUTO: 92.6 FL (ref 79–97)
MONOCYTES # BLD AUTO: 0.4 10*3/MM3 (ref 0.1–0.9)
MONOCYTES NFR BLD AUTO: 12.1 % (ref 5–12)
NEUTROPHILS NFR BLD AUTO: 1.69 10*3/MM3 (ref 1.7–7)
NEUTROPHILS NFR BLD AUTO: 51.3 % (ref 42.7–76)
PLATELET # BLD AUTO: 210 10*3/MM3 (ref 140–450)
PMV BLD AUTO: 10.1 FL (ref 6–12)
RBC # BLD AUTO: 6.21 10*6/MM3 (ref 4.14–5.8)
TIBC SERPL-MCNC: 392 MCG/DL (ref 298–536)
TRANSFERRIN SERPL-MCNC: 263 MG/DL (ref 200–360)
WBC NRBC COR # BLD: 3.3 10*3/MM3 (ref 3.4–10.8)

## 2023-08-30 PROCEDURE — 85025 COMPLETE CBC W/AUTO DIFF WBC: CPT

## 2023-08-30 PROCEDURE — 82728 ASSAY OF FERRITIN: CPT

## 2023-08-30 PROCEDURE — 83540 ASSAY OF IRON: CPT

## 2023-08-30 PROCEDURE — 84466 ASSAY OF TRANSFERRIN: CPT

## 2023-08-30 PROCEDURE — 36415 COLL VENOUS BLD VENIPUNCTURE: CPT

## 2023-09-01 ENCOUNTER — TELEPHONE (OUTPATIENT)
Dept: FAMILY MEDICINE CLINIC | Facility: CLINIC | Age: 28
End: 2023-09-01
Payer: MEDICARE

## 2023-09-01 ENCOUNTER — HOSPITAL ENCOUNTER (OUTPATIENT)
Dept: ONCOLOGY | Facility: HOSPITAL | Age: 28
Discharge: HOME | End: 2023-09-01
Payer: MEDICARE

## 2023-09-01 VITALS — OXYGEN SATURATION: 95 % | TEMPERATURE: 97 F | RESPIRATION RATE: 20 BRPM

## 2023-09-01 DIAGNOSIS — D75.1 SECONDARY POLYCYTHEMIA: Primary | ICD-10-CM

## 2023-09-01 NOTE — TELEPHONE ENCOUNTER
Patient's mother, Sun Bernard is stating that [facility] took away the Petroleum ointment.  She is requesting a prescription for Vanicream. That is what she used to use on [place] before and it really worked.

## 2023-09-05 ENCOUNTER — OFFICE VISIT (OUTPATIENT)
Dept: FAMILY MEDICINE CLINIC | Facility: CLINIC | Age: 28
End: 2023-09-05
Payer: MEDICARE

## 2023-09-05 VITALS
SYSTOLIC BLOOD PRESSURE: 130 MMHG | BODY MASS INDEX: 55.81 KG/M2 | WEIGHT: 315 LBS | DIASTOLIC BLOOD PRESSURE: 84 MMHG | OXYGEN SATURATION: 99 % | HEIGHT: 63 IN | TEMPERATURE: 97.1 F | HEART RATE: 90 BPM

## 2023-09-05 DIAGNOSIS — E66.01 CLASS 3 SEVERE OBESITY DUE TO EXCESS CALORIES WITH BODY MASS INDEX (BMI) OF 60.0 TO 69.9 IN ADULT, UNSPECIFIED WHETHER SERIOUS COMORBIDITY PRESENT: ICD-10-CM

## 2023-09-05 DIAGNOSIS — B37.2 SKIN YEAST INFECTION: Primary | ICD-10-CM

## 2023-09-05 PROCEDURE — 1159F MED LIST DOCD IN RCRD: CPT | Performed by: NURSE PRACTITIONER

## 2023-09-05 PROCEDURE — 99213 OFFICE O/P EST LOW 20 MIN: CPT | Performed by: NURSE PRACTITIONER

## 2023-09-05 PROCEDURE — 1160F RVW MEDS BY RX/DR IN RCRD: CPT | Performed by: NURSE PRACTITIONER

## 2023-09-05 PROCEDURE — 3044F HG A1C LEVEL LT 7.0%: CPT | Performed by: NURSE PRACTITIONER

## 2023-09-05 RX ORDER — NYSTATIN 100000 [USP'U]/G
POWDER TOPICAL 3 TIMES DAILY
Qty: 60 G | Refills: 1 | Status: SHIPPED | OUTPATIENT
Start: 2023-09-05

## 2023-09-05 RX ORDER — GLY/DIMETH/PETROLAT,WHT/WATER
1 CREAM (GRAM) TOPICAL 2 TIMES DAILY
Qty: 453 G | Refills: 3 | Status: SHIPPED | OUTPATIENT
Start: 2023-09-05

## 2023-09-05 NOTE — PROGRESS NOTES
Chief Complaint  Pain (Side right )    Subjective      Mi Junie Bernard is a 28 year old male that presents to [facility] with his mother.  She reports that he has a skin rash that has had an odor and he c/o pain to the skin. This appeared about 4 days ago and is still present. She denies any previous rash similar to this.     History of Present Illness    Current Outpatient Medications   Medication Instructions    albuterol (ACCUNEB) 1.25 mg, Nebulization, Every 6 Hours PRN    albuterol sulfate  (90 Base) MCG/ACT inhaler 2 puffs, Inhalation, Every 4 Hours PRN    allopurinol (ZYLOPRIM) 300 mg, Oral, Daily    ALUM SULFATE-CA ACETATE EX Topical    apixaban (ELIQUIS) 5 mg, Oral, 2 Times Daily    budesonide (PULMICORT) 0.5 mg, Nebulization, Daily - RT    clindamycin (Cleocin-T) 1 % external solution Topical, 2 Times Daily    colchicine 0.6 MG tablet Take 2 PO at onset of gout flare, then take 1 tablet PO daily until symptoms resolve    Diclofenac Sodium (VOLTAREN) 1 % gel gel diclofenac sodium 1 % topical gel apply 2 gram to the affected area(s) by topical route 4 times per day   Suspended    docusate sodium 100 mg, Oral, 2 Times Daily PRN    Emollient (cetaphil) cream 1 application , Topical, 2 Times Daily    erythromycin (ROMYCIN) 5 MG/GM ophthalmic ointment Instill 1 cm ribbon in each eye QID    folic acid (FOLVITE) 1 mg, Oral, Daily    ipratropium (ATROVENT) 0.03 % nasal spray 2 sprays, Nasal    ketoconazole (NIZORAL) 2 % cream Topical, Daily    levothyroxine (SYNTHROID, LEVOTHROID) 25 mcg, Oral, Daily    loratadine (CLARITIN) 10 mg, Oral, Daily    mineral oil-hydrophilic petrolatum (AQUAPHOR) ointment 1 application , Topical, Daily    montelukast (SINGULAIR) 10 mg, Oral, Nightly    mupirocin (BACTROBAN) 2 % ointment Topical, 2 Times Daily    nystatin (MYCOSTATIN) 081744 UNIT/GM powder Topical, 3 Times Daily    olopatadine (PATANOL) 0.1 % ophthalmic solution No dose,  "route, or frequency recorded.    Petrolatum (Petroleum Jelly) ointment 1 application , Apply externally, As Needed    Soap & Cleansers (Cetaklenz) liquid No dose, route, or frequency recorded.    Soap & Cleansers (Gentle Skin Cleanser) liquid 1 application , Apply externally, 2 Times Daily    Urea 50 % suspension Apply topically to the affected areas TID    vitamin B-12 (CYANOCOBALAMIN) 1,000 mcg, Oral, Daily    vitamin B-6 (PYRIDOXINE) 25 mg, Oral, Every Other Day    Zinc Oxide 1 g, Topical, Daily PRN       The following portions of the patient's history were reviewed and updated as appropriate: allergies, current medications, past family history, past medical history, past social history, past surgical history, and problem list.    Objective   Vital Signs:   /84   Pulse 90   Temp 97.1 °F (36.2 °C)   Ht 160 cm (63\")   Wt (!) 162 kg (357 lb)   SpO2 99%   BMI 63.24 kg/m²     Wt Readings from Last 3 Encounters:   09/05/23 (!) 162 kg (357 lb)   08/14/23 (!) 161 kg (354 lb 12.8 oz)   07/31/23 (!) 161 kg (355 lb)     BP Readings from Last 3 Encounters:   09/05/23 130/84   08/14/23 118/70   07/31/23 125/82     Physical Exam  Vitals reviewed.   Constitutional:       Appearance: Normal appearance. He is well-developed. He is obese. He is not ill-appearing.   HENT:      Head: Normocephalic and atraumatic.   Eyes:      Conjunctiva/sclera: Conjunctivae normal.      Pupils: Pupils are equal, round, and reactive to light.   Pulmonary:      Effort: Pulmonary effort is normal.   Skin:     General: Skin is warm and dry.      Findings: Rash present.             Comments: Rash to skin fold of right lateral upper torso. Erythematous with mild moisture noted.    Neurological:      Mental Status: He is alert and oriented to person, place, and time.   Psychiatric:         Mood and Affect: Affect normal.        Result Review :  The following data was reviewed by: BERTHA Griffith on 09/05/2023:            Lab Results " (last 72 hours)       ** No results found for the last 72 hours. **             No Images in the past 120 days found..    Lab Results   Component Value Date    BILIRUBINUR Negative 09/10/2022       Procedures        Assessment and Plan   Diagnoses and all orders for this visit:    1. Skin yeast infection (Primary)  -     nystatin (MYCOSTATIN) 909942 UNIT/GM powder; Apply  topically to the appropriate area as directed 3 (Three) Times a Day.  Dispense: 60 g; Refill: 1    2. Class 3 severe obesity due to excess calories with body mass index (BMI) of 60.0 to 69.9 in adult, unspecified whether serious comorbidity present          There are no discontinued medications.       Follow Up   No follow-ups on file.  Patient was given instructions and counseling regarding his condition or for health maintenance advice. Please see specific information pulled into the AVS if appropriate.   Keep area clean and dry. Pat dry after bathing and apply nystatin powder.     Sheyla Avalos, BERTHA  09/05/23  13:43 EDT

## 2023-09-06 ENCOUNTER — LAB (OUTPATIENT)
Dept: ONCOLOGY | Facility: HOSPITAL | Age: 28
End: 2023-09-06
Payer: MEDICARE

## 2023-09-06 ENCOUNTER — HOSPITAL ENCOUNTER (OUTPATIENT)
Dept: INFUSION THERAPY | Facility: HOSPITAL | Age: 28
Discharge: HOME | End: 2023-09-06
Admitting: INTERNAL MEDICINE
Payer: MEDICARE

## 2023-09-06 VITALS
HEIGHT: 63 IN | HEART RATE: 99 BPM | OXYGEN SATURATION: 95 % | WEIGHT: 260 LBS | SYSTOLIC BLOOD PRESSURE: 99 MMHG | DIASTOLIC BLOOD PRESSURE: 73 MMHG | BODY MASS INDEX: 46.07 KG/M2 | RESPIRATION RATE: 17 BRPM

## 2023-09-06 DIAGNOSIS — D75.1 SECONDARY POLYCYTHEMIA: Primary | ICD-10-CM

## 2023-09-06 LAB
HCT VFR BLD AUTO: 57.5 % (ref 37.5–51)
HGB BLD-MCNC: 18.8 G/DL (ref 13–17.7)

## 2023-09-06 PROCEDURE — 99195 PHLEBOTOMY: CPT

## 2023-09-06 PROCEDURE — 85014 HEMATOCRIT: CPT | Performed by: INTERNAL MEDICINE

## 2023-09-06 PROCEDURE — 85018 HEMOGLOBIN: CPT | Performed by: INTERNAL MEDICINE

## 2023-09-06 PROCEDURE — 36415 COLL VENOUS BLD VENIPUNCTURE: CPT

## 2023-09-14 ENCOUNTER — LAB (OUTPATIENT)
Dept: ONCOLOGY | Facility: HOSPITAL | Age: 28
End: 2023-09-14
Payer: MEDICARE

## 2023-09-14 ENCOUNTER — OFFICE VISIT (OUTPATIENT)
Dept: ONCOLOGY | Facility: HOSPITAL | Age: 28
End: 2023-09-14
Payer: MEDICARE

## 2023-09-14 ENCOUNTER — CLINICAL SUPPORT (OUTPATIENT)
Dept: FAMILY MEDICINE CLINIC | Facility: CLINIC | Age: 28
End: 2023-09-14
Payer: MEDICARE

## 2023-09-14 VITALS
HEIGHT: 63 IN | HEART RATE: 97 BPM | RESPIRATION RATE: 18 BRPM | DIASTOLIC BLOOD PRESSURE: 78 MMHG | SYSTOLIC BLOOD PRESSURE: 119 MMHG | OXYGEN SATURATION: 96 % | BODY MASS INDEX: 55.81 KG/M2 | TEMPERATURE: 97.3 F | WEIGHT: 315 LBS

## 2023-09-14 DIAGNOSIS — D75.1 SECONDARY POLYCYTHEMIA: Primary | ICD-10-CM

## 2023-09-14 DIAGNOSIS — Z23 NEED FOR INFLUENZA VACCINATION: Primary | ICD-10-CM

## 2023-09-14 LAB
HCT VFR BLD AUTO: 56.9 % (ref 37.5–51)
HGB BLD-MCNC: 18.7 G/DL (ref 13–17.7)

## 2023-09-14 PROCEDURE — 85014 HEMATOCRIT: CPT

## 2023-09-14 PROCEDURE — 85018 HEMOGLOBIN: CPT

## 2023-09-14 PROCEDURE — 36415 COLL VENOUS BLD VENIPUNCTURE: CPT

## 2023-09-14 RX ORDER — CETIRIZINE HYDROCHLORIDE 10 MG/1
TABLET ORAL
COMMUNITY
Start: 2023-09-12

## 2023-09-14 NOTE — PROGRESS NOTES
Chief Complaint/Care Team   SECONDARY POLYCYTHEMIA,DVT    Jesus Manuel Sloan DO Loesevitz, Thomas, DO    History of Present Illness     Diagnosis: Recurrent DVT    Secondary Polycythemia (pt with TAMIKO, using machine every other night), JAK2 V617F and exon 12 mutation testing negative    H/o Down Syndrome    Current Treatment: Eliquis 5 mg p.o. twice daily (lifelong)    Previous Treatment: None    Rosaline Bernard is a 28 y.o. male who presents to [facility] for follow-up regarding history of recurrent DVT as well as secondary positive anemia from obstructive sleep apnea.  Patient also history of Down syndrome and is here with his mother who provides additional medical history.  Patient's mother reports he has been compliant with taking Eliquis.  No report of blood clots, lower extremity swelling, difficulty breathing or chest discomfort.  Patient reports overall patient is tolerating Eliquis medication well.    His mother is also attempting to have him wear his sleep apnea machine consistently.  She has not been able to follow-up with pulmonology to assess for other devices/machines that would increase his compliance with use of TAMIKO machine.  Of note, per patient's mother he states up late at night until the early morning, but her and his father has been trying to have him wear the TAMIKO mask more frequently.    Interval history: Patient here with his mother to follow-up regarding secondary polycythemia.  Patient is going to be receiving new mask for sleep apnea machine, patient with increased compliance with use of CPAP machine during the day.  No report of blood loss, melena, or blood clots.  Patient has undergone 1 session of phlebotomy and tolerated well.      Review of Systems   Constitutional:  Positive for fatigue (PT stays up all night so he feels fatigue throughout the day). Negative for appetite change, diaphoresis, fever, unexpected weight gain and unexpected  "weight loss.   HENT:  Negative for hearing loss, mouth sores, sore throat, swollen glands, trouble swallowing and voice change.    Eyes:  Negative for blurred vision.   Respiratory:  Negative for cough, shortness of breath and wheezing.    Cardiovascular:  Negative for chest pain and palpitations.   Gastrointestinal:  Negative for abdominal pain, blood in stool, constipation, diarrhea, nausea and vomiting.   Endocrine: Negative for cold intolerance and heat intolerance.   Genitourinary:  Negative for difficulty urinating, dysuria, frequency, hematuria and urinary incontinence.   Musculoskeletal:  Negative for arthralgias, back pain and myalgias.   Skin:  Negative for rash, skin lesions and wound.   Neurological:  Negative for dizziness, seizures, weakness, numbness and headache.   Hematological:  Does not bruise/bleed easily.   Psychiatric/Behavioral:  Negative for depressed mood. The patient is not nervous/anxious.    All other systems reviewed and are negative.     Oncology/Hematology History    No history exists.       Objective     Vitals:    09/14/23 1147   BP: 119/78   Pulse: 97   Resp: 18   Temp: 97.3 °F (36.3 °C)   TempSrc: Temporal   SpO2: 96%   Weight: (!) 163 kg (358 lb 14.5 oz)   Height: 160 cm (62.99\")   PainSc: 0-No pain     ECOG score: 0       BMI 61.8    PHQ-9 Total Score:         Physical Exam  Vitals reviewed.   Constitutional:       General: He is not in acute distress.     Appearance: Normal appearance. He is obese.   HENT:      Head: Normocephalic and atraumatic.   Eyes:      Extraocular Movements: Extraocular movements intact.   Pulmonary:      Effort: Pulmonary effort is normal.   Musculoskeletal:      Cervical back: Normal range of motion and neck supple.   Skin:     General: Skin is warm and dry.   Neurological:      Mental Status: He is oriented to person, place, and time.         Past Medical History     Past Medical History:   Diagnosis Date    Anemia     Asthma     Diabetes mellitus     " Disease of thyroid gland     Down's syndrome     Gout      Current Outpatient Medications on File Prior to Visit   Medication Sig Dispense Refill    albuterol (ACCUNEB) 1.25 MG/3ML nebulizer solution Take 3 mL by nebulization Every 6 (Six) Hours As Needed for Wheezing. 360 mL 3    albuterol sulfate  (90 Base) MCG/ACT inhaler Inhale 2 puffs Every 4 (Four) Hours As Needed for Wheezing or Shortness of Air. 8.5 g 3    allopurinol (Zyloprim) 300 MG tablet Take 1 tablet by mouth Daily. 90 tablet 1    ALUM SULFATE-CA ACETATE EX Apply  topically to the appropriate area as directed.      apixaban (ELIQUIS) 5 MG tablet tablet Take 1 tablet by mouth 2 (Two) Times a Day. 180 tablet 1    budesonide (PULMICORT) 0.5 MG/2ML nebulizer solution Take 2 mL by nebulization Daily. 180 mL 1    cetirizine (zyrTEC) 10 MG tablet       clindamycin (Cleocin-T) 1 % external solution Apply  topically to the appropriate area as directed 2 (Two) Times a Day. 60 mL 3    colchicine 0.6 MG tablet Take 2 PO at onset of gout flare, then take 1 tablet PO daily until symptoms resolve 30 tablet 3    Diclofenac Sodium (VOLTAREN) 1 % gel gel diclofenac sodium 1 % topical gel apply 2 gram to the affected area(s) by topical route 4 times per day   Suspended      docusate sodium 100 MG capsule Take 1 capsule by mouth 2 (Two) Times a Day As Needed (constipation). 90 each 3    Emollient (cetaphil) cream Apply 1 application  topically to the appropriate area as directed 2 (Two) Times a Day. 453 g 3    erythromycin (ROMYCIN) 5 MG/GM ophthalmic ointment Instill 1 cm ribbon in each eye QID 3.5 g 0    folic acid (FOLVITE) 1 MG tablet Take 1 tablet by mouth Daily. 90 tablet 3    ipratropium (ATROVENT) 0.03 % nasal spray 2 sprays into the nostril(s) as directed by provider.      ketoconazole (NIZORAL) 2 % cream Apply  topically to the appropriate area as directed Daily. 60 g 1    levothyroxine (SYNTHROID, LEVOTHROID) 25 MCG tablet Take 1 tablet by mouth Daily.  90 tablet 3    loratadine (CLARITIN) 10 MG tablet Take 1 tablet by mouth Daily. 90 tablet 3    mineral oil-hydrophilic petrolatum (AQUAPHOR) ointment Apply 1 application  topically to the appropriate area as directed Daily. 396 g 1    montelukast (Singulair) 10 MG tablet Take 1 tablet by mouth Every Night. 90 tablet 3    mupirocin (BACTROBAN) 2 % ointment Apply  topically to the appropriate area as directed 2 (Two) Times a Day. 30 g 3    nystatin (MYCOSTATIN) [id number] UNIT/GM powder Apply  topically to the appropriate area as directed 3 (Three) Times a Day. 60 g 1    olopatadine (PATANOL) 0.1 % ophthalmic solution       Petrolatum (Petroleum Jelly) ointment Apply 1 application  topically As Needed (prn as needed). 368 g 3    Soap & Cleansers (Cetaklenz) liquid       Soap & Cleansers (Gentle Skin Cleanser) liquid Apply 1 application  topically 2 (Two) Times a Day. 473 mL 3    Urea 50 % suspension Apply topically to the affected areas  g 3    vitamin B-12 (CYANOCOBALAMIN) 1000 MCG tablet Take 1 tablet by mouth Daily. 90 tablet 1    vitamin B-6 (PYRIDOXINE) 50 MG tablet Take 0.5 tablets by mouth Every Other Day. 23 tablet 3    Zinc Oxide 16 % ointment Apply 1 application topically to the appropriate area as directed Daily As Needed (rash). 113 g 3     No current facility-administered medications on file prior to visit.      No Known Allergies  Past Surgical History:   Procedure Laterality Date    CYST REMOVAL       Social History     Socioeconomic History    Marital status: Single   Tobacco Use    Smoking status: Never    Smokeless tobacco: Never   Vaping Use    Vaping Use: Never used   Substance and Sexual Activity    Alcohol use: Never    Drug use: Defer    Sexual activity: Defer     History reviewed. No pertinent family history.    Results     Result Review   The following data was reviewed by: Federico Sullivan MD on 04/04/2023:  Lab Results   Component Value Date    HGB 18.7 (H) 09/14/2023    HCT 56.9 (H)  09/14/2023    MCV 92.6 08/30/2023     08/30/2023    WBC 3.30 (L) 08/30/2023    NEUTROABS 1.69 (L) 08/30/2023    LYMPHSABS 1.12 08/30/2023    MONOSABS 0.40 08/30/2023    EOSABS 0.02 08/30/2023    BASOSABS 0.06 08/30/2023     Lab Results   Component Value Date    GLUCOSE 113 (H) 08/09/2023    BUN 20 08/09/2023    CREATININE 1.59 (H) 08/09/2023     08/09/2023    K 4.1 08/09/2023     08/09/2023    CO2 23.2 08/09/2023    CALCIUM 8.9 08/09/2023    PROTEINTOT 6.7 08/09/2023    ALBUMIN 3.6 08/09/2023    BILITOT 0.6 08/09/2023    ALKPHOS 79 08/09/2023    AST 25 08/09/2023    ALT 25 08/09/2023     Lab Results   Component Value Date    MG 2.2 09/10/2022    FREET4 1.11 08/09/2023    TSH 2.350 08/09/2023           No radiology results for the last day       Assessment & Plan     Diagnoses and all orders for this visit:    1. Secondary polycythemia (Primary)  -     Hemoglobin & Hematocrit, Blood; Future        Mi Junie Bernard is a 28 y.o. male who presents to [facility] for polycythemia and history of recurrent DVT.  He has PMH significant for Down's Syndrome, TAMIKO (has TAMIKO machine). Patient is compliant with his Eliquis medication.      Recurrent DVT  -continue Eliquis, lifelong    Secondary polycythemia  -due to TAMIKO, lack of improvement with use of TAMIKO machine  -pt's mother is going to schedule pt appointment with pulmonology to assess for other options regarding TAMIKO mask and recheck TAMIKO machine settings, pending new mask for sleep apnea machine  -placed orders for phlebotomy twice per month, with goal HCT less than 45,  -HCT remains elevated but decreased down to 56 point 1:09 phlebotomy session  -JAK2 V617F and exon 12 mutation testing from 11/2019 were negative    Plan for patient follow-up in 1 month with repeat hemoglobin and hematocrit    Patient verbalized understanding and agreed with plan.    Please note that portions of this note were completed with a voice  recognition program.    Electronically signed by Federico Sullivan MD, 09/14/23, 5:13 PM EDT.      Follow Up     I spent 30 minutes caring for Rosaline Zaman on this date of service. This time includes time spent by me in the following activities:preparing for the visit, reviewing tests, obtaining and/or reviewing a separately obtained history, performing a medically appropriate examination and/or evaluation , counseling and educating the patient/family/caregiver, ordering medications, tests, or procedures, documenting information in the medical record and care coordination.    This is an acute or chronic illness that poses a threat to life or bodily function. The above treatment plan involves a high risk of complications and/or mortality of patient management.    The patient was seen and examined. Work by the provider also included review and/or ordering of lab tests, review and/or ordering of radiology tests, review and/or ordering of medicine tests, discussion with other physicians or providers, independent review of data, obtaining old records, review/summation of old records, and/or other review.    I have reviewed the family history, social history, and past medical history for this patient. Previous information and data has been reviewed and updated as needed. I have reviewed and verified the chief complaint, history, and other documentation. The patient was interviewed and examined in the clinic and the chart reviewed. The previous observations, recommendations, and conclusions were reviewed including those of other providers.     The plan was discussed with the patient and/or family. The patient was given time to ask questions and these questions were answered. At the conclusion of their visit they had no additional questions or concerns and all questions were answered to their satisfaction.    Patient was given instructions and counseling regarding his condition or for health maintenance advice. Please see specific  information pulled into the AVS if appropriate.

## 2023-09-15 ENCOUNTER — HOSPITAL ENCOUNTER (OUTPATIENT)
Dept: ONCOLOGY | Facility: HOSPITAL | Age: 28
Discharge: HOME | End: 2023-09-15
Payer: MEDICARE

## 2023-09-15 VITALS
TEMPERATURE: 98.3 F | BODY MASS INDEX: 63.59 KG/M2 | SYSTOLIC BLOOD PRESSURE: 101 MMHG | HEIGHT: 63 IN | RESPIRATION RATE: 18 BRPM | HEART RATE: 63 BPM | DIASTOLIC BLOOD PRESSURE: 52 MMHG | OXYGEN SATURATION: 96 %

## 2023-09-15 DIAGNOSIS — D75.1 SECONDARY POLYCYTHEMIA: Primary | ICD-10-CM

## 2023-09-15 PROCEDURE — G0463 HOSPITAL OUTPT CLINIC VISIT: HCPCS

## 2023-09-15 NOTE — PROGRESS NOTES
Attempted therapeutic phlebotomy x1. Spoke to charge nurse. Pts mother to call hospital scheduling for appt. Pt will need ultrasound-guided phlebotomy.

## 2023-09-22 ENCOUNTER — OFFICE VISIT (OUTPATIENT)
Dept: PODIATRY | Facility: CLINIC | Age: 28
End: 2023-09-22
Payer: MEDICARE

## 2023-09-22 VITALS
TEMPERATURE: 97.4 F | OXYGEN SATURATION: 91 % | SYSTOLIC BLOOD PRESSURE: 136 MMHG | HEART RATE: 80 BPM | BODY MASS INDEX: 62.19 KG/M2 | WEIGHT: 315 LBS | DIASTOLIC BLOOD PRESSURE: 88 MMHG

## 2023-09-22 DIAGNOSIS — M79.671 FOOT PAIN, BILATERAL: ICD-10-CM

## 2023-09-22 DIAGNOSIS — Q90.9 COMPLETE TRISOMY 21 SYNDROME: ICD-10-CM

## 2023-09-22 DIAGNOSIS — E66.01 CLASS 3 SEVERE OBESITY DUE TO EXCESS CALORIES WITHOUT SERIOUS COMORBIDITY WITH BODY MASS INDEX (BMI) OF 60.0 TO 69.9 IN ADULT: ICD-10-CM

## 2023-09-22 DIAGNOSIS — M79.672 FOOT PAIN, BILATERAL: ICD-10-CM

## 2023-09-22 DIAGNOSIS — L74.4 ANHIDROSIS: ICD-10-CM

## 2023-09-22 DIAGNOSIS — M21.41 PES PLANUS OF BOTH FEET: Primary | ICD-10-CM

## 2023-09-22 DIAGNOSIS — M21.42 PES PLANUS OF BOTH FEET: Primary | ICD-10-CM

## 2023-09-22 RX ORDER — CHLORHEXIDINE GLUCONATE 0.12 MG/ML
RINSE ORAL
COMMUNITY
Start: 2023-09-15

## 2023-09-22 RX ORDER — AMMONIUM LACTATE 12 G/100G
LOTION TOPICAL 2 TIMES DAILY
Qty: 225 G | Refills: 11 | Status: SHIPPED | OUTPATIENT
Start: 2023-09-22

## 2023-09-22 RX ORDER — SODIUM FLUORIDE 5 MG/ML
PASTE, DENTIFRICE DENTAL
COMMUNITY
Start: 2023-09-15

## 2023-09-22 NOTE — PROGRESS NOTES
[facility] - PODIATRY    Today's Date: 09/22/23    Patient Name: Rosaline Bernard  MRN: [id number]  CSN: [id number]  PCP: Jesus Manuel Sloan DO, Last PCP Visit: 14 August 2023  Referring Provider: Jesus Manuel Sloan DO    SUBJECTIVE     Chief Complaint   Patient presents with    Left Foot - Follow-up, Pain         Right Foot - Follow-up     HPI: Rosaline Bernard, a 28 y.o.male, presents to clinic with his mother.  Patient is special needs.    New, Established, New Problem: Established    Location: Bilateral feet    Duration: Lifetime flatfeet    Onset: General    Stable, worsening, improving: Worsening    Aggravating factors:  Patient relates pain is aggravated by shoe gear and ambulation.      Previous Treatment: Custom made arch supports and custom made Arsenio braces.  Patient's mother states these are both over 5 years old and worn out need to be replaced.    New, Established, New Problem:  SECOND PROBLEM  Location: Bilateral feet  Duration:   Greater than 1 month  Onset: Insidious  Nature: Dry skin  Stable, worsening, improving:  Worsening  Aggravating factors:     Previous Treatment:  Slowly worsening despite using OTC skin lotion.    Patient's mother denies any fevers, chills, nausea, vomiting, shortness of breathe, nor any other constitutional signs nor symptoms where the patient.    Past Medical History:   Diagnosis Date    Anemia     Asthma     Diabetes mellitus     Disease of thyroid gland     Down's syndrome     Gout      Past Surgical History:   Procedure Laterality Date    CYST REMOVAL       History reviewed. No pertinent family history.  Social History     Socioeconomic History    Marital status: Single   Tobacco Use    Smoking status: Never    Smokeless tobacco: Never   Vaping Use    Vaping Use: Never used   Substance and Sexual Activity    Alcohol use: Never    Drug use: Defer    Sexual activity: Defer     No Known Allergies  Current Outpatient Medications   Medication Sig  Dispense Refill    albuterol (ACCUNEB) 1.25 MG/3ML nebulizer solution Take 3 mL by nebulization Every 6 (Six) Hours As Needed for Wheezing. 360 mL 3    albuterol sulfate  (90 Base) MCG/ACT inhaler Inhale 2 puffs Every 4 (Four) Hours As Needed for Wheezing or Shortness of Air. 8.5 g 3    allopurinol (Zyloprim) 300 MG tablet Take 1 tablet by mouth Daily. 90 tablet 1    ALUM SULFATE-CA ACETATE EX Apply  topically to the appropriate area as directed.      apixaban (ELIQUIS) 5 MG tablet tablet Take 1 tablet by mouth 2 (Two) Times a Day. 180 tablet 1    budesonide (PULMICORT) 0.5 MG/2ML nebulizer solution Take 2 mL by nebulization Daily. 180 mL 1    cetirizine (zyrTEC) 10 MG tablet       chlorhexidine (PERIDEX) 0.12 % solution       clindamycin (Cleocin-T) 1 % external solution Apply  topically to the appropriate area as directed 2 (Two) Times a Day. 60 mL 3    colchicine 0.6 MG tablet Take 2 PO at onset of gout flare, then take 1 tablet PO daily until symptoms resolve 30 tablet 3    Diclofenac Sodium (VOLTAREN) 1 % gel gel diclofenac sodium 1 % topical gel apply 2 gram to the affected area(s) by topical route 4 times per day   Suspended      docusate sodium 100 MG capsule Take 1 capsule by mouth 2 (Two) Times a Day As Needed (constipation). 90 each 3    Emollient (cetaphil) cream Apply 1 application  topically to the appropriate area as directed 2 (Two) Times a Day. 453 g 3    erythromycin (ROMYCIN) 5 MG/GM ophthalmic ointment Instill 1 cm ribbon in each eye QID 3.5 g 0    folic acid (FOLVITE) 1 MG tablet Take 1 tablet by mouth Daily. 90 tablet 3    ipratropium (ATROVENT) 0.03 % nasal spray 2 sprays into the nostril(s) as directed by provider.      ketoconazole (NIZORAL) 2 % cream Apply  topically to the appropriate area as directed Daily. 60 g 1    levothyroxine (SYNTHROID, LEVOTHROID) 25 MCG tablet Take 1 tablet by mouth Daily. 90 tablet 3    loratadine (CLARITIN) 10 MG tablet Take 1 tablet by mouth Daily. 90  tablet 3    mineral oil-hydrophilic petrolatum (AQUAPHOR) ointment Apply 1 application  topically to the appropriate area as directed Daily. 396 g 1    montelukast (Singulair) 10 MG tablet Take 1 tablet by mouth Every Night. 90 tablet 3    mupirocin (BACTROBAN) 2 % ointment Apply  topically to the appropriate area as directed 2 (Two) Times a Day. 30 g 3    nystatin (MYCOSTATIN) 349014 UNIT/GM powder Apply  topically to the appropriate area as directed 3 (Three) Times a Day. 60 g 1    olopatadine (PATANOL) 0.1 % ophthalmic solution       Petrolatum (Petroleum Jelly) ointment Apply 1 application  topically As Needed (prn as needed). 368 g 3    Soap & Cleansers (Cetaklenz) liquid       Soap & Cleansers (Gentle Skin Cleanser) liquid Apply 1 application  topically 2 (Two) Times a Day. 473 mL 3    Sodium Fluoride 1.1 % cream       Urea 50 % suspension Apply topically to the affected areas  g 3    vitamin B-12 (CYANOCOBALAMIN) 1000 MCG tablet Take 1 tablet by mouth Daily. 90 tablet 1    vitamin B-6 (PYRIDOXINE) 50 MG tablet Take 0.5 tablets by mouth Every Other Day. 23 tablet 3    Zinc Oxide 16 % ointment Apply 1 application topically to the appropriate area as directed Daily As Needed (rash). 113 g 3    ammonium lactate (AmLactin) 12 % lotion Apply  topically to the appropriate area as directed 2 (Two) Times a Day. 225 g 11     No current facility-administered medications for this visit.     Review of Systems   Constitutional: Negative.    Musculoskeletal:         Flat feet   All other systems reviewed and are negative.    OBJECTIVE     Vitals:    09/22/23 0852   BP: 136/88   Pulse: 80   Temp: 97.4 °F (36.3 °C)   SpO2: 91%       WBC   Date Value Ref Range Status   08/30/2023 3.30 (L) 3.40 - 10.80 10*3/mm3 Final     RBC   Date Value Ref Range Status   08/30/2023 6.21 (H) 4.14 - 5.80 10*6/mm3 Final     Hemoglobin   Date Value Ref Range Status   09/14/2023 18.7 (H) 13.0 - 17.7 g/dL Final     Hematocrit   Date Value  Ref Range Status   09/14/2023 56.9 (H) 37.5 - 51.0 % Final     MCV   Date Value Ref Range Status   08/30/2023 92.6 79.0 - 97.0 fL Final     MCH   Date Value Ref Range Status   08/30/2023 30.6 26.6 - 33.0 pg Final     MCHC   Date Value Ref Range Status   08/30/2023 33.0 31.5 - 35.7 g/dL Final     RDW   Date Value Ref Range Status   08/30/2023 17.6 (H) 12.3 - 15.4 % Final     RDW-SD   Date Value Ref Range Status   08/30/2023 55.8 (H) 37.0 - 54.0 fl Final     MPV   Date Value Ref Range Status   08/30/2023 10.1 6.0 - 12.0 fL Final     Platelets   Date Value Ref Range Status   08/30/2023 210 140 - 450 10*3/mm3 Final     Neutrophil %   Date Value Ref Range Status   08/30/2023 51.3 42.7 - 76.0 % Final     Lymphocyte %   Date Value Ref Range Status   08/30/2023 33.9 19.6 - 45.3 % Final     Monocyte %   Date Value Ref Range Status   08/30/2023 12.1 (H) 5.0 - 12.0 % Final     Eosinophil %   Date Value Ref Range Status   08/30/2023 0.6 0.3 - 6.2 % Final     Basophil %   Date Value Ref Range Status   08/30/2023 1.8 (H) 0.0 - 1.5 % Final     Immature Grans %   Date Value Ref Range Status   08/30/2023 0.3 0.0 - 0.5 % Final     Neutrophils, Absolute   Date Value Ref Range Status   08/30/2023 1.69 (L) 1.70 - 7.00 10*3/mm3 Final     Lymphocytes, Absolute   Date Value Ref Range Status   08/30/2023 1.12 0.70 - 3.10 10*3/mm3 Final     Monocytes, Absolute   Date Value Ref Range Status   08/30/2023 0.40 0.10 - 0.90 10*3/mm3 Final     Eosinophils, Absolute   Date Value Ref Range Status   08/30/2023 0.02 0.00 - 0.40 10*3/mm3 Final     Basophils, Absolute   Date Value Ref Range Status   08/30/2023 0.06 0.00 - 0.20 10*3/mm3 Final     Immature Grans, Absolute   Date Value Ref Range Status   08/30/2023 0.01 0.00 - 0.05 10*3/mm3 Final     nRBC   Date Value Ref Range Status   08/09/2023 0.0 0.0 - 0.2 /100 WBC Final         Lab Results   Component Value Date    GLUCOSE 113 (H) 08/09/2023    BUN 20 08/09/2023    CREATININE 1.59 (H) 08/09/2023     EGFR 61.0 08/09/2023    BCR 12.6 08/09/2023    K 4.1 08/09/2023    CO2 23.2 08/09/2023    CALCIUM 8.9 08/09/2023    ALBUMIN 3.6 08/09/2023    BILITOT 0.6 08/09/2023    AST 25 08/09/2023    ALT 25 08/09/2023       Patient seen in no apparent distress.      PHYSICAL EXAM:     Foot/Ankle Exam    GENERAL  Appearance:  chronically ill (Morbidly obese)  Orientation:  AAOx3  Affect:  appropriate  Gait:  unimpaired  Assistance:  independent  Right shoe gear: casual shoe  Left shoe gear: casual shoe    VASCULAR     Right Foot Vascularity   Dorsalis pedis:  1+  Posterior tibial:  1+  Skin temperature:  warm  Edema grading:  None  CFT:  < 3 seconds  Pedal hair growth:  Present  Varicosities:  mild varicosities     Left Foot Vascularity   Dorsalis pedis:  1+  Posterior tibial:  1+  Skin temperature:  warm  Edema grading:  None  CFT:  < 3 seconds  Pedal hair growth:  Present  Varicosities:  mild varicosities     NEUROLOGIC     Right Foot Neurologic   Normal sensation    Light touch sensation: normal  Vibratory sensation: normal  Hot/Cold sensation: normal  Protective Sensation using [place] Monofilament:   Sites intact: 10  Sites tested: 10     Left Foot Neurologic   Normal sensation    Light touch sensation: normal  Vibratory sensation: normal  Hot/Cold sensation:  normal  Protective Sensation using [place] Monofilament:   Sites intact: 10  Sites tested: 10    MUSCULOSKELETAL     Right Foot Musculoskeletal   Arch:  Pes planus     Left Foot Musculoskeletal   Arch:  Pes planus    MUSCLE STRENGTH     Right Foot Muscle Strength   Foot dorsiflexion:  4  Foot plantar flexion:  4  Foot inversion:  4  Foot eversion:  4     Left Foot Muscle Strength   Foot dorsiflexion:  4  Foot plantar flexion:  4  Foot inversion:  4  Foot eversion:  4    RANGE OF MOTION     Right Foot Range of Motion   Foot and ankle ROM within normal limits       Left Foot Range of Motion   Foot and ankle ROM within normal limits      DERMATOLOGIC       Right Foot Dermatologic   Skin  Positive for dryness.      Left Foot Dermatologic   Skin  Positive for dryness.     RADIOLOGY:      No results found.    ASSESSMENT/PLAN     Diagnoses and all orders for this visit:    1. Pes planus of both feet (Primary)  -     Ambulatory Referral For Orthotics    2. Class 3 severe obesity due to excess calories without serious comorbidity with body mass index (BMI) of 60.0 to 69.9 in adult    3. Complete trisomy 21 syndrome    4. Anhidrosis  -     ammonium lactate (AmLactin) 12 % lotion; Apply  topically to the appropriate area as directed 2 (Two) Times a Day.  Dispense: 225 g; Refill: 11    5. Foot pain, bilateral    Scription was written for custom made Arsenio braces bilaterally and custom made orthotics bilaterally.    Comprehensive lower extremity examination and evaluation was performed.    Discussed findings and treatment plan including risks, benefits, and treatment options with patient in detail. Patient agreed with treatment plan.    Medications and allergies reviewed.  Reviewed available lab values along with other pertinent labs.  These were discussed with the patient.    An After Visit Summary was printed and given to the patient at discharge, including (if requested) any available informative/educational handouts regarding diagnosis, treatment, or medications. All questions were answered to patient/family satisfaction. Should symptoms fail to improve or worsen they agree to call or return to clinic or to go to the Emergency Department. Discussed the importance of following up with any needed screening tests/labs/specialist appointments and any requested follow-up recommended by me today. Importance of maintaining follow-up discussed and patient accepts that missed appointments can delay diagnosis and potentially lead to worsening of conditions.    Return if symptoms worsen or fail to improve., or sooner if acute issues arise.    This document has been  electronically signed by Josias Daniel DPM on September 22, 2023 09:31 EDT

## 2023-09-26 ENCOUNTER — PROCEDURE VISIT (OUTPATIENT)
Dept: OTOLARYNGOLOGY | Facility: CLINIC | Age: 28
End: 2023-09-26
Payer: MEDICARE

## 2023-09-26 DIAGNOSIS — H90.A11 CONDUCTIVE HEARING LOSS OF RIGHT EAR WITH RESTRICTED HEARING OF LEFT EAR: Primary | ICD-10-CM

## 2023-09-26 DIAGNOSIS — H90.A12 CONDUCTIVE HEARING LOSS OF LEFT EAR WITH RESTRICTED HEARING OF RIGHT EAR: ICD-10-CM

## 2023-09-26 PROCEDURE — 92557 COMPREHENSIVE HEARING TEST: CPT | Performed by: AUDIOLOGIST

## 2023-09-26 PROCEDURE — 92567 TYMPANOMETRY: CPT | Performed by: AUDIOLOGIST

## 2023-09-26 NOTE — PROGRESS NOTES
AUDIOMETRIC EVALUATION      Name:  Rosaline Bernard  :  1995  Age:  28 y.o.  Date of Evaluation:  2023       History: Rosaline Zaman is seen today for a hearing evaluation due to history of conductive hearing loss.    Audiologic Information:  Concerns for Hearing: From mother yes  PETs: Yes left ear  Other otologic surgical history: No  Aural Pressure/Fullness: No  Otalgia: No  Otorrhea: No  Tinnitus: No  Dizziness: No  Noise Exposure: No  Family history of hearing loss: No  Head trauma requiring hospital stay: No  Chemotherapy: No  Other significant history: Down syndrome    EVALUATION:    See audiogram    RESULTS:    Otoscopic Evaluation:  Right: Mild retraction  Left: PET Visualized     Tympanometry (226 Hz):  Right: Type B, Large ECV - Consistent with Patent PET  Left: Type B large ECV    IMPRESSIONS:  Pure tone thresholds for the right ear shows mild conductive hearing loss  Pure tone thresholds for the left ear shows mild conductive hearing loss.  Mother was counseled with regard to the findings.    RECOMMENDATIONS/PLAN:  Follow-up recommendations of Dr. Amezcua  Discussed results and recommendations with patient's mom    Damian Driver M.S, [facility]  Licensed Audiologist

## 2023-10-05 ENCOUNTER — LAB (OUTPATIENT)
Dept: LAB | Facility: HOSPITAL | Age: 28
End: 2023-10-05
Payer: MEDICARE

## 2023-10-05 ENCOUNTER — HOSPITAL ENCOUNTER (OUTPATIENT)
Dept: INFUSION THERAPY | Facility: HOSPITAL | Age: 28
Discharge: HOME | End: 2023-10-05
Payer: MEDICARE

## 2023-10-05 VITALS
BODY MASS INDEX: 55.81 KG/M2 | HEART RATE: 96 BPM | WEIGHT: 315 LBS | RESPIRATION RATE: 20 BRPM | OXYGEN SATURATION: 97 % | SYSTOLIC BLOOD PRESSURE: 113 MMHG | TEMPERATURE: 98.4 F | DIASTOLIC BLOOD PRESSURE: 68 MMHG | HEIGHT: 63 IN

## 2023-10-05 DIAGNOSIS — D75.1 SECONDARY POLYCYTHEMIA: ICD-10-CM

## 2023-10-05 DIAGNOSIS — D75.1 SECONDARY POLYCYTHEMIA: Primary | ICD-10-CM

## 2023-10-05 LAB
HCT VFR BLD AUTO: 56.1 % (ref 37.5–51)
HGB BLD-MCNC: 18.5 G/DL (ref 13–17.7)

## 2023-10-05 PROCEDURE — 85018 HEMOGLOBIN: CPT | Performed by: INTERNAL MEDICINE

## 2023-10-05 PROCEDURE — 85014 HEMATOCRIT: CPT | Performed by: INTERNAL MEDICINE

## 2023-10-05 PROCEDURE — 99195 PHLEBOTOMY: CPT

## 2023-10-05 NOTE — PATIENT INSTRUCTIONS
Drink something before you leave; rehydrate well for the next 24 hours  No alcoholic beverages before you have eaten  No smoking for 20 minutes  If bleeding from site: apply pressure and raise arm  If dizziness: lie down or sit down with head between knees  If symptoms persist: contact healthcare provider immediately  Resume normal activities after one hour if you feel well enough  Avoid heavy lifting and vigorous exertion for 24 hours

## 2023-10-12 ENCOUNTER — OFFICE VISIT (OUTPATIENT)
Dept: FAMILY MEDICINE CLINIC | Facility: CLINIC | Age: 28
End: 2023-10-12
Payer: MEDICARE

## 2023-10-12 VITALS
DIASTOLIC BLOOD PRESSURE: 64 MMHG | OXYGEN SATURATION: 95 % | BODY MASS INDEX: 55.81 KG/M2 | SYSTOLIC BLOOD PRESSURE: 94 MMHG | HEART RATE: 99 BPM | HEIGHT: 63 IN | TEMPERATURE: 97.3 F | WEIGHT: 315 LBS

## 2023-10-12 DIAGNOSIS — B37.2 SKIN CANDIDIASIS: Primary | ICD-10-CM

## 2023-10-12 PROCEDURE — 99213 OFFICE O/P EST LOW 20 MIN: CPT | Performed by: FAMILY MEDICINE

## 2023-10-12 PROCEDURE — 3044F HG A1C LEVEL LT 7.0%: CPT | Performed by: FAMILY MEDICINE

## 2023-10-12 RX ORDER — NYSTATIN 100000 U/G
1 CREAM TOPICAL 2 TIMES DAILY
Qty: 60 G | Refills: 2 | Status: SHIPPED | OUTPATIENT
Start: 2023-10-12

## 2023-10-12 RX ORDER — FLUCONAZOLE 100 MG/1
100 TABLET ORAL DAILY
Qty: 14 TABLET | Refills: 0 | Status: SHIPPED | OUTPATIENT
Start: 2023-10-12

## 2023-10-12 NOTE — PROGRESS NOTES
"Chief Complaint  skin yeast infection (Skin infection clearing on the right side of torso, has moved to left torso and under abdomen)    Subjective        Mi Junie Bernard presents to [facility]  History of Present Illness  Patient presents today accompanied by his mother, Sun for an acute visit.  He has issues with yeast infection of the skin.  This occurs periodically.  He gets that below his abdomen in the groin area as well as under the breast area.  Patient's mother has run out of the nystatin powder and has been using an over-the-counter Goldbond powder.  The rash on the right breast has improved but it is still persisting on the left and it is the worst in the groin area.  She does report a strong odor as well.  Objective   Vital Signs:  BP 94/64 (BP Location: Left arm, Patient Position: Sitting, Cuff Size: Adult)   Pulse 99   Temp 97.3 øF (36.3 øC) (Oral)   Ht 160 cm (63\")   Wt (!) 158 kg (348 lb)   SpO2 95%   BMI 61.65 kg/mý   Estimated body mass index is 61.65 kg/mý as calculated from the following:    Height as of this encounter: 160 cm (63\").    Weight as of this encounter: 158 kg (348 lb).               Physical Exam   General: AAO x3, no acute distress, pleasant  HEENT: Normocephalic, atraumatic  Skin: Erythema noted with some scattered satellite lesions as well that are pinpoint and erythematous.  This is most prominent in the groin region.  Strong odor also noted.  No signs of cellulitis.  Nontender to palpation.  Cardiovascular: Regular rate and rhythm without appreciable murmur  Respiratory: Clear to auscultation bilaterally no RRW  Gastrointestinal: Soft nontender nondistended with bowel sounds present  extremities: No edema  Neurologic: CN II through XII grossly intact   Psychiatric: Normal mood and affect  Result Review :                   Assessment and Plan   Diagnoses and all orders for this visit:    1. Skin candidiasis (Primary)    Other " orders  -     fluconazole (Diflucan) 100 MG tablet; Take 1 tablet by mouth Daily.  Dispense: 14 tablet; Refill: 0  -     nystatin (MYCOSTATIN) 744597 UNIT/GM cream; Apply 1 application  topically to the appropriate area as directed 2 (Two) Times a Day.  Dispense: 60 g; Refill: 2    I discussed with patient and mother a trial of nystatin cream.  I will go ahead and send in 2 tubes for him and I have also asked for them to take Diflucan daily for the next 14 days.  He will have follow-up with myself next month.  They are encouraged to keep appointment and instructed to call with any questions or concerns.         Follow Up   No follow-ups on file.  Patient was given instructions and counseling regarding his condition or for health maintenance advice. Please see specific information pulled into the AVS if appropriate.

## 2023-10-18 ENCOUNTER — OFFICE VISIT (OUTPATIENT)
Dept: SLEEP MEDICINE | Facility: HOSPITAL | Age: 28
End: 2023-10-18
Payer: MEDICARE

## 2023-10-18 VITALS
OXYGEN SATURATION: 92 % | DIASTOLIC BLOOD PRESSURE: 78 MMHG | HEIGHT: 63 IN | WEIGHT: 315 LBS | BODY MASS INDEX: 55.81 KG/M2 | SYSTOLIC BLOOD PRESSURE: 95 MMHG | HEART RATE: 88 BPM

## 2023-10-18 DIAGNOSIS — I82.4Y9 DEEP VEIN THROMBOSIS (DVT) OF PROXIMAL LOWER EXTREMITY, UNSPECIFIED CHRONICITY, UNSPECIFIED LATERALITY: ICD-10-CM

## 2023-10-18 DIAGNOSIS — Q90.9 DOWN'S SYNDROME: ICD-10-CM

## 2023-10-18 DIAGNOSIS — D75.1 SECONDARY POLYCYTHEMIA: ICD-10-CM

## 2023-10-18 DIAGNOSIS — E66.01 CLASS 3 SEVERE OBESITY DUE TO EXCESS CALORIES WITHOUT SERIOUS COMORBIDITY WITH BODY MASS INDEX (BMI) OF 60.0 TO 69.9 IN ADULT: ICD-10-CM

## 2023-10-18 DIAGNOSIS — G47.33 OSA TREATED WITH BIPAP: Primary | ICD-10-CM

## 2023-10-18 PROCEDURE — G0463 HOSPITAL OUTPT CLINIC VISIT: HCPCS

## 2023-10-18 NOTE — PROGRESS NOTES
"  [address] [address]  [place]   [place] [address]  Phone: [phone]  Fax: [phone]      SLEEP CLINIC FOLLOW UP PROGRESS NOTE.    Rosaline Bernard  [id number]   1995  28 y.o.  male      PCP: Jesus Manuel Sloan DO      Date of visit: 10/18/2023    Chief Complaint   Patient presents with    Sleep Apnea    Obesity       HPI:  This is a 28 y.o. years old patient is here for the management of obstructive sleep apnea.  Sleep apnea is very severe in severity with a AHI of 120/hr. Patient is using positive airway pressure therapy with BiPAP 18/13  and the symptoms of sleep apnea have improved significantly on the therapy. Normally patient goes to bed at 2 AM and wakes up at 830 AM .      Medications and allergies are reviewed by me and documented in the encounter.     SOCIAL (habits pertaining to sleep medicine)  History tobacco use:No   History of alcohol use: 0 per week  Caffeine use: 2 soda    REVIEW OF SYSTEMS:   Pertaining positive symptoms are:  [place] Sleepiness Scale :Total score: 11         PHYSICAL EXAMINATION:  CONSTITUTIONAL:  Vitals:    10/18/23 1100   BP: 95/78   Pulse: 88   SpO2: 92%   Weight: (!) 157 kg (346 lb 11.2 oz)   Height: 160 cm (62.99\")    Body mass index is 61.43 kg/m².   NOSE: nasal passages are clear, No deformities noted   RESP SYSTEM: Not in any respiratory distress, no chest deformities noted,   CARDIOVASULAR: No edema noted  NEURO: Oriented x 3, gait normal,  Mood and affect appeared appropriate      Data reviewed:  The Smart card downloaded on 10/18/2023 has been reviewed independently by me for compliance and discussed the data with the patient.   Mother is going to bring the smart card for download  Device: Respironics BiPAP  DME: [employer] [facility]      ASSESSMENT AND PLAN:  Obstructive sleep apnea ( G 47.33).  Mother is going to bring the smart card for download.  Patient reports that he is using the machine more frequently now.    Obesity morbid " with BMI is Body mass index is 61.43 kg/m².. I have discuss the relationship between the weight and sleep apnea. The benefit of weight loss in reducing severity of sleep apnea was discussed. Discussed diet and exercise with the patient to achieve ideal BMI.  Down syndrome  Polycythemia  History of DVT  Return in about 1 year (around 10/18/2024) for with smart card down load. . Patient's questions were answered.    10/18/2023  Julieth Faith MD  Sleep Medicine.  Medical Director,   [facility], [place] and [place] [facility].

## 2023-11-21 ENCOUNTER — OFFICE VISIT (OUTPATIENT)
Dept: FAMILY MEDICINE CLINIC | Facility: CLINIC | Age: 28
End: 2023-11-21
Payer: MEDICARE

## 2023-11-21 VITALS
TEMPERATURE: 98.4 F | HEIGHT: 63 IN | BODY MASS INDEX: 55.81 KG/M2 | HEART RATE: 102 BPM | SYSTOLIC BLOOD PRESSURE: 126 MMHG | OXYGEN SATURATION: 95 % | WEIGHT: 315 LBS | DIASTOLIC BLOOD PRESSURE: 80 MMHG

## 2023-11-21 DIAGNOSIS — Q90.9 DOWN SYNDROME: ICD-10-CM

## 2023-11-21 DIAGNOSIS — I82.409 RECURRENT DEEP VEIN THROMBOSIS (DVT): Primary | ICD-10-CM

## 2023-11-21 DIAGNOSIS — B37.2 SKIN YEAST INFECTION: ICD-10-CM

## 2023-11-21 DIAGNOSIS — E53.8 B12 DEFICIENCY: ICD-10-CM

## 2023-11-21 DIAGNOSIS — R91.1 PULMONARY NODULE: ICD-10-CM

## 2023-11-21 DIAGNOSIS — Q79.0 MORGAGNI HERNIA: ICD-10-CM

## 2023-11-21 DIAGNOSIS — D75.1 POLYCYTHEMIA: ICD-10-CM

## 2023-11-21 DIAGNOSIS — B37.2 SKIN CANDIDIASIS: ICD-10-CM

## 2023-11-21 DIAGNOSIS — I26.99 OTHER PULMONARY EMBOLISM WITHOUT ACUTE COR PULMONALE, UNSPECIFIED CHRONICITY: ICD-10-CM

## 2023-11-21 DIAGNOSIS — M10.9 GOUT, UNSPECIFIED CAUSE, UNSPECIFIED CHRONICITY, UNSPECIFIED SITE: ICD-10-CM

## 2023-11-21 DIAGNOSIS — E03.9 HYPOTHYROIDISM, UNSPECIFIED TYPE: ICD-10-CM

## 2023-11-21 LAB
ALBUMIN SERPL-MCNC: 4.2 G/DL (ref 3.5–5.2)
ALBUMIN/GLOB SERPL: 1.3 G/DL
ALP SERPL-CCNC: 77 U/L (ref 39–117)
ALT SERPL W P-5'-P-CCNC: 24 U/L (ref 1–41)
ANION GAP SERPL CALCULATED.3IONS-SCNC: 8 MMOL/L (ref 5–15)
AST SERPL-CCNC: 30 U/L (ref 1–40)
BASOPHILS # BLD AUTO: 0.09 10*3/MM3 (ref 0–0.2)
BASOPHILS NFR BLD AUTO: 2 % (ref 0–1.5)
BILIRUB SERPL-MCNC: 0.8 MG/DL (ref 0–1.2)
BUN SERPL-MCNC: 15 MG/DL (ref 6–20)
BUN/CREAT SERPL: 9 (ref 7–25)
CALCIUM SPEC-SCNC: 9.5 MG/DL (ref 8.6–10.5)
CHLORIDE SERPL-SCNC: 99 MMOL/L (ref 98–107)
CO2 SERPL-SCNC: 31 MMOL/L (ref 22–29)
CREAT SERPL-MCNC: 1.66 MG/DL (ref 0.76–1.27)
DEPRECATED RDW RBC AUTO: 53.8 FL (ref 37–54)
EGFRCR SERPLBLD CKD-EPI 2021: 57.9 ML/MIN/1.73
EOSINOPHIL # BLD AUTO: 0.02 10*3/MM3 (ref 0–0.4)
EOSINOPHIL NFR BLD AUTO: 0.4 % (ref 0.3–6.2)
ERYTHROCYTE [DISTWIDTH] IN BLOOD BY AUTOMATED COUNT: 17.4 % (ref 12.3–15.4)
GLOBULIN UR ELPH-MCNC: 3.2 GM/DL
GLUCOSE SERPL-MCNC: 97 MG/DL (ref 65–99)
HCT VFR BLD AUTO: 55.9 % (ref 37.5–51)
HGB BLD-MCNC: 18.9 G/DL (ref 13–17.7)
IMM GRANULOCYTES # BLD AUTO: 0.01 10*3/MM3 (ref 0–0.05)
IMM GRANULOCYTES NFR BLD AUTO: 0.2 % (ref 0–0.5)
LYMPHOCYTES # BLD AUTO: 1.47 10*3/MM3 (ref 0.7–3.1)
LYMPHOCYTES NFR BLD AUTO: 32.7 % (ref 19.6–45.3)
MCH RBC QN AUTO: 30.7 PG (ref 26.6–33)
MCHC RBC AUTO-ENTMCNC: 33.8 G/DL (ref 31.5–35.7)
MCV RBC AUTO: 90.9 FL (ref 79–97)
MONOCYTES # BLD AUTO: 0.56 10*3/MM3 (ref 0.1–0.9)
MONOCYTES NFR BLD AUTO: 12.5 % (ref 5–12)
NEUTROPHILS NFR BLD AUTO: 2.34 10*3/MM3 (ref 1.7–7)
NEUTROPHILS NFR BLD AUTO: 52.2 % (ref 42.7–76)
NRBC BLD AUTO-RTO: 0 /100 WBC (ref 0–0.2)
PLATELET # BLD AUTO: 235 10*3/MM3 (ref 140–450)
PMV BLD AUTO: 10.6 FL (ref 6–12)
POTASSIUM SERPL-SCNC: 5 MMOL/L (ref 3.5–5.2)
PROT SERPL-MCNC: 7.4 G/DL (ref 6–8.5)
RBC # BLD AUTO: 6.15 10*6/MM3 (ref 4.14–5.8)
SODIUM SERPL-SCNC: 138 MMOL/L (ref 136–145)
URATE SERPL-MCNC: 7.9 MG/DL (ref 3.4–7)
VIT B12 BLD-MCNC: 519 PG/ML (ref 211–946)
WBC NRBC COR # BLD AUTO: 4.49 10*3/MM3 (ref 3.4–10.8)

## 2023-11-21 PROCEDURE — 85025 COMPLETE CBC W/AUTO DIFF WBC: CPT | Performed by: FAMILY MEDICINE

## 2023-11-21 PROCEDURE — 80053 COMPREHEN METABOLIC PANEL: CPT | Performed by: FAMILY MEDICINE

## 2023-11-21 PROCEDURE — 84550 ASSAY OF BLOOD/URIC ACID: CPT | Performed by: FAMILY MEDICINE

## 2023-11-21 PROCEDURE — 82607 VITAMIN B-12: CPT | Performed by: FAMILY MEDICINE

## 2023-11-21 RX ORDER — NYSTATIN 100000 U/G
1 CREAM TOPICAL 2 TIMES DAILY
Qty: 60 G | Refills: 2 | Status: SHIPPED | OUTPATIENT
Start: 2023-11-21

## 2023-11-21 RX ORDER — KETOCONAZOLE 20 MG/G
CREAM TOPICAL DAILY
Qty: 60 G | Refills: 1 | Status: SHIPPED | OUTPATIENT
Start: 2023-11-21

## 2023-11-21 RX ORDER — NYSTATIN 100000 [USP'U]/G
POWDER TOPICAL 3 TIMES DAILY
Qty: 60 G | Refills: 2 | Status: SHIPPED | OUTPATIENT
Start: 2023-11-21

## 2023-11-21 NOTE — PROGRESS NOTES
Chief Complaint  Skin rash  Hypothyroidism      Subjective        Mi Junie Bernard presents to [facility]  History of Present Illness  Patient presents today accompanied by his mother for follow-up for the skin rash that he was seen on 10/12/2023.  I gave him Diflucan as well as nystatin.  He is doing better now.  They have had some difficulty getting the nystatin cream.  I will send this in as well as the powder.  We also did discuss using ketoconazole cream as a substitute instead.  Overall he is doing much better as the skin looks significantly improved compared to last time.  I do not see any evidence of skin candidiasis today.  He is being seen by sleep medicine.  They did discuss that he needs to use his CPAP more regularly to get full benefit.  He is encouraged to do so.  We did discuss that he had witnessed apnea today as he does fall asleep quite easily in a chair and this are noted as well on exam.  He is still being followed by hematology/oncology, Dr. Sullivan given the previous DVTs.  He does have also secondary polycythemia which is suspected to be due to obstructive sleep apnea.  Phlebotomy orders have been placed.  His last hemoglobin was 18.5 with hematocrit 56.1.  He is due for routine labs today.  We also discussed his dosage of levothyroxine.  He continues to take 25 mcg daily.  His last TSH and free T4 levels were adequate.  He does continue to take Eliquis given previous DVTs as well as pulmonary emboli.  He did have a follow-up chest CT done on 9/23/2022 which showed that there was a small amount of residual clot burden present with chronic pulmonary embolus in the segmental branches of the lower lobe pulmonary arteries which has significantly improved since 6/10/2022.  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  I previously referred him to Dr. Davidson.  Based on our discussion in the past patient was planned to be on a 1 year recall to follow-up for this hernia.  " This was back in November 2022.  I did discuss getting a CT of the chest to reassess to see if there is any worsening.  He appears to be asymptomatic from this hernia though.  Objective   Vital Signs:  /80 (BP Location: Left arm, Patient Position: Sitting)   Pulse 102   Temp 98.4 °F (36.9 °C) (Oral)   Ht 160 cm (63\")   Wt (!) 157 kg (346 lb 11.2 oz)   SpO2 95%   BMI 61.42 kg/m²   Estimated body mass index is 61.42 kg/m² as calculated from the following:    Height as of this encounter: 160 cm (63\").    Weight as of this encounter: 157 kg (346 lb 11.2 oz).               Physical Exam  Vitals reviewed.   Constitutional:       Appearance: Normal appearance.   HENT:      Head: Normocephalic and atraumatic.      Right Ear: External ear normal.      Left Ear: External ear normal.      Mouth/Throat:      Pharynx: No oropharyngeal exudate.   Eyes:      Conjunctiva/sclera: Conjunctivae normal.   Cardiovascular:      Rate and Rhythm: Normal rate and regular rhythm.      Heart sounds: No murmur heard.     No friction rub. No gallop.   Pulmonary:      Effort: Pulmonary effort is normal.      Breath sounds: Normal breath sounds. No wheezing or rhonchi.   Abdominal:      General: Bowel sounds are normal. There is no distension.      Palpations: Abdomen is soft.      Tenderness: There is no abdominal tenderness.   Skin:     General: Skin is warm and dry.      Comments: No evidence of skin candidiasis today on exam.   Neurological:      Mental Status: He is alert and oriented to person, place, and time.      Cranial Nerves: No cranial nerve deficit.   Psychiatric:         Mood and Affect: Mood and affect normal.         Behavior: Behavior normal.         Thought Content: Thought content normal.         Judgment: Judgment normal.        Result Review :                   Assessment and Plan   Diagnoses and all orders for this visit:    1. Recurrent deep vein thrombosis (DVT) (Primary)    2. Skin yeast infection  -     " nystatin (MYCOSTATIN) 065616 UNIT/GM powder; Apply  topically to the appropriate area as directed 3 (Three) Times a Day.  Dispense: 60 g; Refill: 2    3. Polycythemia  -     CBC & Differential  -     Comprehensive Metabolic Panel    4. Morgagni hernia  -     CT Chest Without Contrast; Future    5. Other pulmonary embolism without acute cor pulmonale, unspecified chronicity    6. Pulmonary nodule  -     CT Chest Without Contrast; Future    7. Skin candidiasis    8. Down syndrome    9. Hypothyroidism, unspecified type    10. Polycythemia, secondary  -     CBC & Differential  -     Comprehensive Metabolic Panel    11. Gout, unspecified cause, unspecified chronicity, unspecified site  -     Uric Acid    12. B12 deficiency  -     Vitamin B12    Other orders  -     nystatin (MYCOSTATIN) 596637 UNIT/GM cream; Apply 1 application  topically to the appropriate area as directed 2 (Two) Times a Day.  Dispense: 60 g; Refill: 2  -     ketoconazole (NIZORAL) 2 % cream; Apply  topically to the appropriate area as directed Daily.  Dispense: 60 g; Refill: 1    Plan as documented above.  We discussed sending in refills today.  Plan for labs today.  Further recommendations to follow once results return.  We discussed continuing current management for DVT as well as PE.  We will continue with lifelong anticoagulation.  We discussed continuing management for skin candidiasis.  They are instructed to keep the areas between the folds of the skin dry to help prevent recurrence of yeast infection.  We did discuss continuing current management for hypothyroidism.  CT of the chest has been ordered to reassess the Morgagni hernia as well as to assess the pulmonary nodule that was noted back on 9/23/2022 that was a 5 mm groundglass nodule in the left upper lobe.         Follow Up   Return in about 3 months (around 2/21/2024) for hypothyroid.  Patient was given instructions and counseling regarding his condition or for health maintenance advice.  Please see specific information pulled into the AVS if appropriate.

## 2023-12-01 ENCOUNTER — OFFICE VISIT (OUTPATIENT)
Dept: ONCOLOGY | Facility: HOSPITAL | Age: 28
End: 2023-12-01
Payer: MEDICARE

## 2023-12-01 ENCOUNTER — LAB (OUTPATIENT)
Dept: ONCOLOGY | Facility: HOSPITAL | Age: 28
End: 2023-12-01
Payer: MEDICARE

## 2023-12-01 VITALS
OXYGEN SATURATION: 94 % | SYSTOLIC BLOOD PRESSURE: 115 MMHG | HEART RATE: 95 BPM | TEMPERATURE: 97.3 F | DIASTOLIC BLOOD PRESSURE: 68 MMHG | WEIGHT: 315 LBS | RESPIRATION RATE: 16 BRPM | HEIGHT: 63 IN | BODY MASS INDEX: 55.81 KG/M2

## 2023-12-01 DIAGNOSIS — D75.1 SECONDARY POLYCYTHEMIA: Primary | ICD-10-CM

## 2023-12-01 DIAGNOSIS — D75.1 SECONDARY POLYCYTHEMIA: ICD-10-CM

## 2023-12-01 LAB
HCT VFR BLD AUTO: 56.1 % (ref 37.5–51)
HGB BLD-MCNC: 18.5 G/DL (ref 13–17.7)

## 2023-12-01 PROCEDURE — 85014 HEMATOCRIT: CPT

## 2023-12-01 PROCEDURE — G0463 HOSPITAL OUTPT CLINIC VISIT: HCPCS | Performed by: INTERNAL MEDICINE

## 2023-12-01 PROCEDURE — 36415 COLL VENOUS BLD VENIPUNCTURE: CPT

## 2023-12-01 PROCEDURE — 85018 HEMOGLOBIN: CPT

## 2023-12-01 NOTE — PROGRESS NOTES
Chief Complaint/Care Team    secondary polycythemia, DVT-labs    Jesus Manuel Sloan DO Loesevitz, Thomas, DO    History of Present Illness     Diagnosis: Recurrent DVT    Secondary Polycythemia (pt with TAMIKO, using machine every other night), JAK2 V617F and exon 12 mutation testing negative    H/o Down Syndrome    Current Treatment: DVT- Eliquis 5 mg p.o. twice daily (lifelong); Phlebotomy prn for Secondary Polycythemia    Previous Treatment: None    Mi Junie Bernard is a 28 y.o. male who presents to [facility] for follow-up regarding history of recurrent DVT as well as secondary positive anemia from obstructive sleep apnea.  Patient also history of Down syndrome and is here with his mother who provides additional medical history.  Patient's mother reports he has been compliant with taking Eliquis.  No report of blood clots, lower extremity swelling, difficulty breathing or chest discomfort.  Patient reports overall patient is tolerating Eliquis medication well.    His mother is also attempting to have him wear his sleep apnea machine consistently.  She has not been able to follow-up with pulmonology to assess for other devices/machines that would increase his compliance with use of TAMIKO machine.  Of note, per patient's mother he states up late at night until the early morning, but her and his father has been trying to have him wear the TAMIKO mask more frequently.    Interval history: Patient here with his mother to follow-up regarding secondary polycythemia. Pt has not been compliant with use of CPAP machine despite his parents placing mask on him regularly at night.  No report of blood loss, melena, or blood clots.  Patient has undergone 1 session of phlebotomy since last clinic appointment and tolerated it well. Pt has gained weight since last clinic appointment.      Review of Systems   Constitutional:  Positive for fatigue (PT stays up all night so he feels fatigue  "throughout the day). Negative for appetite change, diaphoresis, fever, unexpected weight gain and unexpected weight loss.   HENT:  Negative for hearing loss, mouth sores, sore throat, swollen glands, trouble swallowing and voice change.    Eyes:  Negative for blurred vision.   Respiratory:  Negative for cough, shortness of breath and wheezing.    Cardiovascular:  Negative for chest pain and palpitations.   Gastrointestinal:  Negative for abdominal pain, blood in stool, constipation, diarrhea, nausea and vomiting.   Endocrine: Negative for cold intolerance and heat intolerance.   Genitourinary:  Negative for difficulty urinating, dysuria, frequency, hematuria and urinary incontinence.   Musculoskeletal:  Positive for arthralgias. Negative for back pain and myalgias.   Skin:  Negative for rash, skin lesions and wound.   Neurological:  Negative for dizziness, seizures, weakness, numbness and headache.   Hematological:  Does not bruise/bleed easily.   Psychiatric/Behavioral:  Negative for depressed mood. The patient is not nervous/anxious.    All other systems reviewed and are negative.       Oncology/Hematology History    No history exists.       Objective     Vitals:    12/01/23 0959   BP: 115/68   Pulse: 95   Resp: 16   Temp: 97.3 °F (36.3 °C)   TempSrc: Temporal   SpO2: 94%   Weight: (!) 163 kg (358 lb 4 oz)   Height: 160 cm (62.99\")   PainSc:   2   PainLoc: Foot  Comment: right foot       ECOG score: 0       BMI 61.8    PHQ-9 Total Score:         Physical Exam  Vitals reviewed. Exam conducted with a chaperone present.   Constitutional:       General: He is not in acute distress.     Appearance: Normal appearance. He is obese.   HENT:      Head: Normocephalic and atraumatic.   Eyes:      Extraocular Movements: Extraocular movements intact.   Pulmonary:      Effort: Pulmonary effort is normal.   Musculoskeletal:      Cervical back: Normal range of motion and neck supple.   Skin:     General: Skin is warm and dry. "   Neurological:      Mental Status: He is oriented to person, place, and time.           Past Medical History     Past Medical History:   Diagnosis Date    Anemia     Asthma     Diabetes mellitus     Disease of thyroid gland     Down's syndrome     Gout      Current Outpatient Medications on File Prior to Visit   Medication Sig Dispense Refill    albuterol (ACCUNEB) 1.25 MG/3ML nebulizer solution Take 3 mL by nebulization Every 6 (Six) Hours As Needed for Wheezing. 360 mL 3    albuterol sulfate  (90 Base) MCG/ACT inhaler Inhale 2 puffs Every 4 (Four) Hours As Needed for Wheezing or Shortness of Air. 8.5 g 3    allopurinol (Zyloprim) 300 MG tablet Take 1 tablet by mouth Daily. 90 tablet 1    ALUM SULFATE-CA ACETATE EX Apply  topically to the appropriate area as directed.      ammonium lactate (AmLactin) 12 % lotion Apply  topically to the appropriate area as directed 2 (Two) Times a Day. 225 g 11    apixaban (ELIQUIS) 5 MG tablet tablet Take 1 tablet by mouth 2 (Two) Times a Day. 180 tablet 1    budesonide (PULMICORT) 0.5 MG/2ML nebulizer solution Take 2 mL by nebulization Daily. 180 mL 1    cetirizine (zyrTEC) 10 MG tablet       chlorhexidine (PERIDEX) 0.12 % solution       clindamycin (Cleocin-T) 1 % external solution Apply  topically to the appropriate area as directed 2 (Two) Times a Day. 60 mL 3    colchicine 0.6 MG tablet Take 2 PO at onset of gout flare, then take 1 tablet PO daily until symptoms resolve 30 tablet 3    Diclofenac Sodium (VOLTAREN) 1 % gel gel diclofenac sodium 1 % topical gel apply 2 gram to the affected area(s) by topical route 4 times per day   Suspended      docusate sodium 100 MG capsule Take 1 capsule by mouth 2 (Two) Times a Day As Needed (constipation). 90 each 3    Emollient (cetaphil) cream Apply 1 application  topically to the appropriate area as directed 2 (Two) Times a Day. 453 g 3    erythromycin (ROMYCIN) 5 MG/GM ophthalmic ointment Instill 1 cm ribbon in each eye QID 3.5  g 0    fluconazole (Diflucan) 100 MG tablet Take 1 tablet by mouth Daily. 14 tablet 0    folic acid (FOLVITE) 1 MG tablet Take 1 tablet by mouth Daily. 90 tablet 3    ipratropium (ATROVENT) 0.03 % nasal spray 2 sprays into the nostril(s) as directed by provider.      ketoconazole (NIZORAL) 2 % cream Apply  topically to the appropriate area as directed Daily. 60 g 1    levothyroxine (SYNTHROID, LEVOTHROID) 25 MCG tablet Take 1 tablet by mouth Daily. 90 tablet 3    loratadine (CLARITIN) 10 MG tablet Take 1 tablet by mouth Daily. 90 tablet 3    mineral oil-hydrophilic petrolatum (AQUAPHOR) ointment Apply 1 application  topically to the appropriate area as directed Daily. 396 g 1    montelukast (Singulair) 10 MG tablet Take 1 tablet by mouth Every Night. 90 tablet 3    mupirocin (BACTROBAN) 2 % ointment Apply  topically to the appropriate area as directed 2 (Two) Times a Day. 30 g 3    nystatin (MYCOSTATIN) 320240 UNIT/GM cream Apply 1 application  topically to the appropriate area as directed 2 (Two) Times a Day. 60 g 2    nystatin (MYCOSTATIN) 274467 UNIT/GM powder Apply  topically to the appropriate area as directed 3 (Three) Times a Day. 60 g 2    olopatadine (PATANOL) 0.1 % ophthalmic solution       Petrolatum (Petroleum Jelly) ointment Apply 1 application  topically As Needed (prn as needed). 368 g 3    Soap & Cleansers (Cetaklenz) liquid       Soap & Cleansers (Gentle Skin Cleanser) liquid Apply 1 application  topically 2 (Two) Times a Day. 473 mL 3    Sodium Fluoride 1.1 % cream       Urea 50 % suspension Apply topically to the affected areas  g 3    vitamin B-12 (CYANOCOBALAMIN) 1000 MCG tablet Take 1 tablet by mouth Daily. 90 tablet 1    vitamin B-6 (PYRIDOXINE) 50 MG tablet Take 0.5 tablets by mouth Every Other Day. 23 tablet 3    Zinc Oxide 16 % ointment Apply 1 application topically to the appropriate area as directed Daily As Needed (rash). 113 g 3     No current facility-administered medications on  file prior to visit.      No Known Allergies  Past Surgical History:   Procedure Laterality Date    CYST REMOVAL       Social History     Socioeconomic History    Marital status: Single   Tobacco Use    Smoking status: Never    Smokeless tobacco: Never   Vaping Use    Vaping Use: Never used   Substance and Sexual Activity    Alcohol use: Never    Drug use: Defer    Sexual activity: Defer     History reviewed. No pertinent family history.    Results     Result Review   The following data was reviewed by: Federico Sullivan MD on 04/04/2023:  Lab Results   Component Value Date    HGB 18.5 (H) 12/01/2023    HCT 56.1 (H) 12/01/2023    MCV 90.9 11/21/2023     11/21/2023    WBC 4.49 11/21/2023    NEUTROABS 2.34 11/21/2023    LYMPHSABS 1.47 11/21/2023    MONOSABS 0.56 11/21/2023    EOSABS 0.02 11/21/2023    BASOSABS 0.09 11/21/2023     Lab Results   Component Value Date    GLUCOSE 97 11/21/2023    BUN 15 11/21/2023    CREATININE 1.66 (H) 11/21/2023     11/21/2023    K 5.0 11/21/2023    CL 99 11/21/2023    CO2 31.0 (H) 11/21/2023    CALCIUM 9.5 11/21/2023    PROTEINTOT 7.4 11/21/2023    ALBUMIN 4.2 11/21/2023    BILITOT 0.8 11/21/2023    ALKPHOS 77 11/21/2023    AST 30 11/21/2023    ALT 24 11/21/2023     Lab Results   Component Value Date    MG 2.2 09/10/2022    FREET4 1.11 08/09/2023    TSH 2.350 08/09/2023           No radiology results for the last day       Assessment & Plan     Diagnoses and all orders for this visit:    1. Secondary polycythemia (Primary)  -     Ambulatory Referral to [facility] For Infusion Treatment          Rosaline Bernard is a 28 y.o. male who presents to [facility] for polycythemia and history of recurrent DVT.  He has PMH significant for Down's Syndrome, TAMIKO (has TAMIKO machine). Patient is compliant with his Eliquis medication.      Recurrent DVT  -continue Eliquis, lifelong  --Pt due to undergo repeat CT chest (ordered by his PCP) to monitor response  to anticoagulant, recommend pt follow up with me after this scan.      Secondary polycythemia  -due to ATMIKO, lack of improvement with use of TAMIKO machine  -pt's mother is going to schedule pt appointment with pulmonology to assess for other options regarding TAMIKO mask and recheck TAMIKO machine settings  -placed orders for phlebotomy twice per month, with goal HCT less than 45, emphasized importance of pt receiving this consistently.  -HCT remains elevated   -recommended increased compliance with CPAP machine and recommended weight loss  -JAK2 V617F and exon 12 mutation testing from 11/2019 were negative  -also counseled importance of weight loss and encouraged pt to increase his physical activity      Plan for patient follow-up in 1 month with repeat CBC and with CT chest    Patient verbalized understanding and agreed with plan.    Please note that portions of this note were completed with a voice recognition program.      Electronically signed by Federico Sullivan MD, 12/01/23, 10:45 AM EST.        Follow Up     I spent 30 minutes caring for Rosaline Zaman on this date of service. This time includes time spent by me in the following activities:preparing for the visit, reviewing tests, obtaining and/or reviewing a separately obtained history, performing a medically appropriate examination and/or evaluation , counseling and educating the patient/family/caregiver, ordering medications, tests, or procedures, documenting information in the medical record and care coordination.    This is an acute or chronic illness that poses a threat to life or bodily function. The above treatment plan involves a high risk of complications and/or mortality of patient management.    The patient was seen and examined. Work by the provider also included review and/or ordering of lab tests, review and/or ordering of radiology tests, review and/or ordering of medicine tests, discussion with other physicians or providers, independent review of data,  obtaining old records, review/summation of old records, and/or other review.    I have reviewed the family history, social history, and past medical history for this patient. Previous information and data has been reviewed and updated as needed. I have reviewed and verified the chief complaint, history, and other documentation. The patient was interviewed and examined in the clinic and the chart reviewed. The previous observations, recommendations, and conclusions were reviewed including those of other providers.     The plan was discussed with the patient and/or family. The patient was given time to ask questions and these questions were answered. At the conclusion of their visit they had no additional questions or concerns and all questions were answered to their satisfaction.    Patient was given instructions and counseling regarding his condition or for health maintenance advice. Please see specific information pulled into the AVS if appropriate.

## 2023-12-05 ENCOUNTER — HOSPITAL ENCOUNTER (OUTPATIENT)
Dept: INFUSION THERAPY | Facility: HOSPITAL | Age: 28
Discharge: HOME | End: 2023-12-05
Admitting: INTERNAL MEDICINE
Payer: MEDICARE

## 2023-12-05 VITALS
SYSTOLIC BLOOD PRESSURE: 112 MMHG | RESPIRATION RATE: 22 BRPM | HEART RATE: 104 BPM | DIASTOLIC BLOOD PRESSURE: 89 MMHG | TEMPERATURE: 98.7 F | OXYGEN SATURATION: 96 %

## 2023-12-05 DIAGNOSIS — D75.1 SECONDARY POLYCYTHEMIA: Primary | ICD-10-CM

## 2023-12-05 LAB
HCT VFR BLD AUTO: 55.4 % (ref 37.5–51)
HGB BLD-MCNC: 18.5 G/DL (ref 13–17.7)

## 2023-12-05 PROCEDURE — 85018 HEMOGLOBIN: CPT

## 2023-12-05 PROCEDURE — G0463 HOSPITAL OUTPT CLINIC VISIT: HCPCS

## 2023-12-05 PROCEDURE — 36415 COLL VENOUS BLD VENIPUNCTURE: CPT

## 2023-12-05 PROCEDURE — 85014 HEMATOCRIT: CPT

## 2023-12-05 NOTE — CODE DOCUMENTATION
Patient has had heart rate from 106-122 since arrival. I have rechecked on several occasions and it is staying tachy. Doctors office closed. Advised mother to go to ER to get patient checked and to let his primary doctor know.

## 2023-12-06 ENCOUNTER — TELEPHONE (OUTPATIENT)
Dept: FAMILY MEDICINE CLINIC | Facility: CLINIC | Age: 28
End: 2023-12-06
Payer: MEDICARE

## 2023-12-07 ENCOUNTER — HOSPITAL ENCOUNTER (OUTPATIENT)
Dept: CT IMAGING | Facility: HOSPITAL | Age: 28
Discharge: HOME | End: 2023-12-07
Admitting: FAMILY MEDICINE
Payer: MEDICARE

## 2023-12-07 DIAGNOSIS — Q79.0 MORGAGNI HERNIA: ICD-10-CM

## 2023-12-07 DIAGNOSIS — R93.89 ABNORMAL CHEST CT: Primary | ICD-10-CM

## 2023-12-07 DIAGNOSIS — R91.1 PULMONARY NODULE: ICD-10-CM

## 2023-12-07 PROCEDURE — 71250 CT THORAX DX C-: CPT

## 2023-12-13 ENCOUNTER — OFFICE VISIT (OUTPATIENT)
Dept: FAMILY MEDICINE CLINIC | Facility: CLINIC | Age: 28
End: 2023-12-13
Payer: MEDICARE

## 2023-12-13 VITALS
HEART RATE: 94 BPM | DIASTOLIC BLOOD PRESSURE: 80 MMHG | TEMPERATURE: 98.2 F | SYSTOLIC BLOOD PRESSURE: 126 MMHG | OXYGEN SATURATION: 98 % | HEIGHT: 63 IN | WEIGHT: 315 LBS | BODY MASS INDEX: 55.81 KG/M2

## 2023-12-13 DIAGNOSIS — R00.0 TACHYCARDIA: Primary | ICD-10-CM

## 2023-12-13 DIAGNOSIS — R93.89 ABNORMAL CHEST CT: ICD-10-CM

## 2023-12-13 DIAGNOSIS — L73.2 HIDRADENITIS SUPPURATIVA: ICD-10-CM

## 2023-12-13 DIAGNOSIS — R05.9 COUGH, UNSPECIFIED TYPE: ICD-10-CM

## 2023-12-13 DIAGNOSIS — R91.1 PULMONARY NODULE: ICD-10-CM

## 2023-12-13 DIAGNOSIS — L89.322 PRESSURE INJURY OF LEFT BUTTOCK, STAGE 2: ICD-10-CM

## 2023-12-13 DIAGNOSIS — L03.317 CELLULITIS OF BUTTOCK: ICD-10-CM

## 2023-12-13 DIAGNOSIS — Q79.0 MORGAGNI HERNIA: ICD-10-CM

## 2023-12-13 LAB
EXPIRATION DATE: NORMAL
FLUAV AG UPPER RESP QL IA.RAPID: NOT DETECTED
FLUBV AG UPPER RESP QL IA.RAPID: NOT DETECTED
INTERNAL CONTROL: NORMAL
Lab: NORMAL
SARS-COV-2 AG UPPER RESP QL IA.RAPID: NOT DETECTED

## 2023-12-13 PROCEDURE — 87205 SMEAR GRAM STAIN: CPT | Performed by: FAMILY MEDICINE

## 2023-12-13 PROCEDURE — 3044F HG A1C LEVEL LT 7.0%: CPT | Performed by: FAMILY MEDICINE

## 2023-12-13 PROCEDURE — 87428 SARSCOV & INF VIR A&B AG IA: CPT | Performed by: FAMILY MEDICINE

## 2023-12-13 PROCEDURE — 99214 OFFICE O/P EST MOD 30 MIN: CPT | Performed by: FAMILY MEDICINE

## 2023-12-13 PROCEDURE — 93000 ELECTROCARDIOGRAM COMPLETE: CPT | Performed by: FAMILY MEDICINE

## 2023-12-13 PROCEDURE — 87070 CULTURE OTHR SPECIMN AEROBIC: CPT | Performed by: FAMILY MEDICINE

## 2023-12-13 RX ORDER — CEPHALEXIN 500 MG/1
500 CAPSULE ORAL 4 TIMES DAILY
Qty: 28 CAPSULE | Refills: 0 | Status: SHIPPED | OUTPATIENT
Start: 2023-12-13 | End: 2023-12-20

## 2023-12-13 NOTE — PROGRESS NOTES
Chief Complaint  Rapid heart rate  Spot on bottom      Subjective        Mi Junie Bernard presents to [facility]  History of Present Illness  Patient presents today to discuss with his parents arrival heart rate.  He was seen for phlebotomy for secondary polycythemia on 12/5/2023.  Heart rate was over 100 so they did not do the phlebotomy.  Reviewing the record he had a heart rate between 106- 122.  We discussed going to the ER to be checked out and to let primary care know.  Patient is in office today for evaluation.  He denies any symptoms of tachycardia and denies any chest pain or shortness of breath.  His heart rate today on intake was 94 bpm.  An EKG showed 87 bpm.  His EKG did not show any acute ST or T wave changes.  EKG showed normal sinus rhythm with no ectopy noted.  Patient was asymptomatic and has continued to remain asymptomatic in this regard.  He is not reporting any palpitations.  I do not recommend any further evaluation at this time as I suspect this may be due to deconditioning.  At we did discuss his chest CT that was completed on 12/7/2023.  He had interval resolution of groundglass nodules present on the prior study but there was a new 6 mm nodule in each upper lobe and the findings are compatible with a waxing and waning infectious/inflammatory process.  I did discuss with them today referral to see pulmonology.  Referral had previously been placed on 12/7/2023 to see pulmonology in this regard.  He appears to be asymptomatic from this other than having a cough.  This cough has been chronic.  This may also be contributing to his tachycardia.  He also has a large Morgagni hernia that he was supposed to follow-up with Dr. Davidson.  We did discuss placing a referral for this.  He appears to be asymptomatic from that standpoint.  He has a spot on the left buttocks that was noted yesterday.  He does have hidradenitis and is using clindamycin.  It does cause some  "pain.  They are interested in further evaluation in this regard today.  Objective   Vital Signs:  /80   Pulse 94   Temp 98.2 °F (36.8 °C)   Ht 160 cm (63\")   Wt (!) 162 kg (356 lb 3.2 oz)   SpO2 98%   BMI 63.10 kg/m²   Estimated body mass index is 63.1 kg/m² as calculated from the following:    Height as of this encounter: 160 cm (63\").    Weight as of this encounter: 162 kg (356 lb 3.2 oz).               Physical Exam  Vitals reviewed.   Constitutional:       Appearance: Normal appearance.   HENT:      Head: Normocephalic and atraumatic.      Right Ear: External ear normal.      Left Ear: External ear normal.      Mouth/Throat:      Pharynx: No oropharyngeal exudate.   Eyes:      Conjunctiva/sclera: Conjunctivae normal.   Cardiovascular:      Rate and Rhythm: Normal rate and regular rhythm.      Heart sounds: No murmur heard.     No friction rub. No gallop.   Pulmonary:      Effort: Pulmonary effort is normal.      Breath sounds: Normal breath sounds. No wheezing or rhonchi.   Abdominal:      General: Bowel sounds are normal. There is no distension.      Palpations: Abdomen is soft.      Tenderness: There is no abdominal tenderness.   Skin:     Comments: Small erythematous lesion noted at the base of the left buttocks.  I suspect that this is most likely a stage II pressure ulcer but the differential also includes hidradenitis.  I do not appreciate a subcutaneous nodule.  I am unable to express any fluid.  A culture swab was done today.   Neurological:      Mental Status: He is alert and oriented to person, place, and time.      Cranial Nerves: No cranial nerve deficit.   Psychiatric:         Mood and Affect: Mood and affect normal.         Behavior: Behavior normal.         Thought Content: Thought content normal.         Judgment: Judgment normal.        Result Review :              ECG 12 Lead    Date/Time: 12/13/2023 11:03 AM  Performed by: Jesus Manuel Sloan DO    Authorized by: Jesus Manuel Sloan, " DO  Comparison: compared with previous ECG from 9/10/2022  Similar to previous ECG  Rhythm: sinus rhythm  Rate: normal  ST Segments: ST segments normal  T Waves: T waves normal  QRS axis: normal    Clinical impression: normal ECG            Assessment and Plan   Diagnoses and all orders for this visit:    1. Tachycardia (Primary)  -     ECG 12 Lead    2. Hidradenitis suppurativa  -     Wound Culture - Wound, Buttock, Left; Future  -     Wound Culture - Wound, Buttock, Left    3. Cellulitis of buttock  -     Wound Culture - Wound, Buttock, Left; Future  -     Wound Culture - Wound, Buttock, Left    4. Cough, unspecified type  -     POCT SARS-CoV-2 Antigen ELBA + Flu    5. Morgagni hernia  -     Ambulatory Referral to General Surgery    6. Pulmonary nodule    7. Abnormal chest CT    8. Pressure injury of left buttock, stage 2    Other orders  -     cephalexin (Keflex) 500 MG capsule; Take 1 capsule by mouth 4 (Four) Times a Day for 7 days.  Dispense: 28 capsule; Refill: 0    I discussed with patient and mother plan to monitor the tachycardia at this time.  I suspect this is due to deconditioning.  He does need to see pulmonology in regards to his abnormal chest CT showing a possible infectious/inflammatory process.  We did discuss getting a wound culture for his lesion on the left buttocks.  I suspect this is a pressure ulcer.  We did discuss using Silvadene twice daily in addition to taking Keflex.  Plan to reassess at his follow-up appointment.         Follow Up   No follow-ups on file.  Patient was given instructions and counseling regarding his condition or for health maintenance advice. Please see specific information pulled into the AVS if appropriate.

## 2023-12-15 LAB
BACTERIA SPEC AEROBE CULT: NORMAL
GRAM STN SPEC: NORMAL
GRAM STN SPEC: NORMAL

## 2023-12-27 ENCOUNTER — OFFICE VISIT (OUTPATIENT)
Dept: PULMONOLOGY | Facility: CLINIC | Age: 28
End: 2023-12-27
Payer: MEDICARE

## 2023-12-27 VITALS
SYSTOLIC BLOOD PRESSURE: 124 MMHG | OXYGEN SATURATION: 96 % | WEIGHT: 315 LBS | TEMPERATURE: 97.7 F | HEIGHT: 63 IN | DIASTOLIC BLOOD PRESSURE: 74 MMHG | RESPIRATION RATE: 18 BRPM | BODY MASS INDEX: 55.81 KG/M2 | HEART RATE: 78 BPM

## 2023-12-27 DIAGNOSIS — J45.909 ASTHMA, UNSPECIFIED ASTHMA SEVERITY, UNSPECIFIED WHETHER COMPLICATED, UNSPECIFIED WHETHER PERSISTENT: ICD-10-CM

## 2023-12-27 DIAGNOSIS — R91.8 MULTIPLE PULMONARY NODULES: Primary | ICD-10-CM

## 2023-12-27 DIAGNOSIS — I82.4Y9 DEEP VEIN THROMBOSIS (DVT) OF PROXIMAL LOWER EXTREMITY, UNSPECIFIED CHRONICITY, UNSPECIFIED LATERALITY: ICD-10-CM

## 2023-12-27 NOTE — PROGRESS NOTES
Primary Care Provider  Jesus Manuel Sloan DO   Referring Provider  Jesus Manuel Sloan DO      Patient Complaint  No chief complaint on file.      Subjective          Rosaline Bernard presents to [facility]      History of Presenting Illness  Rosaline Bernard is a 28 y.o. male with history of obesity, Down syndrome, history of DVT, here as new patient for abnormal chest CT findings.    Patient had a chest CT done 12/7 23 which showed interval resolution of groundglass nodules compared to previous study.  However, new 6 mm nodules reported.    Patient is with his mom today.  Much history obtained by patient and mother.  Patient is a never smoker.  There is no family history of lung cancer that they know of.  No B symptoms including abnormal weight loss, night sweats, hemoptysis, or lymph nodes.  She does report that patient has had a chronic cough which has been worked up by his PCP.  He may have been diagnosed with childhood asthma but she is unsure.  He has minimal sputum production. I have personally reviewed patient's past family, social, medical and surgical histories and agree with their findings.      Review of Systems  Constitutional:  No fever. No chills. No weakness.  Eyes: No pain, erythema, or discharge. No blurring of vision.  ENT:  No sore throat, URI symptoms.   Cardiovascular:  No chest pain. No palpitations. No lower extremity edema.  Respiratory:  No shortness of breath, cough, pleuritic chest pain. No hemoptysis. No dyspnea.   GI:  Normal appetite. No nausea, vomiting, diarrhea. No pain. No melena.  Musculoskeletal:  No arthralgias or myalgias.  Neurologic:  No headache. No weakness.    No family history on file.     Social History     Socioeconomic History    Marital status: Single   Tobacco Use    Smoking status: Never    Smokeless tobacco: Never   Vaping Use    Vaping Use: Never used   Substance and Sexual Activity    Alcohol use: Never     Drug use: Defer    Sexual activity: Defer        Past Medical History:   Diagnosis Date    Anemia     Asthma     Diabetes mellitus     Disease of thyroid gland     Down's syndrome     Gout         Immunization History   Administered Date(s) Administered    31-influenza Vac Quardvalent Preservativ 09/23/2016    COVID-19 (PFIZER) Purple Cap Monovalent 03/02/2021, 03/23/2021, 11/18/2021    Fluzone (or Fluarix & Flulaval for VFC) >6mos 10/04/2021, 09/21/2022, 09/14/2023    Fluzone Quad >6mos (Multi-dose) 11/06/2019    Influenza Injectable Mdck Pf Quad 09/21/2022    Influenza, Unspecified 09/23/2016, 09/21/2020    Tdap 03/28/2019       No Known Allergies       Current Outpatient Medications:     albuterol (ACCUNEB) 1.25 MG/3ML nebulizer solution, Take 3 mL by nebulization Every 6 (Six) Hours As Needed for Wheezing., Disp: 360 mL, Rfl: 3    albuterol sulfate  (90 Base) MCG/ACT inhaler, Inhale 2 puffs Every 4 (Four) Hours As Needed for Wheezing or Shortness of Air., Disp: 8.5 g, Rfl: 3    allopurinol (Zyloprim) 300 MG tablet, Take 1 tablet by mouth Daily., Disp: 90 tablet, Rfl: 1    ALUM SULFATE-CA ACETATE EX, Apply  topically to the appropriate area as directed., Disp: , Rfl:     ammonium lactate (AmLactin) 12 % lotion, Apply  topically to the appropriate area as directed 2 (Two) Times a Day., Disp: 225 g, Rfl: 11    apixaban (ELIQUIS) 5 MG tablet tablet, Take 1 tablet by mouth 2 (Two) Times a Day., Disp: 180 tablet, Rfl: 1    budesonide (PULMICORT) 0.5 MG/2ML nebulizer solution, Take 2 mL by nebulization Daily., Disp: 180 mL, Rfl: 1    cetirizine (zyrTEC) 10 MG tablet, , Disp: , Rfl:     chlorhexidine (PERIDEX) 0.12 % solution, , Disp: , Rfl:     clindamycin (Cleocin-T) 1 % external solution, Apply  topically to the appropriate area as directed 2 (Two) Times a Day., Disp: 60 mL, Rfl: 3    colchicine 0.6 MG tablet, Take 2 PO at onset of gout flare, then take 1 tablet PO daily until symptoms resolve, Disp: 30  tablet, Rfl: 3    Diclofenac Sodium (VOLTAREN) 1 % gel gel, diclofenac sodium 1 % topical gel apply 2 gram to the affected area(s) by topical route 4 times per day   Suspended, Disp: , Rfl:     docusate sodium 100 MG capsule, Take 1 capsule by mouth 2 (Two) Times a Day As Needed (constipation)., Disp: 90 each, Rfl: 3    Emollient (cetaphil) cream, Apply 1 application  topically to the appropriate area as directed 2 (Two) Times a Day., Disp: 453 g, Rfl: 3    erythromycin (ROMYCIN) 5 MG/GM ophthalmic ointment, Instill 1 cm ribbon in each eye QID, Disp: 3.5 g, Rfl: 0    fluconazole (Diflucan) 100 MG tablet, Take 1 tablet by mouth Daily., Disp: 14 tablet, Rfl: 0    folic acid (FOLVITE) 1 MG tablet, Take 1 tablet by mouth Daily., Disp: 90 tablet, Rfl: 3    ipratropium (ATROVENT) 0.03 % nasal spray, 2 sprays into the nostril(s) as directed by provider., Disp: , Rfl:     ketoconazole (NIZORAL) 2 % cream, Apply  topically to the appropriate area as directed Daily., Disp: 60 g, Rfl: 1    levothyroxine (SYNTHROID, LEVOTHROID) 25 MCG tablet, Take 1 tablet by mouth Daily., Disp: 90 tablet, Rfl: 3    loratadine (CLARITIN) 10 MG tablet, Take 1 tablet by mouth Daily., Disp: 90 tablet, Rfl: 3    mineral oil-hydrophilic petrolatum (AQUAPHOR) ointment, Apply 1 application  topically to the appropriate area as directed Daily. (Patient not taking: Reported on 12/13/2023), Disp: 396 g, Rfl: 1    montelukast (Singulair) 10 MG tablet, Take 1 tablet by mouth Every Night., Disp: 90 tablet, Rfl: 3    mupirocin (BACTROBAN) 2 % ointment, Apply  topically to the appropriate area as directed 2 (Two) Times a Day., Disp: 30 g, Rfl: 3    nystatin (MYCOSTATIN) 963395 UNIT/GM cream, Apply 1 application  topically to the appropriate area as directed 2 (Two) Times a Day., Disp: 60 g, Rfl: 2    nystatin (MYCOSTATIN) 207826 UNIT/GM powder, Apply  topically to the appropriate area as directed 3 (Three) Times a Day., Disp: 60 g, Rfl: 2    olopatadine  (PATANOL) 0.1 % ophthalmic solution, , Disp: , Rfl:     Petrolatum (Petroleum Jelly) ointment, Apply 1 application  topically As Needed (prn as needed)., Disp: 368 g, Rfl: 3    Soap & Cleansers (Cetaklenz) liquid, , Disp: , Rfl:     Soap & Cleansers (Gentle Skin Cleanser) liquid, Apply 1 application  topically 2 (Two) Times a Day., Disp: 473 mL, Rfl: 3    Sodium Fluoride 1.1 % cream, , Disp: , Rfl:     Urea 50 % suspension, Apply topically to the affected areas TID, Disp: 284 g, Rfl: 3    vitamin B-12 (CYANOCOBALAMIN) 1000 MCG tablet, Take 1 tablet by mouth Daily., Disp: 90 tablet, Rfl: 1    vitamin B-6 (PYRIDOXINE) 50 MG tablet, Take 0.5 tablets by mouth Every Other Day., Disp: 23 tablet, Rfl: 3    Zinc Oxide 16 % ointment, Apply 1 application topically to the appropriate area as directed Daily As Needed (rash)., Disp: 113 g, Rfl: 3     Objective     Vital Signs:   There were no vitals taken for this visit.    Physical Exam  There were no vitals filed for this visit.    General: Alert, NAD  HEENT:  EOMI, no sinus tenderness  Neck:  Supple, no thyromegaly,  no JVD  Lymph: no cervical, supraclavicular lymphadenopathy bilaterally  Chest:  clear to auscultation bilaterally, no wheezing or crackles; no work of breathing noted on room air  CV: RRR, no M/G/R, pulses 2+, equal.  Abd:  Soft, NT, ND, +BS, obese  EXT:  no clubbing, no cyanosis, no edema b/l  Neuro:  A&Ox3, CN grossly intact, no focal deficits  Skin: No rashes or lesions noted       Result Review :   I have personally reviewed patient's labs and images.          Assessment and Plan      Patient Active Problem List   Diagnosis    TAMIKO treated with BiPAP    Hypersomnia    Class 3 severe obesity due to excess calories without serious comorbidity with body mass index (BMI) of 60.0 to 69.9 in adult    Down's syndrome    Syncope    Sinus trouble    Pes planus    Homocysteinemia    Hidradenitis suppurativa    Foot pain, left    Fatigue    Elevated serum creatinine     DVT (deep venous thrombosis)    Disability of walking    Diabetes mellitus    Bronchitis    Athletes foot    Asthma    Arthropathy    Arthritis of right knee    Knee pain    Ankle pain    Anemia    Secondary polycythemia    Anhidrosis       Impression and Plan:    Pulmonary nodules  Chronic cough  Obesity, BMI 63.1  Possible childhood asthma  History of DVT on Eliquis  History of Down syndrome  Never smoker    -Chest CT done 12/7 23 which showed interval resolution of groundglass nodules compared to previous study.  However, new 6 mm nodules reported.  Patient is a never smoker.  I have low concern for the 6 mm lung nodule for now.  Based on pressure guideline, will repeat chest CT in 12 months  -Will obtain PFTs  -Continue Eliquis for DVT  -Continue current nebulizers for now including albuterol, Pulmicort as needed  -Follow-up in 4 months or earlier as needed    Vaccination status: Patient is up-to-date with his COVID and flu vaccination.  He does not need pneumonia vaccination at this time    Medications personally reviewed.    Follow Up   No follow-ups on file.  Patient was given instructions and counseling regarding his condition or for health maintenance advice. Please see specific information pulled into the AVS if appropriate.

## 2024-01-03 ENCOUNTER — TELEPHONE (OUTPATIENT)
Dept: FAMILY MEDICINE CLINIC | Facility: CLINIC | Age: 29
End: 2024-01-03
Payer: MEDICARE

## 2024-01-03 NOTE — TELEPHONE ENCOUNTER
Caller:   RAFIA NEWELL     Relationship:  MOTHER     Best call back number:  [phone]     Who are you requesting to speak with (clinical staff, provider,  specific staff member):   CLINICAL     What was the call regarding:   PATIENT'S MOTHER RECEIVED A CALL FROM GENERAL SURGERY FOR PATIENT'S HERNIA.  SHE IS STATING THAT ANOTHER DR TOLD HER HE WAS NOT GOING TO NEED ANY SURGERY FOR THIS. PLEASE CALL AND ADVISE.   
Phone call to mother with agreement to make the appointment and follow up with Dr Davidson.  
Please let patient's mother know that I would like for him to still see Dr. Davidson in this regard.  Based on communications from the referral he is agreeable to seeing her.  Please have an appointment made with Dr. Davidson in this regard.  I would like for patient to be seen by general surgery as previously discussed.  
General

## 2024-01-04 ENCOUNTER — OFFICE VISIT (OUTPATIENT)
Dept: ONCOLOGY | Facility: HOSPITAL | Age: 29
End: 2024-01-04
Payer: MEDICARE

## 2024-01-04 VITALS
BODY MASS INDEX: 55.81 KG/M2 | HEART RATE: 95 BPM | SYSTOLIC BLOOD PRESSURE: 127 MMHG | OXYGEN SATURATION: 94 % | HEIGHT: 63 IN | DIASTOLIC BLOOD PRESSURE: 77 MMHG | TEMPERATURE: 97.5 F | RESPIRATION RATE: 16 BRPM | WEIGHT: 315 LBS

## 2024-01-04 DIAGNOSIS — D75.1 SECONDARY POLYCYTHEMIA: Primary | ICD-10-CM

## 2024-01-04 DIAGNOSIS — I82.4Y9 DEEP VEIN THROMBOSIS (DVT) OF PROXIMAL LOWER EXTREMITY, UNSPECIFIED CHRONICITY, UNSPECIFIED LATERALITY: ICD-10-CM

## 2024-01-04 PROCEDURE — G0463 HOSPITAL OUTPT CLINIC VISIT: HCPCS | Performed by: INTERNAL MEDICINE

## 2024-01-04 NOTE — PROGRESS NOTES
Chief Complaint/Care Team   FOLLOW UP 2    Jesus Manuel Sloan DO Loesevitz, Thomas, DO    History of Present Illness     Diagnosis: Recurrent DVT    Secondary Polycythemia (pt with TAMIKO, using machine every other night), JAK2 V617F and exon 12 mutation testing negative    H/o Down Syndrome    Current Treatment: DVT- Eliquis 5 mg p.o. twice daily (lifelong); Phlebotomy prn for Secondary Polycythemia    Previous Treatment: None    Mi Junie Bernard is a 28 y.o. male who presents to [facility] HEMATOLOGY [facility] ONCOLOGY for follow-up regarding history of recurrent DVT as well as secondary positive anemia from obstructive sleep apnea.  Patient also history of Down syndrome and is here with his mother who provides additional medical history.  Patient's mother reports he has been compliant with taking Eliquis.  No report of blood clots, lower extremity swelling, difficulty breathing or chest discomfort.  Patient reports overall patient is tolerating Eliquis medication well.    His mother is also attempting to have him wear his sleep apnea machine consistently.  She has not been able to follow-up with pulmonology to assess for other devices/machines that would increase his compliance with use of TAMIKO machine.  Of note, per patient's mother he states up late at night until the early morning, but her and his father has been trying to have him wear the TAMIKO mask more frequently.    Interval history: Patient here with his mother and father to follow-up regarding secondary polycythemia. Pt has not been compliant with use of CPAP machine despite his parents placing mask on him regularly at night.  No report of blood loss, melena, or blood clots.  Patient has undergone 1 session of phlebotomy in 10/2023 and tolerated it well. Pt's mother reports phlebotomy was not completed in 12/2023 due to pt having elevation in HR, pt has undergone multiple EKGs ordered by PCP which were normal sinus rhythm. Pt's father is  "encouraging pt to be more active and his mother is going to discuss weight loss medications with pt's PCP at next clinic follow up.    Review of Systems   Constitutional:  Positive for fatigue (PT stays up all night so he feels fatigue throughout the day). Negative for appetite change, diaphoresis, fever, unexpected weight gain and unexpected weight loss.   HENT:  Negative for hearing loss, mouth sores, sore throat, swollen glands, trouble swallowing and voice change.    Eyes:  Negative for blurred vision.   Respiratory:  Negative for cough, shortness of breath and wheezing.    Cardiovascular:  Negative for chest pain and palpitations.   Gastrointestinal:  Negative for abdominal pain, blood in stool, constipation, diarrhea, nausea and vomiting.   Endocrine: Negative for cold intolerance and heat intolerance.   Genitourinary:  Negative for difficulty urinating, dysuria, frequency, hematuria and urinary incontinence.   Musculoskeletal:  Positive for arthralgias. Negative for back pain and myalgias.   Skin:  Negative for rash, skin lesions and wound.   Neurological:  Negative for dizziness, seizures, weakness, numbness and headache.   Hematological:  Does not bruise/bleed easily.   Psychiatric/Behavioral:  Negative for depressed mood. The patient is not nervous/anxious.    All other systems reviewed and are negative.       Oncology/Hematology History    No history exists.       Objective     Vitals:    01/04/24 1317   BP: 127/77   Pulse: 95   Resp: 16   Temp: 97.5 °F (36.4 °C)   TempSrc: Temporal   SpO2: 94%   Weight: (!) 162 kg (357 lb 12.9 oz)   Height: 160 cm (62.99\")   PainSc:   7   PainLoc: Foot         ECOG score: 0       BMI 63.4    PHQ-9 Total Score:         Physical Exam  Vitals reviewed. Exam conducted with a chaperone present.   Constitutional:       General: He is not in acute distress.     Appearance: Normal appearance. He is obese.   HENT:      Head: Normocephalic and atraumatic.   Eyes:      Extraocular " Movements: Extraocular movements intact.   Pulmonary:      Effort: Pulmonary effort is normal.   Musculoskeletal:      Cervical back: Normal range of motion and neck supple.   Skin:     General: Skin is warm and dry.   Neurological:      Mental Status: He is oriented to person, place, and time.           Past Medical History     Past Medical History:   Diagnosis Date    Anemia     Asthma     Diabetes mellitus     Disease of thyroid gland     Down's syndrome     Gout      Current Outpatient Medications on File Prior to Visit   Medication Sig Dispense Refill    albuterol (ACCUNEB) 1.25 MG/3ML nebulizer solution Take 3 mL by nebulization Every 6 (Six) Hours As Needed for Wheezing. 360 mL 3    albuterol sulfate  (90 Base) MCG/ACT inhaler Inhale 2 puffs Every 4 (Four) Hours As Needed for Wheezing or Shortness of Air. 8.5 g 3    allopurinol (Zyloprim) 300 MG tablet Take 1 tablet by mouth Daily. 90 tablet 1    ALUM SULFATE-CA ACETATE EX Apply  topically to the appropriate area as directed.      ammonium lactate (AmLactin) 12 % lotion Apply  topically to the appropriate area as directed 2 (Two) Times a Day. 225 g 11    apixaban (ELIQUIS) 5 MG tablet tablet Take 1 tablet by mouth 2 (Two) Times a Day. 180 tablet 1    budesonide (PULMICORT) 0.5 MG/2ML nebulizer solution Take 2 mL by nebulization Daily. 180 mL 1    cetirizine (zyrTEC) 10 MG tablet       chlorhexidine (PERIDEX) 0.12 % solution       clindamycin (Cleocin-T) 1 % external solution Apply  topically to the appropriate area as directed 2 (Two) Times a Day. 60 mL 3    colchicine 0.6 MG tablet Take 2 PO at onset of gout flare, then take 1 tablet PO daily until symptoms resolve 30 tablet 3    Diclofenac Sodium (VOLTAREN) 1 % gel gel diclofenac sodium 1 % topical gel apply 2 gram to the affected area(s) by topical route 4 times per day   Suspended      docusate sodium 100 MG capsule Take 1 capsule by mouth 2 (Two) Times a Day As Needed (constipation). 90 each 3     Emollient (cetaphil) cream Apply 1 application  topically to the appropriate area as directed 2 (Two) Times a Day. 453 g 3    erythromycin (ROMYCIN) 5 MG/GM ophthalmic ointment Instill 1 cm ribbon in each eye QID 3.5 g 0    fluconazole (Diflucan) 100 MG tablet Take 1 tablet by mouth Daily. 14 tablet 0    folic acid (FOLVITE) 1 MG tablet Take 1 tablet by mouth Daily. 90 tablet 3    ipratropium (ATROVENT) 0.03 % nasal spray 2 sprays into the nostril(s) as directed by provider.      ketoconazole (NIZORAL) 2 % cream Apply  topically to the appropriate area as directed Daily. 60 g 1    levothyroxine (SYNTHROID, LEVOTHROID) 25 MCG tablet Take 1 tablet by mouth Daily. 90 tablet 3    loratadine (CLARITIN) 10 MG tablet Take 1 tablet by mouth Daily. 90 tablet 3    mineral oil-hydrophilic petrolatum (AQUAPHOR) ointment Apply 1 application  topically to the appropriate area as directed Daily. 396 g 1    montelukast (Singulair) 10 MG tablet Take 1 tablet by mouth Every Night. 90 tablet 3    mupirocin (BACTROBAN) 2 % ointment Apply  topically to the appropriate area as directed 2 (Two) Times a Day. 30 g 3    nystatin (MYCOSTATIN) 444954 UNIT/GM cream Apply 1 application  topically to the appropriate area as directed 2 (Two) Times a Day. 60 g 2    nystatin (MYCOSTATIN) 073206 UNIT/GM powder Apply  topically to the appropriate area as directed 3 (Three) Times a Day. 60 g 2    olopatadine (PATANOL) 0.1 % ophthalmic solution       Petrolatum (Petroleum Jelly) ointment Apply 1 application  topically As Needed (prn as needed). 368 g 3    Soap & Cleansers (Cetaklenz) liquid       Soap & Cleansers (Gentle Skin Cleanser) liquid Apply 1 application  topically 2 (Two) Times a Day. 473 mL 3    Sodium Fluoride 1.1 % cream       Urea 50 % suspension Apply topically to the affected areas  g 3    vitamin B-12 (CYANOCOBALAMIN) 1000 MCG tablet Take 1 tablet by mouth Daily. 90 tablet 1    vitamin B-6 (PYRIDOXINE) 50 MG tablet Take 0.5  tablets by mouth Every Other Day. 23 tablet 3    Zinc Oxide 16 % ointment Apply 1 application topically to the appropriate area as directed Daily As Needed (rash). 113 g 3     No current facility-administered medications on file prior to visit.      No Known Allergies  Past Surgical History:   Procedure Laterality Date    CYST REMOVAL       Social History     Socioeconomic History    Marital status: Single   Tobacco Use    Smoking status: Never    Smokeless tobacco: Never   Vaping Use    Vaping Use: Never used   Substance and Sexual Activity    Alcohol use: Never    Drug use: Defer    Sexual activity: Defer     History reviewed. No pertinent family history.    Results     Result Review   The following data was reviewed by: Federico Sullivan MD on 04/04/2023:  Lab Results   Component Value Date    HGB 18.5 (H) 12/05/2023    HCT 55.4 (H) 12/05/2023    MCV 90.9 11/21/2023     11/21/2023    WBC 4.49 11/21/2023    NEUTROABS 2.34 11/21/2023    LYMPHSABS 1.47 11/21/2023    MONOSABS 0.56 11/21/2023    EOSABS 0.02 11/21/2023    BASOSABS 0.09 11/21/2023     Lab Results   Component Value Date    GLUCOSE 97 11/21/2023    BUN 15 11/21/2023    CREATININE 1.66 (H) 11/21/2023     11/21/2023    K 5.0 11/21/2023    CL 99 11/21/2023    CO2 31.0 (H) 11/21/2023    CALCIUM 9.5 11/21/2023    PROTEINTOT 7.4 11/21/2023    ALBUMIN 4.2 11/21/2023    BILITOT 0.8 11/21/2023    ALKPHOS 77 11/21/2023    AST 30 11/21/2023    ALT 24 11/21/2023     Lab Results   Component Value Date    MG 2.2 09/10/2022    FREET4 1.11 08/09/2023    TSH 2.350 08/09/2023           No radiology results for the last day  CT Chest Without Contrast Diagnostic    Result Date: 12/7/2023  Impression:    1. There has been interval resolution of ground-glass nodules present on the prior study.  There is a new 6 mm nodule in each upper lobe.  Finding is compatible with a waxing and waning infectious/inflammatory process  2. Large fat containing Morgagni hernia       AGUSTINA FERREIRA MD       Electronically Signed and Approved By: AGUSTINA FERREIRA MD on 12/07/2023 at 10:42              Assessment & Plan     Diagnoses and all orders for this visit:    1. Secondary polycythemia (Primary)    2. Deep vein thrombosis (DVT) of proximal lower extremity, unspecified chronicity, unspecified laterality            Rosaline Bernard is a 28 y.o. male who presents to [facility] HEMATOLOGY [facility] ONCOLOGY for polycythemia and history of recurrent DVT.  He has PMH significant for Down's Syndrome, TAMIKO (has TAMIKO machine). Patient is compliant with his Eliquis medication.      Recurrent DVT  -continue Eliquis, lifelong  --Pt due to undergo repeat CT chest (ordered by his PCP) to monitor response to anticoagulant, CT chest from 12/7/2023 showed resolution of previous ground glass nodules seen on previous study and a new 6 mm nodule in each upper lobe but given waxing and waning nature of the nodules favors infectious/inflammatory process  -per pt's mother pulmonology plans to repeat imaging in 1 year.      Secondary polycythemia  -due to TAMIKO, lack of improvement with use of TAMIKO machine  -pt's mother is going to schedule pt appointment with pulmonology to assess for other options regarding TAMIKO mask and recheck TAMIKO machine settings  -placed orders for phlebotomy twice per month, with goal HCT less than 45, emphasized importance of pt receiving this consistently.  -HCT remains elevated   -report of pt's HR being elevated but all that I found in chart was HR only mildly elevated at 102-104, EKGs obtained on 12/13/2023 were normal sinus rhythm, thus will continue with phlebotomy as scheduled next week  -recommended increased compliance with CPAP machine and recommended weight loss  -JAK2 V617F and exon 12 mutation testing from 11/2019 were negative  -also counseled importance of weight loss and encouraged pt to increase his physical activity      Plan for patient follow-up in 2 month with  repeat CBC     Patient verbalized understanding and agreed with plan.    Please note that portions of this note were completed with a voice recognition program.      Electronically signed by Federico Sullivan MD, 01/04/24, 3:01 PM EST.      Follow Up     I spent 45 minutes caring for Rosaline Zaman on this date of service. This time includes time spent by me in the following activities:preparing for the visit, reviewing tests, obtaining and/or reviewing a separately obtained history, performing a medically appropriate examination and/or evaluation , counseling and educating the patient/family/caregiver, ordering medications, tests, or procedures, documenting information in the medical record and care coordination.    This is an acute or chronic illness that poses a threat to life or bodily function. The above treatment plan involves a high risk of complications and/or mortality of patient management.    The patient was seen and examined. Work by the provider also included review and/or ordering of lab tests, review and/or ordering of radiology tests, review and/or ordering of medicine tests, discussion with other physicians or providers, independent review of data, obtaining old records, review/summation of old records, and/or other review.    I have reviewed the family history, social history, and past medical history for this patient. Previous information and data has been reviewed and updated as needed. I have reviewed and verified the chief complaint, history, and other documentation. The patient was interviewed and examined in the clinic and the chart reviewed. The previous observations, recommendations, and conclusions were reviewed including those of other providers.     The plan was discussed with the patient and/or family. The patient was given time to ask questions and these questions were answered. At the conclusion of their visit they had no additional questions or concerns and all questions were answered to their  satisfaction.    Patient was given instructions and counseling regarding his condition or for health maintenance advice. Please see specific information pulled into the AVS if appropriate.

## 2024-01-09 ENCOUNTER — HOSPITAL ENCOUNTER (OUTPATIENT)
Dept: INFUSION THERAPY | Facility: HOSPITAL | Age: 29
Discharge: HOME | End: 2024-01-09
Admitting: INTERNAL MEDICINE
Payer: MEDICARE

## 2024-01-09 VITALS
OXYGEN SATURATION: 95 % | TEMPERATURE: 98.4 F | WEIGHT: 315 LBS | HEART RATE: 88 BPM | RESPIRATION RATE: 20 BRPM | HEIGHT: 63 IN | SYSTOLIC BLOOD PRESSURE: 107 MMHG | BODY MASS INDEX: 55.81 KG/M2 | DIASTOLIC BLOOD PRESSURE: 66 MMHG

## 2024-01-09 DIAGNOSIS — D75.1 SECONDARY POLYCYTHEMIA: Primary | ICD-10-CM

## 2024-01-09 LAB
HCT VFR BLD AUTO: 56.5 % (ref 37.5–51)
HGB BLD-MCNC: 19 G/DL (ref 13–17.7)

## 2024-01-09 PROCEDURE — 85014 HEMATOCRIT: CPT | Performed by: INTERNAL MEDICINE

## 2024-01-09 PROCEDURE — 85018 HEMOGLOBIN: CPT | Performed by: INTERNAL MEDICINE

## 2024-01-09 PROCEDURE — 99195 PHLEBOTOMY: CPT

## 2024-01-29 ENCOUNTER — HOSPITAL ENCOUNTER (OUTPATIENT)
Dept: INFUSION THERAPY | Facility: HOSPITAL | Age: 29
Discharge: HOME | End: 2024-01-29
Admitting: INTERNAL MEDICINE
Payer: MEDICARE

## 2024-01-29 VITALS
DIASTOLIC BLOOD PRESSURE: 87 MMHG | TEMPERATURE: 98 F | RESPIRATION RATE: 20 BRPM | OXYGEN SATURATION: 96 % | HEART RATE: 86 BPM | SYSTOLIC BLOOD PRESSURE: 104 MMHG

## 2024-01-29 DIAGNOSIS — D75.1 SECONDARY POLYCYTHEMIA: Primary | ICD-10-CM

## 2024-01-29 LAB
DEPRECATED RDW RBC AUTO: 55.3 FL (ref 37–54)
ERYTHROCYTE [DISTWIDTH] IN BLOOD BY AUTOMATED COUNT: 18.9 % (ref 12.3–15.4)
HCT VFR BLD AUTO: 59.8 % (ref 37.5–51)
HGB BLD-MCNC: 20.2 G/DL (ref 13–17.7)
MCH RBC QN AUTO: 29.4 PG (ref 26.6–33)
MCHC RBC AUTO-ENTMCNC: 33.8 G/DL (ref 31.5–35.7)
MCV RBC AUTO: 87 FL (ref 79–97)
PLATELET # BLD AUTO: 280 10*3/MM3 (ref 140–450)
PMV BLD AUTO: 11.1 FL (ref 6–12)
RBC # BLD AUTO: 6.87 10*6/MM3 (ref 4.14–5.8)
WBC NRBC COR # BLD AUTO: 5.36 10*3/MM3 (ref 3.4–10.8)

## 2024-01-29 PROCEDURE — 36415 COLL VENOUS BLD VENIPUNCTURE: CPT

## 2024-01-29 PROCEDURE — 85027 COMPLETE CBC AUTOMATED: CPT | Performed by: INTERNAL MEDICINE

## 2024-01-29 PROCEDURE — 99195 PHLEBOTOMY: CPT

## 2024-01-30 ENCOUNTER — OFFICE VISIT (OUTPATIENT)
Dept: SURGERY | Facility: CLINIC | Age: 29
End: 2024-01-30
Payer: MEDICARE

## 2024-01-30 VITALS
DIASTOLIC BLOOD PRESSURE: 121 MMHG | BODY MASS INDEX: 55.81 KG/M2 | HEIGHT: 63 IN | SYSTOLIC BLOOD PRESSURE: 133 MMHG | WEIGHT: 315 LBS

## 2024-01-30 DIAGNOSIS — Q79.0 MORGAGNI HERNIA: Primary | ICD-10-CM

## 2024-01-30 PROCEDURE — 1159F MED LIST DOCD IN RCRD: CPT | Performed by: SURGERY

## 2024-01-30 PROCEDURE — 99204 OFFICE O/P NEW MOD 45 MIN: CPT | Performed by: SURGERY

## 2024-01-30 PROCEDURE — 1160F RVW MEDS BY RX/DR IN RCRD: CPT | Performed by: SURGERY

## 2024-01-30 NOTE — PROGRESS NOTES
Chief Complaint:  Hernia    Primary Care Provider: Jesus Manuel Sloan DO    Referring Provider: Jesus Manuel Sloan DO    History of Present Illness  Rosaline Bernard is a 28 y.o. male referred by Jesus Manuel Sloan DO for a Morgagni Hernia.  The patient had a cough over a few months ago.  He ultimately was evaluated the CT chest and it showed a Morgagni hernia containing fat.  The patient has Down syndrome.  He is not able to provide any meaningful history himself.  He communicates very little with me.  History was exclusively provided by the patient's mother.  Patient takes Eliquis for history of DVT.  Patient had a left calf ultrasound in June 2022 and it showed acute DVT in the popliteal and posterior tibial veins.  Patient does not have any difficulty eating.  No nausea or vomiting.  No dysphagia.  The cough that he had not mentioned above has improved.  No abdominal pain.  Reviewed the CT chest images.  The images are not the best quality presumably secondary to motion artifact.    Allergies: Patient has no known allergies.    Outpatient Medications Marked as Taking for the 1/30/24 encounter (Office Visit) with Long Davidson MD   Medication Sig Dispense Refill    albuterol (ACCUNEB) 1.25 MG/3ML nebulizer solution Take 3 mL by nebulization Every 6 (Six) Hours As Needed for Wheezing. 360 mL 3    albuterol sulfate  (90 Base) MCG/ACT inhaler Inhale 2 puffs Every 4 (Four) Hours As Needed for Wheezing or Shortness of Air. 8.5 g 3    allopurinol (Zyloprim) 300 MG tablet Take 1 tablet by mouth Daily. 90 tablet 1    ALUM SULFATE-CA ACETATE EX Apply  topically to the appropriate area as directed.      ammonium lactate (AmLactin) 12 % lotion Apply  topically to the appropriate area as directed 2 (Two) Times a Day. 225 g 11    apixaban (ELIQUIS) 5 MG tablet tablet Take 1 tablet by mouth 2 (Two) Times a Day. 180 tablet 1    budesonide (PULMICORT) 0.5 MG/2ML nebulizer solution Take 2 mL by nebulization Daily. 180  mL 1    cetirizine (zyrTEC) 10 MG tablet       chlorhexidine (PERIDEX) 0.12 % solution       clindamycin (Cleocin-T) 1 % external solution Apply  topically to the appropriate area as directed 2 (Two) Times a Day. 60 mL 3    colchicine 0.6 MG tablet Take 2 PO at onset of gout flare, then take 1 tablet PO daily until symptoms resolve 30 tablet 3    Diclofenac Sodium (VOLTAREN) 1 % gel gel diclofenac sodium 1 % topical gel apply 2 gram to the affected area(s) by topical route 4 times per day   Suspended      docusate sodium 100 MG capsule Take 1 capsule by mouth 2 (Two) Times a Day As Needed (constipation). 90 each 3    Emollient (cetaphil) cream Apply 1 application  topically to the appropriate area as directed 2 (Two) Times a Day. 453 g 3    erythromycin (ROMYCIN) 5 MG/GM ophthalmic ointment Instill 1 cm ribbon in each eye QID 3.5 g 0    fluconazole (Diflucan) 100 MG tablet Take 1 tablet by mouth Daily. 14 tablet 0    folic acid (FOLVITE) 1 MG tablet Take 1 tablet by mouth Daily. 90 tablet 3    ipratropium (ATROVENT) 0.03 % nasal spray 2 sprays into the nostril(s) as directed by provider.      ketoconazole (NIZORAL) 2 % cream Apply  topically to the appropriate area as directed Daily. 60 g 1    levothyroxine (SYNTHROID, LEVOTHROID) 25 MCG tablet Take 1 tablet by mouth Daily. 90 tablet 3    loratadine (CLARITIN) 10 MG tablet Take 1 tablet by mouth Daily. 90 tablet 3    mineral oil-hydrophilic petrolatum (AQUAPHOR) ointment Apply 1 application  topically to the appropriate area as directed Daily. 396 g 1    montelukast (Singulair) 10 MG tablet Take 1 tablet by mouth Every Night. 90 tablet 3    mupirocin (BACTROBAN) 2 % ointment Apply  topically to the appropriate area as directed 2 (Two) Times a Day. 30 g 3    nystatin (MYCOSTATIN) 378547 UNIT/GM cream Apply 1 application  topically to the appropriate area as directed 2 (Two) Times a Day. 60 g 2    nystatin (MYCOSTATIN) 458215 UNIT/GM powder Apply  topically to the  "appropriate area as directed 3 (Three) Times a Day. 60 g 2    olopatadine (PATANOL) 0.1 % ophthalmic solution       Petrolatum (Petroleum Jelly) ointment Apply 1 application  topically As Needed (prn as needed). 368 g 3    Soap & Cleansers (Cetaklenz) liquid       Soap & Cleansers (Gentle Skin Cleanser) liquid Apply 1 application  topically 2 (Two) Times a Day. 473 mL 3    Sodium Fluoride 1.1 % cream       Urea 50 % suspension Apply topically to the affected areas  g 3    vitamin B-12 (CYANOCOBALAMIN) 1000 MCG tablet Take 1 tablet by mouth Daily. 90 tablet 1    vitamin B-6 (PYRIDOXINE) 50 MG tablet Take 0.5 tablets by mouth Every Other Day. 23 tablet 3    Zinc Oxide 16 % ointment Apply 1 application topically to the appropriate area as directed Daily As Needed (rash). 113 g 3       Past Medical History:    Anemia    Asthma    Diabetes mellitus    Disease of thyroid gland    Down's syndrome    Gout        Past Surgical History:    CYST REMOVAL       Family History: History reviewed. No pertinent family history.     Social History:  Social History     Tobacco Use    Smoking status: Never    Smokeless tobacco: Never   Substance Use Topics    Alcohol use: Never       Objective     Vital Signs:  BP (!) 133/121 (BP Location: Right arm, Patient Position: Sitting, Cuff Size: Large Adult)   Ht 160 cm (63\")   Wt (!) 162 kg (358 lb 3.2 oz)   BMI 63.45 kg/m²   Constitutional: overweight, mother present in room  Respiratory:  breathing not labored, respiratory effort appears normal  Cardiovascular:  heart regular rate  Abdomen:  obese  Musculoskeletal: moving all extremities symmetrically and purposefully  Neurologic:  no obvious motor or sensory deficits, consistent with know mental handicap      Assessment:  Morgagni hernia    Plan:  Morgagni hernia diagnosis was discussed with the patient's mother.  In general, recommend surgical repair, but this is an unusual situation with the patient's mental limitations, fairly " significant obesity, and mother's desire for patient not to have any surgery unless deemed absolutely necessary.  We talked about the symptoms that would be concerning for the hernia getting worse.  The patient's mother voiced understanding of everything we talked about.  If something significantly changes then the mother will schedule an appoint for the patient to see me again.  Otherwise, the patient will see me PRN.      Long Davidson MD  01/30/2024    Electronically signed by Long Davidson MD, 01/30/24, 10:03 AM EST.

## 2024-02-12 ENCOUNTER — HOSPITAL ENCOUNTER (OUTPATIENT)
Dept: INFUSION THERAPY | Facility: HOSPITAL | Age: 29
Discharge: HOME | End: 2024-02-12
Admitting: INTERNAL MEDICINE
Payer: MEDICARE

## 2024-02-12 VITALS
TEMPERATURE: 98.1 F | HEIGHT: 63 IN | HEART RATE: 85 BPM | SYSTOLIC BLOOD PRESSURE: 119 MMHG | WEIGHT: 315 LBS | OXYGEN SATURATION: 100 % | DIASTOLIC BLOOD PRESSURE: 75 MMHG | BODY MASS INDEX: 55.81 KG/M2 | RESPIRATION RATE: 16 BRPM

## 2024-02-12 DIAGNOSIS — D75.1 SECONDARY POLYCYTHEMIA: Primary | ICD-10-CM

## 2024-02-12 LAB
BASOPHILS # BLD AUTO: 0.08 10*3/MM3 (ref 0–0.2)
BASOPHILS NFR BLD AUTO: 2.2 % (ref 0–1.5)
DEPRECATED RDW RBC AUTO: 54.1 FL (ref 37–54)
EOSINOPHIL # BLD AUTO: 0.05 10*3/MM3 (ref 0–0.4)
EOSINOPHIL NFR BLD AUTO: 1.4 % (ref 0.3–6.2)
ERYTHROCYTE [DISTWIDTH] IN BLOOD BY AUTOMATED COUNT: 18.6 % (ref 12.3–15.4)
HCT VFR BLD AUTO: 53.2 % (ref 37.5–51)
HGB BLD-MCNC: 17.8 G/DL (ref 13–17.7)
IMM GRANULOCYTES # BLD AUTO: 0.06 10*3/MM3 (ref 0–0.05)
IMM GRANULOCYTES NFR BLD AUTO: 1.6 % (ref 0–0.5)
LYMPHOCYTES # BLD AUTO: 1.38 10*3/MM3 (ref 0.7–3.1)
LYMPHOCYTES NFR BLD AUTO: 37.8 % (ref 19.6–45.3)
MCH RBC QN AUTO: 28.6 PG (ref 26.6–33)
MCHC RBC AUTO-ENTMCNC: 33.5 G/DL (ref 31.5–35.7)
MCV RBC AUTO: 85.5 FL (ref 79–97)
MONOCYTES # BLD AUTO: 0.46 10*3/MM3 (ref 0.1–0.9)
MONOCYTES NFR BLD AUTO: 12.6 % (ref 5–12)
NEUTROPHILS NFR BLD AUTO: 1.62 10*3/MM3 (ref 1.7–7)
NEUTROPHILS NFR BLD AUTO: 44.4 % (ref 42.7–76)
NRBC BLD AUTO-RTO: 0 /100 WBC (ref 0–0.2)
PLATELET # BLD AUTO: 214 10*3/MM3 (ref 140–450)
PMV BLD AUTO: 10.4 FL (ref 6–12)
RBC # BLD AUTO: 6.22 10*6/MM3 (ref 4.14–5.8)
WBC NRBC COR # BLD AUTO: 3.65 10*3/MM3 (ref 3.4–10.8)

## 2024-02-12 PROCEDURE — 99195 PHLEBOTOMY: CPT

## 2024-02-12 PROCEDURE — 85025 COMPLETE CBC W/AUTO DIFF WBC: CPT | Performed by: INTERNAL MEDICINE

## 2024-02-22 ENCOUNTER — OFFICE VISIT (OUTPATIENT)
Dept: FAMILY MEDICINE CLINIC | Facility: CLINIC | Age: 29
End: 2024-02-22
Payer: OTHER GOVERNMENT

## 2024-02-22 VITALS
BODY MASS INDEX: 55.81 KG/M2 | HEART RATE: 81 BPM | SYSTOLIC BLOOD PRESSURE: 120 MMHG | RESPIRATION RATE: 18 BRPM | WEIGHT: 315 LBS | HEIGHT: 63 IN | TEMPERATURE: 98 F | DIASTOLIC BLOOD PRESSURE: 80 MMHG | OXYGEN SATURATION: 98 %

## 2024-02-22 DIAGNOSIS — R91.1 PULMONARY NODULE: ICD-10-CM

## 2024-02-22 DIAGNOSIS — D75.1 SECONDARY POLYCYTHEMIA: ICD-10-CM

## 2024-02-22 DIAGNOSIS — I82.4Y2 DEEP VEIN THROMBOSIS (DVT) OF PROXIMAL VEIN OF LEFT LOWER EXTREMITY, UNSPECIFIED CHRONICITY: ICD-10-CM

## 2024-02-22 DIAGNOSIS — Q79.0 MORGAGNI HERNIA: ICD-10-CM

## 2024-02-22 DIAGNOSIS — L30.4 INTERTRIGO: Primary | ICD-10-CM

## 2024-02-22 PROCEDURE — 99214 OFFICE O/P EST MOD 30 MIN: CPT | Performed by: FAMILY MEDICINE

## 2024-02-22 RX ORDER — SKIN CLEANSER
1 CLEANSER (ML) TOPICAL DAILY
Qty: 472 ML | Refills: 3 | Status: SHIPPED | OUTPATIENT
Start: 2024-02-22

## 2024-02-22 RX ORDER — NYSTATIN 100000 U/G
1 CREAM TOPICAL 2 TIMES DAILY
Qty: 60 G | Refills: 2 | Status: SHIPPED | OUTPATIENT
Start: 2024-02-22

## 2024-02-22 RX ORDER — KETOCONAZOLE 20 MG/G
CREAM TOPICAL DAILY
Qty: 60 G | Refills: 1 | Status: SHIPPED | OUTPATIENT
Start: 2024-02-22

## 2024-02-22 RX ORDER — ALBUTEROL SULFATE 90 UG/1
2 AEROSOL, METERED RESPIRATORY (INHALATION) EVERY 4 HOURS PRN
Qty: 8.5 G | Refills: 3 | Status: SHIPPED | OUTPATIENT
Start: 2024-02-22

## 2024-02-22 NOTE — PROGRESS NOTES
Chief Complaint  Rash in buttock area      Subjective          Mi Junie Bernard presents to [facility]  History of Present Illness  Patient presents today accompanied by his mother to discuss her rash in the buttock region.  This is right in the intergluteal cleft.  She has been applying nystatin.  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  He is asymptomatic at this time so they did discuss monitoring.  He continues to be seen by hematology/oncology for secondary polycythemia and is having phlebotomy done.  He does have a 6 mm nodule in each upper lobe and is being followed by pulmonology in this regard.  He is recommended for a 12-month follow-up chest CT.    Current Outpatient Medications   Medication Instructions    albuterol (ACCUNEB) 1.25 mg, Nebulization, Every 6 Hours PRN    albuterol sulfate  (90 Base) MCG/ACT inhaler 2 puffs, Inhalation, Every 4 Hours PRN    allopurinol (ZYLOPRIM) 300 mg, Oral, Daily    ALUM SULFATE-CA ACETATE EX Topical    ammonium lactate (AmLactin) 12 % lotion Topical, 2 Times Daily    apixaban (ELIQUIS) 5 mg, Oral, 2 Times Daily    budesonide (PULMICORT) 0.5 mg, Nebulization, Daily - RT    cetirizine (zyrTEC) 10 MG tablet     chlorhexidine (PERIDEX) 0.12 % solution     clindamycin (Cleocin-T) 1 % external solution Topical, 2 Times Daily    colchicine 0.6 MG tablet Take 2 PO at onset of gout flare, then take 1 tablet PO daily until symptoms resolve    Diclofenac Sodium (VOLTAREN) 1 % gel gel diclofenac sodium 1 % topical gel apply 2 gram to the affected area(s) by topical route 4 times per day   Suspended    docusate sodium 100 mg, Oral, 2 Times Daily PRN    Emollient (cetaphil) cream 1 application , Topical, 2 Times Daily    erythromycin (ROMYCIN) 5 MG/GM ophthalmic ointment Instill 1 cm ribbon in each eye QID    fluconazole (DIFLUCAN) 100 mg, Oral, Daily    folic acid (FOLVITE) 1 mg, Oral, Daily    ipratropium (ATROVENT)  "0.03 % nasal spray 2 sprays, Nasal    ketoconazole (NIZORAL) 2 % cream Topical, Daily    levothyroxine (SYNTHROID, LEVOTHROID) 25 mcg, Oral, Daily    loratadine (CLARITIN) 10 mg, Oral, Daily    mineral oil-hydrophilic petrolatum (AQUAPHOR) ointment 1 Application, Topical, Daily    montelukast (SINGULAIR) 10 mg, Oral, Nightly    mupirocin (BACTROBAN) 2 % ointment Topical, 2 Times Daily    nystatin (MYCOSTATIN) 772917 UNIT/GM cream 1 Application, Topical, 2 Times Daily    nystatin (MYCOSTATIN) 034218 UNIT/GM powder Topical, 3 Times Daily    olopatadine (PATANOL) 0.1 % ophthalmic solution No dose, route, or frequency recorded.    Petrolatum (Petroleum Jelly) ointment 1 application , Apply externally, As Needed    Soap & Cleansers (Cetaklenz) liquid 1 Application, Topical, Daily    Soap & Cleansers (Gentle Skin Cleanser) liquid 1 application , Apply externally, 2 Times Daily    Sodium Fluoride 1.1 % cream     Urea 50 % suspension Apply topically to the affected areas TID    vitamin B-12 (CYANOCOBALAMIN) 1,000 mcg, Oral, Daily    vitamin B-6 (PYRIDOXINE) 25 mg, Oral, Every Other Day    Zinc Oxide 16 % ointment 1 dose    Zinc Oxide 1 g, Topical, Daily PRN       The following portions of the patient's history were reviewed and updated as appropriate: allergies, current medications, past family history, past medical history, past social history, past surgical history, and problem list.    Objective   Vital Signs:   /80 (BP Location: Left arm, Patient Position: Sitting, Cuff Size: Large Adult)   Pulse 81   Temp 98 °F (36.7 °C) (Oral)   Resp 18   Ht 160 cm (63\")   Wt (!) 166 kg (366 lb 11.2 oz)   SpO2 98%   BMI 64.96 kg/m²     BP Readings from Last 3 Encounters:   02/22/24 120/80   02/12/24 119/75   01/30/24 (!) 133/121     Wt Readings from Last 3 Encounters:   02/22/24 (!) 166 kg (366 lb 11.2 oz)   02/12/24 (!) 163 kg (360 lb 7.2 oz)   01/30/24 (!) 162 kg (358 lb 3.2 oz)           Physical Exam  Vitals reviewed. "   Constitutional:       Appearance: Normal appearance.   HENT:      Head: Normocephalic and atraumatic.      Right Ear: External ear normal.      Left Ear: External ear normal.   Eyes:      Conjunctiva/sclera: Conjunctivae normal.   Cardiovascular:      Rate and Rhythm: Normal rate and regular rhythm.      Heart sounds: No murmur heard.     No friction rub. No gallop.   Pulmonary:      Effort: Pulmonary effort is normal.      Breath sounds: Normal breath sounds. No wheezing or rhonchi.   Abdominal:      General: Bowel sounds are normal. There is no distension.      Palpations: Abdomen is soft.      Tenderness: There is no abdominal tenderness.   Skin:     Comments: In the intergluteal cleft region there is some erythema noted.  With a reddish-brown patch in the intergluteal cleft   Neurological:      Mental Status: He is alert and oriented to person, place, and time.      Cranial Nerves: No cranial nerve deficit.   Psychiatric:         Mood and Affect: Mood and affect normal.         Behavior: Behavior normal.         Thought Content: Thought content normal.         Judgment: Judgment normal.          [unfilled]    Result Review :   The following data was reviewed by: Jesus Manuel Sloan DO on 02/22/2024:  Common labs          1/9/2024    14:27 1/29/2024    14:26 2/12/2024    15:17   Common Labs   WBC  5.36  3.65    Hemoglobin 19.0  20.2  17.8    Hematocrit 56.5  59.8  53.2    Platelets  280  214             Lab Results   Component Value Date    SARSANTIGEN Not Detected 12/13/2023    FLUAAG Not Detected 12/13/2023    FLUBAG Not Detected 12/13/2023    BILIRUBINUR Negative 09/10/2022       Procedures        Assessment and Plan    Diagnoses and all orders for this visit:    1. Intertrigo (Primary)    2. Morgagni hernia    3. Secondary polycythemia    4. Deep vein thrombosis (DVT) of proximal vein of left lower extremity, unspecified chronicity    5. Pulmonary nodule    Other orders  -     albuterol sulfate  (90 Base)  MCG/ACT inhaler; Inhale 2 puffs Every 4 (Four) Hours As Needed for Wheezing or Shortness of Air.  Dispense: 8.5 g; Refill: 3  -     mineral oil-hydrophilic petrolatum (AQUAPHOR) ointment; Apply 1 Application topically to the appropriate area as directed Daily.  Dispense: 396 g; Refill: 1  -     mupirocin (BACTROBAN) 2 % ointment; Apply  topically to the appropriate area as directed 2 (Two) Times a Day.  Dispense: 30 g; Refill: 3  -     nystatin (MYCOSTATIN) 539740 UNIT/GM cream; Apply 1 Application topically to the appropriate area as directed 2 (Two) Times a Day.  Dispense: 60 g; Refill: 2  -     Soap & Cleansers (Cetaklenz) liquid; Apply 1 Application topically to the appropriate area as directed Daily.  Dispense: 472 mL; Refill: 3  -     ketoconazole (NIZORAL) 2 % cream; Apply  topically to the appropriate area as directed Daily.  Dispense: 60 g; Refill: 1    I discussed with patient and mother treatment for intertrigo with ketoconazole.  Medications have been refilled otherwise today as requested.  Patient to keep appointment with pulmonology for follow-up for lung nodule.  Patient also to continue being followed by hematology/oncology for polycythemia as well as recurrent DVT.      Medications Discontinued During This Encounter   Medication Reason    Soap & Cleansers (Cetaklenz) liquid Reorder    mupirocin (BACTROBAN) 2 % ointment Reorder    albuterol sulfate  (90 Base) MCG/ACT inhaler Reorder    mineral oil-hydrophilic petrolatum (AQUAPHOR) ointment Reorder    nystatin (MYCOSTATIN) 191255 UNIT/GM cream Reorder    ketoconazole (NIZORAL) 2 % cream Reorder          Follow Up   Return in about 3 months (around 5/22/2024) for intertrigo.  Patient was given instructions and counseling regarding his condition or for health maintenance advice. Please see specific information pulled into the AVS if appropriate.       Jesus Manuel Sloan,   02/22/24  19:12 EST

## 2024-02-26 ENCOUNTER — HOSPITAL ENCOUNTER (OUTPATIENT)
Dept: INFUSION THERAPY | Facility: HOSPITAL | Age: 29
Discharge: HOME | End: 2024-02-26
Admitting: INTERNAL MEDICINE
Payer: MEDICARE

## 2024-02-26 VITALS
DIASTOLIC BLOOD PRESSURE: 68 MMHG | SYSTOLIC BLOOD PRESSURE: 110 MMHG | HEART RATE: 90 BPM | WEIGHT: 315 LBS | HEIGHT: 63 IN | RESPIRATION RATE: 20 BRPM | BODY MASS INDEX: 55.81 KG/M2 | OXYGEN SATURATION: 93 % | TEMPERATURE: 97.8 F

## 2024-02-26 DIAGNOSIS — D75.1 SECONDARY POLYCYTHEMIA: Primary | ICD-10-CM

## 2024-02-26 LAB
DEPRECATED RDW RBC AUTO: 55.7 FL (ref 37–54)
ERYTHROCYTE [DISTWIDTH] IN BLOOD BY AUTOMATED COUNT: 18.2 % (ref 12.3–15.4)
HCT VFR BLD AUTO: 51.9 % (ref 37.5–51)
HGB BLD-MCNC: 16.9 G/DL (ref 13–17.7)
MCH RBC QN AUTO: 28.5 PG (ref 26.6–33)
MCHC RBC AUTO-ENTMCNC: 32.6 G/DL (ref 31.5–35.7)
MCV RBC AUTO: 87.5 FL (ref 79–97)
PLATELET # BLD AUTO: 232 10*3/MM3 (ref 140–450)
PMV BLD AUTO: 10.4 FL (ref 6–12)
RBC # BLD AUTO: 5.93 10*6/MM3 (ref 4.14–5.8)
WBC NRBC COR # BLD AUTO: 3.54 10*3/MM3 (ref 3.4–10.8)

## 2024-02-26 PROCEDURE — 85027 COMPLETE CBC AUTOMATED: CPT | Performed by: INTERNAL MEDICINE

## 2024-02-26 PROCEDURE — 99195 PHLEBOTOMY: CPT

## 2024-02-29 ENCOUNTER — TELEPHONE (OUTPATIENT)
Dept: ONCOLOGY | Facility: HOSPITAL | Age: 29
End: 2024-02-29
Payer: OTHER GOVERNMENT

## 2024-02-29 NOTE — TELEPHONE ENCOUNTER
Caller: RAFIA NEWELL    Relationship to patient: Mother    Best call back number: [phone]    Type of visit: FOLLOW UP    Requested date: SAME DAY BUT AFTER NOON     If rescheduling, when is the original appointment: 03/06     Additional notes:PLEASE CALL TO R/S.

## 2024-03-06 ENCOUNTER — OFFICE VISIT (OUTPATIENT)
Dept: ONCOLOGY | Facility: HOSPITAL | Age: 29
End: 2024-03-06
Payer: MEDICARE

## 2024-03-06 VITALS
SYSTOLIC BLOOD PRESSURE: 127 MMHG | DIASTOLIC BLOOD PRESSURE: 67 MMHG | BODY MASS INDEX: 55.81 KG/M2 | RESPIRATION RATE: 16 BRPM | OXYGEN SATURATION: 94 % | TEMPERATURE: 97.6 F | HEIGHT: 63 IN | HEART RATE: 89 BPM | WEIGHT: 315 LBS

## 2024-03-06 DIAGNOSIS — D75.1 SECONDARY POLYCYTHEMIA: Primary | ICD-10-CM

## 2024-03-06 PROCEDURE — G0463 HOSPITAL OUTPT CLINIC VISIT: HCPCS | Performed by: INTERNAL MEDICINE

## 2024-03-06 NOTE — PROGRESS NOTES
Chief Complaint/Care Team   secondary polycythemia, DVT-- 2 MO FOLLOW UP    Jesus Manuel Sloan DO Loesevitz, Thomas, DO    History of Present Illness     Diagnosis: Recurrent DVT    Secondary Polycythemia (pt with TAMIKO, using machine every other night), JAK2 V617F and exon 12 mutation testing negative    H/o Down Syndrome    Current Treatment: DVT- Eliquis 5 mg p.o. twice daily (lifelong); Phlebotomy prn for Secondary Polycythemia    Previous Treatment: None    Mi Junie Bernard is a 28 y.o. male who presents to [facility] for follow-up regarding history of recurrent DVT as well as secondary positive anemia from obstructive sleep apnea.  Patient also history of Down syndrome and is here with his mother who provides additional medical history.  Patient's mother reports he has been compliant with taking Eliquis.  No report of blood clots, lower extremity swelling, difficulty breathing or chest discomfort.  Patient reports overall patient is tolerating Eliquis medication well.    His mother is also attempting to have him wear his sleep apnea machine consistently.  She has not been able to follow-up with pulmonology to assess for other devices/machines that would increase his compliance with use of TAMIKO machine.  Of note, per patient's mother he states up late at night until the early morning, but her and his father has been trying to have him wear the TAMIKO mask more frequently.    Interval history: Patient here with his mother to follow-up regarding secondary polycythemia. Pt has not been compliant with use of CPAP machine despite his parents placing mask on him regularly at night.  No report of blood loss, melena, or blood clots.  Patient has undergone biweekly sessions of phlebotomy since last clinic appointment and tolerated it well.     Review of Systems   Constitutional:  Positive for fatigue (PT stays up all night so he feels fatigue throughout the day). Negative for  "appetite change, diaphoresis, fever, unexpected weight gain and unexpected weight loss.   HENT:  Negative for hearing loss, mouth sores, sore throat, swollen glands, trouble swallowing and voice change.    Eyes:  Negative for blurred vision.   Respiratory:  Negative for cough, shortness of breath and wheezing.    Cardiovascular:  Negative for chest pain and palpitations.   Gastrointestinal:  Negative for abdominal pain, blood in stool, constipation, diarrhea, nausea and vomiting.   Endocrine: Negative for cold intolerance and heat intolerance.   Genitourinary:  Negative for difficulty urinating, dysuria, frequency, hematuria and urinary incontinence.   Musculoskeletal:  Positive for arthralgias. Negative for back pain and myalgias.   Skin:  Negative for rash, skin lesions and wound.   Neurological:  Negative for dizziness, seizures, weakness, numbness and headache.   Hematological:  Does not bruise/bleed easily.   Psychiatric/Behavioral:  Negative for depressed mood. The patient is not nervous/anxious.    All other systems reviewed and are negative.       Oncology/Hematology History    No history exists.       Objective     Vitals:    03/06/24 1440   BP: 127/67   Pulse: 89   Resp: 16   Temp: 97.6 °F (36.4 °C)   TempSrc: Temporal   SpO2: 94%   Weight: (!) 168 kg (369 lb 4.3 oz)   Height: 160 cm (62.99\")   PainSc: 10-Worst pain ever   PainLoc: Foot  Comment: left foot           ECOG score: 0       BMI 63.4    PHQ-9 Total Score:         Physical Exam  Vitals reviewed. Exam conducted with a chaperone present.   Constitutional:       General: He is not in acute distress.     Appearance: Normal appearance. He is obese.   HENT:      Head: Normocephalic and atraumatic.   Eyes:      Extraocular Movements: Extraocular movements intact.   Pulmonary:      Effort: Pulmonary effort is normal.   Musculoskeletal:      Cervical back: Normal range of motion and neck supple.   Skin:     General: Skin is warm and dry.   Neurological: "      Mental Status: He is oriented to person, place, and time.           Past Medical History     Past Medical History:   Diagnosis Date    Anemia     Asthma     Diabetes mellitus     Disease of thyroid gland     Down's syndrome     Gout      Current Outpatient Medications on File Prior to Visit   Medication Sig Dispense Refill    albuterol (ACCUNEB) 1.25 MG/3ML nebulizer solution Take 3 mL by nebulization Every 6 (Six) Hours As Needed for Wheezing. 360 mL 3    albuterol sulfate  (90 Base) MCG/ACT inhaler Inhale 2 puffs Every 4 (Four) Hours As Needed for Wheezing or Shortness of Air. 8.5 g 3    allopurinol (Zyloprim) 300 MG tablet Take 1 tablet by mouth Daily. 90 tablet 1    ALUM SULFATE-CA ACETATE EX Apply  topically to the appropriate area as directed.      ammonium lactate (AmLactin) 12 % lotion Apply  topically to the appropriate area as directed 2 (Two) Times a Day. 225 g 11    apixaban (ELIQUIS) 5 MG tablet tablet Take 1 tablet by mouth 2 (Two) Times a Day. 180 tablet 1    budesonide (PULMICORT) 0.5 MG/2ML nebulizer solution Take 2 mL by nebulization Daily. 180 mL 1    cetirizine (zyrTEC) 10 MG tablet       chlorhexidine (PERIDEX) 0.12 % solution       clindamycin (Cleocin-T) 1 % external solution Apply  topically to the appropriate area as directed 2 (Two) Times a Day. 60 mL 3    colchicine 0.6 MG tablet Take 2 PO at onset of gout flare, then take 1 tablet PO daily until symptoms resolve 30 tablet 3    Diclofenac Sodium (VOLTAREN) 1 % gel gel diclofenac sodium 1 % topical gel apply 2 gram to the affected area(s) by topical route 4 times per day   Suspended      docusate sodium 100 MG capsule Take 1 capsule by mouth 2 (Two) Times a Day As Needed (constipation). 90 each 3    Emollient (cetaphil) cream Apply 1 application  topically to the appropriate area as directed 2 (Two) Times a Day. 453 g 3    erythromycin (ROMYCIN) 5 MG/GM ophthalmic ointment Instill 1 cm ribbon in each eye QID 3.5 g 0     fluconazole (Diflucan) 100 MG tablet Take 1 tablet by mouth Daily. 14 tablet 0    folic acid (FOLVITE) 1 MG tablet Take 1 tablet by mouth Daily. 90 tablet 3    ipratropium (ATROVENT) 0.03 % nasal spray 2 sprays into the nostril(s) as directed by provider.      ketoconazole (NIZORAL) 2 % cream Apply  topically to the appropriate area as directed Daily. 60 g 1    levothyroxine (SYNTHROID, LEVOTHROID) 25 MCG tablet Take 1 tablet by mouth Daily. 90 tablet 3    loratadine (CLARITIN) 10 MG tablet Take 1 tablet by mouth Daily. 90 tablet 3    mineral oil-hydrophilic petrolatum (AQUAPHOR) ointment Apply 1 Application topically to the appropriate area as directed Daily. 396 g 1    montelukast (Singulair) 10 MG tablet Take 1 tablet by mouth Every Night. 90 tablet 3    mupirocin (BACTROBAN) 2 % ointment Apply  topically to the appropriate area as directed 2 (Two) Times a Day. 30 g 3    nystatin (MYCOSTATIN) 639146 UNIT/GM cream Apply 1 Application topically to the appropriate area as directed 2 (Two) Times a Day. 60 g 2    nystatin (MYCOSTATIN) 750527 UNIT/GM powder Apply  topically to the appropriate area as directed 3 (Three) Times a Day. 60 g 2    olopatadine (PATANOL) 0.1 % ophthalmic solution       Petrolatum (Petroleum Jelly) ointment Apply 1 application  topically As Needed (prn as needed). 368 g 3    Soap & Cleansers (Cetaklenz) liquid Apply 1 Application topically to the appropriate area as directed Daily. 472 mL 3    Soap & Cleansers (Gentle Skin Cleanser) liquid Apply 1 application  topically 2 (Two) Times a Day. 473 mL 3    Sodium Fluoride 1.1 % cream       Urea 50 % suspension Apply topically to the affected areas  g 3    vitamin B-12 (CYANOCOBALAMIN) 1000 MCG tablet Take 1 tablet by mouth Daily. 90 tablet 1    vitamin B-6 (PYRIDOXINE) 50 MG tablet Take 0.5 tablets by mouth Every Other Day. 23 tablet 3    Zinc Oxide 16 % ointment Apply 1 application topically to the appropriate area as directed Daily As  Needed (rash). 113 g 3    Zinc Oxide 16 % ointment Apply 1 dose topically to the appropriate area as directed. (Patient not taking: Reported on 2/22/2024)       No current facility-administered medications on file prior to visit.      No Known Allergies  Past Surgical History:   Procedure Laterality Date    CYST REMOVAL       Social History     Socioeconomic History    Marital status: Single   Tobacco Use    Smoking status: Never     Passive exposure: Never    Smokeless tobacco: Never   Vaping Use    Vaping status: Never Used   Substance and Sexual Activity    Alcohol use: Never    Drug use: Defer    Sexual activity: Defer     History reviewed. No pertinent family history.    Results     Result Review   The following data was reviewed by: Federico Sullivan MD on 04/04/2023:  Lab Results   Component Value Date    HGB 16.9 02/26/2024    HCT 51.9 (H) 02/26/2024    MCV 87.5 02/26/2024     02/26/2024    WBC 3.54 02/26/2024    NEUTROABS 1.62 (L) 02/12/2024    LYMPHSABS 1.38 02/12/2024    MONOSABS 0.46 02/12/2024    EOSABS 0.05 02/12/2024    BASOSABS 0.08 02/12/2024     Lab Results   Component Value Date    GLUCOSE 97 11/21/2023    BUN 15 11/21/2023    CREATININE 1.66 (H) 11/21/2023     11/21/2023    K 5.0 11/21/2023    CL 99 11/21/2023    CO2 31.0 (H) 11/21/2023    CALCIUM 9.5 11/21/2023    PROTEINTOT 7.4 11/21/2023    ALBUMIN 4.2 11/21/2023    BILITOT 0.8 11/21/2023    ALKPHOS 77 11/21/2023    AST 30 11/21/2023    ALT 24 11/21/2023     Lab Results   Component Value Date    MG 2.2 09/10/2022    FREET4 1.11 08/09/2023    TSH 2.350 08/09/2023           No radiology results for the last day       Assessment & Plan     Diagnoses and all orders for this visit:    1. Secondary polycythemia (Primary)  -     Ambulatory Referral to [facility] For Infusion Treatment              Rosaline Regaladodariusz Bernard is a 28 y.o. male who presents to [facility] for polycythemia and history of recurrent DVT.   He has PMH significant for Down's Syndrome, TAMIKO (has TAMIKO machine). Patient is compliant with his Eliquis medication.      Recurrent DVT  -continue Eliquis, lifelong  --Pt due to undergo repeat CT chest (ordered by his PCP) to monitor response to anticoagulant, CT chest from 12/7/2023 showed resolution of previous ground glass nodules seen on previous study and a new 6 mm nodule in each upper lobe but given waxing and waning nature of the nodules favors infectious/inflammatory process  -per pt's mother pulmonology plans to repeat imaging in 1 year.      Secondary polycythemia  -due to TAMIKO, lack of improvement with use of TAMIKO machine  -pt's mother is going to schedule pt appointment with pulmonology to assess for other options regarding TAMIKO mask and recheck TAMIKO machine settings  -placed orders for phlebotomy twice per month, with goal HCT less than 45, emphasized importance of pt receiving this consistently.  -HCT remains elevated at 51.9 and hemoglobin 16.9, both improved from 1 month ago  -recommended increased compliance with CPAP machine and recommended weight loss  -JAK2 V617F and exon 12 mutation testing from 11/2019 were negative  -also counseled importance of weight loss and encouraged pt to increase his physical activity  -recommend continuing biweekly phlebotomy sessions.      Plan for patient follow-up in 3 months with repeat CBC     Patient verbalized understanding and agreed with plan.    Please note that portions of this note were completed with a voice recognition program.      Electronically signed by Federico Sullivan MD, 03/06/24, 4:16 PM EST.        Follow Up     I spent 30 minutes caring for Rosaline Zaman on this date of service. This time includes time spent by me in the following activities:preparing for the visit, reviewing tests, obtaining and/or reviewing a separately obtained history, performing a medically appropriate examination and/or evaluation , counseling and educating the  patient/family/caregiver, ordering medications, tests, or procedures, documenting information in the medical record and care coordination.    This is an acute or chronic illness that poses a threat to life or bodily function. The above treatment plan involves a high risk of complications and/or mortality of patient management.    The patient was seen and examined. Work by the provider also included review and/or ordering of lab tests, review and/or ordering of radiology tests, review and/or ordering of medicine tests, discussion with other physicians or providers, independent review of data, obtaining old records, review/summation of old records, and/or other review.    I have reviewed the family history, social history, and past medical history for this patient. Previous information and data has been reviewed and updated as needed. I have reviewed and verified the chief complaint, history, and other documentation. The patient was interviewed and examined in the clinic and the chart reviewed. The previous observations, recommendations, and conclusions were reviewed including those of other providers.     The plan was discussed with the patient and/or family. The patient was given time to ask questions and these questions were answered. At the conclusion of their visit they had no additional questions or concerns and all questions were answered to their satisfaction.    Patient was given instructions and counseling regarding his condition or for health maintenance advice. Please see specific information pulled into the AVS if appropriate.

## 2024-03-28 ENCOUNTER — HOSPITAL ENCOUNTER (OUTPATIENT)
Dept: INFUSION THERAPY | Facility: HOSPITAL | Age: 29
Discharge: HOME | End: 2024-03-28
Admitting: INTERNAL MEDICINE
Payer: MEDICARE

## 2024-03-28 VITALS
HEART RATE: 84 BPM | SYSTOLIC BLOOD PRESSURE: 107 MMHG | BODY MASS INDEX: 55.81 KG/M2 | RESPIRATION RATE: 20 BRPM | HEIGHT: 63 IN | DIASTOLIC BLOOD PRESSURE: 59 MMHG | OXYGEN SATURATION: 95 % | WEIGHT: 315 LBS | TEMPERATURE: 98.2 F

## 2024-03-28 DIAGNOSIS — D75.1 SECONDARY POLYCYTHEMIA: Primary | ICD-10-CM

## 2024-03-28 LAB
HCT VFR BLD AUTO: 49.7 % (ref 37.5–51)
HGB BLD-MCNC: 15.9 G/DL (ref 13–17.7)

## 2024-03-28 PROCEDURE — 99195 PHLEBOTOMY: CPT

## 2024-03-28 PROCEDURE — 85018 HEMOGLOBIN: CPT | Performed by: INTERNAL MEDICINE

## 2024-03-28 PROCEDURE — 85014 HEMATOCRIT: CPT | Performed by: INTERNAL MEDICINE

## 2024-04-18 NOTE — PROGRESS NOTES
Primary Care Provider  Jesus Manuel Sloan DO   Referring Provider  No ref. provider found      Patient Complaint  Asthma, Lung Nodule, Follow-up (4 month ), and Cough      Subjective          Christine Bernard presents to [facility]      History of Presenting Illness  Christine Bernard is a 28 y.o. male patient of Dr. Eugene with history of chronic cough, childhood asthma, sleep apnea, Down syndrome, history of DVT, here for 4-month follow-up.    Patient states he is doing well since last visit, accompanied today by his mother.  He was initially seen in our clinic for lung nodules seen on chest CT 12/7/2023.  Patient denies using any antibiotics or steroids for his lungs recently, denies any fevers or chills, no ER visits or hospitalizations since last visit.  He continues to use his Pulmicort nebulizer treatments and albuterol nebs and albuterol inhaler as needed, managed by his PCP.  Patient has sleep apnea, CPAP managed by sleep medicine.  Unfortunately patient does not wear his CPAP for very long each night, will take it off.  He has a lot of daytime fatigue.  He has never smoked.  Patient denies any productive cough, hemoptysis, swollen lymph nodes, weight loss, or night sweats.  He does have a dry productive cough.  Overall, patient is doing well and has no additional concerns at this time.  Patient is able to perform ADLs without difficulty.  I have personally reviewed the review of systems, past family, social, medical and surgical histories; and agree with their findings.      Review of Systems    Review of Systems   Constitutional:  Negative for activity change, chills, fatigue, fever, unexpected weight gain and unexpected weight loss.   HENT:  Negative for congestion, ear discharge, ear pain, mouth sores, postnasal drip, rhinorrhea, sinus pressure, sore throat, swollen glands and trouble swallowing.    Eyes:  Negative for blurred vision, pain,  discharge, itching and visual disturbance.   Respiratory:  Positive for cough (chronic, dry). Negative for apnea, chest tightness, shortness of breath, wheezing and stridor.    Cardiovascular:  Negative for chest pain, palpitations and leg swelling.   Gastrointestinal:  Negative for abdominal distention, abdominal pain, constipation, diarrhea, nausea, vomiting, GERD and indigestion.   Musculoskeletal:  Negative for arthralgias, joint swelling and myalgias.   Skin:  Negative for color change.   Neurological:  Negative for dizziness, weakness, light-headedness and headache.      Sleep: Positive for Excessive daytime sleepiness  Negative for morning headaches  Negative for Snoring      History reviewed. No pertinent family history.     Social History     Socioeconomic History    Marital status: Single   Tobacco Use    Smoking status: Never     Passive exposure: Never    Smokeless tobacco: Never   Vaping Use    Vaping status: Never Used   Substance and Sexual Activity    Alcohol use: Never    Drug use: Defer    Sexual activity: Defer        Past Medical History:   Diagnosis Date    Anemia     Asthma     Diabetes mellitus     Disease of thyroid gland     Down's syndrome     Gout         Immunization History   Administered Date(s) Administered    31-influenza Vac Quardvalent Preservativ 09/23/2016    COVID-19 (PFIZER) Purple Cap Monovalent 03/02/2021, 03/23/2021, 11/18/2021    Fluzone (or Fluarix & Flulaval for VFC) >6mos 10/04/2021, 09/21/2022, 09/14/2023    Fluzone Quad >6mos (Multi-dose) 11/06/2019    Influenza Injectable Mdck Pf Quad 09/21/2022    Influenza, Unspecified 09/23/2016, 09/21/2020    Tdap 03/28/2019       No Known Allergies       Current Outpatient Medications:     albuterol (ACCUNEB) 1.25 MG/3ML nebulizer solution, Take 3 mL by nebulization Every 6 (Six) Hours As Needed for Wheezing., Disp: 360 mL, Rfl: 3    albuterol sulfate  (90 Base) MCG/ACT inhaler, Inhale 2 puffs Every 4 (Four) Hours As Needed  for Wheezing or Shortness of Air., Disp: 8.5 g, Rfl: 3    allopurinol (Zyloprim) 300 MG tablet, Take 1 tablet by mouth Daily., Disp: 90 tablet, Rfl: 1    ALUM SULFATE-CA ACETATE EX, Apply  topically to the appropriate area as directed., Disp: , Rfl:     ammonium lactate (AmLactin) 12 % lotion, Apply  topically to the appropriate area as directed 2 (Two) Times a Day., Disp: 225 g, Rfl: 11    apixaban (ELIQUIS) 5 MG tablet tablet, Take 1 tablet by mouth 2 (Two) Times a Day., Disp: 180 tablet, Rfl: 1    budesonide (PULMICORT) 0.5 MG/2ML nebulizer solution, Take 2 mL by nebulization Daily., Disp: 180 mL, Rfl: 1    cetirizine (zyrTEC) 10 MG tablet, , Disp: , Rfl:     chlorhexidine (PERIDEX) 0.12 % solution, , Disp: , Rfl:     clindamycin (Cleocin-T) 1 % external solution, Apply  topically to the appropriate area as directed 2 (Two) Times a Day., Disp: 60 mL, Rfl: 3    colchicine 0.6 MG tablet, Take 2 PO at onset of gout flare, then take 1 tablet PO daily until symptoms resolve, Disp: 30 tablet, Rfl: 3    Diclofenac Sodium (VOLTAREN) 1 % gel gel, diclofenac sodium 1 % topical gel apply 2 gram to the affected area(s) by topical route 4 times per day   Suspended, Disp: , Rfl:     docusate sodium 100 MG capsule, Take 1 capsule by mouth 2 (Two) Times a Day As Needed (constipation)., Disp: 90 each, Rfl: 3    Emollient (cetaphil) cream, Apply 1 application  topically to the appropriate area as directed 2 (Two) Times a Day., Disp: 453 g, Rfl: 3    erythromycin (ROMYCIN) 5 MG/GM ophthalmic ointment, Instill 1 cm ribbon in each eye QID, Disp: 3.5 g, Rfl: 0    fluconazole (Diflucan) 100 MG tablet, Take 1 tablet by mouth Daily., Disp: 14 tablet, Rfl: 0    folic acid (FOLVITE) 1 MG tablet, Take 1 tablet by mouth Daily., Disp: 90 tablet, Rfl: 3    ipratropium (ATROVENT) 0.03 % nasal spray, 2 sprays into the nostril(s) as directed by provider., Disp: , Rfl:     ketoconazole (NIZORAL) 2 % cream, Apply  topically to the appropriate area  as directed Daily., Disp: 60 g, Rfl: 1    levothyroxine (SYNTHROID, LEVOTHROID) 25 MCG tablet, Take 1 tablet by mouth Daily., Disp: 90 tablet, Rfl: 3    loratadine (CLARITIN) 10 MG tablet, Take 1 tablet by mouth Daily., Disp: 90 tablet, Rfl: 3    mineral oil-hydrophilic petrolatum (AQUAPHOR) ointment, Apply 1 Application topically to the appropriate area as directed Daily., Disp: 396 g, Rfl: 1    montelukast (Singulair) 10 MG tablet, Take 1 tablet by mouth Every Night., Disp: 90 tablet, Rfl: 3    mupirocin (BACTROBAN) 2 % ointment, Apply  topically to the appropriate area as directed 2 (Two) Times a Day., Disp: 30 g, Rfl: 3    nystatin (MYCOSTATIN) 805852 UNIT/GM cream, Apply 1 Application topically to the appropriate area as directed 2 (Two) Times a Day., Disp: 60 g, Rfl: 2    nystatin (MYCOSTATIN) 858809 UNIT/GM powder, Apply  topically to the appropriate area as directed 3 (Three) Times a Day., Disp: 60 g, Rfl: 2    olopatadine (PATANOL) 0.1 % ophthalmic solution, , Disp: , Rfl:     Petrolatum (Petroleum Jelly) ointment, Apply 1 application  topically As Needed (prn as needed)., Disp: 368 g, Rfl: 3    Soap & Cleansers (Cetaklenz) liquid, Apply 1 Application topically to the appropriate area as directed Daily., Disp: 472 mL, Rfl: 3    Soap & Cleansers (Gentle Skin Cleanser) liquid, Apply 1 application  topically 2 (Two) Times a Day., Disp: 473 mL, Rfl: 3    Sodium Fluoride 1.1 % cream, , Disp: , Rfl:     Urea 50 % suspension, Apply topically to the affected areas TID, Disp: 284 g, Rfl: 3    vitamin B-12 (CYANOCOBALAMIN) 1000 MCG tablet, Take 1 tablet by mouth Daily., Disp: 90 tablet, Rfl: 1    vitamin B-6 (PYRIDOXINE) 50 MG tablet, Take 0.5 tablets by mouth Every Other Day., Disp: 23 tablet, Rfl: 3    Zinc Oxide 16 % ointment, Apply 1 application topically to the appropriate area as directed Daily As Needed (rash)., Disp: 113 g, Rfl: 3    promethazine-dextromethorphan (PROMETHAZINE-DM) 6.25-15 MG/5ML syrup, Take 5  "mL by mouth 4 (Four) Times a Day As Needed for Cough., Disp: 473 mL, Rfl: 0    Zinc Oxide 16 % ointment, Apply 1 dose topically to the appropriate area as directed. (Patient not taking: Reported on 2/22/2024), Disp: , Rfl:      Objective     Vital Signs:   /75 (BP Location: Left arm, Patient Position: Sitting, Cuff Size: Adult)   Pulse 91   Temp 98.7 °F (37.1 °C) (Oral)   Resp 14   Ht 160 cm (63\")   Wt (!) 166 kg (365 lb)   SpO2 97% Comment: ROOM AIR  BMI 64.66 kg/m²     Physical Exam  Constitutional:       General: He is not in acute distress.     Appearance: Normal appearance. He is obese. He is not ill-appearing.   HENT:      Right Ear: Tympanic membrane and ear canal normal.      Left Ear: Tympanic membrane and ear canal normal.      Nose: Nose normal.      Mouth/Throat:      Mouth: Mucous membranes are moist.      Pharynx: Oropharynx is clear.   Eyes:      Extraocular Movements: Extraocular movements intact.      Conjunctiva/sclera: Conjunctivae normal.      Pupils: Pupils are equal, round, and reactive to light.   Cardiovascular:      Rate and Rhythm: Normal rate and regular rhythm.      Pulses: Normal pulses.      Heart sounds: Normal heart sounds.   Pulmonary:      Effort: Pulmonary effort is normal. No respiratory distress.      Breath sounds: Normal breath sounds. No stridor. No wheezing, rhonchi or rales.   Abdominal:      General: Bowel sounds are normal.      Palpations: Abdomen is soft.   Musculoskeletal:         General: No swelling. Normal range of motion.      Cervical back: Normal range of motion and neck supple.      Right lower leg: No edema.      Left lower leg: No edema.   Skin:     General: Skin is warm and dry.   Neurological:      General: No focal deficit present.      Mental Status: He is alert and oriented to person, place, and time.      Motor: No weakness.   Psychiatric:         Mood and Affect: Mood normal.         Behavior: Behavior normal.       Result Review :   I have " personally reviewed patient's labs and images.  I also reviewed Dr. Eugene's last office note 12/27/2023.            Diagnoses and all orders for this visit:    1. Multiple pulmonary nodules (Primary)  -     CT Chest Without Contrast; Future    2. Asthma, unspecified asthma severity, unspecified whether complicated, unspecified whether persistent  -     Home Nebulizer    3. TAMIKO (obstructive sleep apnea)    4. Chronic cough  -     promethazine-dextromethorphan (PROMETHAZINE-DM) 6.25-15 MG/5ML syrup; Take 5 mL by mouth 4 (Four) Times a Day As Needed for Cough.  Dispense: 473 mL; Refill: 0    5. Deep vein thrombosis (DVT) of proximal lower extremity, unspecified chronicity, unspecified laterality    6. Morbid obesity with BMI of 60.0-69.9, adult       Impression and Plan    CT of chest 12/7/2023 shows interval resolution of groundglass nodules present on the prior study.  There is a new 6 millimeter nodule in the medial left upper lobe and lateral right upper lobe.  No pleural effusions.  Will hold off on PFTs for now  Order chest CT for 12/2024 to monitor lung nodule  Continue albuterol rescue inhaler, albuterol nebs and Pulmicort as needed.  New nebulizer machine provided in clinic today.  Continue taking Eliquis daily for history of DVT  Follow-up after CT of chest    Smoking status: Reviewed  Vaccination status:Patient reports he is up-to-date with his flu, pneumonia, and Covid vaccines.  Patient is advised to continue to follow CDC recommendations such as social distancing wearing a mask and washing hands for at least 20 seconds.    Medications personally reviewed    Follow Up   No follow-ups on file.  Patient was given instructions and counseling regarding his condition or for health maintenance advice. Please see specific information pulled into the AVS if appropriate.

## 2024-04-22 ENCOUNTER — OFFICE VISIT (OUTPATIENT)
Dept: PULMONOLOGY | Facility: CLINIC | Age: 29
End: 2024-04-22
Payer: MEDICARE

## 2024-04-22 ENCOUNTER — HOSPITAL ENCOUNTER (OUTPATIENT)
Dept: INFUSION THERAPY | Facility: HOSPITAL | Age: 29
Discharge: HOME | End: 2024-04-22
Admitting: INTERNAL MEDICINE
Payer: OTHER GOVERNMENT

## 2024-04-22 VITALS
HEIGHT: 63 IN | RESPIRATION RATE: 14 BRPM | SYSTOLIC BLOOD PRESSURE: 116 MMHG | DIASTOLIC BLOOD PRESSURE: 75 MMHG | WEIGHT: 315 LBS | OXYGEN SATURATION: 97 % | HEART RATE: 91 BPM | TEMPERATURE: 98.7 F | BODY MASS INDEX: 55.81 KG/M2

## 2024-04-22 VITALS
HEART RATE: 95 BPM | WEIGHT: 315 LBS | TEMPERATURE: 98.7 F | BODY MASS INDEX: 55.81 KG/M2 | SYSTOLIC BLOOD PRESSURE: 115 MMHG | RESPIRATION RATE: 18 BRPM | HEIGHT: 63 IN | DIASTOLIC BLOOD PRESSURE: 60 MMHG | OXYGEN SATURATION: 99 %

## 2024-04-22 DIAGNOSIS — E66.01 MORBID OBESITY WITH BMI OF 60.0-69.9, ADULT: ICD-10-CM

## 2024-04-22 DIAGNOSIS — I82.4Y9 DEEP VEIN THROMBOSIS (DVT) OF PROXIMAL LOWER EXTREMITY, UNSPECIFIED CHRONICITY, UNSPECIFIED LATERALITY: ICD-10-CM

## 2024-04-22 DIAGNOSIS — R91.8 MULTIPLE PULMONARY NODULES: Primary | ICD-10-CM

## 2024-04-22 DIAGNOSIS — D75.1 SECONDARY POLYCYTHEMIA: Primary | ICD-10-CM

## 2024-04-22 DIAGNOSIS — R05.3 CHRONIC COUGH: ICD-10-CM

## 2024-04-22 DIAGNOSIS — G47.33 OSA (OBSTRUCTIVE SLEEP APNEA): ICD-10-CM

## 2024-04-22 DIAGNOSIS — J45.909 ASTHMA, UNSPECIFIED ASTHMA SEVERITY, UNSPECIFIED WHETHER COMPLICATED, UNSPECIFIED WHETHER PERSISTENT: ICD-10-CM

## 2024-04-22 LAB
DEPRECATED RDW RBC AUTO: 51.8 FL (ref 37–54)
ERYTHROCYTE [DISTWIDTH] IN BLOOD BY AUTOMATED COUNT: 16.8 % (ref 12.3–15.4)
HCT VFR BLD AUTO: 46.3 % (ref 37.5–51)
HGB BLD-MCNC: 14.8 G/DL (ref 13–17.7)
MCH RBC QN AUTO: 27.3 PG (ref 26.6–33)
MCHC RBC AUTO-ENTMCNC: 32 G/DL (ref 31.5–35.7)
MCV RBC AUTO: 85.3 FL (ref 79–97)
PLATELET # BLD AUTO: 307 10*3/MM3 (ref 140–450)
PMV BLD AUTO: 10.3 FL (ref 6–12)
RBC # BLD AUTO: 5.43 10*6/MM3 (ref 4.14–5.8)
WBC NRBC COR # BLD AUTO: 3.99 10*3/MM3 (ref 3.4–10.8)

## 2024-04-22 PROCEDURE — 36415 COLL VENOUS BLD VENIPUNCTURE: CPT

## 2024-04-22 PROCEDURE — 1159F MED LIST DOCD IN RCRD: CPT

## 2024-04-22 PROCEDURE — 1160F RVW MEDS BY RX/DR IN RCRD: CPT

## 2024-04-22 PROCEDURE — 99214 OFFICE O/P EST MOD 30 MIN: CPT

## 2024-04-22 PROCEDURE — 99195 PHLEBOTOMY: CPT

## 2024-04-22 PROCEDURE — 85027 COMPLETE CBC AUTOMATED: CPT | Performed by: INTERNAL MEDICINE

## 2024-04-22 RX ORDER — DEXTROMETHORPHAN HYDROBROMIDE AND PROMETHAZINE HYDROCHLORIDE 15; 6.25 MG/5ML; MG/5ML
5 SYRUP ORAL 4 TIMES DAILY PRN
Qty: 473 ML | Refills: 0 | Status: SHIPPED | OUTPATIENT
Start: 2024-04-22

## 2024-05-06 ENCOUNTER — HOSPITAL ENCOUNTER (OUTPATIENT)
Dept: INFUSION THERAPY | Facility: HOSPITAL | Age: 29
Discharge: HOME | End: 2024-05-06
Admitting: INTERNAL MEDICINE
Payer: OTHER GOVERNMENT

## 2024-05-06 VITALS
BODY MASS INDEX: 55.81 KG/M2 | OXYGEN SATURATION: 94 % | HEART RATE: 104 BPM | DIASTOLIC BLOOD PRESSURE: 77 MMHG | TEMPERATURE: 98.6 F | RESPIRATION RATE: 20 BRPM | SYSTOLIC BLOOD PRESSURE: 122 MMHG | WEIGHT: 315 LBS | HEIGHT: 63 IN

## 2024-05-06 DIAGNOSIS — D75.1 SECONDARY POLYCYTHEMIA: Primary | ICD-10-CM

## 2024-05-06 LAB
DEPRECATED RDW RBC AUTO: 50.5 FL (ref 37–54)
ERYTHROCYTE [DISTWIDTH] IN BLOOD BY AUTOMATED COUNT: 16.7 % (ref 12.3–15.4)
HCT VFR BLD AUTO: 42.7 % (ref 37.5–51)
HGB BLD-MCNC: 13.7 G/DL (ref 13–17.7)
MCH RBC QN AUTO: 26.6 PG (ref 26.6–33)
MCHC RBC AUTO-ENTMCNC: 32.1 G/DL (ref 31.5–35.7)
MCV RBC AUTO: 82.8 FL (ref 79–97)
PLATELET # BLD AUTO: 327 10*3/MM3 (ref 140–450)
PMV BLD AUTO: 10.8 FL (ref 6–12)
RBC # BLD AUTO: 5.16 10*6/MM3 (ref 4.14–5.8)
WBC NRBC COR # BLD AUTO: 3.76 10*3/MM3 (ref 3.4–10.8)

## 2024-05-06 PROCEDURE — 99195 PHLEBOTOMY: CPT

## 2024-05-06 PROCEDURE — 36415 COLL VENOUS BLD VENIPUNCTURE: CPT

## 2024-05-06 PROCEDURE — 85027 COMPLETE CBC AUTOMATED: CPT | Performed by: INTERNAL MEDICINE

## 2024-05-21 ENCOUNTER — HOSPITAL ENCOUNTER (OUTPATIENT)
Dept: INFUSION THERAPY | Facility: HOSPITAL | Age: 29
Discharge: HOME | End: 2024-05-21
Admitting: INTERNAL MEDICINE
Payer: OTHER GOVERNMENT

## 2024-05-21 VITALS
RESPIRATION RATE: 20 BRPM | HEART RATE: 82 BPM | WEIGHT: 315 LBS | SYSTOLIC BLOOD PRESSURE: 102 MMHG | HEIGHT: 63 IN | OXYGEN SATURATION: 97 % | DIASTOLIC BLOOD PRESSURE: 57 MMHG | TEMPERATURE: 98.3 F | BODY MASS INDEX: 55.81 KG/M2

## 2024-05-21 DIAGNOSIS — D75.1 SECONDARY POLYCYTHEMIA: Primary | ICD-10-CM

## 2024-05-21 LAB
DEPRECATED RDW RBC AUTO: 49.8 FL (ref 37–54)
ERYTHROCYTE [DISTWIDTH] IN BLOOD BY AUTOMATED COUNT: 17.9 % (ref 12.3–15.4)
HCT VFR BLD AUTO: 46.3 % (ref 37.5–51)
HGB BLD-MCNC: 14.8 G/DL (ref 13–17.7)
MCH RBC QN AUTO: 25.6 PG (ref 26.6–33)
MCHC RBC AUTO-ENTMCNC: 32 G/DL (ref 31.5–35.7)
MCV RBC AUTO: 80.2 FL (ref 79–97)
PLATELET # BLD AUTO: 376 10*3/MM3 (ref 140–450)
PMV BLD AUTO: 9.8 FL (ref 6–12)
RBC # BLD AUTO: 5.77 10*6/MM3 (ref 4.14–5.8)
WBC NRBC COR # BLD AUTO: 6.62 10*3/MM3 (ref 3.4–10.8)

## 2024-05-21 PROCEDURE — 36415 COLL VENOUS BLD VENIPUNCTURE: CPT

## 2024-05-21 PROCEDURE — 85027 COMPLETE CBC AUTOMATED: CPT | Performed by: INTERNAL MEDICINE

## 2024-05-21 PROCEDURE — 99195 PHLEBOTOMY: CPT

## 2024-05-21 NOTE — CODE DOCUMENTATION
"Patient presented for therapeutic phlebotomy labs were obtained and resulted, phlebotomy was initiated 378 g/349ml was obtained, and blood flow ceased. Mother ( caregiver) refused another stick, stating \" we will be back in 2 weeks anyway.\"   "

## 2024-05-23 ENCOUNTER — OFFICE VISIT (OUTPATIENT)
Dept: FAMILY MEDICINE CLINIC | Facility: CLINIC | Age: 29
End: 2024-05-23
Payer: OTHER GOVERNMENT

## 2024-05-23 VITALS
DIASTOLIC BLOOD PRESSURE: 78 MMHG | HEART RATE: 93 BPM | RESPIRATION RATE: 18 BRPM | TEMPERATURE: 98 F | SYSTOLIC BLOOD PRESSURE: 120 MMHG | WEIGHT: 315 LBS | OXYGEN SATURATION: 95 % | BODY MASS INDEX: 55.81 KG/M2 | HEIGHT: 63 IN

## 2024-05-23 DIAGNOSIS — E66.01 CLASS 3 SEVERE OBESITY WITH BODY MASS INDEX (BMI) OF 60.0 TO 69.9 IN ADULT, UNSPECIFIED OBESITY TYPE, UNSPECIFIED WHETHER SERIOUS COMORBIDITY PRESENT: ICD-10-CM

## 2024-05-23 DIAGNOSIS — L73.2 HIDRADENITIS SUPPURATIVA: ICD-10-CM

## 2024-05-23 DIAGNOSIS — Q79.0 MORGAGNI HERNIA: ICD-10-CM

## 2024-05-23 DIAGNOSIS — M10.9 GOUT, UNSPECIFIED CAUSE, UNSPECIFIED CHRONICITY, UNSPECIFIED SITE: ICD-10-CM

## 2024-05-23 DIAGNOSIS — I82.4Y2 DEEP VEIN THROMBOSIS (DVT) OF PROXIMAL VEIN OF LEFT LOWER EXTREMITY, UNSPECIFIED CHRONICITY: Primary | ICD-10-CM

## 2024-05-23 DIAGNOSIS — Q90.9 DOWN SYNDROME: ICD-10-CM

## 2024-05-23 DIAGNOSIS — I26.99 OTHER PULMONARY EMBOLISM WITHOUT ACUTE COR PULMONALE, UNSPECIFIED CHRONICITY: ICD-10-CM

## 2024-05-23 DIAGNOSIS — E53.8 B12 DEFICIENCY: ICD-10-CM

## 2024-05-23 DIAGNOSIS — R73.09 ABNORMAL GLUCOSE: ICD-10-CM

## 2024-05-23 DIAGNOSIS — D75.1 SECONDARY POLYCYTHEMIA: ICD-10-CM

## 2024-05-23 DIAGNOSIS — R91.8 MULTIPLE PULMONARY NODULES: ICD-10-CM

## 2024-05-23 DIAGNOSIS — L30.4 INTERTRIGO: ICD-10-CM

## 2024-05-23 RX ORDER — ALLOPURINOL 300 MG/1
300 TABLET ORAL DAILY
Qty: 90 TABLET | Refills: 3 | Status: SHIPPED | OUTPATIENT
Start: 2024-05-23

## 2024-05-23 RX ORDER — MONTELUKAST SODIUM 10 MG/1
10 TABLET ORAL NIGHTLY
Qty: 90 TABLET | Refills: 3 | Status: SHIPPED | OUTPATIENT
Start: 2024-05-23

## 2024-05-23 RX ORDER — COLCHICINE 0.6 MG/1
TABLET ORAL
Qty: 30 TABLET | Refills: 3 | Status: SHIPPED | OUTPATIENT
Start: 2024-05-23

## 2024-05-23 RX ORDER — CLINDAMYCIN PHOSPHATE 11.9 MG/ML
SOLUTION TOPICAL 2 TIMES DAILY
Qty: 60 ML | Refills: 3 | Status: SHIPPED | OUTPATIENT
Start: 2024-05-23

## 2024-05-23 RX ORDER — SKIN CLEANSER
1 CLEANSER (ML) TOPICAL DAILY
Qty: 472 ML | Refills: 3 | Status: SHIPPED | OUTPATIENT
Start: 2024-05-23

## 2024-05-23 RX ORDER — KETOCONAZOLE 20 MG/G
CREAM TOPICAL DAILY
Qty: 60 G | Refills: 2 | Status: SHIPPED | OUTPATIENT
Start: 2024-05-23

## 2024-05-23 RX ORDER — ALBUTEROL SULFATE 90 UG/1
2 AEROSOL, METERED RESPIRATORY (INHALATION) EVERY 4 HOURS PRN
Qty: 8.5 G | Refills: 3 | Status: SHIPPED | OUTPATIENT
Start: 2024-05-23

## 2024-05-23 RX ORDER — MINERAL OIL/HYDROPHIL PETROLAT
1 OINTMENT (GRAM) TOPICAL DAILY
Qty: 396 G | Refills: 1 | Status: SHIPPED | OUTPATIENT
Start: 2024-05-23

## 2024-05-23 RX ORDER — LORATADINE 10 MG/1
10 TABLET ORAL DAILY
Qty: 90 TABLET | Refills: 3 | Status: SHIPPED | OUTPATIENT
Start: 2024-05-23

## 2024-05-23 NOTE — LETTER
May 23, 2024     Patient: Christine Bernard   YOB: 1995   Date of Visit: 2024       To Whom It May Concern:    This letter is written on behalf of my patient, Kathi Bernard (: 1995). I, Dr. Jesus Manuel Sloan, DO provide primary care services for the patient.     Patient requires the following equipment:    Walking Cane  Bathtub seat  Wide rolator with a chair that is wide for patients up to 400lbs    This will be to help with mobility issues as patient has morbid obesity as well as difficulty with ambulation.    Diagnoses include:   E66.01  M10.9  Q90.9    If you have any further questions in this regard, please do not hesitate to reach out to me.      Sincerely,        Jesus Manuel Solan DO    CC: No Recipients

## 2024-05-23 NOTE — PROGRESS NOTES
Chief Complaint  Cyst on left armpit area  Med refills  Discuss obesity management      Subjective          Christine Bernard presents to [facility]  Obesity    Spots and/or Floaters      Patient presents today accompanied by his parents to discuss a cyst in the left armpit region.  He was previously diagnosed with hidradenitis and has a similar lesion present on the left axillary region.  It is not causing any drainage or pain.  The intertrigo that was in the buttocks region has improved.  He had this noted previously in the intergluteal cleft.  I prescribed ketoconazole to help out which has been beneficial.  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  He did see general surgery.  He does continue to take Eliquis for DVT of the left lower extremity as well as previous pulmonary embolism.  We discussed continue current management.  He also seen by hematology for secondary polycythemia.  He is having therapeutic phlebotomy done.  His last CBC showed normal hemoglobin at 14.8.  He does periodically get flareups of gout and takes allopurinol as well as colchicine.  He does need labs repeated again so we will place orders.  Plan also check A1c.  Not previously noted to be in the diabetic range.  We did discuss his pulmonary nodules.  He will have follow-up as well with pulmonology in this regard and was most recently seen on 4/22/2024 with a CT scheduled for December to follow-up.  We discussed options for weight management.  I discussed with him a trial of zepbound.  No family or personal history of medullary thyroid cancer or multiple endocrine neoplasia syndrome type II.  He does need home equipment including a cane, rollator with a chair that is wide enough to see someone that weighs up to 400 pounds as well as a bathtub chair.  He does have difficulty with ambulation and already uses a cane to help  out.    Current Outpatient Medications   Medication Instructions    albuterol (ACCUNEB) 1.25 mg, Nebulization, Every 6 Hours PRN    albuterol sulfate  (90 Base) MCG/ACT inhaler 2 puffs, Inhalation, Every 4 Hours PRN    allopurinol (ZYLOPRIM) 300 mg, Oral, Daily    ALUM SULFATE-CA ACETATE EX Topical    ammonium lactate (AmLactin) 12 % lotion Topical, 2 Times Daily    apixaban (ELIQUIS) 5 mg, Oral, 2 Times Daily    budesonide (PULMICORT) 0.5 mg, Nebulization, Daily - RT    cetirizine (zyrTEC) 10 MG tablet     chlorhexidine (PERIDEX) 0.12 % solution     clindamycin (Cleocin-T) 1 % external solution Topical, 2 Times Daily    colchicine 0.6 MG tablet Take 2 PO at onset of gout flare, then take 1 tablet PO daily until symptoms resolve    Diclofenac Sodium (VOLTAREN) 1 % gel gel diclofenac sodium 1 % topical gel apply 2 gram to the affected area(s) by topical route 4 times per day   Suspended    docusate sodium 100 mg, Oral, 2 Times Daily PRN    Emollient (cetaphil) cream 1 application , Topical, 2 Times Daily    erythromycin (ROMYCIN) 5 MG/GM ophthalmic ointment Instill 1 cm ribbon in each eye QID    fluconazole (DIFLUCAN) 100 mg, Oral, Daily    folic acid (FOLVITE) 1 mg, Oral, Daily    ipratropium (ATROVENT) 0.03 % nasal spray 2 sprays, Nasal    ketoconazole (NIZORAL) 2 % cream Topical, Daily    levothyroxine (SYNTHROID, LEVOTHROID) 25 mcg, Oral, Daily    loratadine (CLARITIN) 10 mg, Oral, Daily    mineral oil-hydrophilic petrolatum (AQUAPHOR) ointment 1 Application, Topical, Daily    montelukast (SINGULAIR) 10 mg, Oral, Nightly    mupirocin (BACTROBAN) 2 % ointment Topical, 2 Times Daily    nystatin (MYCOSTATIN) 497625 UNIT/GM cream 1 Application, Topical, 2 Times Daily    nystatin (MYCOSTATIN) 836433 UNIT/GM powder Topical, 3 Times Daily    olopatadine (PATANOL) 0.1 % ophthalmic solution No dose, route, or frequency recorded.    Petrolatum (Petroleum Jelly) ointment 1 application , Apply externally, As Needed     "promethazine-dextromethorphan (PROMETHAZINE-DM) 6.25-15 MG/5ML syrup 5 mL, Oral, 4 Times Daily PRN    Soap & Cleansers (Cetaklenz) liquid 1 Application, Topical, Daily    Soap & Cleansers (Gentle Skin Cleanser) liquid 1 application , Apply externally, 2 Times Daily    Sodium Fluoride 1.1 % cream     Tirzepatide-Weight Management (ZEPBOUND) 2.5 mg, Subcutaneous, Weekly    Urea 50 % suspension Apply topically to the affected areas TID    vitamin B-12 (CYANOCOBALAMIN) 1,000 mcg, Oral, Daily    vitamin B-6 (PYRIDOXINE) 25 mg, Oral, Every Other Day    Zinc Oxide 1 g, Topical, Daily PRN       The following portions of the patient's history were reviewed and updated as appropriate: allergies, current medications, past family history, past medical history, past social history, past surgical history, and problem list.    Objective   Vital Signs:   /78 (BP Location: Right arm, Patient Position: Sitting, Cuff Size: Large Adult)   Pulse 93   Temp 98 °F (36.7 °C) (Oral)   Resp 18   Ht 160 cm (63\")   Wt (!) 163 kg (360 lb 4.8 oz)   SpO2 95%   BMI 63.82 kg/m²     BP Readings from Last 3 Encounters:   05/23/24 120/78   05/21/24 102/57   05/06/24 122/77     Wt Readings from Last 3 Encounters:   05/23/24 (!) 163 kg (360 lb 4.8 oz)   05/21/24 (!) 162 kg (357 lb 2.3 oz)   05/06/24 (!) 165 kg (362 lb 10.5 oz)           Physical Exam  Vitals reviewed.   Constitutional:       Appearance: Normal appearance.   HENT:      Head: Normocephalic and atraumatic.      Right Ear: External ear normal.      Left Ear: External ear normal.   Eyes:      Conjunctiva/sclera: Conjunctivae normal.   Cardiovascular:      Rate and Rhythm: Normal rate and regular rhythm.      Heart sounds: No murmur heard.     No friction rub. No gallop.   Pulmonary:      Effort: Pulmonary effort is normal.      Breath sounds: Normal breath sounds. No wheezing or rhonchi.   Abdominal:      General: Bowel sounds are normal. There is no distension.      Palpations: " Abdomen is soft.      Tenderness: There is no abdominal tenderness.   Skin:     Comments: Healing hidradenitis lesion noted in the left axillary region.   Neurological:      Mental Status: He is alert and oriented to person, place, and time.      Cranial Nerves: No cranial nerve deficit.   Psychiatric:         Mood and Affect: Mood and affect normal.         Behavior: Behavior normal.         Thought Content: Thought content normal.         Judgment: Judgment normal.            Result Review :   The following data was reviewed by: Jesus Manuel Sloan DO on 05/23/2024:  Common labs          4/22/2024    15:06 5/6/2024    15:18 5/21/2024    10:24   Common Labs   WBC 3.99  3.76  6.62    Hemoglobin 14.8  13.7  14.8    Hematocrit 46.3  42.7  46.3    Platelets 307  327  376             Lab Results   Component Value Date    SARSANTIGEN Not Detected 12/13/2023    FLUAAG Not Detected 12/13/2023    FLUBAG Not Detected 12/13/2023    BILIRUBINUR Negative 09/10/2022       Procedures        Assessment and Plan    Diagnoses and all orders for this visit:    1. Deep vein thrombosis (DVT) of proximal vein of left lower extremity, unspecified chronicity (Primary)    2. Other pulmonary embolism without acute cor pulmonale, unspecified chronicity    3. Gout, unspecified cause, unspecified chronicity, unspecified site  -     CBC & Differential; Future  -     Comprehensive Metabolic Panel; Future  -     Uric Acid; Future    4. Intertrigo    5. Hidradenitis suppurativa    6. Morgagni hernia    7. Multiple pulmonary nodules    8. Secondary polycythemia    9. Class 3 severe obesity with body mass index (BMI) of 60.0 to 69.9 in adult, unspecified obesity type, unspecified whether serious comorbidity present    10. Abnormal glucose  -     Hemoglobin A1c; Future    11. B12 deficiency  -     Vitamin B12; Future    12. Down syndrome    Other orders  -     albuterol sulfate  (90 Base) MCG/ACT inhaler; Inhale 2 puffs Every 4 (Four) Hours As  Needed for Wheezing or Shortness of Air.  Dispense: 8.5 g; Refill: 3  -     allopurinol (Zyloprim) 300 MG tablet; Take 1 tablet by mouth Daily.  Dispense: 90 tablet; Refill: 3  -     apixaban (ELIQUIS) 5 MG tablet tablet; Take 1 tablet by mouth 2 (Two) Times a Day.  Dispense: 180 tablet; Refill: 3  -     colchicine 0.6 MG tablet; Take 2 PO at onset of gout flare, then take 1 tablet PO daily until symptoms resolve  Dispense: 30 tablet; Refill: 3  -     Soap & Cleansers (Gentle Skin Cleanser) liquid; Apply 1 application  topically 2 (Two) Times a Day.  Dispense: 473 mL; Refill: 3  -     Soap & Cleansers (Cetaklenz) liquid; Apply 1 Application topically to the appropriate area as directed Daily.  Dispense: 472 mL; Refill: 3  -     mineral oil-hydrophilic petrolatum (AQUAPHOR) ointment; Apply 1 Application topically to the appropriate area as directed Daily.  Dispense: 396 g; Refill: 1  -     ketoconazole (NIZORAL) 2 % cream; Apply  topically to the appropriate area as directed Daily.  Dispense: 60 g; Refill: 2  -     loratadine (CLARITIN) 10 MG tablet; Take 1 tablet by mouth Daily.  Dispense: 90 tablet; Refill: 3  -     montelukast (Singulair) 10 MG tablet; Take 1 tablet by mouth Every Night.  Dispense: 90 tablet; Refill: 3  -     mupirocin (BACTROBAN) 2 % ointment; Apply  topically to the appropriate area as directed 2 (Two) Times a Day.  Dispense: 30 g; Refill: 3  -     Zinc Oxide 16 % ointment; Apply 1 Application topically to the appropriate area as directed Daily As Needed (rash).  Dispense: 113 g; Refill: 3  -     clindamycin (Cleocin-T) 1 % external solution; Apply  topically to the appropriate area as directed 2 (Two) Times a Day.  Dispense: 60 mL; Refill: 3  -     Tirzepatide-Weight Management (ZEPBOUND) 2.5 MG/0.5ML solution auto-injector; Inject 0.5 mL under the skin into the appropriate area as directed 1 (One) Time Per Week.  Dispense: 2 mL; Refill: 0    Plan as documented above.  I discussed a trial of  zepbound. Medication has been prescribed today.  Medications have otherwise been refilled as requested.  We will need to advance prescription as tolerated.  They will contact us for a refill.  Letter has been written on patient's behalf today as well.  I discussed with them treatment for hidradenitis using clindamycin twice daily.  Plan to get labs done at his convenience.  They would like to hold off today as he just had a phlebotomy done recently.      Medications Discontinued During This Encounter   Medication Reason    Zinc Oxide 16 % ointment Reorder    colchicine 0.6 MG tablet Reorder    montelukast (Singulair) 10 MG tablet Reorder    loratadine (CLARITIN) 10 MG tablet Reorder    allopurinol (Zyloprim) 300 MG tablet Reorder    Soap & Cleansers (Gentle Skin Cleanser) liquid Reorder    albuterol sulfate  (90 Base) MCG/ACT inhaler Reorder    mineral oil-hydrophilic petrolatum (AQUAPHOR) ointment Reorder    mupirocin (BACTROBAN) 2 % ointment Reorder    Soap & Cleansers (Cetaklenz) liquid Reorder    ketoconazole (NIZORAL) 2 % cream Reorder    apixaban (ELIQUIS) 5 MG tablet tablet Reorder    clindamycin (Cleocin-T) 1 % external solution Reorder          Follow Up   Return in about 2 months (around 7/23/2024) for obesity.  Patient was given instructions and counseling regarding his condition or for health maintenance advice. Please see specific information pulled into the AVS if appropriate.       Jesus Manuel Sloan DO  05/23/24  10:41 EDT

## 2024-05-24 ENCOUNTER — PRIOR AUTHORIZATION (OUTPATIENT)
Dept: FAMILY MEDICINE CLINIC | Facility: CLINIC | Age: 29
End: 2024-05-24
Payer: OTHER GOVERNMENT

## 2024-05-28 ENCOUNTER — TELEPHONE (OUTPATIENT)
Dept: PULMONOLOGY | Facility: CLINIC | Age: 29
End: 2024-05-28

## 2024-05-28 NOTE — TELEPHONE ENCOUNTER
Hub staff attempted to follow warm transfer process and was unsuccessful     Caller: [facility]    Best call back number: [phone]     Patient is needing: NEEDS DX CCODE FOR NEBULIZER.

## 2024-05-29 ENCOUNTER — PRIOR AUTHORIZATION (OUTPATIENT)
Dept: FAMILY MEDICINE CLINIC | Facility: CLINIC | Age: 29
End: 2024-05-29
Payer: OTHER GOVERNMENT

## 2024-06-20 ENCOUNTER — HOSPITAL ENCOUNTER (OUTPATIENT)
Dept: INFUSION THERAPY | Facility: HOSPITAL | Age: 29
Discharge: HOME | End: 2024-06-20
Admitting: INTERNAL MEDICINE
Payer: OTHER GOVERNMENT

## 2024-06-20 VITALS
WEIGHT: 315 LBS | HEIGHT: 63 IN | RESPIRATION RATE: 20 BRPM | TEMPERATURE: 98.5 F | SYSTOLIC BLOOD PRESSURE: 122 MMHG | BODY MASS INDEX: 55.81 KG/M2 | DIASTOLIC BLOOD PRESSURE: 67 MMHG | OXYGEN SATURATION: 96 % | HEART RATE: 100 BPM

## 2024-06-20 DIAGNOSIS — D75.1 SECONDARY POLYCYTHEMIA: Primary | ICD-10-CM

## 2024-06-20 LAB
DEPRECATED RDW RBC AUTO: 49.1 FL (ref 37–54)
ERYTHROCYTE [DISTWIDTH] IN BLOOD BY AUTOMATED COUNT: 18.8 % (ref 12.3–15.4)
HCT VFR BLD AUTO: 43.3 % (ref 37.5–51)
HGB BLD-MCNC: 13.2 G/DL (ref 13–17.7)
MCH RBC QN AUTO: 23.2 PG (ref 26.6–33)
MCHC RBC AUTO-ENTMCNC: 30.5 G/DL (ref 31.5–35.7)
MCV RBC AUTO: 76.1 FL (ref 79–97)
PLATELET # BLD AUTO: 323 10*3/MM3 (ref 140–450)
PMV BLD AUTO: 9.8 FL (ref 6–12)
RBC # BLD AUTO: 5.69 10*6/MM3 (ref 4.14–5.8)
WBC NRBC COR # BLD AUTO: 3.53 10*3/MM3 (ref 3.4–10.8)

## 2024-06-20 PROCEDURE — 99195 PHLEBOTOMY: CPT

## 2024-06-20 PROCEDURE — 85027 COMPLETE CBC AUTOMATED: CPT | Performed by: INTERNAL MEDICINE

## 2024-06-20 NOTE — CODE DOCUMENTATION
1100 patient got restless after phlebotomy and I had trouble getting his blood pressure. When I asked he states he needs to use the bathroom. Patient became clammy and pale. I made him lean back in chair and he passed out for a few seconds. He came to fairly easy. Mother took dressing off arm and tried to get him up to the bathroom. He proceeded to get weak and start to fall. We lowered him to sit on the floor. No loss of consciousness at this episode. We got him into wheel chair with cold rags and cold drink. He did well after and even went to bathroom without issues. Discharged at 1130

## 2024-07-02 ENCOUNTER — OFFICE VISIT (OUTPATIENT)
Dept: FAMILY MEDICINE CLINIC | Facility: CLINIC | Age: 29
End: 2024-07-02
Payer: OTHER GOVERNMENT

## 2024-07-02 VITALS
BODY MASS INDEX: 55.81 KG/M2 | SYSTOLIC BLOOD PRESSURE: 122 MMHG | TEMPERATURE: 98.3 F | DIASTOLIC BLOOD PRESSURE: 78 MMHG | OXYGEN SATURATION: 94 % | HEART RATE: 94 BPM | WEIGHT: 315 LBS | HEIGHT: 63 IN

## 2024-07-02 DIAGNOSIS — R91.8 MULTIPLE PULMONARY NODULES: ICD-10-CM

## 2024-07-02 DIAGNOSIS — I26.99 OTHER PULMONARY EMBOLISM WITHOUT ACUTE COR PULMONALE, UNSPECIFIED CHRONICITY: ICD-10-CM

## 2024-07-02 DIAGNOSIS — R05.9 COUGH, UNSPECIFIED TYPE: ICD-10-CM

## 2024-07-02 DIAGNOSIS — Q79.0 MORGAGNI HERNIA: ICD-10-CM

## 2024-07-02 DIAGNOSIS — J30.9 ALLERGIC RHINITIS, UNSPECIFIED SEASONALITY, UNSPECIFIED TRIGGER: ICD-10-CM

## 2024-07-02 DIAGNOSIS — I82.409 RECURRENT DEEP VEIN THROMBOSIS (DVT): Primary | ICD-10-CM

## 2024-07-02 DIAGNOSIS — J45.40 MODERATE PERSISTENT ASTHMA WITHOUT COMPLICATION: ICD-10-CM

## 2024-07-02 DIAGNOSIS — I82.4Y2 DEEP VEIN THROMBOSIS (DVT) OF PROXIMAL VEIN OF LEFT LOWER EXTREMITY, UNSPECIFIED CHRONICITY: ICD-10-CM

## 2024-07-02 DIAGNOSIS — M10.9 GOUT, UNSPECIFIED CAUSE, UNSPECIFIED CHRONICITY, UNSPECIFIED SITE: ICD-10-CM

## 2024-07-02 DIAGNOSIS — D75.1 SECONDARY POLYCYTHEMIA: ICD-10-CM

## 2024-07-02 PROCEDURE — 99214 OFFICE O/P EST MOD 30 MIN: CPT | Performed by: FAMILY MEDICINE

## 2024-07-02 RX ORDER — MONTELUKAST SODIUM 10 MG/1
10 TABLET ORAL NIGHTLY
Qty: 90 TABLET | Refills: 3 | Status: SHIPPED | OUTPATIENT
Start: 2024-07-02

## 2024-07-02 RX ORDER — FLUTICASONE FUROATE, UMECLIDINIUM BROMIDE AND VILANTEROL TRIFENATATE 100; 62.5; 25 UG/1; UG/1; UG/1
1 POWDER RESPIRATORY (INHALATION)
Qty: 60 EACH | Refills: 1 | Status: SHIPPED | OUTPATIENT
Start: 2024-07-02

## 2024-07-02 RX ORDER — ALLOPURINOL 300 MG/1
300 TABLET ORAL DAILY
Qty: 90 TABLET | Refills: 3 | Status: SHIPPED | OUTPATIENT
Start: 2024-07-02

## 2024-07-02 RX ORDER — BUDESONIDE 0.5 MG/2ML
0.5 INHALANT ORAL
Qty: 180 ML | Refills: 3 | Status: SHIPPED | OUTPATIENT
Start: 2024-07-02

## 2024-07-02 RX ORDER — SKIN CLEANSER
1 CLEANSER (ML) TOPICAL DAILY
Qty: 472 ML | Refills: 3 | Status: SHIPPED | OUTPATIENT
Start: 2024-07-02

## 2024-07-02 RX ORDER — LORATADINE 10 MG/1
10 TABLET ORAL DAILY
Qty: 90 TABLET | Refills: 3 | Status: SHIPPED | OUTPATIENT
Start: 2024-07-02

## 2024-07-02 RX ORDER — LANOLIN ALCOHOL/MO/W.PET/CERES
1000 CREAM (GRAM) TOPICAL DAILY
Qty: 90 TABLET | Refills: 3 | Status: SHIPPED | OUTPATIENT
Start: 2024-07-02

## 2024-07-02 NOTE — PROGRESS NOTES
Chief Complaint  Passed out with recent phlebotomy  Follow-up for bronchitis  Med refills    Subjective          Christine Bernard presents to [facility]  Fatigue  Associated symptoms include fatigue.     Patient presents today companied by his mother to discuss a recent incident when he was having a therapeutic phlebotomy done for secondary polycythemia that he started to feel really hot.  He complained of this and then shortly thereafter passed out.  He had a therapeutic phlebotomy done which normally takes a lot longer than 30 minutes but this 1 was done rather quickly.  It was felt as though that may have contributed as well.  He has not had any recurrence of this since it occurred.  He does have some bruising on his left arm from where the therapeutic phlebotomy was done.  He also was recently seen at an urgent care for bronchitis.  He was given azithromycin as well as prednisone.  He still has a cough.  He was seen about a month ago.  I did discuss getting a chest x-ray as this has not been completed yet.  We previously discussed options for weight management including stepdown however this was not covered by his insurance.  We discussed having his A1c repeated.  He was not previously noted to be in the diabetic range.  He is due for labs but they would like to get these done tomorrow as patient's mother has to  her grandchildren to take for a school lunch.  We did discuss his pulmonary nodules.  He will have follow-up as well with pulmonology in this regard and was most recently seen on 4/22/2024 with a CT scheduled for December to follow-up.  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  He did see general surgery.  He does continue to take Eliquis for DVT of the left lower extremity as well as previous pulmonary embolism.  We discussed continue current management.  His last CBC on 6/20/2024  showed the hemoglobin was at 13.2.    Current Outpatient Medications   Medication Instructions   • albuterol (ACCUNEB) 1.25 mg, Nebulization, Every 6 Hours PRN   • albuterol sulfate  (90 Base) MCG/ACT inhaler 2 puffs, Inhalation, Every 4 Hours PRN   • allopurinol (ZYLOPRIM) 300 mg, Oral, Daily   • ALUM SULFATE-CA ACETATE EX Apply  topically to the appropriate area as directed.   • ammonium lactate (AmLactin) 12 % lotion Topical, 2 Times Daily   • apixaban (ELIQUIS) 5 mg, Oral, 2 Times Daily   • budesonide (PULMICORT) 0.5 mg, Nebulization, Daily - RT   • cetirizine (zyrTEC) 10 MG tablet    • chlorhexidine (PERIDEX) 0.12 % solution    • clindamycin (Cleocin-T) 1 % external solution Topical, 2 Times Daily   • colchicine 0.6 MG tablet Take 2 PO at onset of gout flare, then take 1 tablet PO daily until symptoms resolve   • Diclofenac Sodium (VOLTAREN) 1 % gel gel diclofenac sodium 1 % topical gel apply 2 gram to the affected area(s) by topical route 4 times per day   Suspended   • docusate sodium 100 mg, Oral, 2 Times Daily PRN   • Emollient (cetaphil) cream 1 application , Topical, 2 Times Daily   • erythromycin (ROMYCIN) 5 MG/GM ophthalmic ointment Instill 1 cm ribbon in each eye QID   • fluconazole (DIFLUCAN) 100 mg, Oral, Daily   • Fluticasone-Umeclidin-Vilant (Trelegy Ellipta) 100-62.5-25 MCG/ACT inhaler 1 puff, Inhalation, Daily - RT   • folic acid (FOLVITE) 1 mg, Oral, Daily   • ipratropium (ATROVENT) 0.03 % nasal spray 2 sprays, Nasal   • ketoconazole (NIZORAL) 2 % cream Topical, Daily   • levothyroxine (SYNTHROID, LEVOTHROID) 25 mcg, Oral, Daily   • loratadine (CLARITIN) 10 mg, Oral, Daily   • mineral oil-hydrophilic petrolatum (AQUAPHOR) ointment 1 Application, Topical, Daily   • montelukast (SINGULAIR) 10 mg, Oral, Nightly   • mupirocin (BACTROBAN) 2 % ointment Topical, 2 Times Daily   • nystatin (MYCOSTATIN) 014241 UNIT/GM cream 1 Application, Topical, 2 Times Daily   • nystatin (MYCOSTATIN) 375403  "UNIT/GM powder Topical, 3 Times Daily   • olopatadine (PATANOL) 0.1 % ophthalmic solution No dose, route, or frequency recorded.   • Petrolatum (Petroleum Jelly) ointment 1 application , Apply externally, As Needed   • promethazine-dextromethorphan (PROMETHAZINE-DM) 6.25-15 MG/5ML syrup 5 mL, Oral, 4 Times Daily PRN   • Soap & Cleansers (Cetaklenz) liquid 1 Application, Topical, Daily   • Soap & Cleansers (Gentle Skin Cleanser) liquid 1 application , Apply externally, 2 Times Daily   • Sodium Fluoride 1.1 % cream    • Urea 50 % suspension Apply topically to the affected areas TID   • vitamin B-12 (CYANOCOBALAMIN) 1,000 mcg, Oral, Daily   • vitamin B-6 (PYRIDOXINE) 25 mg, Oral, Every Other Day   • Zinc Oxide 1 g, Topical, Daily PRN       The following portions of the patient's history were reviewed and updated as appropriate: allergies, current medications, past family history, past medical history, past social history, past surgical history, and problem list.    Objective   Vital Signs:   /78   Pulse 94   Temp 98.3 °F (36.8 °C)   Ht 160 cm (63\")   Wt (!) 167 kg (368 lb 6.4 oz)   SpO2 94%   BMI 65.26 kg/m²     BP Readings from Last 3 Encounters:   07/02/24 122/78   06/20/24 122/67   05/23/24 120/78     Wt Readings from Last 3 Encounters:   07/02/24 (!) 167 kg (368 lb 6.4 oz)   06/20/24 (!) 166 kg (365 lb 4.8 oz)   05/23/24 (!) 163 kg (360 lb 4.8 oz)           Physical Exam  Vitals reviewed.   Constitutional:       Appearance: Normal appearance.   HENT:      Head: Normocephalic and atraumatic.      Right Ear: External ear normal.      Left Ear: External ear normal.   Eyes:      Conjunctiva/sclera: Conjunctivae normal.   Cardiovascular:      Rate and Rhythm: Normal rate and regular rhythm.      Heart sounds: No murmur heard.     No friction rub. No gallop.   Pulmonary:      Effort: Pulmonary effort is normal.      Breath sounds: Normal breath sounds. No wheezing or rhonchi.   Abdominal:      General: Bowel " sounds are normal. There is no distension.      Palpations: Abdomen is soft.      Tenderness: There is no abdominal tenderness.   Skin:     Comments: Resolving ecchymosis noted on the flexor aspect of the left arm distal to the antecubital.   Neurological:      Mental Status: He is alert and oriented to person, place, and time.      Cranial Nerves: No cranial nerve deficit.   Psychiatric:         Mood and Affect: Mood and affect normal.         Behavior: Behavior normal.         Thought Content: Thought content normal.         Judgment: Judgment normal.          Result Review :   The following data was reviewed by: Jesus Manuel Sloan DO on 07/02/2024:  Common labs          5/6/2024    15:18 5/21/2024    10:24 6/20/2024    10:09   Common Labs   WBC 3.76  6.62  3.53    Hemoglobin 13.7  14.8  13.2    Hematocrit 42.7  46.3  43.3    Platelets 327  376  323             Lab Results   Component Value Date    SARSANTIGEN Not Detected 12/13/2023    FLUAAG Not Detected 12/13/2023    FLUBAG Not Detected 12/13/2023    BILIRUBINUR Negative 09/10/2022       Procedures        Assessment and Plan    Diagnoses and all orders for this visit:    1. Recurrent deep vein thrombosis (DVT) (Primary)    2. Allergic rhinitis, unspecified seasonality, unspecified trigger    3. Deep vein thrombosis (DVT) of proximal vein of left lower extremity, unspecified chronicity    4. Other pulmonary embolism without acute cor pulmonale, unspecified chronicity    5. Morgagni hernia    6. Secondary polycythemia    7. Gout, unspecified cause, unspecified chronicity, unspecified site    8. Multiple pulmonary nodules    9. Cough, unspecified type  -     XR Chest PA & Lateral; Future    10. Moderate persistent asthma without complication    Other orders  -     vitamin B-12 (CYANOCOBALAMIN) 1000 MCG tablet; Take 1 tablet by mouth Daily.  Dispense: 90 tablet; Refill: 3  -     apixaban (ELIQUIS) 5 MG tablet tablet; Take 1 tablet by mouth 2 (Two) Times a Day.   Dispense: 180 tablet; Refill: 3  -     Soap & Cleansers (Cetaklenz) liquid; Apply 1 Application topically to the appropriate area as directed Daily.  Dispense: 472 mL; Refill: 3  -     loratadine (CLARITIN) 10 MG tablet; Take 1 tablet by mouth Daily.  Dispense: 90 tablet; Refill: 3  -     montelukast (Singulair) 10 MG tablet; Take 1 tablet by mouth Every Night.  Dispense: 90 tablet; Refill: 3  -     allopurinol (Zyloprim) 300 MG tablet; Take 1 tablet by mouth Daily.  Dispense: 90 tablet; Refill: 3  -     budesonide (PULMICORT) 0.5 MG/2ML nebulizer solution; Take 2 mL by nebulization Daily.  Dispense: 180 mL; Refill: 3  -     Fluticasone-Umeclidin-Vilant (Trelegy Ellipta) 100-62.5-25 MCG/ACT inhaler; Inhale 1 puff Daily.  Dispense: 60 each; Refill: 1      I discussed with patient and mother that his syncope appears to be secondary to vasovagal syncope.  I suspect this is due to the blood being drawn.  He has not had any recurrence of symptoms.  Should there be a recurrence of symptoms we did discuss considering further evaluation at this time.  Patient is encouraged to stay well-hydrated.  We did discuss eating prior to having phlebotomies done.  I did discuss with patient and mother getting a chest x-ray to follow-up for the bronchitis.  I will send in a Trelegy inhaler as well as he does have known asthma.  Plan to see him back in 2 months or sooner if needed.  They are instructed to call with any questions or concerns.  Medications have been refilled today as requested.    Medications Discontinued During This Encounter   Medication Reason   • budesonide (PULMICORT) 0.5 MG/2ML nebulizer solution Reorder   • vitamin B-12 (CYANOCOBALAMIN) 1000 MCG tablet Reorder   • allopurinol (Zyloprim) 300 MG tablet Reorder   • apixaban (ELIQUIS) 5 MG tablet tablet Reorder   • Soap & Cleansers (Cetaklenz) liquid Reorder   • loratadine (CLARITIN) 10 MG tablet Reorder   • montelukast (Singulair) 10 MG tablet Reorder   •  Tirzepatide-Weight Management (ZEPBOUND) 2.5 MG/0.5ML solution auto-injector           Follow Up   Return in about 2 months (around 9/2/2024) for recurrent DVT.  Patient was given instructions and counseling regarding his condition or for health maintenance advice. Please see specific information pulled into the AVS if appropriate.       Jesus Manuel Sloan DO  07/02/24  12:28 EDT

## 2024-07-03 ENCOUNTER — LAB (OUTPATIENT)
Dept: LAB | Facility: HOSPITAL | Age: 29
End: 2024-07-03
Payer: MEDICARE

## 2024-07-03 ENCOUNTER — HOSPITAL ENCOUNTER (OUTPATIENT)
Dept: GENERAL RADIOLOGY | Facility: HOSPITAL | Age: 29
Discharge: HOME | End: 2024-07-03
Payer: MEDICARE

## 2024-07-03 DIAGNOSIS — M10.9 GOUT, UNSPECIFIED CAUSE, UNSPECIFIED CHRONICITY, UNSPECIFIED SITE: ICD-10-CM

## 2024-07-03 DIAGNOSIS — D64.9 ANEMIA, UNSPECIFIED TYPE: ICD-10-CM

## 2024-07-03 DIAGNOSIS — R73.09 ABNORMAL GLUCOSE: ICD-10-CM

## 2024-07-03 DIAGNOSIS — E03.9 HYPOTHYROIDISM, UNSPECIFIED TYPE: ICD-10-CM

## 2024-07-03 DIAGNOSIS — Z11.59 NEED FOR HEPATITIS C SCREENING TEST: ICD-10-CM

## 2024-07-03 DIAGNOSIS — E53.1 VITAMIN B6 DEFICIENCY: ICD-10-CM

## 2024-07-03 DIAGNOSIS — D75.1 SECONDARY POLYCYTHEMIA: ICD-10-CM

## 2024-07-03 DIAGNOSIS — R05.9 COUGH, UNSPECIFIED TYPE: ICD-10-CM

## 2024-07-03 DIAGNOSIS — E53.8 B12 DEFICIENCY: ICD-10-CM

## 2024-07-03 DIAGNOSIS — E11.65 TYPE 2 DIABETES MELLITUS WITH HYPERGLYCEMIA, WITHOUT LONG-TERM CURRENT USE OF INSULIN: ICD-10-CM

## 2024-07-03 DIAGNOSIS — E53.8 FOLATE DEFICIENCY: ICD-10-CM

## 2024-07-03 LAB
ALBUMIN SERPL-MCNC: 3.7 G/DL (ref 3.5–5.2)
ALBUMIN/GLOB SERPL: 1.3 G/DL
ALP SERPL-CCNC: 74 U/L (ref 39–117)
ALT SERPL W P-5'-P-CCNC: 29 U/L (ref 1–41)
ANION GAP SERPL CALCULATED.3IONS-SCNC: 12.6 MMOL/L (ref 5–15)
AST SERPL-CCNC: 33 U/L (ref 1–40)
BASOPHILS # BLD AUTO: 0.08 10*3/MM3 (ref 0–0.2)
BASOPHILS NFR BLD AUTO: 2.1 % (ref 0–1.5)
BILIRUB SERPL-MCNC: 0.4 MG/DL (ref 0–1.2)
BUN SERPL-MCNC: 15 MG/DL (ref 6–20)
BUN/CREAT SERPL: 9.9 (ref 7–25)
CALCIUM SPEC-SCNC: 8.9 MG/DL (ref 8.6–10.5)
CHLORIDE SERPL-SCNC: 102 MMOL/L (ref 98–107)
CO2 SERPL-SCNC: 24.4 MMOL/L (ref 22–29)
CREAT SERPL-MCNC: 1.51 MG/DL (ref 0.76–1.27)
DEPRECATED RDW RBC AUTO: 47.9 FL (ref 37–54)
EGFRCR SERPLBLD CKD-EPI 2021: 64.5 ML/MIN/1.73
EOSINOPHIL # BLD AUTO: 0.02 10*3/MM3 (ref 0–0.4)
EOSINOPHIL NFR BLD AUTO: 0.5 % (ref 0.3–6.2)
ERYTHROCYTE [DISTWIDTH] IN BLOOD BY AUTOMATED COUNT: 18.3 % (ref 12.3–15.4)
GLOBULIN UR ELPH-MCNC: 2.9 GM/DL
GLUCOSE SERPL-MCNC: 99 MG/DL (ref 65–99)
HBA1C MFR BLD: 5.2 % (ref 4.8–5.6)
HCT VFR BLD AUTO: 39.1 % (ref 37.5–51)
HGB BLD-MCNC: 11.7 G/DL (ref 13–17.7)
IMM GRANULOCYTES # BLD AUTO: 0.01 10*3/MM3 (ref 0–0.05)
IMM GRANULOCYTES NFR BLD AUTO: 0.3 % (ref 0–0.5)
LYMPHOCYTES # BLD AUTO: 1.22 10*3/MM3 (ref 0.7–3.1)
LYMPHOCYTES NFR BLD AUTO: 31.5 % (ref 19.6–45.3)
MCH RBC QN AUTO: 22.6 PG (ref 26.6–33)
MCHC RBC AUTO-ENTMCNC: 29.9 G/DL (ref 31.5–35.7)
MCV RBC AUTO: 75.5 FL (ref 79–97)
MONOCYTES # BLD AUTO: 0.41 10*3/MM3 (ref 0.1–0.9)
MONOCYTES NFR BLD AUTO: 10.6 % (ref 5–12)
NEUTROPHILS NFR BLD AUTO: 2.13 10*3/MM3 (ref 1.7–7)
NEUTROPHILS NFR BLD AUTO: 55 % (ref 42.7–76)
NRBC BLD AUTO-RTO: 0 /100 WBC (ref 0–0.2)
PLATELET # BLD AUTO: 339 10*3/MM3 (ref 140–450)
PMV BLD AUTO: 9.7 FL (ref 6–12)
POTASSIUM SERPL-SCNC: 4.4 MMOL/L (ref 3.5–5.2)
PROT SERPL-MCNC: 6.6 G/DL (ref 6–8.5)
RBC # BLD AUTO: 5.18 10*6/MM3 (ref 4.14–5.8)
SODIUM SERPL-SCNC: 139 MMOL/L (ref 136–145)
URATE SERPL-MCNC: 7.8 MG/DL (ref 3.4–7)
VIT B12 BLD-MCNC: 464 PG/ML (ref 211–946)
WBC NRBC COR # BLD AUTO: 3.87 10*3/MM3 (ref 3.4–10.8)

## 2024-07-03 PROCEDURE — 82607 VITAMIN B-12: CPT

## 2024-07-03 PROCEDURE — 84207 ASSAY OF VITAMIN B-6: CPT

## 2024-07-03 PROCEDURE — 83036 HEMOGLOBIN GLYCOSYLATED A1C: CPT

## 2024-07-03 PROCEDURE — 71046 X-RAY EXAM CHEST 2 VIEWS: CPT

## 2024-07-03 PROCEDURE — 80053 COMPREHEN METABOLIC PANEL: CPT

## 2024-07-03 PROCEDURE — 85025 COMPLETE CBC W/AUTO DIFF WBC: CPT

## 2024-07-03 PROCEDURE — 36415 COLL VENOUS BLD VENIPUNCTURE: CPT

## 2024-07-03 PROCEDURE — 84550 ASSAY OF BLOOD/URIC ACID: CPT

## 2024-07-05 ENCOUNTER — TELEPHONE (OUTPATIENT)
Dept: FAMILY MEDICINE CLINIC | Facility: CLINIC | Age: 29
End: 2024-07-05

## 2024-07-05 NOTE — TELEPHONE ENCOUNTER
Caller: RAFIA NEWELL    Relationship to patient: Mother    Best call back number:     [phone]       Chief complaint: 2 MONTH FOLLOW UP     Type of visit: OFFICE    Requested date:  PER NOTES STATES PATIENT WAS TO BE SEEN AROUND 9.2.2024     If rescheduling, when is the original appointment: 10.23.2024     Additional notes:SCHEDULED WITH FIRST AVAILABLE APPOINTMENT.

## 2024-07-07 LAB — PYRIDOXAL PHOS SERPL-MCNC: 30.6 UG/L (ref 3.4–65.2)

## 2024-07-16 ENCOUNTER — HOSPITAL ENCOUNTER (OUTPATIENT)
Dept: INFUSION THERAPY | Facility: HOSPITAL | Age: 29
Discharge: HOME | End: 2024-07-16
Admitting: INTERNAL MEDICINE
Payer: OTHER GOVERNMENT

## 2024-07-16 VITALS
RESPIRATION RATE: 20 BRPM | TEMPERATURE: 98.8 F | HEART RATE: 105 BPM | OXYGEN SATURATION: 94 % | DIASTOLIC BLOOD PRESSURE: 73 MMHG | SYSTOLIC BLOOD PRESSURE: 121 MMHG

## 2024-07-16 DIAGNOSIS — D75.1 SECONDARY POLYCYTHEMIA: Primary | ICD-10-CM

## 2024-07-16 LAB
DEPRECATED RDW RBC AUTO: 49.5 FL (ref 37–54)
ERYTHROCYTE [DISTWIDTH] IN BLOOD BY AUTOMATED COUNT: 19.9 % (ref 12.3–15.4)
HCT VFR BLD AUTO: 40.7 % (ref 37.5–51)
HGB BLD-MCNC: 12.1 G/DL (ref 13–17.7)
MCH RBC QN AUTO: 21.8 PG (ref 26.6–33)
MCHC RBC AUTO-ENTMCNC: 29.7 G/DL (ref 31.5–35.7)
MCV RBC AUTO: 73.5 FL (ref 79–97)
PLATELET # BLD AUTO: 383 10*3/MM3 (ref 140–450)
PMV BLD AUTO: 9.8 FL (ref 6–12)
RBC # BLD AUTO: 5.54 10*6/MM3 (ref 4.14–5.8)
WBC NRBC COR # BLD AUTO: 5.1 10*3/MM3 (ref 3.4–10.8)

## 2024-07-16 PROCEDURE — 85027 COMPLETE CBC AUTOMATED: CPT | Performed by: INTERNAL MEDICINE

## 2024-07-16 PROCEDURE — 36415 COLL VENOUS BLD VENIPUNCTURE: CPT

## 2024-07-18 ENCOUNTER — TELEPHONE (OUTPATIENT)
Dept: FAMILY MEDICINE CLINIC | Facility: CLINIC | Age: 29
End: 2024-07-18
Payer: OTHER GOVERNMENT

## 2024-07-18 NOTE — TELEPHONE ENCOUNTER
Mother came in inquiring about letter sent to [facility]. Letter printed and [facility] called regarding DME. No case on file as this has to go to DME. Phone call placed back to mother who was informed that [place] requires it to be sent to a DME of which the mother states that she uses [facility]. Letter to be faxed to Beth with info needed and signed with mother to followup on Monday.

## 2024-08-07 ENCOUNTER — TELEPHONE (OUTPATIENT)
Dept: ONCOLOGY | Facility: HOSPITAL | Age: 29
End: 2024-08-07

## 2024-08-07 NOTE — TELEPHONE ENCOUNTER
Caller: RAFIA NEWELL    Relationship: Mother    Best call back number: [phone]    What is the best time to reach you: any    Who are you requesting to speak with (clinical staff, provider,  specific staff member): scheduling     What was the call regarding: rafia is calling wanting to schedule an appointment for hali in September around 9-10 is good       Please advise

## 2024-08-27 ENCOUNTER — OFFICE VISIT (OUTPATIENT)
Dept: PULMONOLOGY | Facility: CLINIC | Age: 29
End: 2024-08-27
Payer: OTHER GOVERNMENT

## 2024-08-27 VITALS
DIASTOLIC BLOOD PRESSURE: 95 MMHG | RESPIRATION RATE: 18 BRPM | WEIGHT: 315 LBS | BODY MASS INDEX: 55.81 KG/M2 | SYSTOLIC BLOOD PRESSURE: 129 MMHG | TEMPERATURE: 98.1 F | HEART RATE: 95 BPM | OXYGEN SATURATION: 97 % | HEIGHT: 63 IN

## 2024-08-27 DIAGNOSIS — I82.4Y9 DEEP VEIN THROMBOSIS (DVT) OF PROXIMAL LOWER EXTREMITY, UNSPECIFIED CHRONICITY, UNSPECIFIED LATERALITY: ICD-10-CM

## 2024-08-27 DIAGNOSIS — G47.33 OSA (OBSTRUCTIVE SLEEP APNEA): ICD-10-CM

## 2024-08-27 DIAGNOSIS — R05.3 CHRONIC COUGH: ICD-10-CM

## 2024-08-27 DIAGNOSIS — E66.01 MORBID OBESITY WITH BMI OF 60.0-69.9, ADULT: ICD-10-CM

## 2024-08-27 DIAGNOSIS — J45.909 ASTHMA, UNSPECIFIED ASTHMA SEVERITY, UNSPECIFIED WHETHER COMPLICATED, UNSPECIFIED WHETHER PERSISTENT: ICD-10-CM

## 2024-08-27 DIAGNOSIS — Z86.711 HISTORY OF PULMONARY EMBOLISM: ICD-10-CM

## 2024-08-27 DIAGNOSIS — R91.8 MULTIPLE PULMONARY NODULES: Primary | ICD-10-CM

## 2024-08-27 PROCEDURE — 99214 OFFICE O/P EST MOD 30 MIN: CPT

## 2024-08-27 RX ORDER — AZITHROMYCIN 250 MG/1
TABLET, FILM COATED ORAL
Qty: 6 TABLET | Refills: 0 | Status: SHIPPED | OUTPATIENT
Start: 2024-08-27

## 2024-08-28 NOTE — PROGRESS NOTES
Iker Mark is a 40 year old male. No chief complaint on file.    HPI:   40-year-old white male apparently has had multiple sets of ear tubes last being in 2014 primarily is having problems now in the right ear left ear has been good for a while what happens is he gets ear infection that has chronic discharge which she has been having now for at least a month or more.  With decreased hearing slight irritation.  Typically when this happens he will clear the infection and he gets another ear tube placed.  Current Outpatient Medications   Medication Sig Dispense Refill    amphetamine-dextroamphetamine ER (ADDERALL XR) 30 MG Oral Capsule SR 24 Hr Take 1 capsule (30 mg total) by mouth every morning. 30 capsule 0    amphetamine-dextroamphetamine (ADDERALL) 30 MG Oral Tab Take 1 tablet (30 mg total) by mouth daily. To be taken in the afternoon 30 tablet 0    FLUTICASONE PROPIONATE 50 MCG/ACT Nasal Suspension SHAKE LIQUID AND USE 2 SPRAYS IN EACH NOSTRIL DAILY 48 g 0    amoxicillin 500 MG Oral Cap Take 1 capsule (500 mg total) by mouth Q12H.      ibuprofen 600 MG Oral Tab Take 1 tablet (600 mg total) by mouth every 6 (six) hours as needed for Pain.      amphetamine-dextroamphetamine ER (ADDERALL XR) 30 MG Oral Capsule SR 24 Hr Take 1 capsule (30 mg total) by mouth every morning. 30 capsule 0      Past Medical History:    ADHD (attention deficit hyperactivity disorder)    Allergic rhinitis    Eczema    Unspecified otitis media      Social History:  Social History     Socioeconomic History    Marital status:    Occupational History    Occupation:    Tobacco Use    Smoking status: Some Days     Types: Cigars, Pipe    Smokeless tobacco: Never   Vaping Use    Vaping status: Never Used   Substance and Sexual Activity    Alcohol use: Yes     Comment: 5 -6 per week of beer or whiskey or vodka    Drug use: Yes     Types: Cannabis     Social Determinants of Health      Received from [facility],  Primary Care Provider  Jesus Manuel Sloan DO   Referring Provider  No ref. provider found      Patient Complaint  Multiple pulmonary nodules, Follow-up (4 Month ), and Cough (Dry )      Subjective          Tremayne Bernard presents to [facility] PULMONARY [facility] CRITICAL CARE MEDICINE      History of Presenting Illness  Tremayne Bernard is a 29 y.o. male patient of Dr. Eugene with chronic cough, childhood asthma, sleep apnea, Down syndrome, history of DVT/PE, here for 4-month follow-up.    Patient states he is doing pretty well since last visit, accompanied today by his parents.  He was initially seen in our clinic for lung nodules seen on chest CT 12/7/2023, has follow-up chest CT scheduled for 12/4/2024.  We will make sure that this CT is with contrast to evaluate residual clots seen in 2022.  Patient denies using any antibiotics or steroids for his lungs recently, denies any fevers or chills, no hospitalizations for his breathing since he was last seen.  Patient did have an ER visit earlier this month 8/20/2024 for cough and congestion x 1 month, was diagnosed with acute bronchitis.  Chest x-ray 8/20/2024 showed mild pulmonary edema, no other acute abnormalities.  He was prescribed doxycycline, Medrol Dosepak, Allegra, and albuterol.  Normally, patient has a chronic dry cough.  He continues to use his Pulmicort nebulizer treatments and albuterol as needed, managed by his PCP.  He has sleep apnea, CPAP managed by sleep medicine.  Unfortunately patient does not wear his CPAP for very long each night, will take it off.  He has a lot of daytime fatigue.  He has never smoked.  Patient denies any hemoptysis, swollen lymph nodes, weight loss, or night sweats.  Overall, patient is doing well and has no additional concerns at this time.  Patient is able to perform ADLs without difficulty.  I have personally reviewed the review of systems, past family, social, medical and surgical histories; and agree with their  [facility]    Housing Stability      History reviewed. No pertinent surgical history.      REVIEW OF SYSTEMS:   GENERAL HEALTH: feels well otherwise  GENERAL : denies fever, chills, sweats, weight loss, weight gain  SKIN: denies any unusual skin lesions or rashes  RESPIRATORY: denies shortness of breath with exertion  NEURO: denies headaches    EXAM:   There were no vitals taken for this visit.    System Pertinent findings Details   Constitutional  Overall appearance - Normal.   Psychiatric  Orientation - Oriented to time, place, person & situation. Appropriate mood and affect.   Head/Face  Facial features -- Normal. Skull - Normal.   Eyes  Pupils equal ,round ,react to light and accomidate   Ears  External Ear Right: Normal, Left: Normal. Canal - Right: Normal, Left: Normal. TM - Right: There is discharge noted cleaned under the operative microscope perforation is encountered.  Left: Normal   Nose  External Nose, Normal, Septum -midline nasal Vault, clear,Turbinates - Right: Normal left: Normal   Mouth/Throat  Lips/teeth/gums - Normal. Tonsils -1+. Oropharynx - Normal.   Neck Exam  Inspection - Normal. Palpation - Normal. Parotid gland - Normal. Thyroid gland -normal   Neurological  Cranial nerves - Cranial nerves II through XII grossly intact.   Nasopharynx   Normal        Lymph Detail  Submental. Submandibular. Anterior cervical. Posterior cervical. Supraclavicular.   Ear microscopy: The ears were cleaned operative microscope in the right ear there was purulent discharge and a perforation noted after cleaning I placed Ciprodex eardrops  Dread shows conductive hearing loss right ear left ear normal  ASSESSMENT AND PLAN:   1. Perforation of right tympanic membrane  Ciprodex eardrops in the right ear twice daily follow-up pre-clinic audiogram.  Consideration to ear tube in the right ear      The patient indicates understanding of these issues and agrees to the plan.      Nestor Chadwick,  findings.      Review of Systems    Review of Systems   Constitutional:  Negative for activity change, chills, fatigue, fever, unexpected weight gain and unexpected weight loss.   HENT:  Negative for congestion, ear discharge, ear pain, mouth sores, postnasal drip, rhinorrhea, sinus pressure, sore throat, swollen glands and trouble swallowing.    Eyes:  Negative for blurred vision, pain, discharge, itching and visual disturbance.   Respiratory:  Positive for cough (chronic, dry). Negative for apnea, chest tightness, shortness of breath, wheezing and stridor.    Cardiovascular:  Negative for chest pain, palpitations and leg swelling.   Gastrointestinal:  Negative for abdominal distention, abdominal pain, constipation, diarrhea, nausea, vomiting, GERD and indigestion.   Musculoskeletal:  Negative for arthralgias, joint swelling and myalgias.   Skin:  Negative for color change.   Neurological:  Negative for dizziness, weakness, light-headedness and headache.      Sleep: Positive for Excessive daytime sleepiness  Negative for morning headaches  Negative for Snoring      History reviewed. No pertinent family history.     Social History     Socioeconomic History    Marital status: Single   Tobacco Use    Smoking status: Never     Passive exposure: Never    Smokeless tobacco: Never   Vaping Use    Vaping status: Never Used   Substance and Sexual Activity    Alcohol use: Never    Drug use: Defer    Sexual activity: Defer        Past Medical History:   Diagnosis Date    Anemia     Asthma     Diabetes mellitus     Disease of thyroid gland     Down's syndrome     Gout         Immunization History   Administered Date(s) Administered    31-influenza Vac Quardvalent Preservativ 09/23/2016    COVID-19 (PFIZER) Purple Cap Monovalent 03/02/2021, 03/23/2021, 11/18/2021    Fluzone (or Fluarix & Flulaval for VFC) >6mos 10/04/2021, 09/21/2022, 09/14/2023    Fluzone Quad >6mos (Multi-dose) 11/06/2019    Influenza Injectable Mdck Pf Quad  09/21/2022    Influenza, Unspecified 09/23/2016, 09/21/2020    Tdap 03/28/2019       No Known Allergies       Current Outpatient Medications:     albuterol (ACCUNEB) 1.25 MG/3ML nebulizer solution, Take 3 mL by nebulization Every 6 (Six) Hours As Needed for Wheezing., Disp: 360 mL, Rfl: 3    albuterol sulfate  (90 Base) MCG/ACT inhaler, Inhale 2 puffs Every 4 (Four) Hours As Needed for Wheezing or Shortness of Air., Disp: 8.5 g, Rfl: 3    allopurinol (Zyloprim) 300 MG tablet, Take 1 tablet by mouth Daily., Disp: 90 tablet, Rfl: 3    apixaban (ELIQUIS) 5 MG tablet tablet, Take 1 tablet by mouth 2 (Two) Times a Day., Disp: 180 tablet, Rfl: 3    budesonide (PULMICORT) 0.5 MG/2ML nebulizer solution, Take 2 mL by nebulization Daily., Disp: 180 mL, Rfl: 3    cetirizine (zyrTEC) 10 MG tablet, , Disp: , Rfl:     clindamycin (Cleocin-T) 1 % external solution, Apply  topically to the appropriate area as directed 2 (Two) Times a Day., Disp: 60 mL, Rfl: 3    colchicine 0.6 MG tablet, Take 2 PO at onset of gout flare, then take 1 tablet PO daily until symptoms resolve, Disp: 30 tablet, Rfl: 3    Diclofenac Sodium (VOLTAREN) 1 % gel gel, diclofenac sodium 1 % topical gel apply 2 gram to the affected area(s) by topical route 4 times per day   Suspended, Disp: , Rfl:     docusate sodium 100 MG capsule, Take 1 capsule by mouth 2 (Two) Times a Day As Needed (constipation)., Disp: 90 each, Rfl: 3    Emollient (cetaphil) cream, Apply 1 application  topically to the appropriate area as directed 2 (Two) Times a Day., Disp: 453 g, Rfl: 3    erythromycin (ROMYCIN) 5 MG/GM ophthalmic ointment, Instill 1 cm ribbon in each eye QID, Disp: 3.5 g, Rfl: 0    Fluticasone-Umeclidin-Vilant (Trelegy Ellipta) 100-62.5-25 MCG/ACT inhaler, Inhale 1 puff Daily., Disp: 60 each, Rfl: 1    folic acid (FOLVITE) 1 MG tablet, Take 1 tablet by mouth Daily., Disp: 90 tablet, Rfl: 3    ipratropium (ATROVENT) 0.03 % nasal spray, 2 sprays into the nostril(s)  MD  8/28/2024  3:37 PM   as directed by provider., Disp: , Rfl:     ketoconazole (NIZORAL) 2 % cream, Apply  topically to the appropriate area as directed Daily., Disp: 60 g, Rfl: 2    levothyroxine (SYNTHROID, LEVOTHROID) 25 MCG tablet, Take 1 tablet by mouth Daily., Disp: 90 tablet, Rfl: 3    loratadine (CLARITIN) 10 MG tablet, Take 1 tablet by mouth Daily., Disp: 90 tablet, Rfl: 3    mineral oil-hydrophilic petrolatum (AQUAPHOR) ointment, Apply 1 Application topically to the appropriate area as directed Daily., Disp: 396 g, Rfl: 1    montelukast (Singulair) 10 MG tablet, Take 1 tablet by mouth Every Night., Disp: 90 tablet, Rfl: 3    mupirocin (BACTROBAN) 2 % ointment, Apply  topically to the appropriate area as directed 2 (Two) Times a Day., Disp: 30 g, Rfl: 3    nystatin (MYCOSTATIN) 691684 UNIT/GM cream, Apply 1 Application topically to the appropriate area as directed 2 (Two) Times a Day., Disp: 60 g, Rfl: 2    nystatin (MYCOSTATIN) 591217 UNIT/GM powder, Apply  topically to the appropriate area as directed 3 (Three) Times a Day., Disp: 60 g, Rfl: 2    olopatadine (PATANOL) 0.1 % ophthalmic solution, , Disp: , Rfl:     Petrolatum (Petroleum Jelly) ointment, Apply 1 application  topically As Needed (prn as needed)., Disp: 368 g, Rfl: 3    promethazine-dextromethorphan (PROMETHAZINE-DM) 6.25-15 MG/5ML syrup, Take 5 mL by mouth 4 (Four) Times a Day As Needed for Cough., Disp: 473 mL, Rfl: 0    Soap & Cleansers (Cetaklenz) liquid, Apply 1 Application topically to the appropriate area as directed Daily., Disp: 472 mL, Rfl: 3    Soap & Cleansers (Gentle Skin Cleanser) liquid, Apply 1 application  topically 2 (Two) Times a Day., Disp: 473 mL, Rfl: 3    Sodium Fluoride 1.1 % cream, , Disp: , Rfl:     Urea 50 % suspension, Apply topically to the affected areas TID, Disp: 284 g, Rfl: 3    vitamin B-12 (CYANOCOBALAMIN) 1000 MCG tablet, Take 1 tablet by mouth Daily., Disp: 90 tablet, Rfl: 3    vitamin B-6 (PYRIDOXINE) 50 MG tablet, Take 0.5  "tablets by mouth Every Other Day., Disp: 23 tablet, Rfl: 3    Zinc Oxide 16 % ointment, Apply 1 Application topically to the appropriate area as directed Daily As Needed (rash)., Disp: 113 g, Rfl: 3    ALUM SULFATE-CA ACETATE EX, Apply  topically to the appropriate area as directed. (Patient not taking: Reported on 7/2/2024), Disp: , Rfl:     ammonium lactate (AmLactin) 12 % lotion, Apply  topically to the appropriate area as directed 2 (Two) Times a Day. (Patient not taking: Reported on 7/2/2024), Disp: 225 g, Rfl: 11    azithromycin (ZITHROMAX) 250 MG tablet, Take 2 by mouth today then 1 daily for 4 days, Disp: 6 tablet, Rfl: 0    chlorhexidine (PERIDEX) 0.12 % solution, , Disp: , Rfl:     fluconazole (Diflucan) 100 MG tablet, Take 1 tablet by mouth Daily. (Patient not taking: Reported on 8/27/2024), Disp: 14 tablet, Rfl: 0     Objective     Vital Signs:   /95 (BP Location: Left arm, Patient Position: Sitting, Cuff Size: Adult) Comment (BP Location): forearm  Pulse 95   Temp 98.1 °F (36.7 °C) (Oral)   Resp 18   Ht 160 cm (63\")   Wt (!) 161 kg (356 lb)   SpO2 97% Comment: Room air.  BMI 63.06 kg/m²     Physical Exam  Constitutional:       General: He is not in acute distress.     Appearance: Normal appearance. He is obese. He is not ill-appearing.   HENT:      Right Ear: Tympanic membrane and ear canal normal.      Left Ear: Tympanic membrane and ear canal normal.      Nose: Nose normal.      Mouth/Throat:      Mouth: Mucous membranes are moist.      Pharynx: Oropharynx is clear.   Eyes:      Extraocular Movements: Extraocular movements intact.      Conjunctiva/sclera: Conjunctivae normal.      Pupils: Pupils are equal, round, and reactive to light.   Cardiovascular:      Rate and Rhythm: Normal rate and regular rhythm.      Pulses: Normal pulses.      Heart sounds: Normal heart sounds.   Pulmonary:      Effort: Pulmonary effort is normal. No respiratory distress.      Breath sounds: Normal breath " sounds. No stridor. No wheezing, rhonchi or rales.   Abdominal:      General: Bowel sounds are normal.      Palpations: Abdomen is soft.   Musculoskeletal:         General: No swelling. Normal range of motion.      Cervical back: Normal range of motion and neck supple.      Right lower leg: No edema.      Left lower leg: No edema.   Skin:     General: Skin is warm and dry.   Neurological:      General: No focal deficit present.      Mental Status: He is alert and oriented to person, place, and time.      Motor: No weakness.   Psychiatric:         Mood and Affect: Mood normal.         Behavior: Behavior normal.       Result Review :   I have personally reviewed patient's labs and images.  I also reviewed my last office note 4/22/2024.       Diagnoses and all orders for this visit:    1. Multiple pulmonary nodules (Primary)    2. Asthma, unspecified asthma severity, unspecified whether complicated, unspecified whether persistent    3. TAMIKO (obstructive sleep apnea)  -     Overnight Sleep Oximetry Study; Future    4. Chronic cough    5. History of pulmonary embolism    6. Deep vein thrombosis (DVT) of proximal lower extremity, unspecified chronicity, unspecified laterality    7. Morbid obesity with BMI of 60.0-69.9, adult    Other orders  -     azithromycin (ZITHROMAX) 250 MG tablet; Take 2 by mouth today then 1 daily for 4 days  Dispense: 6 tablet; Refill: 0      Impression and Plan    -CT of chest 12/7/2023 shows interval resolution of groundglass nodules present on the prior study.  There is a new 6 millimeter nodule in the medial left upper lobe and lateral right upper lobe.  No pleural effusions.  -Repeat chest CT scheduled for 12/4/2024.  Will order with contrast to evaluate for any chronic thrombi.  -Will hold off on PFTs for now  -Check overnight oximetry on CPAP to determine whether patient needs oxygen bled in  -Continue wearing CPAP nightly, managed by sleep medicine  -Continue using Pulmicort nebulizer  treatments twice daily, rinse mouth after each use  -Continue using albuterol inhaler or albuterol nebulizer treatments as needed.  New nebulizer machine provided in clinic April 2024.  -Continue taking Eliquis daily for history of DVT/PE  -Patient to keep regularly scheduled appointment with myself 1/21/2025 after CT scan    Smoking status: Reviewed  Vaccination status:Patient reports he is up-to-date with his flu, pneumonia, and Covid vaccines.  Patient is advised to continue to follow CDC recommendations such as social distancing wearing a mask and washing hands for at least 20 seconds.  Medications personally reviewed    Follow Up   No follow-ups on file.  Patient was given instructions and counseling regarding his condition or for health maintenance advice. Please see specific information pulled into the AVS if appropriate.

## 2024-09-12 ENCOUNTER — CLINICAL SUPPORT (OUTPATIENT)
Dept: FAMILY MEDICINE CLINIC | Facility: CLINIC | Age: 29
End: 2024-09-12
Payer: OTHER GOVERNMENT

## 2024-09-12 DIAGNOSIS — Z23 NEED FOR INFLUENZA VACCINATION: Primary | ICD-10-CM

## 2024-09-12 PROCEDURE — 90656 IIV3 VACC NO PRSV 0.5 ML IM: CPT | Performed by: FAMILY MEDICINE

## 2024-09-12 PROCEDURE — 90471 IMMUNIZATION ADMIN: CPT | Performed by: FAMILY MEDICINE

## 2024-09-23 DIAGNOSIS — G47.34 NOCTURNAL HYPOXIA: Primary | ICD-10-CM

## 2024-09-23 DIAGNOSIS — D75.1 SECONDARY POLYCYTHEMIA: Primary | ICD-10-CM

## 2024-09-25 ENCOUNTER — OFFICE VISIT (OUTPATIENT)
Dept: ONCOLOGY | Facility: HOSPITAL | Age: 29
End: 2024-09-25
Payer: MEDICARE

## 2024-09-25 ENCOUNTER — LAB (OUTPATIENT)
Dept: ONCOLOGY | Facility: HOSPITAL | Age: 29
End: 2024-09-25
Payer: MEDICARE

## 2024-09-25 VITALS
HEIGHT: 63 IN | SYSTOLIC BLOOD PRESSURE: 100 MMHG | BODY MASS INDEX: 55.81 KG/M2 | DIASTOLIC BLOOD PRESSURE: 63 MMHG | WEIGHT: 315 LBS | OXYGEN SATURATION: 100 % | TEMPERATURE: 98 F | HEART RATE: 92 BPM | RESPIRATION RATE: 20 BRPM

## 2024-09-25 DIAGNOSIS — D75.1 SECONDARY POLYCYTHEMIA: Primary | ICD-10-CM

## 2024-09-25 DIAGNOSIS — D75.1 SECONDARY POLYCYTHEMIA: ICD-10-CM

## 2024-09-25 DIAGNOSIS — E61.1 IRON DEFICIENCY: ICD-10-CM

## 2024-09-25 LAB
BASOPHILS # BLD AUTO: 0.07 10*3/MM3 (ref 0–0.2)
BASOPHILS NFR BLD AUTO: 1.5 % (ref 0–1.5)
DEPRECATED RDW RBC AUTO: 52.9 FL (ref 37–54)
EOSINOPHIL # BLD AUTO: 0.02 10*3/MM3 (ref 0–0.4)
EOSINOPHIL NFR BLD AUTO: 0.4 % (ref 0.3–6.2)
ERYTHROCYTE [DISTWIDTH] IN BLOOD BY AUTOMATED COUNT: 22.1 % (ref 12.3–15.4)
HCT VFR BLD AUTO: 44.9 % (ref 37.5–51)
HGB BLD-MCNC: 13.4 G/DL (ref 13–17.7)
IMM GRANULOCYTES # BLD AUTO: 0.02 10*3/MM3 (ref 0–0.05)
IMM GRANULOCYTES NFR BLD AUTO: 0.4 % (ref 0–0.5)
LYMPHOCYTES # BLD AUTO: 1.15 10*3/MM3 (ref 0.7–3.1)
LYMPHOCYTES NFR BLD AUTO: 24.6 % (ref 19.6–45.3)
MCH RBC QN AUTO: 20.8 PG (ref 26.6–33)
MCHC RBC AUTO-ENTMCNC: 29.8 G/DL (ref 31.5–35.7)
MCV RBC AUTO: 69.6 FL (ref 79–97)
MONOCYTES # BLD AUTO: 0.76 10*3/MM3 (ref 0.1–0.9)
MONOCYTES NFR BLD AUTO: 16.3 % (ref 5–12)
NEUTROPHILS NFR BLD AUTO: 2.65 10*3/MM3 (ref 1.7–7)
NEUTROPHILS NFR BLD AUTO: 56.8 % (ref 42.7–76)
PLATELET # BLD AUTO: 348 10*3/MM3 (ref 140–450)
PMV BLD AUTO: 9.8 FL (ref 6–12)
RBC # BLD AUTO: 6.45 10*6/MM3 (ref 4.14–5.8)
WBC NRBC COR # BLD AUTO: 4.67 10*3/MM3 (ref 3.4–10.8)

## 2024-09-25 PROCEDURE — 85025 COMPLETE CBC W/AUTO DIFF WBC: CPT

## 2024-09-25 PROCEDURE — G0463 HOSPITAL OUTPT CLINIC VISIT: HCPCS | Performed by: INTERNAL MEDICINE

## 2024-09-25 PROCEDURE — 36415 COLL VENOUS BLD VENIPUNCTURE: CPT

## 2024-09-25 RX ORDER — AMOXICILLIN 875 MG
1 TABLET ORAL EVERY 12 HOURS SCHEDULED
COMMUNITY
Start: 2024-09-16

## 2024-09-30 NOTE — PROGRESS NOTES
Primary Care Provider  Jesus Manuel Sloan DO   Referring Provider  No ref. provider found      Patient Complaint  Follow-up, Cough, and Multiple pulmonary nodules      Subjective          Tremayne Bernard presents to [facility] PULMONARY & CRITICAL CARE MEDICINE      History of Presenting Illness  Tremayne Bernard is a 29 y.o. male patient of Dr. Eugene with nocturnal hypoxia, chronic cough, history of childhood asthma, TAMIKO, Down syndrome, and history of DVT/PE, here for acute visit.    Patient presents today with ongoing chronic cough and questions about daytime oxygen.  He is accompanied today by his parents.  He was initially seen in our clinic for lung nodules seen on chest CT 12/7/2023, has follow-up chest CT scheduled for 12/4/2024.  We will make sure that this CT is with contrast to evaluate residual clots seen in 2022.  Patient denies using any antibiotics for his lungs recently, denies any fevers or chills, no ER visits or hospitalizations for his breathing since he was last seen.  Patient's most recent ER visit was 8/20/2024 for cough and congestion x 1 month, was diagnosed with acute bronchitis.  Normally, patient has a chronic dry cough.  He continues to use his Pulmicort nebulizer treatments and albuterol as needed, managed by his PCP.  He has sleep apnea, CPAP managed by sleep medicine.  Unfortunately patient does not wear his CPAP for very long each night, will take it off.  Since his last visit, patient has began wearing supplemental oxygen at night after overnight oximetry showed desaturations.  He is doing well with his new oxygen at bedtime.  He has never smoked.  Patient denies any hemoptysis, swollen lymph nodes, weight loss, or night sweats.  Overall, patient is doing well and has no additional concerns at this time.  Patient is able to perform ADLs without difficulty.  I have personally reviewed the review of systems, past family, social, medical and surgical histories; and  agree with their findings.      Review of Systems    Review of Systems   Constitutional:  Negative for activity change, chills, fatigue, fever, unexpected weight gain and unexpected weight loss.   HENT:  Negative for congestion, ear discharge, ear pain, mouth sores, postnasal drip, rhinorrhea, sinus pressure, sore throat, swollen glands and trouble swallowing.    Eyes:  Negative for blurred vision, pain, discharge, itching and visual disturbance.   Respiratory:  Positive for cough (chronic, dry). Negative for apnea, chest tightness, shortness of breath, wheezing and stridor.    Cardiovascular:  Negative for chest pain, palpitations and leg swelling.   Gastrointestinal:  Negative for abdominal distention, abdominal pain, constipation, diarrhea, nausea, vomiting, GERD and indigestion.   Musculoskeletal:  Negative for arthralgias, joint swelling and myalgias.   Skin:  Negative for color change.   Neurological:  Negative for dizziness, weakness, light-headedness and headache.      Sleep: Positive for Excessive daytime sleepiness  Negative for morning headaches  Negative for Snoring      History reviewed. No pertinent family history.     Social History     Socioeconomic History    Marital status: Single   Tobacco Use    Smoking status: Never     Passive exposure: Never    Smokeless tobacco: Never   Vaping Use    Vaping status: Never Used   Substance and Sexual Activity    Alcohol use: Never    Drug use: Defer    Sexual activity: Defer        Past Medical History:   Diagnosis Date    Anemia     Asthma     Diabetes mellitus     Disease of thyroid gland     Down's syndrome     Gout         Immunization History   Administered Date(s) Administered    31-influenza Vac Quardvalent Preservativ 09/23/2016    COVID-19 (PFIZER) Purple Cap Monovalent 03/02/2021, 03/23/2021, 11/18/2021    Fluzone  >6mos 09/12/2024    Fluzone (or Fluarix & Flulaval for VFC) >6mos 10/04/2021, 09/21/2022, 09/14/2023    Fluzone Quad >6mos (Multi-dose)  11/06/2019    Influenza Injectable Mdck Pf Quad 09/21/2022    Influenza, Unspecified 09/23/2016, 09/21/2020    Tdap 03/28/2019       No Known Allergies       Current Outpatient Medications:     albuterol (ACCUNEB) 1.25 MG/3ML nebulizer solution, Take 3 mL by nebulization Every 6 (Six) Hours As Needed for Wheezing., Disp: 360 mL, Rfl: 3    albuterol sulfate  (90 Base) MCG/ACT inhaler, Inhale 2 puffs Every 4 (Four) Hours As Needed for Wheezing or Shortness of Air., Disp: 8.5 g, Rfl: 3    allopurinol (Zyloprim) 300 MG tablet, Take 1 tablet by mouth Daily., Disp: 90 tablet, Rfl: 3    amoxicillin (AMOXIL) 875 MG tablet, Take 1 tablet by mouth Every 12 (Twelve) Hours., Disp: , Rfl:     apixaban (ELIQUIS) 5 MG tablet tablet, Take 1 tablet by mouth 2 (Two) Times a Day., Disp: 180 tablet, Rfl: 3    budesonide (PULMICORT) 0.5 MG/2ML nebulizer solution, Take 2 mL by nebulization 2 (Two) Times a Day., Disp: 180 mL, Rfl: 3    cetirizine (zyrTEC) 10 MG tablet, , Disp: , Rfl:     clindamycin (Cleocin-T) 1 % external solution, Apply  topically to the appropriate area as directed 2 (Two) Times a Day., Disp: 60 mL, Rfl: 3    colchicine 0.6 MG tablet, Take 2 PO at onset of gout flare, then take 1 tablet PO daily until symptoms resolve, Disp: 30 tablet, Rfl: 3    Diclofenac Sodium (VOLTAREN) 1 % gel gel, diclofenac sodium 1 % topical gel apply 2 gram to the affected area(s) by topical route 4 times per day   Suspended, Disp: , Rfl:     docusate sodium 100 MG capsule, Take 1 capsule by mouth 2 (Two) Times a Day As Needed (constipation)., Disp: 90 each, Rfl: 3    Emollient (cetaphil) cream, Apply 1 application  topically to the appropriate area as directed 2 (Two) Times a Day., Disp: 453 g, Rfl: 3    erythromycin (ROMYCIN) 5 MG/GM ophthalmic ointment, Instill 1 cm ribbon in each eye QID, Disp: 3.5 g, Rfl: 0    folic acid (FOLVITE) 1 MG tablet, Take 1 tablet by mouth Daily., Disp: 90 tablet, Rfl: 3    ipratropium (ATROVENT) 0.03 %  nasal spray, Administer 2 sprays into the nostril(s) as directed by provider., Disp: , Rfl:     ketoconazole (NIZORAL) 2 % cream, Apply  topically to the appropriate area as directed Daily., Disp: 60 g, Rfl: 2    levothyroxine (SYNTHROID, LEVOTHROID) 25 MCG tablet, Take 1 tablet by mouth Daily., Disp: 90 tablet, Rfl: 3    loratadine (CLARITIN) 10 MG tablet, Take 1 tablet by mouth Daily., Disp: 90 tablet, Rfl: 3    mineral oil-hydrophilic petrolatum (AQUAPHOR) ointment, Apply 1 Application topically to the appropriate area as directed Daily., Disp: 396 g, Rfl: 1    montelukast (Singulair) 10 MG tablet, Take 1 tablet by mouth Every Night., Disp: 90 tablet, Rfl: 3    mupirocin (BACTROBAN) 2 % ointment, Apply  topically to the appropriate area as directed 2 (Two) Times a Day., Disp: 30 g, Rfl: 3    nystatin (MYCOSTATIN) 677478 UNIT/GM cream, Apply 1 Application topically to the appropriate area as directed 2 (Two) Times a Day., Disp: 60 g, Rfl: 2    nystatin (MYCOSTATIN) 886382 UNIT/GM powder, Apply  topically to the appropriate area as directed 3 (Three) Times a Day., Disp: 60 g, Rfl: 2    olopatadine (PATANOL) 0.1 % ophthalmic solution, , Disp: , Rfl:     Petrolatum (Petroleum Jelly) ointment, Apply 1 application  topically As Needed (prn as needed)., Disp: 368 g, Rfl: 3    promethazine-dextromethorphan (PROMETHAZINE-DM) 6.25-15 MG/5ML syrup, Take 5 mL by mouth 4 (Four) Times a Day As Needed for Cough., Disp: 473 mL, Rfl: 0    Soap & Cleansers (Gentle Skin Cleanser) liquid, Apply 1 application  topically 2 (Two) Times a Day., Disp: 473 mL, Rfl: 3    Sodium Fluoride 1.1 % cream, , Disp: , Rfl:     Urea 50 % suspension, Apply topically to the affected areas TID, Disp: 284 g, Rfl: 3    vitamin B-12 (CYANOCOBALAMIN) 1000 MCG tablet, Take 1 tablet by mouth Daily., Disp: 90 tablet, Rfl: 3    vitamin B-6 (PYRIDOXINE) 50 MG tablet, Take 0.5 tablets by mouth Every Other Day., Disp: 23 tablet, Rfl: 3    Zinc Oxide 16 %  "ointment, Apply 1 Application topically to the appropriate area as directed Daily As Needed (rash)., Disp: 113 g, Rfl: 3    ALUM SULFATE-CA ACETATE EX, Apply  topically to the appropriate area as directed. (Patient not taking: Reported on 7/2/2024), Disp: , Rfl:     ammonium lactate (AmLactin) 12 % lotion, Apply  topically to the appropriate area as directed 2 (Two) Times a Day. (Patient not taking: Reported on 7/2/2024), Disp: 225 g, Rfl: 11     Objective     Vital Signs:   /74 (BP Location: Right arm, Patient Position: Sitting, Cuff Size: Adult)   Pulse 108   Temp 96.6 °F (35.9 °C) (Temporal)   Resp 18   Ht 160 cm (63\")   Wt (!) 165 kg (364 lb)   SpO2 90% Comment: Room air  BMI 64.48 kg/m²     Physical Exam  Constitutional:       General: He is not in acute distress.     Appearance: Normal appearance. He is obese. He is not ill-appearing.   HENT:      Right Ear: Tympanic membrane and ear canal normal.      Left Ear: Tympanic membrane and ear canal normal.      Nose: Nose normal.      Mouth/Throat:      Mouth: Mucous membranes are moist.      Pharynx: Oropharynx is clear.   Eyes:      Extraocular Movements: Extraocular movements intact.      Conjunctiva/sclera: Conjunctivae normal.      Pupils: Pupils are equal, round, and reactive to light.   Cardiovascular:      Rate and Rhythm: Normal rate and regular rhythm.      Pulses: Normal pulses.      Heart sounds: Normal heart sounds.   Pulmonary:      Effort: Pulmonary effort is normal. No respiratory distress.      Breath sounds: Normal breath sounds. No stridor. No wheezing, rhonchi or rales.   Abdominal:      General: Bowel sounds are normal.      Palpations: Abdomen is soft.   Musculoskeletal:         General: No swelling. Normal range of motion.      Cervical back: Normal range of motion and neck supple.      Right lower leg: No edema.      Left lower leg: No edema.   Skin:     General: Skin is warm and dry.   Neurological:      General: No focal " deficit present.      Mental Status: He is alert and oriented to person, place, and time.      Motor: No weakness.   Psychiatric:         Mood and Affect: Mood normal.         Behavior: Behavior normal.       Result Review :   I have personally reviewed patient's labs and images.  I also reviewed my last office note 8/27/2024.       Diagnoses and all orders for this visit:    1. Asthma, unspecified asthma severity, unspecified whether complicated, unspecified whether persistent (Primary)  -     budesonide (PULMICORT) 0.5 MG/2ML nebulizer solution; Take 2 mL by nebulization 2 (Two) Times a Day.  Dispense: 180 mL; Refill: 3    2. Chronic cough    3. Nocturnal hypoxia    4. Multiple pulmonary nodules    5. TAMIKO (obstructive sleep apnea)    6. History of pulmonary embolism    7. Deep vein thrombosis (DVT) of proximal lower extremity, unspecified chronicity, unspecified laterality    8. Morbid obesity with BMI of 60.0-69.9, adult      Impression and Plan    -CT of chest 12/7/2023 shows interval resolution of groundglass nodules present on the prior study.  There is a new 6 millimeter nodule in the medial left upper lobe and lateral right upper lobe.  No pleural effusions.  -Repeat chest CT scheduled for 12/4/2024.  Ordered with contrast to evaluate for any chronic thrombi.  -Will hold off on PFTs for now  -Patient interested in daytime supplemental oxygen, portable oxygen concentrator.  Today, he is not feeling great and not ambulating well due to gout flareup.  Patient to come in in a couple weeks or so for walk test only.  -Continue wearing 2 L supplemental oxygen at bedtime  -Continue wearing CPAP nightly, managed by sleep medicine  -Continue using Pulmicort nebulizer treatments twice daily, rinse mouth after each use  -Continue using albuterol inhaler or albuterol nebulizer treatments as needed.  New nebulizer machine provided in clinic April 2024.  -Continue taking Eliquis daily for history of DVT/PE  -Patient to  keep regularly scheduled appointment with myself 1/21/2025 after repeat CT scan    Smoking status: Reviewed  Vaccination status:Patient reports he is up-to-date with his flu, pneumonia, and Covid vaccines.  Patient is advised to continue to follow CDC recommendations such as social distancing wearing a mask and washing hands for at least 20 seconds.  Medications personally reviewed    Follow Up   No follow-ups on file.  Patient was given instructions and counseling regarding his condition or for health maintenance advice. Please see specific information pulled into the AVS if appropriate.

## 2024-10-01 ENCOUNTER — OFFICE VISIT (OUTPATIENT)
Dept: PULMONOLOGY | Facility: CLINIC | Age: 29
End: 2024-10-01
Payer: MEDICARE

## 2024-10-01 VITALS
OXYGEN SATURATION: 90 % | SYSTOLIC BLOOD PRESSURE: 106 MMHG | HEIGHT: 63 IN | WEIGHT: 315 LBS | DIASTOLIC BLOOD PRESSURE: 74 MMHG | TEMPERATURE: 96.6 F | RESPIRATION RATE: 18 BRPM | HEART RATE: 108 BPM | BODY MASS INDEX: 55.81 KG/M2

## 2024-10-01 DIAGNOSIS — G47.34 NOCTURNAL HYPOXIA: ICD-10-CM

## 2024-10-01 DIAGNOSIS — G47.33 OSA (OBSTRUCTIVE SLEEP APNEA): ICD-10-CM

## 2024-10-01 DIAGNOSIS — J45.909 ASTHMA, UNSPECIFIED ASTHMA SEVERITY, UNSPECIFIED WHETHER COMPLICATED, UNSPECIFIED WHETHER PERSISTENT: Primary | ICD-10-CM

## 2024-10-01 DIAGNOSIS — R05.3 CHRONIC COUGH: ICD-10-CM

## 2024-10-01 DIAGNOSIS — Z86.711 HISTORY OF PULMONARY EMBOLISM: ICD-10-CM

## 2024-10-01 DIAGNOSIS — E66.01 MORBID OBESITY WITH BMI OF 60.0-69.9, ADULT: ICD-10-CM

## 2024-10-01 DIAGNOSIS — R91.8 MULTIPLE PULMONARY NODULES: ICD-10-CM

## 2024-10-01 DIAGNOSIS — I82.4Y9 DEEP VEIN THROMBOSIS (DVT) OF PROXIMAL LOWER EXTREMITY, UNSPECIFIED CHRONICITY, UNSPECIFIED LATERALITY: ICD-10-CM

## 2024-10-01 PROCEDURE — 1160F RVW MEDS BY RX/DR IN RCRD: CPT

## 2024-10-01 PROCEDURE — 1159F MED LIST DOCD IN RCRD: CPT

## 2024-10-01 PROCEDURE — 99214 OFFICE O/P EST MOD 30 MIN: CPT

## 2024-10-01 RX ORDER — BUDESONIDE 0.5 MG/2ML
0.5 INHALANT ORAL 2 TIMES DAILY
Qty: 180 ML | Refills: 3 | Status: SHIPPED | OUTPATIENT
Start: 2024-10-01

## 2024-10-04 NOTE — TELEPHONE ENCOUNTER
Phone call received from mother stating that the patient was unable to get his lab work done phlebotomy yesterday due to his pulse being 160. Mother states that the patient denies any discomfort at present. Informed mother that his pulse is normally around 100-110 with this office and that he may be anxious getting is blood done. Mother switched appointment times for his PCP on the 13th for follow up. Instructed mother how to take his pulse of which she said she has done before and tokeep a record of that with agreement to do so. Transferred to front to change out the visit and make her a new one. Instructed mother if the patient starts to have complications to call or go to urgent care with voiced understanding.   
2-13 lbs (1)
no

## 2024-10-09 DIAGNOSIS — G47.33 OSA (OBSTRUCTIVE SLEEP APNEA): Primary | ICD-10-CM

## 2024-10-14 ENCOUNTER — OFFICE VISIT (OUTPATIENT)
Dept: SLEEP MEDICINE | Facility: HOSPITAL | Age: 29
End: 2024-10-14
Payer: MEDICARE

## 2024-10-14 VITALS
DIASTOLIC BLOOD PRESSURE: 69 MMHG | HEART RATE: 95 BPM | WEIGHT: 315 LBS | BODY MASS INDEX: 55.81 KG/M2 | SYSTOLIC BLOOD PRESSURE: 110 MMHG | OXYGEN SATURATION: 94 % | HEIGHT: 63 IN

## 2024-10-14 DIAGNOSIS — E66.813 CLASS 3 SEVERE OBESITY DUE TO EXCESS CALORIES WITHOUT SERIOUS COMORBIDITY WITH BODY MASS INDEX (BMI) OF 60.0 TO 69.9 IN ADULT: ICD-10-CM

## 2024-10-14 DIAGNOSIS — G47.33 OSA TREATED WITH BIPAP: Primary | ICD-10-CM

## 2024-10-14 DIAGNOSIS — Q90.9 DOWN'S SYNDROME: ICD-10-CM

## 2024-10-14 DIAGNOSIS — E66.01 CLASS 3 SEVERE OBESITY DUE TO EXCESS CALORIES WITHOUT SERIOUS COMORBIDITY WITH BODY MASS INDEX (BMI) OF 60.0 TO 69.9 IN ADULT: ICD-10-CM

## 2024-10-14 DIAGNOSIS — I82.4Y9 DEEP VEIN THROMBOSIS (DVT) OF PROXIMAL LOWER EXTREMITY, UNSPECIFIED CHRONICITY, UNSPECIFIED LATERALITY: ICD-10-CM

## 2024-10-14 PROCEDURE — 99213 OFFICE O/P EST LOW 20 MIN: CPT | Performed by: INTERNAL MEDICINE

## 2024-10-14 PROCEDURE — 1159F MED LIST DOCD IN RCRD: CPT | Performed by: INTERNAL MEDICINE

## 2024-10-14 PROCEDURE — 1160F RVW MEDS BY RX/DR IN RCRD: CPT | Performed by: INTERNAL MEDICINE

## 2024-10-14 PROCEDURE — G0463 HOSPITAL OUTPT CLINIC VISIT: HCPCS

## 2024-10-14 NOTE — PROGRESS NOTES
"  [facility]  Sleep medicine  [address] [place] [address]  [place]   [place] [address]  Phone: [phone]  Fax: [phone]      SLEEP CLINIC FOLLOW UP PROGRESS NOTE.    Tremayne Bernard  [id number]   1995  29 y.o.  male      PCP: Jesus Manuel Sloan DO      Date of visit: 10/14/2024    Chief Complaint   Patient presents with    Obesity    Sleep Apnea       HPI:  This is a 29 y.o. years old patient is here for the management of obstructive sleep apnea.  Sleep apnea is very severe in severity with a AHI of 120/hr. Patient is using positive airway pressure therapy with BiPAP 18/13.  He is accompanied with his mother.  He has a history of Down syndrome and also pulmonary embolism.  Recently he has been diagnosed with asthma and has a chronic cough.  He is being followed by pulmonary and using oxygen also 2 L at nighttime.  But unfortunately his compliance is not optimal.    Bedtime 11:30 PM  Wake time. noon    Medications and allergies are reviewed by me and documented in the encounter.     SOCIAL (habits pertaining to sleep medicine)  History tobacco use:No   History of alcohol use: 0 per week  Caffeine use: 2 soda    REVIEW OF SYSTEMS:   Pertaining positive symptoms are:  [place] Sleepiness Scale :Total score: 12         PHYSICAL EXAMINATION:  CONSTITUTIONAL:  Vitals:    10/14/24 1100   BP: 110/69   Pulse: 95   SpO2: 94%   Weight: (!) 165 kg (363 lb 14.4 oz)   Height: 160 cm (62.99\")    Body mass index is 64.48 kg/m².   NOSE: nasal passages are clear, No deformities noted   RESP SYSTEM: Not in any respiratory distress, no chest deformities noted,   CARDIOVASULAR: No edema noted  NEURO: Oriented x 3, gait normal,  Mood and affect appeared appropriate      Data reviewed:  The Smart card downloaded on 10/14/2024 has been reviewed independently by me for compliance   Complaints 20%  Residual AHI 6  Mother is going to bring the smart card for download  Device: DreamStation BiPAP  DME: [facility] [facility]      ASSESSMENT AND PLAN:  Obstructive sleep apnea ( G 47.33).  Patient has Down syndrome and mother is accompanying the patient.  Needs to use the BIPAP more regularly to get full benefit..    Chronic hypoxia continue oxygen with the BiPAP  Obesity morbid with BMI is Body mass index is 64.48 kg/m².. I have discuss the relationship between the weight and sleep apnea. The benefit of weight loss in reducing severity of sleep apnea was discussed. Discussed diet and exercise with the patient to achieve ideal BMI.  Down syndrome  Polycythemia  History of DVT  Return in about 1 year (around 10/14/2025) for with smart card down load. . Patient's questions were answered.    10/14/2024  Julieth Faith MD  Sleep Medicine.  Medical Director,   [facility], [place] [facility] [place] [facility].

## 2024-10-23 ENCOUNTER — OFFICE VISIT (OUTPATIENT)
Dept: FAMILY MEDICINE CLINIC | Facility: CLINIC | Age: 29
End: 2024-10-23
Payer: MEDICARE

## 2024-10-23 VITALS
HEART RATE: 85 BPM | BODY MASS INDEX: 55.81 KG/M2 | TEMPERATURE: 98.3 F | SYSTOLIC BLOOD PRESSURE: 108 MMHG | HEIGHT: 63 IN | WEIGHT: 315 LBS | OXYGEN SATURATION: 96 % | DIASTOLIC BLOOD PRESSURE: 80 MMHG

## 2024-10-23 DIAGNOSIS — R05.3 CHRONIC COUGH: ICD-10-CM

## 2024-10-23 DIAGNOSIS — Q90.9 DOWN'S SYNDROME: Primary | ICD-10-CM

## 2024-10-23 DIAGNOSIS — G47.33 OSA (OBSTRUCTIVE SLEEP APNEA): ICD-10-CM

## 2024-10-23 DIAGNOSIS — B35.3 TINEA PEDIS OF LEFT FOOT: ICD-10-CM

## 2024-10-23 DIAGNOSIS — J45.40 MODERATE PERSISTENT ASTHMA WITHOUT COMPLICATION: ICD-10-CM

## 2024-10-23 DIAGNOSIS — B37.2 SKIN YEAST INFECTION: ICD-10-CM

## 2024-10-23 PROCEDURE — 99214 OFFICE O/P EST MOD 30 MIN: CPT | Performed by: FAMILY MEDICINE

## 2024-10-23 PROCEDURE — 1126F AMNT PAIN NOTED NONE PRSNT: CPT | Performed by: FAMILY MEDICINE

## 2024-10-23 PROCEDURE — 3044F HG A1C LEVEL LT 7.0%: CPT | Performed by: FAMILY MEDICINE

## 2024-10-23 RX ORDER — DEXTROMETHORPHAN HYDROBROMIDE AND PROMETHAZINE HYDROCHLORIDE 15; 6.25 MG/5ML; MG/5ML
5 SYRUP ORAL 4 TIMES DAILY PRN
Qty: 473 ML | Refills: 0 | Status: SHIPPED | OUTPATIENT
Start: 2024-10-23

## 2024-10-23 RX ORDER — KETOCONAZOLE 20 MG/G
CREAM TOPICAL DAILY
Qty: 60 G | Refills: 2 | Status: SHIPPED | OUTPATIENT
Start: 2024-10-23

## 2024-10-23 RX ORDER — COLCHICINE 0.6 MG/1
TABLET ORAL
Qty: 30 TABLET | Refills: 3 | Status: SHIPPED | OUTPATIENT
Start: 2024-10-23

## 2024-10-23 RX ORDER — MAGNESIUM CARB/ALUMINUM HYDROX 105-160MG
1 TABLET,CHEWABLE ORAL AS NEEDED
Qty: 368 G | Refills: 3 | Status: SHIPPED | OUTPATIENT
Start: 2024-10-23

## 2024-10-23 RX ORDER — MUPIROCIN 20 MG/G
OINTMENT TOPICAL 2 TIMES DAILY
Qty: 30 G | Refills: 3 | Status: SHIPPED | OUTPATIENT
Start: 2024-10-23

## 2024-10-23 RX ORDER — ALLOPURINOL 300 MG/1
600 TABLET ORAL DAILY
Qty: 180 TABLET | Refills: 1 | Status: SHIPPED | OUTPATIENT
Start: 2024-10-23

## 2024-10-23 RX ORDER — FERROUS GLUCONATE 324(38)MG
324 TABLET ORAL WEEKLY
Qty: 13 TABLET | Refills: 3 | Status: SHIPPED | OUTPATIENT
Start: 2024-10-23

## 2024-10-23 RX ORDER — NYSTATIN 100000 [USP'U]/G
POWDER TOPICAL 3 TIMES DAILY
Qty: 60 G | Refills: 2 | Status: SHIPPED | OUTPATIENT
Start: 2024-10-23

## 2024-10-23 NOTE — PROGRESS NOTES
Chief Complaint  Follow-up, Cough, and Vomiting    Subjective          Tremayne Bernard presents to [facility]    History of Present Illness     History of Present Illness  The patient is a 29-year-old male with Down syndrome who presents today for evaluation of multiple medical concerns. He is accompanied by his mother.    He has experienced a slight weight loss of half a pound.    He is currently under the care of Dr. Lisbet Pathak for asthma and cough. His mother reports that he has been advised to walk for 6 minutes to assess his need for portable oxygen, which he uses in conjunction with his CPAP machine. He also uses a nebulizer daily due to persistent coughing. His mother expresses concern about his coughing leading to vomiting. He is currently on Claritin and Singulair. A humidifier is used in his home, but it is not filled with distilled water.    He has been experiencing intermittent swelling in his feet, which alternates between the left and right foot. The swelling is primarily located in the joint area. He has been managing this with ice application and elevation of his feet. He is currently on medication for gout.    He has had athlete's foot for 15 years and uses a cream for it.         Current Outpatient Medications   Medication Instructions   • albuterol (ACCUNEB) 1.25 mg, Nebulization, Every 6 Hours PRN   • albuterol sulfate  (90 Base) MCG/ACT inhaler 2 puffs, Inhalation, Every 4 Hours PRN   • allopurinol (ZYLOPRIM) 600 mg, Oral, Daily   • ALUM SULFATE-CA ACETATE EX Apply  topically to the appropriate area as directed.   • ammonium lactate (AmLactin) 12 % lotion Topical, 2 Times Daily   • apixaban (ELIQUIS) 5 mg, Oral, 2 Times Daily   • budesonide (PULMICORT) 0.5 mg, Nebulization, 2 Times Daily   • clindamycin (Cleocin-T) 1 % external solution Topical, 2 Times Daily   • colchicine 0.6 MG tablet Take 2 PO at onset of gout flare, then take 1 tablet PO daily until  symptoms resolve   • Diclofenac Sodium (VOLTAREN) 1 % gel gel diclofenac sodium 1 % topical gel apply 2 gram to the affected area(s) by topical route 4 times per day   Suspended   • docusate sodium 100 mg, Oral, 2 Times Daily PRN   • Emollient (cetaphil) cream 1 application , Topical, 2 Times Daily   • erythromycin (ROMYCIN) 5 MG/GM ophthalmic ointment Instill 1 cm ribbon in each eye QID   • ferrous gluconate (FERGON) 324 mg, Oral, Weekly   • folic acid (FOLVITE) 1 mg, Oral, Daily   • ipratropium (ATROVENT) 0.03 % nasal spray 2 sprays   • ketoconazole (NIZORAL) 2 % cream Topical, Daily   • levothyroxine (SYNTHROID, LEVOTHROID) 25 mcg, Oral, Daily   • loratadine (CLARITIN) 10 mg, Oral, Daily   • mineral oil-hydrophilic petrolatum (AQUAPHOR) ointment 1 Application, Topical, Daily   • montelukast (SINGULAIR) 10 mg, Oral, Nightly   • mupirocin (BACTROBAN) 2 % ointment Topical, 2 Times Daily   • nystatin (MYCOSTATIN) 130079 UNIT/GM cream 1 Application, Topical, 2 Times Daily   • nystatin (MYCOSTATIN) 701449 UNIT/GM powder Topical, 3 Times Daily   • olopatadine (PATANOL) 0.1 % ophthalmic solution No dose, route, or frequency recorded.   • Petrolatum (Petroleum Jelly) ointment 1 application , Apply externally, As Needed   • promethazine-dextromethorphan (PROMETHAZINE-DM) 6.25-15 MG/5ML syrup 5 mL, Oral, 4 Times Daily PRN   • Soap & Cleansers (Gentle Skin Cleanser) liquid 1 application , Apply externally, 2 Times Daily   • Sodium Fluoride 1.1 % cream    • Urea 50 % suspension Apply topically to the affected areas TID   • vitamin B-12 (CYANOCOBALAMIN) 1,000 mcg, Oral, Daily   • vitamin B-6 (PYRIDOXINE) 25 mg, Oral, Every Other Day   • Zinc Oxide 1 g, Topical, Daily PRN       The following portions of the patient's history were reviewed and updated as appropriate: allergies, current medications, past family history, past medical history, past social history, past surgical history, and problem list.    Objective   Vital Signs:  "  /80   Pulse 85   Temp 98.3 °F (36.8 °C)   Ht 160 cm (62.99\")   Wt (!) 164 kg (362 lb 9.6 oz)   SpO2 96%   BMI 64.25 kg/m²     BP Readings from Last 3 Encounters:   10/23/24 108/80   10/14/24 110/69   10/01/24 106/74     Wt Readings from Last 3 Encounters:   10/23/24 (!) 164 kg (362 lb 9.6 oz)   10/14/24 (!) 165 kg (363 lb 14.4 oz)   10/01/24 (!) 165 kg (364 lb)     Class 3 Severe Obesity (BMI >=40). Obesity-related health conditions include the following: obstructive sleep apnea. Obesity is unchanged. BMI is is above average; BMI management plan is completed. We discussed portion control and increasing exercise.     Physical Exam  Vitals reviewed.   Constitutional:       Appearance: Normal appearance.   HENT:      Head: Normocephalic and atraumatic.      Right Ear: External ear normal.      Left Ear: External ear normal.   Eyes:      Conjunctiva/sclera: Conjunctivae normal.   Cardiovascular:      Rate and Rhythm: Normal rate and regular rhythm.      Heart sounds: No murmur heard.     No friction rub. No gallop.   Pulmonary:      Effort: Pulmonary effort is normal.      Breath sounds: Normal breath sounds. No wheezing or rhonchi.   Abdominal:      General: Bowel sounds are normal. There is no distension.      Palpations: Abdomen is soft.      Tenderness: There is no abdominal tenderness.   Musculoskeletal:      Comments: No joint swelling appreciated in the lower extremities bilaterally.   Skin:     Comments: tinea pedis noted on the left lower extremity/foot.   Neurological:      Mental Status: He is alert and oriented to person, place, and time.      Cranial Nerves: No cranial nerve deficit.   Psychiatric:         Mood and Affect: Mood and affect normal.         Behavior: Behavior normal.         Thought Content: Thought content normal.         Judgment: Judgment normal.          Result Review :   The following data was reviewed by: Jesus Manuel Sloan DO on 10/23/2024:  Common labs          7/3/2024    " 13:09 7/16/2024    10:56 9/25/2024    13:57   Common Labs   Glucose 99      BUN 15      Creatinine 1.51      Sodium 139      Potassium 4.4      Chloride 102      Calcium 8.9      Albumin 3.7      Total Bilirubin 0.4      Alkaline Phosphatase 74      AST (SGOT) 33      ALT (SGPT) 29      WBC 3.87  5.10  4.67    Hemoglobin 11.7  12.1  13.4    Hematocrit 39.1  40.7  44.9    Platelets 339  383  348    Hemoglobin A1C 5.20      Uric Acid 7.8               Lab Results   Component Value Date    SARSANTIGEN Not Detected 12/13/2023    FLUAAG Not Detected 12/13/2023    FLUBAG Not Detected 12/13/2023    BILIRUBINUR Negative 09/10/2022       Results  Laboratory Studies  Uric acid level was 7.8.    Procedures        Assessment and Plan    Diagnoses and all orders for this visit:    1. Down's syndrome (Primary)    2. TAMIKO (obstructive sleep apnea)    3. Moderate persistent asthma without complication    4. Chronic cough  -     promethazine-dextromethorphan (PROMETHAZINE-DM) 6.25-15 MG/5ML syrup; Take 5 mL by mouth 4 (Four) Times a Day As Needed for Cough.  Dispense: 473 mL; Refill: 0    5. Skin yeast infection  -     nystatin (MYCOSTATIN) 423735 UNIT/GM powder; Apply  topically to the appropriate area as directed 3 (Three) Times a Day.  Dispense: 60 g; Refill: 2    6. Tinea pedis of left foot    Other orders  -     ferrous gluconate (FERGON) 324 MG tablet; Take 1 tablet by mouth 1 (One) Time Per Week.  Dispense: 13 tablet; Refill: 3  -     Petrolatum (Petroleum Jelly) ointment; Apply 1 application  topically As Needed (prn as needed).  Dispense: 368 g; Refill: 3  -     mupirocin (BACTROBAN) 2 % ointment; Apply  topically to the appropriate area as directed 2 (Two) Times a Day.  Dispense: 30 g; Refill: 3  -     colchicine 0.6 MG tablet; Take 2 PO at onset of gout flare, then take 1 tablet PO daily until symptoms resolve  Dispense: 30 tablet; Refill: 3  -     ketoconazole (NIZORAL) 2 % cream; Apply  topically to the appropriate area  as directed Daily.  Dispense: 60 g; Refill: 2  -     allopurinol (Zyloprim) 300 MG tablet; Take 2 tablets by mouth Daily.  Dispense: 180 tablet; Refill: 1        Assessment & Plan  1. Cough.  He has been experiencing coughing that sometimes leads to vomiting. He is currently using a nebulizer and inhalers, including albuterol, Claritin, and Singulair. Promethazine DM will be refilled to help manage his cough, particularly at nighttime. He is advised to clean and dry his humidifier and refrain from using it for a week, as using tap water in the humidifier may be contributing to his cough. It is also recommended to check and change the air filter in the house and ensure there is no mold buildup.    2. Asthma.  He continues to have periodic coughing episodes. He is using a nebulizer and inhalers, including albuterol, Claritin, and Singulair. A repeat chest CT is scheduled for December to further evaluate his condition. He is advised to continue his current asthma management regimen.    3. Gout.  His uric acid level was last recorded at 7.8. He has been experiencing swelling in his feet, which alternates between the right and left foot. The dosage of allopurinol will be increased to 600 mg, to be taken as two pills. He is also advised to follow a gout-friendly diet, avoiding red meats and other high-purine foods.    4. Iron Deficiency.  An iron supplement will be prescribed, specifically ferrous gluconate, as discussed. He is to follow up with his primary care provider for further management.    5. Athlete's Foot.  He has been experiencing athlete's foot for 15 years. Ketoconazole cream will be prescribed to manage this condition.    6. Ear Wax.  There is a small amount of ear wax in both ears. It is recommended to use sweet oil or mineral oil on a cotton ball at the opening of the ear canal to help soften and remove the ear wax. Avoid using Q-tips.    Follow-up  Return in 3 months for follow up.       Medications  Discontinued During This Encounter   Medication Reason   • amoxicillin (AMOXIL) 875 MG tablet    • cetirizine (zyrTEC) 10 MG tablet    • Petrolatum (Petroleum Jelly) ointment Reorder   • nystatin (MYCOSTATIN) 924997 UNIT/GM powder Reorder   • promethazine-dextromethorphan (PROMETHAZINE-DM) 6.25-15 MG/5ML syrup Reorder   • colchicine 0.6 MG tablet Reorder   • ketoconazole (NIZORAL) 2 % cream Reorder   • mupirocin (BACTROBAN) 2 % ointment Reorder   • allopurinol (Zyloprim) 300 MG tablet Reorder          Follow Up   Return in about 3 months (around 1/23/2025) for asthma.  Patient was given instructions and counseling regarding his condition or for health maintenance advice. Please see specific information pulled into the AVS if appropriate.     Patient or patient representative verbalized consent for the use of Ambient Listening during the visit with  Jesus Manuel Sloan DO for chart documentation. 10/23/2024  13:54 EDT    Jesus Manuel Sloan DO  10/23/24  14:15 EDT

## 2024-10-24 RX ORDER — MINERAL OIL/HYDROPHIL PETROLAT
1 OINTMENT (GRAM) TOPICAL DAILY
Qty: 396 G | Refills: 1 | Status: SHIPPED | OUTPATIENT
Start: 2024-10-24

## 2024-10-29 ENCOUNTER — TELEPHONE (OUTPATIENT)
Dept: FAMILY MEDICINE CLINIC | Facility: CLINIC | Age: 29
End: 2024-10-29
Payer: MEDICARE

## 2024-10-29 NOTE — TELEPHONE ENCOUNTER
"Relay     \"LVM to call office to schedule subsequent [facility] wellness visit before the end of year. This appt will need to be scheduled with Dr Fisher or Dr Dobbs. \"                "

## 2024-11-25 ENCOUNTER — TELEPHONE (OUTPATIENT)
Dept: FAMILY MEDICINE CLINIC | Facility: CLINIC | Age: 29
End: 2024-11-25

## 2024-11-25 NOTE — TELEPHONE ENCOUNTER
Phone call placed to mother to inform that she needs to follow doctors orders with review of treatments order. Patient states that a family member told her to ask for a shot to clear up the bronchitis. Informed mother that the treatment for one is not the same for another with voiced understanding. Informed mother that if the patient gets worse to call and schedule an appointment or go to urgent care with voiced agreement.

## 2024-11-25 NOTE — TELEPHONE ENCOUNTER
Caller: RAFIA NEWELL    Relationship to patient: Mother    Best call back number: [phone]    Patient is needing: PATIENT'S MOTHER CALLED IN AND SAID THAT   PATIENT HAS BEEN DIAGNOSED WITH BRONCHITIS AND WOULD LIKE TO KNOW IF IT WAS BACTERIAL AND IF SO WOULD LIKE TO HAVE PATIENT COME IN FOR A SHOT. PATIENT'S MOTHER WOULD LIKE A CALL BACK PLEASE

## 2024-11-29 NOTE — PROGRESS NOTES
"Chief Complaint  Dark spot on left leg    Subjective          Kathi JAKE Bernard presents to [facility]  History of Present Illness  Patient presents today accompanied by his mother for an acute visit.  It was noticed by his father yesterday when getting him dressed that he had a dark spot on his left leg.  This is located on the left posterior calf area on the medial aspect below the knee.  It has been present for an unknown duration but again was just noticed yesterday.  It does not cause any pain.  There has not been any lower extremity swelling/edema.  No recent travel or surgery.  No history of cancer.  He previously did have a DVT in one of his leg several years ago.  He does take aspirin daily.  He also has a couple of bumps that have come up again.  He does have hidradenitis suppurativa.  This is noted on his left axillary area as well as in the left groin area.  He does have clindamycin but they have not been using it regularly.  He is still taking 25 mcg of levothyroxine daily.  He will be due for labs when he returns for follow-up.  His last thyroid profile was within normal limits.  Objective   Vital Signs:   /62   Pulse 88   Temp 98.3 °F (36.8 °C)   Ht 160 cm (63\")   Wt (!) 153 kg (337 lb 3.2 oz)   SpO2 96%   BMI 59.73 kg/m²     Physical Exam   General: AAO ×3, no acute distress, pleasant  HEENT: Normocephalic, atraumatic  Skin: Nodule noted in the left axillary area.  Slightly tender to palpation.  Consistent with hidradenitis.  He also has a similar one in the left groin area/crural fold region.  Patient has a darker area of skin noted on the posterior aspect of his left calf on the medial aspect just below the left knee.  Nontender to palpation.  Cardiovascular: Regular rate and rhythm without appreciable murmur  Respiratory: Clear to auscultation bilaterally no RRW  Gastrointestinal: Soft nontender nondistended with bowel sounds present  extremities: No " Diagnosis:   Encounter Diagnoses   Name Primary?    Cough, unspecified type Yes    Malignant neoplasm of overlapping sites of left breast in female, estrogen receptor positive (CMD)        Regimen: DOSE DENSE AC- WEEKLY T: DOXORUBICIN 60 MG/M2 + CYCLOPHOSPHAMIDE 600 MG/M2 EVERY 14 DAYS X 4 CYCLES, FOLLOWED BY PACLITAXEL 80 MG/M2 WEEKLY X 12 DOSES   Cycle/Day: C3/D1  Is this a C1D1 appt?  No    SONAL Andrews is supervising clinician today.    ECOG:   ECOG [11/29/24 1202]   ECOG Performance Status 1       Review and verified Advanced Directives: No: Patient declined to create/provide document at this time     Verified if patient has state DNR bracelet on: No; Full Code    Nursing Assessment:   A focused nursing assessment addressing the toxicity of chemotherapy was performed and the patient reports the following:    Anxiety/Depression/Insomnia: Anxiety: No, Depression: No, and Insomnia: Yes: Intervention ongoing  Pain: YES, Pain Rating:  3, Location: hip, Pain Description: aching  During this visit, medication was administered to treat pain: No      Toxicity Assessment    Auditory/Ear  Assessment: Yes (Within Defined Limits)    Cardiac General  Assessment: Yes (w/ Exceptions to WDL)  Hypertension: Grade 2    Constitutional  Assessment: Yes (w/ Exceptions to WDL)  Fatigue: Grade 1    Dermatology/Skin  Assessment: Yes (w/ Exceptions to WDL)  Bruising: Grade 1    Endocrine  Assessment: Yes (Within Defined Limits)    Gastrointestinal  Assessment: Yes (Within Defined Limits)    Hemorrhage/Bleeding  Assessment: Yes (Within Defined Limits)    Infection  Assessment: Yes (Within Defined Limits)    Lymphatics  Assessment: Yes (Within Defined Limits)    Musculoskeletal  Assessment: Yes (Within Defined Limits)    Neurology  Assessment: Yes (Within Defined Limits)    Ocular  Assessment: Yes (Within Defined Limits)    Pain  Assessment: Yes (Within Defined Limits)    Pulmonary/Upper Respiratory  Assessment: Yes (w/ Exceptions to  WDL)  Cough: Grade 1    Genitourinary  Assessment: Yes (Within Defined Limits)        Patient confirms receipt of a signed copy of Anti-Cancer Treatment consent and verbalizes understanding of treatment plan: Yes.     Pre-Treatment: Treatment consent signed  Patient has valid pre-authorization  VS completed  Height and weight verified  BSA independently double checked & verified by two practitioners  Premed orders, including hydration, are verified prior to administration  Treatment parameters verified in patient protocol  Lab results reviewed: Treatment conditions met, ok to proceed with treatment   Chemotherapy doses independently doubled checked & verified by two practitioners  Verified patient has not reached the Cumulative lifetime dose of Doxorubicin 450 mg/m2    Treatment: I have reviewed the following with the patient:  Name of chemo drug, duration and route of infusion, and reportable infusion-related symptoms.  Chemotherapy has not ; double checked & verified by two practitioners  Appearance and physical integrity of drugs meets standard of drug monograph; double checked & verified by two practitioners  Rate set on infusion pump is in alignment with ordered rate; double checked & verified by two practitioners  Blood return confirmed before, during and after treatment administered  Infusion pump used for non-vesicant drugs  Drugs were administered in proper sequencing  Refer to LDA and MAR for line assessment and medication administration  Neulasta on-body injector: Yes:   Writer reviewed with patient on Neulasta On-Body Injector.    Writer confirmed that patient is not scheduled for any radiology, radiation, or hyperbaric appointments until after the medication has been completely instilled.  Patient aware that flashing green light means injector is working properly.  Patient aware that red light means injector is not working properly and they need to call clinic.  Patient aware that medication will  edema  Neurologic: CN II through XII grossly intact   Psychiatric: Normal mood and affect  Result Review :                 Assessment and Plan    Diagnoses and all orders for this visit:    1. Rash (Primary)    2. Hidradenitis suppurativa    3. Hypothyroidism, unspecified type  -     CBC & Differential; Future  -     Comprehensive Metabolic Panel; Future  -     Vitamin B6; Future  -     TSH+Free T4; Future    4. Medication monitoring encounter  -     CBC & Differential; Future  -     Comprehensive Metabolic Panel; Future    5. Gout, unspecified cause, unspecified chronicity, unspecified site  -     Uric Acid; Future    6. Vitamin B6 deficiency  -     Vitamin B6; Future    Other orders  -     allopurinol (ZYLOPRIM) 100 MG tablet; Take 1 tablet by mouth Daily.  Dispense: 90 tablet; Refill: 3  -     Soap & Cleansers (Cetaphil Gentle Cleanser) liquid; Apply 1 Pump topically Daily. Apply topically once daily  Dispense: 237 mL; Refill: 3    Patient has a darker area on his left calf area.  Has been present for at least 1 day for an unknown duration.  Nontender to palpation.  He does not have any lower extremity edema.  His well score is 1.  I do not suspect DVT at this point.  I suspect likely a resolving contusion.  I discussed with mother to call or return if there is any worsening symptoms or no improvement.  I discussed with him continued use clindamycin for treatment of hidradenitis suppurativa.  Discussed having labs done prior to next appointment.  They are instructed to call with any questions or concerns.    Follow Up   Return in about 3 months (around 3/15/2022) for hidradenitis suppurativa.  Patient was given instructions and counseling regarding his condition or for health maintenance advice. Please see specific information pulled into the AVS if appropriate.        inject 27 hours after it is applied. Medication will inject at 1736.  Patient is not to soak/bathe while injector is in place.  Patient may shower & swim up to 8 ft for 1 hour.   Patient should avoid traveling, driving or operating heavy machinery during hour 26 through hour 29 after the On-Body Injector is applied.  Patient should avoid sleeping on the On-Body Injector or applying pressure on the On-Body Injector.  The On-Body Injector may not work properly.  Patient should keep the On-Body Injector at least 4 inches away from electrical equipment such as cell phones, cordless telephones, microwaves and other common appliances.  If the On-Body Injector is too close to electrical equipment, it may not work correctly and can lead to a missed or incomplete dose.  Patient aware it will take 45 minutes for medication to inject.   Patient is to check occasionally to make sure On-Body Injector is flashing a green light.  Patient is to make sure On-Body Injector is not leaking - if it leaks patient is to call clinic.  Patient is aware that if On-Body Injector falls off, he/she should not replace it on to his/her body. He/she should call clinic immediately.  Once medication is completely instilled, the light will be solid green or pump will turn off and patient can remove On-Body Injector.  Patient to place empty On-Body Injector into a sharps container.  Keep the on-body injector dry for the last 3 hours prior to the dose delivery start.     Post Treatment: Treatment tolerated well; no adverse reaction    Transfusion: Not needed    Integrative Medicine: No    Oral Chemotherapy: No    Education: No new instructions needed    Next appointment scheduled:   Future Appointments   Date Time Provider Department [place]   12/3/2024  4:30 PM Haseeb Isidro DO [facility]    12/13/2024 10:15 AM [facility]18 [id number]   12/13/2024 10:30 AM Jennifer Edmond NP [facility]18 [facility]   3/4/2025  2:40 PM Adarsh Kenny MD [facility]        Patient instructed to call the office with any questions or concerns.    Patient Discharged: patient discharged to home per self, ambulatory

## 2024-12-02 ENCOUNTER — OFFICE VISIT (OUTPATIENT)
Dept: FAMILY MEDICINE CLINIC | Facility: CLINIC | Age: 29
End: 2024-12-02
Payer: MEDICARE

## 2024-12-02 VITALS
BODY MASS INDEX: 52.48 KG/M2 | OXYGEN SATURATION: 94 % | SYSTOLIC BLOOD PRESSURE: 112 MMHG | WEIGHT: 315 LBS | TEMPERATURE: 98.7 F | DIASTOLIC BLOOD PRESSURE: 74 MMHG | HEART RATE: 92 BPM | HEIGHT: 65 IN

## 2024-12-02 DIAGNOSIS — Z00.00 MEDICARE ANNUAL WELLNESS VISIT, SUBSEQUENT: Primary | ICD-10-CM

## 2024-12-02 DIAGNOSIS — B37.2 SKIN YEAST INFECTION: ICD-10-CM

## 2024-12-02 DIAGNOSIS — L85.3 DRY SKIN: ICD-10-CM

## 2024-12-02 DIAGNOSIS — B35.3 TINEA PEDIS OF LEFT FOOT: ICD-10-CM

## 2024-12-02 DIAGNOSIS — J45.40 MODERATE PERSISTENT ASTHMA WITHOUT COMPLICATION: ICD-10-CM

## 2024-12-02 DIAGNOSIS — J30.9 ALLERGIC RHINITIS, UNSPECIFIED SEASONALITY, UNSPECIFIED TRIGGER: ICD-10-CM

## 2024-12-02 PROCEDURE — 1125F AMNT PAIN NOTED PAIN PRSNT: CPT | Performed by: FAMILY MEDICINE

## 2024-12-02 PROCEDURE — 99214 OFFICE O/P EST MOD 30 MIN: CPT | Performed by: FAMILY MEDICINE

## 2024-12-02 PROCEDURE — 3044F HG A1C LEVEL LT 7.0%: CPT | Performed by: FAMILY MEDICINE

## 2024-12-02 PROCEDURE — 1170F FXNL STATUS ASSESSED: CPT | Performed by: FAMILY MEDICINE

## 2024-12-02 PROCEDURE — G0439 PPPS, SUBSEQ VISIT: HCPCS | Performed by: FAMILY MEDICINE

## 2024-12-02 RX ORDER — NYSTATIN 100000 [USP'U]/ML
500000 SUSPENSION ORAL 4 TIMES DAILY
Qty: 120 ML | Refills: 0 | Status: SHIPPED | OUTPATIENT
Start: 2024-12-02

## 2024-12-02 RX ORDER — NYSTATIN 100000 [USP'U]/G
POWDER TOPICAL 3 TIMES DAILY
Qty: 60 G | Refills: 2 | Status: SHIPPED | OUTPATIENT
Start: 2024-12-02

## 2024-12-02 RX ORDER — AMMONIUM LACTATE 12 G/100G
LOTION TOPICAL 2 TIMES DAILY
Qty: 225 G | Refills: 11 | Status: SHIPPED | OUTPATIENT
Start: 2024-12-02

## 2024-12-02 RX ORDER — KETOCONAZOLE 20 MG/G
CREAM TOPICAL DAILY
Qty: 60 G | Refills: 2 | Status: SHIPPED | OUTPATIENT
Start: 2024-12-02

## 2024-12-02 RX ORDER — IPRATROPIUM BROMIDE 21 UG/1
2 SPRAY, METERED NASAL 3 TIMES DAILY PRN
Qty: 90 ML | Refills: 3 | Status: SHIPPED | OUTPATIENT
Start: 2024-12-02

## 2024-12-02 RX ORDER — ALBUTEROL SULFATE 90 UG/1
2 INHALANT RESPIRATORY (INHALATION) EVERY 4 HOURS PRN
Qty: 8.5 G | Refills: 3 | Status: SHIPPED | OUTPATIENT
Start: 2024-12-02

## 2024-12-02 RX ORDER — CALCIUM ACETATE MONOHYDRATE AND ALUMINUM SULFATE TETRADECAHYDRATE 952; 1347 MG/2299MG; MG/2299MG
1 POWDER, FOR SOLUTION TOPICAL 3 TIMES DAILY
Qty: 100 EACH | Refills: 3 | Status: SHIPPED | OUTPATIENT
Start: 2024-12-02

## 2024-12-02 RX ORDER — FLUTICASONE FUROATE, UMECLIDINIUM BROMIDE AND VILANTEROL TRIFENATATE 100; 62.5; 25 UG/1; UG/1; UG/1
1 POWDER RESPIRATORY (INHALATION)
Qty: 60 EACH | Refills: 1 | Status: SHIPPED | OUTPATIENT
Start: 2024-12-02

## 2024-12-02 RX ORDER — FLUTICASONE PROPIONATE 50 MCG
2 SPRAY, SUSPENSION (ML) NASAL DAILY
Qty: 16 G | Refills: 3 | Status: SHIPPED | OUTPATIENT
Start: 2024-12-02

## 2024-12-02 NOTE — PROGRESS NOTES
Subjective   The ABCs of the Annual Wellness Visit  Medicare Wellness Visit      Tremayne Bernard is a 29 y.o. patient who presents for a Medicare Wellness Visit.    The following portions of the patient's history were reviewed and   updated as appropriate: allergies, current medications, past family history, past medical history, past social history, past surgical history, and problem list.    Compared to one year ago, the patient's physical   health is worse.  Compared to one year ago, the patient's mental   health is the same.    Recent Hospitalizations:  He was not admitted to the hospital during the last year.     Current Medical Providers:  Patient Care Team:  Jesus Manuel Sloan DO as PCP - General (Family Medicine)  Lnog Davidson MD as Consulting Physician (General Surgery)  Mayra Pathak APRN as Nurse Practitioner (Pulmonary Disease)    Outpatient Medications Prior to Visit   Medication Sig Dispense Refill    albuterol (ACCUNEB) 1.25 MG/3ML nebulizer solution Take 3 mL by nebulization Every 6 (Six) Hours As Needed for Wheezing. 360 mL 3    allopurinol (Zyloprim) 300 MG tablet Take 2 tablets by mouth Daily. 180 tablet 1    apixaban (ELIQUIS) 5 MG tablet tablet Take 1 tablet by mouth 2 (Two) Times a Day. 180 tablet 3    budesonide (PULMICORT) 0.5 MG/2ML nebulizer solution Take 2 mL by nebulization 2 (Two) Times a Day. 180 mL 3    clindamycin (Cleocin-T) 1 % external solution Apply  topically to the appropriate area as directed 2 (Two) Times a Day. 60 mL 3    colchicine 0.6 MG tablet Take 2 PO at onset of gout flare, then take 1 tablet PO daily until symptoms resolve 30 tablet 3    Diclofenac Sodium (VOLTAREN) 1 % gel gel diclofenac sodium 1 % topical gel apply 2 gram to the affected area(s) by topical route 4 times per day   Suspended      docusate sodium 100 MG capsule Take 1 capsule by mouth 2 (Two) Times a Day As Needed (constipation). 90 each 3    Emollient (cetaphil) cream Apply 1 application   topically to the appropriate area as directed 2 (Two) Times a Day. 453 g 3    erythromycin (ROMYCIN) 5 MG/GM ophthalmic ointment Instill 1 cm ribbon in each eye QID 3.5 g 0    ferrous gluconate (FERGON) 324 MG tablet Take 1 tablet by mouth 1 (One) Time Per Week. 13 tablet 3    folic acid (FOLVITE) 1 MG tablet Take 1 tablet by mouth Daily. 90 tablet 3    levothyroxine (SYNTHROID, LEVOTHROID) 25 MCG tablet Take 1 tablet by mouth Daily. 90 tablet 3    loratadine (CLARITIN) 10 MG tablet Take 1 tablet by mouth Daily. 90 tablet 3    mineral oil-hydrophilic petrolatum (AQUAPHOR) ointment Apply 1 Application topically to the appropriate area as directed Daily. 396 g 1    montelukast (Singulair) 10 MG tablet Take 1 tablet by mouth Every Night. 90 tablet 3    mupirocin (BACTROBAN) 2 % ointment Apply  topically to the appropriate area as directed 2 (Two) Times a Day. 30 g 3    nystatin (MYCOSTATIN) 557734 UNIT/GM cream Apply 1 Application topically to the appropriate area as directed 2 (Two) Times a Day. 60 g 2    olopatadine (PATANOL) 0.1 % ophthalmic solution       Petrolatum (Petroleum Jelly) ointment Apply 1 application  topically As Needed (prn as needed). 368 g 3    promethazine-dextromethorphan (PROMETHAZINE-DM) 6.25-15 MG/5ML syrup Take 5 mL by mouth 4 (Four) Times a Day As Needed for Cough. 473 mL 0    Sodium Fluoride 1.1 % cream       Urea 50 % suspension Apply topically to the affected areas  g 3    vitamin B-12 (CYANOCOBALAMIN) 1000 MCG tablet Take 1 tablet by mouth Daily. 90 tablet 3    vitamin B-6 (PYRIDOXINE) 50 MG tablet Take 0.5 tablets by mouth Every Other Day. 23 tablet 3    Zinc Oxide 16 % ointment Apply 1 Application topically to the appropriate area as directed Daily As Needed (rash). 113 g 3    albuterol sulfate  (90 Base) MCG/ACT inhaler Inhale 2 puffs Every 4 (Four) Hours As Needed for Wheezing or Shortness of Air. 8.5 g 3    ALUM SULFATE-CA ACETATE EX Apply  topically to the appropriate  area as directed.      ammonium lactate (AmLactin) 12 % lotion Apply  topically to the appropriate area as directed 2 (Two) Times a Day. 225 g 11    ipratropium (ATROVENT) 0.03 % nasal spray Administer 2 sprays into the nostril(s) as directed by provider.      ketoconazole (NIZORAL) 2 % cream Apply  topically to the appropriate area as directed Daily. 60 g 2    nystatin (MYCOSTATIN) 340115 UNIT/GM powder Apply  topically to the appropriate area as directed 3 (Three) Times a Day. 60 g 2    Soap & Cleansers (Gentle Skin Cleanser) liquid Apply 1 application  topically 2 (Two) Times a Day. 473 mL 3     No facility-administered medications prior to visit.     No opioid medication identified on active medication list. I have reviewed chart for other potential  high risk medication/s and harmful drug interactions in the elderly.      Aspirin is not on active medication list.  Aspirin use is not indicated based on review of current medical condition/s. Risk of harm outweighs potential benefits.  .    Patient Active Problem List   Diagnosis    TAMIKO treated with BiPAP    Hypersomnia    Class 3 severe obesity due to excess calories without serious comorbidity with body mass index (BMI) of 60.0 to 69.9 in adult    Down's syndrome    Syncope    Sinus trouble    Pes planus    Homocysteinemia    Hidradenitis suppurativa    Foot pain, left    Fatigue    Elevated serum creatinine    DVT (deep venous thrombosis)    Disability of walking    Diabetes mellitus    Bronchitis    Athletes foot    Asthma    Arthropathy    Arthritis of right knee    Knee pain    Ankle pain    Anemia    Secondary polycythemia    Anhidrosis    Multiple pulmonary nodules     Advance Care Planning Advance Directive is not on file.  ACP discussion was held with the patient during this visit. Patient does not have an advance directive, information provided.            Objective   Vitals:    12/02/24 1608   BP: 112/74   Pulse: 92   Temp: 98.7 °F (37.1 °C)   TempSrc:  "Oral   SpO2: 94%   Weight: (!) 163 kg (360 lb 3.2 oz)   Height: 165.1 cm (65\")   PainSc:   4   PainLoc: Foot       Estimated body mass index is 59.94 kg/m² as calculated from the following:    Height as of this encounter: 165.1 cm (65\").    Weight as of this encounter: 163 kg (360 lb 3.2 oz).            Does the patient have evidence of cognitive impairment? No                                                                                                Health  Risk Assessment    Smoking Status:  Social History     Tobacco Use   Smoking Status Never    Passive exposure: Never   Smokeless Tobacco Never     Alcohol Consumption:  Social History     Substance and Sexual Activity   Alcohol Use Never       Fall Risk Screen  STEADI Fall Risk Assessment was completed, and patient is at MODERATE risk for falls. Assessment completed on:2024    Depression Screening   Little interest or pleasure in doing things? Not at all   Feeling down, depressed, or hopeless? Not at all   PHQ-2 Total Score 0      Health Habits and Functional and Cognitive Screenin/2/2024     4:16 PM   Functional & Cognitive Status   Do you have difficulty preparing food and eating? Yes   Do you have difficulty bathing yourself, getting dressed or grooming yourself? Yes   Do you have difficulty using the toilet? Yes   Do you have difficulty moving around from place to place? Yes   Do you have trouble with steps or getting out of a bed or a chair? Yes   Current Diet Well Balanced Diet   Dental Exam Up to date   Eye Exam Up to date   Exercise (times per week) 3 times per week   Current Exercises Include Treadmill   Do you need help using the phone?  No   Are you deaf or do you have serious difficulty hearing?  No   Do you need help to go to places out of walking distance? Yes   Do you need help shopping? Yes   Do you need help preparing meals?  Yes   Do you need help with housework?  Yes   Do you need help with laundry? Yes   Do you need help " taking your medications? Yes   Do you need help managing money? Yes   Do you ever drive or ride in a car without wearing a seat belt? No   Have you felt unusual stress, anger or loneliness in the last month? No   Who do you live with? Other   If you need help, do you have trouble finding someone available to you? No   Have you been bothered in the last four weeks by sexual problems? No   Do you have difficulty concentrating, remembering or making decisions? Yes           Age-appropriate Screening Schedule:  Refer to the list below for future screening recommendations based on patient's age, sex and/or medical conditions. Orders for these recommended tests are listed in the plan section. The patient has been provided with a written plan.    Health Maintenance List  Health Maintenance   Topic Date Due    Pneumococcal Vaccine 0-64 (1 of 2 - PCV) Never done    DIABETIC FOOT EXAM  Never done    DIABETIC EYE EXAM  Never done    Hepatitis B (1 of 3 - 19+ 3-dose series) Never done    URINE MICROALBUMIN  08/09/2024    COVID-19 Vaccine (4 - 2024-25 season) 09/01/2024    HEMOGLOBIN A1C  01/03/2025    BMI FOLLOWUP  10/23/2025    ANNUAL WELLNESS VISIT  12/02/2025    TDAP/TD VACCINES (2 - Td or Tdap) 03/28/2029    HEPATITIS C SCREENING  Completed    INFLUENZA VACCINE  Completed                                                                                                                                                CMS Preventative Services Quick Reference  Risk Factors Identified During Encounter  Polypharmacy: Medication List reviewed    The above risks/problems have been discussed with the patient.  Pertinent information has been shared with the patient in the After Visit Summary.  An After Visit Summary and PPPS were made available to the patient.    Follow Up:   Next Medicare Wellness visit to be scheduled in 1 year.          Additional E&M Note during same encounter follows:  Patient has multiple medical problems which  "are significant and separately identifiable that require additional work above and beyond the Medicare Wellness Visit.      Chief Complaint  Medicare Wellness-subsequent    Tremayne Bernard is a 29 y.o. male who presents to [facility]     History of Present Illness  The patient is a 29-year-old male who presents today for a Medicare wellness visit. He is accompanied by his mother and father.    He reports a persistent cough and bronchitis, which he feels have worsened his physical health compared to the previous year. He also reports a runny nose. He is currently using a Pulmicort nebulizer twice daily and is on oxygen therapy at night. His oxygen saturation level today was 94 percent. He is scheduled for a CT scan on 12/04/2023 to investigate the cause of his cough. His mental health remains stable, and he has not required hospitalization in the past 365 days.    He requires a refill of his albuterol inhaler for asthma management.    He is also requesting a refill of his nystatin powder. He has been using ammonium lactate solution for foot soaks and applying Vaseline daily to manage dry skin on his feet.    He had a busted eardrum previously.    Objective   Vital Signs:   Vitals:    12/02/24 1608   BP: 112/74   Pulse: 92   Temp: 98.7 °F (37.1 °C)   TempSrc: Oral   SpO2: 94%   Weight: (!) 163 kg (360 lb 3.2 oz)   Height: 165.1 cm (65\")   PainSc:   4   PainLoc: Foot       Wt Readings from Last 3 Encounters:   12/02/24 (!) 163 kg (360 lb 3.2 oz)   10/23/24 (!) 164 kg (362 lb 9.6 oz)   10/14/24 (!) 165 kg (363 lb 14.4 oz)     BP Readings from Last 3 Encounters:   12/02/24 112/74   10/23/24 108/80   10/14/24 110/69       Physical Exam  Vitals reviewed.   Constitutional:       Appearance: Normal appearance.   HENT:      Head: Normocephalic and atraumatic.      Right Ear: External ear normal.      Left Ear: External ear normal.      Nose: Congestion present.      Mouth/Throat:      Mouth: " Mucous membranes are moist.      Pharynx: Oropharynx is clear.      Comments: Oral candidiasis noted.  Eyes:      Conjunctiva/sclera: Conjunctivae normal.   Cardiovascular:      Rate and Rhythm: Normal rate and regular rhythm.      Heart sounds: No murmur heard.     No friction rub. No gallop.   Pulmonary:      Effort: Pulmonary effort is normal.      Breath sounds: Normal breath sounds. No wheezing or rhonchi.   Abdominal:      General: Bowel sounds are normal. There is no distension.      Palpations: Abdomen is soft.      Tenderness: There is no abdominal tenderness.   Skin:     Comments: Dry, scaly skin with some erythema noted on the left foot   Neurological:      Mental Status: He is alert and oriented to person, place, and time.      Cranial Nerves: No cranial nerve deficit.   Psychiatric:         Mood and Affect: Mood and affect normal.         Behavior: Behavior normal.         Thought Content: Thought content normal.         Judgment: Judgment normal.         Physical Exam  Yeast is present on the tongue. Inferior turbinates are enlarged.    The following data was reviewed by Jesus Manuel Sloan DO on 12/02/2024  Common Labs   Common labs          7/3/2024    13:09 7/16/2024    10:56 9/25/2024    13:57   Common Labs   Glucose 99      BUN 15      Creatinine 1.51      Sodium 139      Potassium 4.4      Chloride 102      Calcium 8.9      Albumin 3.7      Total Bilirubin 0.4      Alkaline Phosphatase 74      AST (SGOT) 33      ALT (SGPT) 29      WBC 3.87  5.10  4.67    Hemoglobin 11.7  12.1  13.4    Hematocrit 39.1  40.7  44.9    Platelets 339  383  348    Hemoglobin A1C 5.20      Uric Acid 7.8          Results          Assessment & Plan   Diagnoses and all orders for this visit:    1. Medicare annual wellness visit, subsequent (Primary)    2. Skin yeast infection  -     nystatin (MYCOSTATIN) 392411 UNIT/GM powder; Apply  topically to the appropriate area as directed 3 (Three) Times a Day.  Dispense: 60 g;  Refill: 2    3. Dry skin  -     ammonium lactate (AmLactin) 12 % lotion; Apply  topically to the appropriate area as directed 2 (Two) Times a Day.  Dispense: 225 g; Refill: 11    4. Tinea pedis of left foot    5. Moderate persistent asthma without complication    6. Allergic rhinitis, unspecified seasonality, unspecified trigger    Other orders  -     albuterol sulfate  (90 Base) MCG/ACT inhaler; Inhale 2 puffs Every 4 (Four) Hours As Needed for Wheezing or Shortness of Air.  Dispense: 8.5 g; Refill: 3  -     ketoconazole (NIZORAL) 2 % cream; Apply  topically to the appropriate area as directed Daily.  Dispense: 60 g; Refill: 2  -     aluminum sulfate-calcium acetate ([place]) topical packet; Apply 1 packet topically to the appropriate area as directed 3 (Three) Times a Day.  Dispense: 100 each; Refill: 3  -     Soap & Cleansers (Gentle Skin Cleanser) liquid; Apply 1 application  topically 2 (Two) Times a Day.  Dispense: 473 mL; Refill: 3  -     Fluticasone-Umeclidin-Vilant (Trelegy Ellipta) 100-62.5-25 MCG/ACT inhaler; Inhale 1 puff Daily.  Dispense: 60 each; Refill: 1  -     ipratropium (ATROVENT) 0.03 % nasal spray; Administer 2 sprays into the nostril(s) as directed by provider 3 (Three) Times a Day As Needed (for running).  Dispense: 90 mL; Refill: 3  -     fluticasone (FLONASE) 50 MCG/ACT nasal spray; Administer 2 sprays into the nostril(s) as directed by provider Daily.  Dispense: 16 g; Refill: 3  -     nystatin (MYCOSTATIN) 100,000 unit/mL suspension; Swish and swallow 5 mL 4 (Four) Times a Day.  Dispense: 120 mL; Refill: 0        Assessment & Plan  1. Medicare wellness visit.  He is here for a Medicare wellness visit. His physical health is reported to be worse compared to a year ago, primarily due to a persistent cough and bronchitis. His mental health remains the same. He has not been hospitalized in the last 365 days. An advanced directive is recommended.     2. Asthma.  His asthma appears to be  poorly managed, potentially exacerbating his cough. A refill for his albuterol inhaler will be provided. A prescription for Trelegy will be provided, replacing the current Pulmicort nebulizer. Instructions on its usage were given. He is scheduled for a chest CT scan on 12/04/2024 to further investigate his cough.    3. Oral thrush.  He presents with oral thrush. A prescription for nystatin powder will be provided.    4. Allergic rhinitis.  He has significant allergies causing nasal congestion and rhinorrhea. Ipratropium nasal spray will be prescribed, to be used three times daily as needed for rhinorrhea. Flonase nasal spray will also be prescribed, to be used three times daily to alleviate nasal congestion and rhinorrhea. He is advised to continue taking Claritin 10 mg and Singulair 10 mg.    5. Athlete's foot.  A prescription for ketoconazole cream will be provided, to be applied once daily to the affected foot. He is advised to continue using ammonium lactate for his dry skin. A prescription for [place] will also be provided.    Follow-up  Return in 3 months for follow up.               FOLLOW UP  Return in about 3 months (around 3/2/2025) for allergic rhinitis.  Patient was given instructions and counseling regarding his condition or for health maintenance advice. Please see specific information pulled into the AVS if appropriate.     Patient or patient representative verbalized consent for the use of Ambient Listening during the visit with  Jesus Manuel Sloan DO for chart documentation. 12/2/2024  17:03 EST    Jesus Manuel Sloan DO  12/02/24  17:43 EST

## 2024-12-04 ENCOUNTER — HOSPITAL ENCOUNTER (OUTPATIENT)
Dept: CT IMAGING | Facility: HOSPITAL | Age: 29
Discharge: HOME | End: 2024-12-04
Payer: MEDICARE

## 2024-12-04 DIAGNOSIS — R91.8 MULTIPLE PULMONARY NODULES: ICD-10-CM

## 2024-12-04 DIAGNOSIS — Z86.711 HISTORY OF PULMONARY EMBOLISM: ICD-10-CM

## 2024-12-04 LAB
CREAT BLDA-MCNC: 1.6 MG/DL (ref 0.6–1.3)
EGFRCR SERPLBLD CKD-EPI 2021: 60.2 ML/MIN/1.73

## 2024-12-04 PROCEDURE — 71260 CT THORAX DX C+: CPT

## 2024-12-04 PROCEDURE — 25510000001 IOPAMIDOL PER 1 ML

## 2024-12-04 PROCEDURE — 82565 ASSAY OF CREATININE: CPT

## 2024-12-04 RX ORDER — IOPAMIDOL 755 MG/ML
100 INJECTION, SOLUTION INTRAVASCULAR
Status: COMPLETED | OUTPATIENT
Start: 2024-12-04 | End: 2024-12-04

## 2024-12-04 RX ADMIN — IOPAMIDOL 100 ML: 755 INJECTION, SOLUTION INTRAVENOUS at 10:32

## 2025-01-07 NOTE — PROGRESS NOTES
Chief Complaint/Care Team   secondary polycythemia, DVT    Federico Sullivan MD Loesevitz, Thomas, DO    History of Present Illness     Diagnosis: Recurrent DVT    Secondary Polycythemia (pt with TAMIKO, using machine every other night), JAK2 [id number] and exon 12 mutation testing negative    H/o Down Syndrome    Current Treatment: DVT- Eliquis 5 mg p.o. twice daily (lifelong); Phlebotomy prn for Secondary Polycythemia    Previous Treatment: None    Tremayne Bernard is a 29 y.o. male who presents to [facility]EMATOLOGY [facility] ONCOLOGY for follow-up regarding history of recurrent DVT as well as secondary positive anemia from obstructive sleep apnea.  Patient also history of Down syndrome and is here with his mother who provides additional medical history.  Patient's mother reports he has been compliant with taking Eliquis.  No report of blood clots, lower extremity swelling, difficulty breathing or chest discomfort.  Patient reports overall patient is tolerating Eliquis medication well.    His mother is also attempting to have him wear his sleep apnea machine consistently.  She has not been able to follow-up with pulmonology to assess for other devices/machines that would increase his compliance with use of TAMIKO machine.  Of note, per patient's mother he states up late at night until the early morning, but her and his father has been trying to have him wear the TAMIKO mask more frequently.    Interval history: Patient here with his mother and father to follow-up regarding secondary polycythemia. According to his parents he has increased his use of CPAP machine and reports consistent use of supplemental oxygen at night.  Pt's mother reports she may have noticed blood when cleaning him up after a bowel movement, but denies any melena, or blood clots.  Patient's last phlebotomy was in 6/2024. Pt reports recent ER visit due to bronchitis symptoms, he continues to follow up with pulmonology. Pt had labs collected during  "clinic visit and I called pt's mother back after labs had resulted.     Review of Systems   Constitutional:  Positive for fatigue (PT stays up all night so he feels fatigue throughout the day). Negative for appetite change, diaphoresis, fever, unexpected weight gain and unexpected weight loss.   HENT:  Negative for hearing loss, mouth sores, sore throat, swollen glands, trouble swallowing and voice change.    Eyes:  Negative for blurred vision.   Respiratory:  Positive for cough. Negative for shortness of breath and wheezing.    Cardiovascular:  Negative for chest pain and palpitations.   Gastrointestinal:  Negative for abdominal pain, blood in stool, constipation, diarrhea, nausea and vomiting.   Endocrine: Negative for cold intolerance and heat intolerance.   Genitourinary:  Negative for difficulty urinating, dysuria, frequency, hematuria and urinary incontinence.   Musculoskeletal:  Positive for arthralgias. Negative for back pain and myalgias.   Skin:  Negative for rash, skin lesions and wound.   Neurological:  Negative for dizziness, seizures, weakness, numbness and headache.   Hematological:  Does not bruise/bleed easily.   Psychiatric/Behavioral:  Negative for depressed mood. The patient is not nervous/anxious.    All other systems reviewed and are negative.       Oncology/Hematology History    No history exists.       Objective     Vitals:    01/08/25 1059   BP: 122/79   Pulse: 99   Resp: 20   Temp: 97.8 °F (36.6 °C)   TempSrc: Temporal   SpO2: 94%   Weight: (!) 161 kg (354 lb 3.2 oz)   Height: 165.1 cm (65\")   PainSc: 10-Worst pain ever   PainLoc: Foot               ECOG score: 0       BMI 58.9    PHQ-9 Total Score:         Physical Exam  Vitals reviewed. Exam conducted with a chaperone present.   Constitutional:       General: He is not in acute distress.     Appearance: Normal appearance. He is obese.   HENT:      Head: Normocephalic and atraumatic.   Eyes:      Extraocular Movements: Extraocular " movements intact.   Pulmonary:      Effort: Pulmonary effort is normal.      Breath sounds: No wheezing.   Musculoskeletal:      Cervical back: Normal range of motion and neck supple.   Skin:     General: Skin is warm and dry.   Neurological:      Mental Status: He is oriented to person, place, and time.           Past Medical History     Past Medical History:   Diagnosis Date    Anemia     Asthma     Diabetes mellitus     Disease of thyroid gland     Down's syndrome     Gout     Oxygen dependent     Nighttime Only     Current Outpatient Medications on File Prior to Visit   Medication Sig Dispense Refill    albuterol (ACCUNEB) 1.25 MG/3ML nebulizer solution Take 3 mL by nebulization Every 6 (Six) Hours As Needed for Wheezing. 360 mL 3    albuterol sulfate  (90 Base) MCG/ACT inhaler Inhale 2 puffs Every 4 (Four) Hours As Needed for Wheezing or Shortness of Air. 8.5 g 3    allopurinol (Zyloprim) 300 MG tablet Take 2 tablets by mouth Daily. 180 tablet 1    aluminum sulfate-calcium acetate (DOMEBORO) topical packet Apply 1 packet topically to the appropriate area as directed 3 (Three) Times a Day. 100 each 3    ammonium lactate (AmLactin) 12 % lotion Apply  topically to the appropriate area as directed 2 (Two) Times a Day. 225 g 11    apixaban (ELIQUIS) 5 MG tablet tablet Take 1 tablet by mouth 2 (Two) Times a Day. 180 tablet 3    brompheniramine-pseudoephedrine-DM 30-2-10 MG/5ML syrup TAKE 5 ML BY MOUTH EVERY 6 HOURS AS NEEDED      budesonide (PULMICORT) 0.5 MG/2ML nebulizer solution Take 2 mL by nebulization 2 (Two) Times a Day. 180 mL 3    clindamycin (Cleocin-T) 1 % external solution Apply  topically to the appropriate area as directed 2 (Two) Times a Day. 60 mL 3    colchicine 0.6 MG tablet Take 2 PO at onset of gout flare, then take 1 tablet PO daily until symptoms resolve 30 tablet 3    Diclofenac Sodium (VOLTAREN) 1 % gel gel diclofenac sodium 1 % topical gel apply 2 gram to the affected area(s) by  topical route 4 times per day   Suspended      docusate sodium 100 MG capsule Take 1 capsule by mouth 2 (Two) Times a Day As Needed (constipation). 90 each 3    Emollient (cetaphil) cream Apply 1 application  topically to the appropriate area as directed 2 (Two) Times a Day. 453 g 3    erythromycin (ROMYCIN) 5 MG/GM ophthalmic ointment Instill 1 cm ribbon in each eye QID 3.5 g 0    fluticasone (FLONASE) 50 MCG/ACT nasal spray Administer 2 sprays into the nostril(s) as directed by provider Daily. 16 g 3    Fluticasone-Umeclidin-Vilant (Trelegy Ellipta) 100-62.5-25 MCG/ACT inhaler Inhale 1 puff Daily. 60 each 1    folic acid (FOLVITE) 1 MG tablet Take 1 tablet by mouth Daily. 90 tablet 3    ipratropium (ATROVENT) 0.03 % nasal spray Administer 2 sprays into the nostril(s) as directed by provider 3 (Three) Times a Day As Needed (for running). 90 mL 3    ketoconazole (NIZORAL) 2 % cream Apply  topically to the appropriate area as directed Daily. 60 g 2    levothyroxine (SYNTHROID, LEVOTHROID) 25 MCG tablet Take 1 tablet by mouth Daily. 90 tablet 3    loratadine (CLARITIN) 10 MG tablet Take 1 tablet by mouth Daily. 90 tablet 3    mineral oil-hydrophilic petrolatum (AQUAPHOR) ointment Apply 1 Application topically to the appropriate area as directed Daily. 396 g 1    montelukast (Singulair) 10 MG tablet Take 1 tablet by mouth Every Night. 90 tablet 3    mupirocin (BACTROBAN) 2 % ointment Apply  topically to the appropriate area as directed 2 (Two) Times a Day. 30 g 3    nystatin (MYCOSTATIN) 100,000 unit/mL suspension Swish and swallow 5 mL 4 (Four) Times a Day. 120 mL 0    nystatin (MYCOSTATIN) 604558 UNIT/GM cream Apply 1 Application topically to the appropriate area as directed 2 (Two) Times a Day. 60 g 2    nystatin (MYCOSTATIN) 664925 UNIT/GM powder Apply  topically to the appropriate area as directed 3 (Three) Times a Day. 60 g 2    olopatadine (PATANOL) 0.1 % ophthalmic solution       Soap & Cleansers (Gentle Skin  Cleanser) liquid Apply 1 application  topically 2 (Two) Times a Day. 473 mL 3    Sodium Fluoride 1.1 % cream       Urea 50 % suspension Apply topically to the affected areas  g 3    vitamin B-12 (CYANOCOBALAMIN) 1000 MCG tablet Take 1 tablet by mouth Daily. 90 tablet 3    vitamin B-6 (PYRIDOXINE) 50 MG tablet Take 0.5 tablets by mouth Every Other Day. 23 tablet 3    Zinc Oxide 16 % ointment Apply 1 Application topically to the appropriate area as directed Daily As Needed (rash). 113 g 3    ferrous gluconate (FERGON) 324 MG tablet Take 1 tablet by mouth 1 (One) Time Per Week. (Patient not taking: Reported on 1/8/2025) 13 tablet 3    Petrolatum (Petroleum Jelly) ointment Apply 1 application  topically As Needed (prn as needed). (Patient not taking: Reported on 1/8/2025) 368 g 3    promethazine-dextromethorphan (PROMETHAZINE-DM) 6.25-15 MG/5ML syrup Take 5 mL by mouth 4 (Four) Times a Day As Needed for Cough. (Patient not taking: Reported on 1/8/2025) 473 mL 0     No current facility-administered medications on file prior to visit.      No Known Allergies  Past Surgical History:   Procedure Laterality Date    CYST REMOVAL      OTHER SURGICAL HISTORY Left     TUBE REPLACEMENT IN EAR     Social History     Socioeconomic History    Marital status: Single   Tobacco Use    Smoking status: Never     Passive exposure: Never    Smokeless tobacco: Never   Vaping Use    Vaping status: Never Used   Substance and Sexual Activity    Alcohol use: Never    Drug use: Defer    Sexual activity: Defer     History reviewed. No pertinent family history.    Results     Result Review   The following data was reviewed by: Federico Sullivan MD on 04/04/2023:  Lab Results   Component Value Date    HGB 14.7 01/08/2025    HCT 51.2 (H) 01/08/2025    MCV 71.1 (L) 01/08/2025     01/08/2025    WBC 5.16 01/08/2025    NEUTROABS 2.72 01/08/2025    LYMPHSABS 1.47 01/08/2025    MONOSABS 0.83 01/08/2025    EOSABS 0.02 01/08/2025    BASOSABS 0.10  01/08/2025     Lab Results   Component Value Date    GLUCOSE 99 07/03/2024    BUN 15 07/03/2024    CREATININE 1.60 (H) 12/04/2024     07/03/2024    K 4.4 07/03/2024     07/03/2024    CO2 24.4 07/03/2024    CALCIUM 8.9 07/03/2024    PROTEINTOT 6.6 07/03/2024    ALBUMIN 3.7 07/03/2024    BILITOT 0.4 07/03/2024    ALKPHOS 74 07/03/2024    AST 33 07/03/2024    ALT 29 07/03/2024     Lab Results   Component Value Date    MG 2.2 09/10/2022    FREET4 1.11 08/09/2023    TSH 2.350 08/09/2023           No radiology results for the last day       Assessment & Plan     Diagnoses and all orders for this visit:    1. Secondary polycythemia (Primary)  -     CBC & Differential; Future  -     Ferritin; Future  -     Iron Profile; Future    2. Iron deficiency  -     Ferritin; Future  -     Iron Profile; Future                  Tremayne Bernard is a 29 y.o. male who presents to [facility] HEMATOLOGY & ONCOLOGY for polycythemia and history of recurrent DVT.  He has PMH significant for Down's Syndrome, TAMIKO (has TAMIKO machine). Patient is compliant with his Eliquis medication.      Recurrent DVT  -continue Eliquis, lifelong  --Pt due to undergo repeat CT chest (ordered by his PCP) to monitor response to anticoagulant, CT chest from 12/7/2023 showed resolution of previous ground glass nodules seen on previous study and a new 6 mm nodule in each upper lobe but given waxing and waning nature of the nodules favors infectious/inflammatory process  -per pt's mother pulmonology plans to repeat imaging in 1 year, CT chest from 12/5/2024 was negative for any suspicious pulmonary nodules.      Secondary polycythemia  -due to TAMIKO, lack of improvement with use of TAMIKO machine  -pt's mother is going to schedule pt appointment with pulmonology to assess for other options regarding TAMIKO mask and recheck TAMIKO machine settings  -recommended increased compliance with CPAP machine, use of O2 at night, and recommended weight  loss  -JAK2 V617F and exon 12 mutation testing from 11/2019 were negative  -pt's last phlebotomy was in 6/2024, pt with evidence of iron deficiency on most recent labs, will hold phlebotomy for now and prescribed iron ferrous gluconate 324 mg PO QD, ordered FOBT and UA to assess for blood loss today, will consider GI vs urology evaluation based on results.     Plan for patient follow-up in 2 months with repeat CBC and iron studies to assess response to oral iron repletion.    Patient verbalized understanding and agreed with plan.    Please note that portions of this note were completed with a voice recognition program.      Electronically signed by Federico Sullivan MD, 01/08/25, 1:52 PM EST.        Follow Up     I spent 30 minutes caring for Tremayne on this date of service. This time includes time spent by me in the following activities:preparing for the visit, reviewing tests, obtaining and/or reviewing a separately obtained history, performing a medically appropriate examination and/or evaluation , counseling and educating the patient/family/caregiver, ordering medications, tests, or procedures, documenting information in the medical record and care coordination.    This is an acute or chronic illness that poses a threat to life or bodily function. The above treatment plan involves a high risk of complications and/or mortality of patient management.    The patient was seen and examined. Work by the provider also included review and/or ordering of lab tests, review and/or ordering of radiology tests, review and/or ordering of medicine tests, discussion with other physicians or providers, independent review of data, obtaining old records, review/summation of old records, and/or other review.    I have reviewed the family history, social history, and past medical history for this patient. Previous information and data has been reviewed and updated as needed. I have reviewed and verified the chief complaint, history, and  other documentation. The patient was interviewed and examined in the clinic and the chart reviewed. The previous observations, recommendations, and conclusions were reviewed including those of other providers.     The plan was discussed with the patient and/or family. The patient was given time to ask questions and these questions were answered. At the conclusion of their visit they had no additional questions or concerns and all questions were answered to their satisfaction.    Patient was given instructions and counseling regarding his condition or for health maintenance advice. Please see specific information pulled into the AVS if appropriate.

## 2025-01-08 ENCOUNTER — OFFICE VISIT (OUTPATIENT)
Dept: ONCOLOGY | Facility: HOSPITAL | Age: 30
End: 2025-01-08
Payer: MEDICARE

## 2025-01-08 ENCOUNTER — LAB (OUTPATIENT)
Dept: ONCOLOGY | Facility: HOSPITAL | Age: 30
End: 2025-01-08
Payer: MEDICARE

## 2025-01-08 VITALS
SYSTOLIC BLOOD PRESSURE: 122 MMHG | DIASTOLIC BLOOD PRESSURE: 79 MMHG | HEART RATE: 99 BPM | OXYGEN SATURATION: 94 % | HEIGHT: 65 IN | WEIGHT: 315 LBS | TEMPERATURE: 97.8 F | BODY MASS INDEX: 52.48 KG/M2 | RESPIRATION RATE: 20 BRPM

## 2025-01-08 DIAGNOSIS — E61.1 IRON DEFICIENCY: ICD-10-CM

## 2025-01-08 DIAGNOSIS — D64.9 ANEMIA, UNSPECIFIED TYPE: ICD-10-CM

## 2025-01-08 DIAGNOSIS — D75.1 SECONDARY POLYCYTHEMIA: Primary | ICD-10-CM

## 2025-01-08 DIAGNOSIS — D75.1 SECONDARY POLYCYTHEMIA: ICD-10-CM

## 2025-01-08 LAB
ANISOCYTOSIS BLD QL: NORMAL
BASOPHILS # BLD AUTO: 0.1 10*3/MM3 (ref 0–0.2)
BASOPHILS NFR BLD AUTO: 1.9 % (ref 0–1.5)
DEPRECATED RDW RBC AUTO: 53.1 FL (ref 37–54)
EOSINOPHIL # BLD AUTO: 0.02 10*3/MM3 (ref 0–0.4)
EOSINOPHIL NFR BLD AUTO: 0.4 % (ref 0.3–6.2)
ERYTHROCYTE [DISTWIDTH] IN BLOOD BY AUTOMATED COUNT: 22.7 % (ref 12.3–15.4)
FERRITIN SERPL-MCNC: 12.3 NG/ML (ref 30–400)
HCT VFR BLD AUTO: 51.2 % (ref 37.5–51)
HGB BLD-MCNC: 14.7 G/DL (ref 13–17.7)
IMM GRANULOCYTES # BLD AUTO: 0.02 10*3/MM3 (ref 0–0.05)
IMM GRANULOCYTES NFR BLD AUTO: 0.4 % (ref 0–0.5)
IRON 24H UR-MRATE: 40 MCG/DL (ref 59–158)
IRON SATN MFR SERPL: 8 % (ref 20–50)
LYMPHOCYTES # BLD AUTO: 1.47 10*3/MM3 (ref 0.7–3.1)
LYMPHOCYTES NFR BLD AUTO: 28.5 % (ref 19.6–45.3)
MACROCYTES BLD QL SMEAR: NORMAL
MCH RBC QN AUTO: 20.4 PG (ref 26.6–33)
MCHC RBC AUTO-ENTMCNC: 28.7 G/DL (ref 31.5–35.7)
MCV RBC AUTO: 71.1 FL (ref 79–97)
MONOCYTES # BLD AUTO: 0.83 10*3/MM3 (ref 0.1–0.9)
MONOCYTES NFR BLD AUTO: 16.1 % (ref 5–12)
NEUTROPHILS NFR BLD AUTO: 2.72 10*3/MM3 (ref 1.7–7)
NEUTROPHILS NFR BLD AUTO: 52.7 % (ref 42.7–76)
NRBC BLD AUTO-RTO: 0 /100 WBC (ref 0–0.2)
PLAT MORPH BLD: NORMAL
PLATELET # BLD AUTO: 407 10*3/MM3 (ref 140–450)
PMV BLD AUTO: 10.4 FL (ref 6–12)
RBC # BLD AUTO: 7.2 10*6/MM3 (ref 4.14–5.8)
TARGETS BLD QL SMEAR: NORMAL
TIBC SERPL-MCNC: 514 MCG/DL (ref 298–536)
TRANSFERRIN SERPL-MCNC: 345 MG/DL (ref 200–360)
WBC MORPH BLD: NORMAL
WBC NRBC COR # BLD AUTO: 5.16 10*3/MM3 (ref 3.4–10.8)

## 2025-01-08 PROCEDURE — 82728 ASSAY OF FERRITIN: CPT

## 2025-01-08 PROCEDURE — 85007 BL SMEAR W/DIFF WBC COUNT: CPT

## 2025-01-08 PROCEDURE — 99214 OFFICE O/P EST MOD 30 MIN: CPT | Performed by: INTERNAL MEDICINE

## 2025-01-08 PROCEDURE — 85025 COMPLETE CBC W/AUTO DIFF WBC: CPT

## 2025-01-08 PROCEDURE — G0463 HOSPITAL OUTPT CLINIC VISIT: HCPCS | Performed by: INTERNAL MEDICINE

## 2025-01-08 PROCEDURE — 1125F AMNT PAIN NOTED PAIN PRSNT: CPT | Performed by: INTERNAL MEDICINE

## 2025-01-08 PROCEDURE — 83540 ASSAY OF IRON: CPT

## 2025-01-08 PROCEDURE — 84466 ASSAY OF TRANSFERRIN: CPT

## 2025-01-08 PROCEDURE — 36415 COLL VENOUS BLD VENIPUNCTURE: CPT

## 2025-01-08 RX ORDER — FERROUS GLUCONATE 324(38)MG
324 TABLET ORAL
Qty: 30 TABLET | Refills: 2 | Status: SHIPPED | OUTPATIENT
Start: 2025-01-08

## 2025-01-08 RX ORDER — BROMPHENIRAMINE MALEATE, PSEUDOEPHEDRINE HYDROCHLORIDE, AND DEXTROMETHORPHAN HYDROBROMIDE 2; 30; 10 MG/5ML; MG/5ML; MG/5ML
SYRUP ORAL
COMMUNITY
Start: 2024-12-30

## 2025-01-14 NOTE — PROGRESS NOTES
Primary Care Provider  Jesus Manuel Sloan DO   Referring Provider  No ref. provider found      Patient Complaint  Follow-up (9 month), Asthma, Lung Nodule, Cough (Coughs so much he will vomit), and Shortness of Breath      Subjective          Tremayne Bernard presents to [facility] PULMONARY & CRITICAL CARE MEDICINE      History of Presenting Illness  Tremayne Bernard is a 29 y.o. male patient of Dr. Eugene with chronic hypoxic respiratory failure, chronic cough, history of asthma, TAMIKO, Down syndrome, and history of DVT/PE, here for 3 month follow up.    Patient states he is doing okay since his last visit, accompanied by his parents.  Since his last visit, patient had a chest CT with contrast that was negative for any persistent pulmonary emboli.  He was initially seen in our clinic for lung nodules seen on chest CT 12/7/2023, which have now resolved.  Patient denies using any antibiotics or steroids for his lungs recently, denies any fevers or chills, no ER visits or hospitalizations for his breathing since he was last seen.  Patient's most recent ER visit was 8/20/2024 for cough and congestion x 1 month, was diagnosed with acute bronchitis.  Patient's primary respiratory complaint is a persistent cough since August 2024.  Sometimes he will cough so hard that the next him vomit.  He continues to use his Pulmicort nebulizer treatments and albuterol as needed, managed by his PCP.  He has sleep apnea, CPAP managed by sleep medicine.  Unfortunately patient does not wear his CPAP for very long each night, will take it off.  Since his last visit, patient has began wearing supplemental oxygen at night after overnight oximetry showed desaturations.  He is doing well with his new oxygen at bedtime, is interested in having oxygen during the daytime if needed.  We were unable to do a walk test at his last visit due to gout flareup.  He has never smoked.  Patient denies any hemoptysis, swollen lymph nodes,  weight loss, or night sweats.  Overall, patient is doing well and has no additional concerns at this time.  Patient is able to perform ADLs without difficulty.  I have personally reviewed the review of systems, past family, social, medical and surgical histories; and agree with their findings.      Review of Systems    Review of Systems   Constitutional:  Negative for activity change, chills, fatigue, fever, unexpected weight gain and unexpected weight loss.   HENT:  Negative for congestion, ear discharge, ear pain, mouth sores, postnasal drip, rhinorrhea, sinus pressure, sore throat, swollen glands and trouble swallowing.    Eyes:  Negative for blurred vision, pain, discharge, itching and visual disturbance.   Respiratory:  Positive for cough (since August 2024). Negative for apnea, chest tightness, shortness of breath, wheezing and stridor.    Cardiovascular:  Negative for chest pain, palpitations and leg swelling.   Gastrointestinal:  Negative for abdominal distention, abdominal pain, constipation, diarrhea, nausea, vomiting, GERD and indigestion.   Musculoskeletal:  Negative for arthralgias, joint swelling and myalgias.   Skin:  Negative for color change.   Neurological:  Negative for dizziness, weakness, light-headedness and headache.      Sleep: Positive for Excessive daytime sleepiness  Negative for morning headaches  Negative for Snoring      History reviewed. No pertinent family history.     Social History     Socioeconomic History    Marital status: Single   Tobacco Use    Smoking status: Never     Passive exposure: Never    Smokeless tobacco: Never   Vaping Use    Vaping status: Never Used   Substance and Sexual Activity    Alcohol use: Never    Drug use: Defer    Sexual activity: Defer        Past Medical History:   Diagnosis Date    Anemia     Asthma     Diabetes mellitus     Disease of thyroid gland     Down's syndrome     Gout     Oxygen dependent     Nighttime Only        Immunization History    Administered Date(s) Administered    31-influenza Vac Quardvalent Preservativ 09/23/2016    COVID-19 (PFIZER) Purple Cap Monovalent 03/02/2021, 03/23/2021, 11/18/2021    Fluzone  >6mos 09/12/2024    Fluzone (or Fluarix & Flulaval for VFC) >6mos 10/04/2021, 09/21/2022, 09/14/2023    Fluzone Quad >6mos (Multi-dose) 11/06/2019    Influenza Injectable Mdck Pf Quad 09/21/2022    Influenza, Unspecified 09/23/2016, 09/21/2020    Tdap 03/28/2019       No Known Allergies       Current Outpatient Medications:     albuterol (ACCUNEB) 1.25 MG/3ML nebulizer solution, Take 3 mL by nebulization Every 6 (Six) Hours As Needed for Wheezing., Disp: 360 mL, Rfl: 3    albuterol sulfate  (90 Base) MCG/ACT inhaler, Inhale 2 puffs Every 4 (Four) Hours As Needed for Wheezing or Shortness of Air., Disp: 8.5 g, Rfl: 3    allopurinol (Zyloprim) 300 MG tablet, Take 2 tablets by mouth Daily., Disp: 180 tablet, Rfl: 1    aluminum sulfate-calcium acetate (DOMEBORO) topical packet, Apply 1 packet topically to the appropriate area as directed 3 (Three) Times a Day., Disp: 100 each, Rfl: 3    ammonium lactate (AmLactin) 12 % lotion, Apply  topically to the appropriate area as directed 2 (Two) Times a Day., Disp: 225 g, Rfl: 11    apixaban (ELIQUIS) 5 MG tablet tablet, Take 1 tablet by mouth 2 (Two) Times a Day., Disp: 180 tablet, Rfl: 3    benzonatate (TESSALON) 200 MG capsule, Take 1 capsule by mouth 3 (Three) Times a Day As Needed for Cough., Disp: , Rfl:     brompheniramine-pseudoephedrine-DM 30-2-10 MG/5ML syrup, TAKE 5 ML BY MOUTH EVERY 6 HOURS AS NEEDED, Disp: , Rfl:     budesonide (PULMICORT) 0.5 MG/2ML nebulizer solution, Take 2 mL by nebulization 2 (Two) Times a Day., Disp: 180 mL, Rfl: 3    clindamycin (Cleocin-T) 1 % external solution, Apply  topically to the appropriate area as directed 2 (Two) Times a Day., Disp: 60 mL, Rfl: 3    colchicine 0.6 MG tablet, Take 2 PO at onset of gout flare, then take 1 tablet PO daily until  symptoms resolve, Disp: 30 tablet, Rfl: 3    Diclofenac Sodium (VOLTAREN) 1 % gel gel, diclofenac sodium 1 % topical gel apply 2 gram to the affected area(s) by topical route 4 times per day   Suspended, Disp: , Rfl:     docusate sodium 100 MG capsule, Take 1 capsule by mouth 2 (Two) Times a Day As Needed (constipation)., Disp: 90 each, Rfl: 3    Emollient (cetaphil) cream, Apply 1 application  topically to the appropriate area as directed 2 (Two) Times a Day., Disp: 453 g, Rfl: 3    erythromycin (ROMYCIN) 5 MG/GM ophthalmic ointment, Instill 1 cm ribbon in each eye QID, Disp: 3.5 g, Rfl: 0    ferrous sulfate 325 (65 FE) MG tablet, Take 1 tablet by mouth Daily With Breakfast., Disp: 30 tablet, Rfl: 2    fluticasone (FLONASE) 50 MCG/ACT nasal spray, Administer 2 sprays into the nostril(s) as directed by provider Daily., Disp: 16 g, Rfl: 3    folic acid (FOLVITE) 1 MG tablet, Take 1 tablet by mouth Daily., Disp: 90 tablet, Rfl: 3    ipratropium (ATROVENT) 0.03 % nasal spray, Administer 2 sprays into the nostril(s) as directed by provider 3 (Three) Times a Day As Needed (for running)., Disp: 90 mL, Rfl: 3    ketoconazole (NIZORAL) 2 % cream, Apply  topically to the appropriate area as directed Daily., Disp: 60 g, Rfl: 2    levothyroxine (SYNTHROID, LEVOTHROID) 25 MCG tablet, Take 1 tablet by mouth Daily., Disp: 90 tablet, Rfl: 3    loratadine (CLARITIN) 10 MG tablet, Take 1 tablet by mouth Daily., Disp: 90 tablet, Rfl: 3    mineral oil-hydrophilic petrolatum (AQUAPHOR) ointment, Apply 1 Application topically to the appropriate area as directed Daily., Disp: 396 g, Rfl: 1    montelukast (Singulair) 10 MG tablet, Take 1 tablet by mouth Every Night., Disp: 90 tablet, Rfl: 3    mupirocin (BACTROBAN) 2 % ointment, Apply  topically to the appropriate area as directed 2 (Two) Times a Day., Disp: 30 g, Rfl: 3    nystatin (MYCOSTATIN) 100,000 unit/mL suspension, Swish and swallow 5 mL 4 (Four) Times a Day., Disp: 120 mL, Rfl:  "0    nystatin (MYCOSTATIN) 161137 UNIT/GM cream, Apply 1 Application topically to the appropriate area as directed 2 (Two) Times a Day., Disp: 60 g, Rfl: 2    nystatin (MYCOSTATIN) 566206 UNIT/GM powder, Apply  topically to the appropriate area as directed 3 (Three) Times a Day., Disp: 60 g, Rfl: 2    olopatadine (PATANOL) 0.1 % ophthalmic solution, , Disp: , Rfl:     promethazine-dextromethorphan (PROMETHAZINE-DM) 6.25-15 MG/5ML syrup, Take 5 mL by mouth 4 (Four) Times a Day As Needed for Cough., Disp: 473 mL, Rfl: 0    Soap & Cleansers (Gentle Skin Cleanser) liquid, Apply 1 application  topically 2 (Two) Times a Day., Disp: 473 mL, Rfl: 3    Sodium Fluoride 1.1 % cream, , Disp: , Rfl:     Urea 50 % suspension, Apply topically to the affected areas TID, Disp: 284 g, Rfl: 3    vitamin B-12 (CYANOCOBALAMIN) 1000 MCG tablet, Take 1 tablet by mouth Daily., Disp: 90 tablet, Rfl: 3    vitamin B-6 (PYRIDOXINE) 50 MG tablet, Take 0.5 tablets by mouth Every Other Day., Disp: 23 tablet, Rfl: 3    Zinc Oxide 16 % ointment, Apply 1 Application topically to the appropriate area as directed Daily As Needed (rash)., Disp: 113 g, Rfl: 3    arformoterol (Brovana) 15 MCG/2ML nebulizer solution, Take 2 mL by nebulization 2 (Two) Times a Day., Disp: 120 mL, Rfl: 11    Petrolatum (Petroleum Jelly) ointment, Apply 1 application  topically As Needed (prn as needed). (Patient not taking: Reported on 1/21/2025), Disp: 368 g, Rfl: 3     Objective     Vital Signs:   /78 (BP Location: Right arm, Patient Position: Sitting, Cuff Size: Large Adult)   Pulse 104   Temp 98.8 °F (37.1 °C) (Oral)   Resp 20   Ht 165.1 cm (65\")   Wt (!) 160 kg (352 lb 3.2 oz)   SpO2 94% Comment: room air  BMI 58.61 kg/m²     Physical Exam  Constitutional:       General: He is not in acute distress.     Appearance: Normal appearance. He is obese. He is not ill-appearing.   HENT:      Right Ear: Tympanic membrane and ear canal normal.      Left Ear: Tympanic " membrane and ear canal normal.      Nose: Nose normal.      Mouth/Throat:      Mouth: Mucous membranes are moist.      Pharynx: Oropharynx is clear.   Eyes:      Extraocular Movements: Extraocular movements intact.      Conjunctiva/sclera: Conjunctivae normal.      Pupils: Pupils are equal, round, and reactive to light.   Cardiovascular:      Rate and Rhythm: Normal rate and regular rhythm.      Pulses: Normal pulses.      Heart sounds: Normal heart sounds.   Pulmonary:      Effort: Pulmonary effort is normal. No respiratory distress.      Breath sounds: Normal breath sounds. No stridor. No wheezing, rhonchi or rales.   Abdominal:      General: Bowel sounds are normal.      Palpations: Abdomen is soft.   Musculoskeletal:         General: No swelling. Normal range of motion.      Cervical back: Normal range of motion and neck supple.      Right lower leg: No edema.      Left lower leg: No edema.   Skin:     General: Skin is warm and dry.   Neurological:      General: No focal deficit present.      Mental Status: He is alert and oriented to person, place, and time.      Motor: No weakness.   Psychiatric:         Mood and Affect: Mood normal.         Behavior: Behavior normal.       Result Review :   I have personally reviewed patient's labs and images.  I also reviewed my last office note 10/1/2024.       Diagnoses and all orders for this visit:    1. Asthma, unspecified asthma severity, unspecified whether complicated, unspecified whether persistent (Primary)  -     6 Minute Walk Test; Future  -     arformoterol (Brovana) 15 MCG/2ML nebulizer solution; Take 2 mL by nebulization 2 (Two) Times a Day.  Dispense: 120 mL; Refill: 11    2. Chronic cough  -     arformoterol (Brovana) 15 MCG/2ML nebulizer solution; Take 2 mL by nebulization 2 (Two) Times a Day.  Dispense: 120 mL; Refill: 11    3. Nocturnal hypoxia    4. Multiple pulmonary nodules    5. TAMIKO (obstructive sleep apnea)    6. History of pulmonary embolism    7.  Deep vein thrombosis (DVT) of proximal lower extremity, unspecified chronicity, unspecified laterality    8. Morbid obesity with BMI of 60.0-69.9, adult    9. Dyspnea on exertion  -     6 Minute Walk Test; Future    10. Chronic respiratory failure with hypoxia  -     Oxygen Therapy      Impression and Plan    -CT chest 12/7/2023 shows interval resolution of groundglass nodules present on the prior study.  There is a new 6 millimeter nodule in the medial left upper lobe and lateral right upper lobe.  No pleural effusions.  -CT chest with contrast 12/4/2024.  Ordered with contrast to evaluate for any chronic thrombi.  Negative for any visible clots, though views were suboptimal.  Previously seen pulmonary nodules had resolved.  Large fat-containing right Morgagni hernia stable, mass effect on right atrium.  -Will hold off on PFTs for now  -Walk test today, patient required 3 L pulsed dose to recover.  POC ordered.  -Continue wearing 2 L supplemental oxygen at bedtime  -Continue wearing CPAP nightly, managed by sleep medicine  -Continue using Pulmicort nebulizer treatments twice daily, rinse mouth after each use.  Begin using Brovana nebulizer treatments mixed in with Pulmicort twice daily.  -Continue using albuterol inhaler or albuterol nebulizer treatments as needed.  Nebulizer machine provided April 2024.  -Continue taking Eliquis daily for history of DVT/PE  -Follow-up with Dr. Eugene in 2 months, may return sooner if needed    Smoking status: Reviewed  Vaccination status:Patient reports he is up-to-date with his flu, pneumonia, and Covid vaccines.  Patient is advised to continue to follow CDC recommendations such as social distancing wearing a mask and washing hands for at least 20 seconds.  Medications personally reviewed    Follow Up   No follow-ups on file.  Patient was given instructions and counseling regarding his condition or for health maintenance advice. Please see specific information pulled into the AVS  if appropriate.

## 2025-01-15 ENCOUNTER — TELEPHONE (OUTPATIENT)
Dept: ONCOLOGY | Facility: HOSPITAL | Age: 30
End: 2025-01-15
Payer: MEDICARE

## 2025-01-15 RX ORDER — FERROUS SULFATE 325(65) MG
325 TABLET ORAL
Qty: 30 TABLET | Refills: 2 | Status: SHIPPED | OUTPATIENT
Start: 2025-01-15

## 2025-01-15 NOTE — TELEPHONE ENCOUNTER
Caller: RAFIA NEWELL    Relationship: Mother    Best call back number: [phone]     What medication are you requesting: FERROUS SULFATE 325    What are your current symptoms: WAS RX FERROUS GLUCONATEBUT NOT AVAILABLE AT THEIR PHARMACY    Have you had these symptoms before:    [x] Yes  [] No    Have you been treated for these symptoms before:   [x] Yes  [] No    If a prescription is needed, what is your preferred pharmacy and phone number:      Pharmacy    [facility] - [place], [place] - [address] [place] [address] - [phone]  - [phone] [address] [address]  Phone: [phone]  Fax: [phone]

## 2025-01-15 NOTE — TELEPHONE ENCOUNTER
Spoke with patient's mother that Dr. Sullivan was ok with switching patient's ferrous gluconate to ferrous sulfate; pt mother verbalized understanding and prescription sent to [facility]

## 2025-01-16 ENCOUNTER — LAB (OUTPATIENT)
Dept: LAB | Facility: HOSPITAL | Age: 30
End: 2025-01-16
Payer: MEDICARE

## 2025-01-16 DIAGNOSIS — D64.9 ANEMIA, UNSPECIFIED TYPE: ICD-10-CM

## 2025-01-16 LAB
BACTERIA UR QL AUTO: NORMAL /HPF
BILIRUB UR QL STRIP: NEGATIVE
CLARITY UR: CLEAR
COLOR UR: YELLOW
GLUCOSE UR STRIP-MCNC: NEGATIVE MG/DL
HEMOCCULT STL QL IA: NEGATIVE
HGB UR QL STRIP.AUTO: NEGATIVE
HYALINE CASTS UR QL AUTO: NORMAL /LPF
KETONES UR QL STRIP: NEGATIVE
LEUKOCYTE ESTERASE UR QL STRIP.AUTO: NEGATIVE
NITRITE UR QL STRIP: NEGATIVE
PH UR STRIP.AUTO: 5.5 [PH] (ref 5–8)
PROT UR QL STRIP: NEGATIVE
RBC # UR STRIP: NORMAL /HPF
REF LAB TEST METHOD: NORMAL
SP GR UR STRIP: 1.02 (ref 1–1.03)
SQUAMOUS #/AREA URNS HPF: NORMAL /HPF
UROBILINOGEN UR QL STRIP: NORMAL
WBC # UR STRIP: NORMAL /HPF

## 2025-01-16 PROCEDURE — 81001 URINALYSIS AUTO W/SCOPE: CPT

## 2025-01-16 PROCEDURE — 82274 ASSAY TEST FOR BLOOD FECAL: CPT

## 2025-01-21 ENCOUNTER — OFFICE VISIT (OUTPATIENT)
Dept: PULMONOLOGY | Facility: CLINIC | Age: 30
End: 2025-01-21
Payer: MEDICARE

## 2025-01-21 VITALS
SYSTOLIC BLOOD PRESSURE: 109 MMHG | OXYGEN SATURATION: 94 % | BODY MASS INDEX: 52.48 KG/M2 | HEART RATE: 104 BPM | RESPIRATION RATE: 20 BRPM | WEIGHT: 315 LBS | HEIGHT: 65 IN | DIASTOLIC BLOOD PRESSURE: 78 MMHG | TEMPERATURE: 98.8 F

## 2025-01-21 DIAGNOSIS — G47.33 OSA (OBSTRUCTIVE SLEEP APNEA): ICD-10-CM

## 2025-01-21 DIAGNOSIS — G47.34 NOCTURNAL HYPOXIA: ICD-10-CM

## 2025-01-21 DIAGNOSIS — J96.11 CHRONIC RESPIRATORY FAILURE WITH HYPOXIA: ICD-10-CM

## 2025-01-21 DIAGNOSIS — I82.4Y9 DEEP VEIN THROMBOSIS (DVT) OF PROXIMAL LOWER EXTREMITY, UNSPECIFIED CHRONICITY, UNSPECIFIED LATERALITY: ICD-10-CM

## 2025-01-21 DIAGNOSIS — R91.8 MULTIPLE PULMONARY NODULES: ICD-10-CM

## 2025-01-21 DIAGNOSIS — E66.01 MORBID OBESITY WITH BMI OF 60.0-69.9, ADULT: ICD-10-CM

## 2025-01-21 DIAGNOSIS — Z86.711 HISTORY OF PULMONARY EMBOLISM: ICD-10-CM

## 2025-01-21 DIAGNOSIS — R05.3 CHRONIC COUGH: ICD-10-CM

## 2025-01-21 DIAGNOSIS — J45.909 ASTHMA, UNSPECIFIED ASTHMA SEVERITY, UNSPECIFIED WHETHER COMPLICATED, UNSPECIFIED WHETHER PERSISTENT: Primary | ICD-10-CM

## 2025-01-21 DIAGNOSIS — R06.09 DYSPNEA ON EXERTION: ICD-10-CM

## 2025-01-21 RX ORDER — PREDNISONE 20 MG/1
1 TABLET ORAL DAILY
COMMUNITY
Start: 2024-12-30 | End: 2025-01-21

## 2025-01-21 RX ORDER — BENZONATATE 200 MG/1
200 CAPSULE ORAL 3 TIMES DAILY PRN
COMMUNITY
Start: 2024-12-30

## 2025-01-21 RX ORDER — ARFORMOTEROL TARTRATE 15 UG/2ML
15 SOLUTION RESPIRATORY (INHALATION)
Qty: 120 ML | Refills: 11 | Status: SHIPPED | OUTPATIENT
Start: 2025-01-21

## 2025-02-06 ENCOUNTER — OFFICE VISIT (OUTPATIENT)
Dept: FAMILY MEDICINE CLINIC | Facility: CLINIC | Age: 30
End: 2025-02-06
Payer: MEDICARE

## 2025-02-06 VITALS
TEMPERATURE: 98.3 F | BODY MASS INDEX: 52.48 KG/M2 | OXYGEN SATURATION: 90 % | SYSTOLIC BLOOD PRESSURE: 128 MMHG | HEART RATE: 85 BPM | HEIGHT: 65 IN | DIASTOLIC BLOOD PRESSURE: 72 MMHG | WEIGHT: 315 LBS

## 2025-02-06 DIAGNOSIS — R05.3 CHRONIC COUGH: ICD-10-CM

## 2025-02-06 DIAGNOSIS — E03.9 HYPOTHYROIDISM, UNSPECIFIED TYPE: ICD-10-CM

## 2025-02-06 DIAGNOSIS — M10.9 GOUT, UNSPECIFIED CAUSE, UNSPECIFIED CHRONICITY, UNSPECIFIED SITE: ICD-10-CM

## 2025-02-06 DIAGNOSIS — D75.1 SECONDARY POLYCYTHEMIA: ICD-10-CM

## 2025-02-06 DIAGNOSIS — E61.1 IRON DEFICIENCY: ICD-10-CM

## 2025-02-06 DIAGNOSIS — E53.8 B12 DEFICIENCY: ICD-10-CM

## 2025-02-06 DIAGNOSIS — J45.40 MODERATE PERSISTENT ASTHMA WITHOUT COMPLICATION: Primary | ICD-10-CM

## 2025-02-06 DIAGNOSIS — E11.43 TYPE 2 DIABETES MELLITUS WITH DIABETIC AUTONOMIC NEUROPATHY, WITHOUT LONG-TERM CURRENT USE OF INSULIN: ICD-10-CM

## 2025-02-06 DIAGNOSIS — E53.8 FOLATE DEFICIENCY: ICD-10-CM

## 2025-02-06 PROCEDURE — 99214 OFFICE O/P EST MOD 30 MIN: CPT | Performed by: FAMILY MEDICINE

## 2025-02-06 PROCEDURE — 1125F AMNT PAIN NOTED PAIN PRSNT: CPT | Performed by: FAMILY MEDICINE

## 2025-02-06 RX ORDER — MUPIROCIN 20 MG/G
OINTMENT TOPICAL 2 TIMES DAILY
Qty: 30 G | Refills: 3 | Status: SHIPPED | OUTPATIENT
Start: 2025-02-06

## 2025-02-06 RX ORDER — CODEINE PHOSPHATE AND GUAIFENESIN 10; 100 MG/5ML; MG/5ML
5 SOLUTION ORAL 3 TIMES DAILY PRN
Qty: 140 ML | Refills: 0 | Status: SHIPPED | OUTPATIENT
Start: 2025-02-06

## 2025-02-06 RX ORDER — GLY/DIMETH/PETROLAT,WHT/WATER
1 CREAM (GRAM) TOPICAL AS NEEDED
Qty: 453 G | Refills: 3 | Status: SHIPPED | OUTPATIENT
Start: 2025-02-06

## 2025-02-06 RX ORDER — MINERAL OIL/HYDROPHIL PETROLAT
1 OINTMENT (GRAM) TOPICAL DAILY
Qty: 396 G | Refills: 3 | Status: SHIPPED | OUTPATIENT
Start: 2025-02-06

## 2025-02-06 RX ORDER — GLY/DIMETH/PETROLAT,WHT/WATER
1 CREAM (GRAM) TOPICAL 2 TIMES DAILY
Qty: 453 G | Refills: 3 | Status: SHIPPED | OUTPATIENT
Start: 2025-02-06

## 2025-02-06 RX ORDER — BENZONATATE 200 MG/1
200 CAPSULE ORAL 3 TIMES DAILY PRN
Qty: 90 CAPSULE | Refills: 3 | Status: SHIPPED | OUTPATIENT
Start: 2025-02-06

## 2025-02-06 NOTE — PROGRESS NOTES
Chief Complaint  cough    Subjective          Tremayne Bernard presents to [facility]    History of Present Illness     History of Present Illness  The patient is a 29-year-old male who presents today for a follow-up visit. He is accompanied by his mother.    He has been experiencing persistent coughing episodes, which often lead to vomiting. These episodes occur even during seemingly unrelated activities such as brushing his teeth or taking medication. He has been under the care of a pulmonologist, Dr. Herrera, who conducted tests last month and found no abnormalities. Despite this, the coughing persists. He has an upcoming appointment with Dr. Eugene on 02/11/2025. His supply of Tessalon Perles is nearly depleted, a medication that has been beneficial in managing his symptoms. He has also been using an incentive spirometer at home to aid his breathing. His mother reports that he has lost some weight, currently weighing 348 pounds, and speculates that this could be due to the frequent vomiting. He has been prescribed Brovana, which he reports as being effective.    He was previously diagnosed with diabetes but has since been informed that he no longer has the condition. His mother believes his diabetes is well-managed as he has been losing weight.    He saw Dr. Sullivan for polycythemia and was put on iron pills. A stool sample and urine test were done at home because they thought too much blood was taken off. They are waiting for the results.    He is taking levothyroxine 25 mcg daily for thyroid management.    MEDICATIONS  Current: Tessalon Perles, Brovana, levothyroxine, allopurinol         Current Outpatient Medications   Medication Instructions   • albuterol (ACCUNEB) 1.25 mg, Nebulization, Every 6 Hours PRN   • albuterol sulfate  (90 Base) MCG/ACT inhaler 2 puffs, Inhalation, Every 4 Hours PRN   • allopurinol (ZYLOPRIM) 600 mg, Oral, Daily   • aluminum sulfate-calcium  acetate (DOMEBORO) topical packet 1 packet, Topical, 3 Times Daily   • ammonium lactate (AmLactin) 12 % lotion Topical, 2 Times Daily   • apixaban (ELIQUIS) 5 mg, Oral, 2 Times Daily   • arformoterol (BROVANA) 15 mcg, Nebulization, 2 Times Daily - RT   • benzonatate (TESSALON) 200 mg, Oral, 3 Times Daily PRN   • brompheniramine-pseudoephedrine-DM 30-2-10 MG/5ML syrup TAKE 5 ML BY MOUTH EVERY 6 HOURS AS NEEDED   • budesonide (PULMICORT) 0.5 mg, Nebulization, 2 Times Daily   • clindamycin (Cleocin-T) 1 % external solution Topical, 2 Times Daily   • colchicine 0.6 MG tablet Take 2 PO at onset of gout flare, then take 1 tablet PO daily until symptoms resolve   • Diclofenac Sodium (VOLTAREN) 1 % gel gel diclofenac sodium 1 % topical gel apply 2 gram to the affected area(s) by topical route 4 times per day   Suspended   • docusate sodium 100 mg, Oral, 2 Times Daily PRN   • Emollient (cetaphil) cream 1 Application, Topical, 2 Times Daily   • erythromycin (ROMYCIN) 5 MG/GM ophthalmic ointment Instill 1 cm ribbon in each eye QID   • ferrous sulfate 325 mg, Oral, Daily With Breakfast   • fluticasone (FLONASE) 50 MCG/ACT nasal spray 2 sprays, Nasal, Daily   • folic acid (FOLVITE) 1 mg, Oral, Daily   • guaiFENesin-codeine (guaiFENesin AC) 100-10 MG/5ML solution/syrup 5 mL, Oral, 3 Times Daily PRN   • ipratropium (ATROVENT) 0.03 % nasal spray 2 sprays, Nasal, 3 Times Daily PRN   • ketoconazole (NIZORAL) 2 % cream Topical, Daily   • levothyroxine (SYNTHROID, LEVOTHROID) 25 mcg, Oral, Daily   • loratadine (CLARITIN) 10 mg, Oral, Daily   • mineral oil-hydrophilic petrolatum (AQUAPHOR) ointment 1 Application, Topical, Daily   • montelukast (SINGULAIR) 10 mg, Oral, Nightly   • mupirocin (BACTROBAN) 2 % ointment Topical, 2 Times Daily   • nystatin (MYCOSTATIN) 641997 UNIT/GM cream 1 Application, Topical, 2 Times Daily   • nystatin (MYCOSTATIN) 830092 UNIT/GM powder Topical, 3 Times Daily   • nystatin (MYCOSTATIN) 500,000 Units, Swish  "& Swallow, 4 Times Daily   • olopatadine (PATANOL) 0.1 % ophthalmic solution No dose, route, or frequency recorded.   • Petrolatum (Petroleum Jelly) ointment 1 application , Apply externally, As Needed   • promethazine-dextromethorphan (PROMETHAZINE-DM) 6.25-15 MG/5ML syrup 5 mL, Oral, 4 Times Daily PRN   • Soap & Cleansers (Gentle Skin Cleanser) liquid 1 application , Apply externally, 2 Times Daily   • Sodium Fluoride 1.1 % cream    • Urea 50 % suspension Apply topically to the affected areas TID   • vitamin B-12 (CYANOCOBALAMIN) 1,000 mcg, Oral, Daily   • vitamin B-6 (PYRIDOXINE) 25 mg, Oral, Every Other Day   • Zinc Oxide 1 g, Topical, Daily PRN       The following portions of the patient's history were reviewed and updated as appropriate: allergies, current medications, past family history, past medical history, past social history, past surgical history, and problem list.    Objective   Vital Signs:   /72   Pulse 85   Temp 98.3 °F (36.8 °C)   Ht 165.1 cm (65\")   Wt (!) 158 kg (348 lb)   SpO2 90%   BMI 57.91 kg/m²     BP Readings from Last 3 Encounters:   02/06/25 128/72   01/21/25 109/78   01/08/25 122/79     Wt Readings from Last 3 Encounters:   02/06/25 (!) 158 kg (348 lb)   01/21/25 (!) 160 kg (352 lb 3.2 oz)   01/08/25 (!) 161 kg (354 lb 3.2 oz)           Physical Exam  Vitals reviewed.   Constitutional:       Appearance: Normal appearance.   HENT:      Head: Normocephalic and atraumatic.      Right Ear: External ear normal.      Left Ear: External ear normal.   Eyes:      Conjunctiva/sclera: Conjunctivae normal.   Cardiovascular:      Rate and Rhythm: Normal rate and regular rhythm.      Heart sounds: No murmur heard.     No friction rub. No gallop.   Pulmonary:      Effort: Pulmonary effort is normal.      Breath sounds: Normal breath sounds. No wheezing or rhonchi.   Abdominal:      General: Bowel sounds are normal. There is no distension.      Palpations: Abdomen is soft.      Tenderness: " There is no abdominal tenderness.   Skin:     General: Skin is warm and dry.   Neurological:      Mental Status: He is alert and oriented to person, place, and time.      Cranial Nerves: No cranial nerve deficit.   Psychiatric:         Mood and Affect: Mood and affect normal.         Behavior: Behavior normal.         Thought Content: Thought content normal.         Judgment: Judgment normal.          Result Review :   The following data was reviewed by: Jesus Manuel Sloan DO on 02/06/2025:  Common labs          9/25/2024    13:57 12/4/2024    10:12 1/8/2025    10:46   Common Labs   Creatinine  1.60     WBC 4.67   5.16    Hemoglobin 13.4   14.7    Hematocrit 44.9   51.2    Platelets 348   407             Lab Results   Component Value Date    SARSANTIGEN Not Detected 12/13/2023    FLUAAG Not Detected 12/13/2023    FLUBAG Not Detected 12/13/2023    BILIRUBINUR Negative 01/16/2025       Results  Laboratory Studies  Last hemoglobin was normal. TSH was last checked in August 2023.    Procedures        Assessment and Plan    Diagnoses and all orders for this visit:    1. Moderate persistent asthma without complication (Primary)    2. Chronic cough  -     guaiFENesin-codeine (guaiFENesin AC) 100-10 MG/5ML solution/syrup; Take 5 mL by mouth 3 (Three) Times a Day As Needed for Cough.  Dispense: 140 mL; Refill: 0    3. Secondary polycythemia    4. Iron deficiency    5. Type 2 diabetes mellitus with diabetic autonomic neuropathy, without long-term current use of insulin  -     CBC & Differential; Future  -     Comprehensive Metabolic Panel; Future  -     Hemoglobin A1c; Future  -     Microalbumin / Creatinine Urine Ratio - Urine, Clean Catch; Future    6. Hypothyroidism, unspecified type  -     TSH+Free T4; Future    7. B12 deficiency  -     Vitamin B12; Future    8. Folate deficiency  -     Folate; Future    9. Gout, unspecified cause, unspecified chronicity, unspecified site  -     Uric Acid; Future    Other orders  -      mineral oil-hydrophilic petrolatum (AQUAPHOR) ointment; Apply 1 Application topically to the appropriate area as directed Daily.  Dispense: 396 g; Refill: 3  -     Emollient (cetaphil) cream; Apply 1 Application topically to the appropriate area as directed 2 (Two) Times a Day.  Dispense: 453 g; Refill: 3  -     mupirocin (BACTROBAN) 2 % ointment; Apply  topically to the appropriate area as directed 2 (Two) Times a Day.  Dispense: 30 g; Refill: 3  -     benzonatate (TESSALON) 200 MG capsule; Take 1 capsule by mouth 3 (Three) Times a Day As Needed for Cough.  Dispense: 90 capsule; Refill: 3        Assessment & Plan  1. Cough.  Prescriptions for Tessalon Perles and codeine cough syrup have been provided, to be used as needed for breakthrough symptoms. The codeine cough syrup should be taken up to three times a day but only as needed due to its sedative effects. He is advised to continue using Brovana as it appears to be effective.    2. Polycythemia.  He is currently on iron pills as prescribed by Dr. Sullivan. A stool sample and urine test were conducted at home, and results are pending. His last hemoglobin was normal. He was slightly anemic 6 to 7 months ago.    3. Diabetes Mellitus.  He has a history of diabetes but is currently losing weight and appears to be managing well. A comprehensive set of fasting labs will be ordered, including CBC, CMP, HCV, A1c, microalbumin, TSH, vitamin B12, folate, uric acid, and a urine test.    4. Thyroid management.  He is taking levothyroxine 25 mcg daily. TSH was last checked in August 2023.    Follow-up  The patient will follow up in 3 months.       Medications Discontinued During This Encounter   Medication Reason   • Emollient (cetaphil) cream Reorder   • mupirocin (BACTROBAN) 2 % ointment Reorder   • mineral oil-hydrophilic petrolatum (AQUAPHOR) ointment Reorder   • benzonatate (TESSALON) 200 MG capsule Reorder          Follow Up   Return in about 3 months (around 5/6/2025) for  diabetes.  Patient was given instructions and counseling regarding his condition or for health maintenance advice. Please see specific information pulled into the AVS if appropriate.     Patient or patient representative verbalized consent for the use of Ambient Listening during the visit with  Jesus Manuel Sloan DO for chart documentation. 2/6/2025  11:18 EST    Jesus Manuel Sloan DO  02/06/25  11:32 EST

## 2025-02-11 ENCOUNTER — OFFICE VISIT (OUTPATIENT)
Dept: PULMONOLOGY | Facility: CLINIC | Age: 30
End: 2025-02-11
Payer: MEDICARE

## 2025-02-11 VITALS
RESPIRATION RATE: 18 BRPM | OXYGEN SATURATION: 92 % | TEMPERATURE: 98 F | HEIGHT: 65 IN | HEART RATE: 95 BPM | WEIGHT: 315 LBS | SYSTOLIC BLOOD PRESSURE: 115 MMHG | DIASTOLIC BLOOD PRESSURE: 81 MMHG | BODY MASS INDEX: 52.48 KG/M2

## 2025-02-11 DIAGNOSIS — R05.3 CHRONIC COUGH: ICD-10-CM

## 2025-02-11 DIAGNOSIS — J96.11 CHRONIC HYPOXIC RESPIRATORY FAILURE: ICD-10-CM

## 2025-02-11 DIAGNOSIS — Q90.9 DOWN'S SYNDROME: ICD-10-CM

## 2025-02-11 DIAGNOSIS — J45.909 ASTHMA, UNSPECIFIED ASTHMA SEVERITY, UNSPECIFIED WHETHER COMPLICATED, UNSPECIFIED WHETHER PERSISTENT: ICD-10-CM

## 2025-02-11 DIAGNOSIS — I82.4Y9 DEEP VEIN THROMBOSIS (DVT) OF PROXIMAL LOWER EXTREMITY, UNSPECIFIED CHRONICITY, UNSPECIFIED LATERALITY: Primary | ICD-10-CM

## 2025-02-11 PROCEDURE — 1160F RVW MEDS BY RX/DR IN RCRD: CPT | Performed by: STUDENT IN AN ORGANIZED HEALTH CARE EDUCATION/TRAINING PROGRAM

## 2025-02-11 PROCEDURE — 1159F MED LIST DOCD IN RCRD: CPT | Performed by: STUDENT IN AN ORGANIZED HEALTH CARE EDUCATION/TRAINING PROGRAM

## 2025-02-11 PROCEDURE — 99214 OFFICE O/P EST MOD 30 MIN: CPT | Performed by: STUDENT IN AN ORGANIZED HEALTH CARE EDUCATION/TRAINING PROGRAM

## 2025-02-11 NOTE — PROGRESS NOTES
Primary Care Provider  Jesus Manuel Sloan DO   Referring Provider  No ref. provider found      Patient Complaint  Follow-up (1 Month) and Asthma, unspecified asthma severity, unspecified whether com      Subjective          Tremayne Bernard presents to [facility]      History of Presenting Illness  Tremayne Bernard is a 29 y.o. male with history of DVT/PE, chronic hypoxic respiratory failure, asthma, Down syndrome, TAMIKO here for follow-up.    Patient is here today with his parents.  He is doing well.  From a pulmonary standpoint he is doing fair.  He continues his Brovana, Pulmicort and as needed albuterol rescue inhaler.  He has a chronic cough with spitting up sputum and sometimes even vomiting.  I think patient is having acid reflux.  He also has a small hiatal hernia that also may be contributing.  Patient's parents say that he sometimes lays down after eating which worsens his symptoms.  He remains a never smoker.  He continues his Eliquis.  He continues his CPAP at night.  Patient has oxygen but only uses as needed.  I told him to get a pulse oximeter to make sure his SpO2 is greater than 90%.  The expressed understanding. I have personally reviewed the review of systems, past family, social, medical and surgical histories; and agree with their findings.      Review of Systems  Constitutional:  No fever. No chills. No weakness.  ENT:  No sore throat, URI symptoms.   Cardiovascular:  No chest pain. No palpitations. No lower extremity edema.  Respiratory:  No shortness of breath, +chronic cough, pleuritic chest pain. No hemoptysis. No dyspnea.   GI:  Normal appetite. No nausea, vomiting, diarrhea. No melena.  Musculoskeletal:  No arthralgias or myalgias.  Neurologic:  No weakness    History reviewed. No pertinent family history.     Social History     Socioeconomic History    Marital status: Single   Tobacco Use    Smoking status: Never     Passive exposure:  Never    Smokeless tobacco: Never   Vaping Use    Vaping status: Never Used   Substance and Sexual Activity    Alcohol use: Never    Drug use: Defer    Sexual activity: Defer        Past Medical History:   Diagnosis Date    Anemia     Asthma     Diabetes mellitus     Disease of thyroid gland     Down's syndrome     Gout     Oxygen dependent     Nighttime Only        Immunization History   Administered Date(s) Administered    31-influenza Vac Quardvalent Preservativ 09/23/2016    COVID-19 (PFIZER) Purple Cap Monovalent 03/02/2021, 03/23/2021, 11/18/2021    Fluzone  >6mos 09/12/2024    Fluzone (or Fluarix & Flulaval for VFC) >6mos 10/04/2021, 09/21/2022, 09/14/2023    Fluzone Quad >6mos (Multi-dose) 11/06/2019    Influenza Injectable Mdck Pf Quad 09/21/2022    Influenza, Unspecified 09/23/2016, 09/21/2020    Tdap 03/28/2019       No Known Allergies       Current Outpatient Medications:     albuterol (ACCUNEB) 1.25 MG/3ML nebulizer solution, Take 3 mL by nebulization Every 6 (Six) Hours As Needed for Wheezing., Disp: 360 mL, Rfl: 3    albuterol sulfate  (90 Base) MCG/ACT inhaler, Inhale 2 puffs Every 4 (Four) Hours As Needed for Wheezing or Shortness of Air., Disp: 8.5 g, Rfl: 3    allopurinol (Zyloprim) 300 MG tablet, Take 2 tablets by mouth Daily., Disp: 180 tablet, Rfl: 1    aluminum sulfate-calcium acetate (DOMEBORO) topical packet, Apply 1 packet topically to the appropriate area as directed 3 (Three) Times a Day., Disp: 100 each, Rfl: 3    ammonium lactate (AmLactin) 12 % lotion, Apply  topically to the appropriate area as directed 2 (Two) Times a Day., Disp: 225 g, Rfl: 11    apixaban (ELIQUIS) 5 MG tablet tablet, Take 1 tablet by mouth 2 (Two) Times a Day., Disp: 180 tablet, Rfl: 3    arformoterol (Brovana) 15 MCG/2ML nebulizer solution, Take 2 mL by nebulization 2 (Two) Times a Day., Disp: 120 mL, Rfl: 11    benzonatate (TESSALON) 200 MG capsule, Take 1 capsule by mouth 3 (Three) Times a Day As Needed  for Cough., Disp: 90 capsule, Rfl: 3    brompheniramine-pseudoephedrine-DM 30-2-10 MG/5ML syrup, TAKE 5 ML BY MOUTH EVERY 6 HOURS AS NEEDED, Disp: , Rfl:     budesonide (PULMICORT) 0.5 MG/2ML nebulizer solution, Take 2 mL by nebulization 2 (Two) Times a Day., Disp: 180 mL, Rfl: 3    clindamycin (Cleocin-T) 1 % external solution, Apply  topically to the appropriate area as directed 2 (Two) Times a Day., Disp: 60 mL, Rfl: 3    colchicine 0.6 MG tablet, Take 2 PO at onset of gout flare, then take 1 tablet PO daily until symptoms resolve, Disp: 30 tablet, Rfl: 3    Diclofenac Sodium (VOLTAREN) 1 % gel gel, diclofenac sodium 1 % topical gel apply 2 gram to the affected area(s) by topical route 4 times per day   Suspended, Disp: , Rfl:     docusate sodium 100 MG capsule, Take 1 capsule by mouth 2 (Two) Times a Day As Needed (constipation)., Disp: 90 each, Rfl: 3    Emollient (cetaphil) cream, Apply 1 Application topically to the appropriate area as directed 2 (Two) Times a Day., Disp: 453 g, Rfl: 3    Emollient (cetaphil) cream, Apply 1 Application topically to the appropriate area as directed As Needed for Dry Skin., Disp: 453 g, Rfl: 3    erythromycin (ROMYCIN) 5 MG/GM ophthalmic ointment, Instill 1 cm ribbon in each eye QID, Disp: 3.5 g, Rfl: 0    ferrous sulfate 325 (65 FE) MG tablet, Take 1 tablet by mouth Daily With Breakfast., Disp: 30 tablet, Rfl: 2    fluticasone (FLONASE) 50 MCG/ACT nasal spray, Administer 2 sprays into the nostril(s) as directed by provider Daily., Disp: 16 g, Rfl: 3    folic acid (FOLVITE) 1 MG tablet, Take 1 tablet by mouth Daily., Disp: 90 tablet, Rfl: 3    guaiFENesin-codeine (guaiFENesin AC) 100-10 MG/5ML solution/syrup, Take 5 mL by mouth 3 (Three) Times a Day As Needed for Cough., Disp: 140 mL, Rfl: 0    ipratropium (ATROVENT) 0.03 % nasal spray, Administer 2 sprays into the nostril(s) as directed by provider 3 (Three) Times a Day As Needed (for running)., Disp: 90 mL, Rfl: 3     ketoconazole (NIZORAL) 2 % cream, Apply  topically to the appropriate area as directed Daily., Disp: 60 g, Rfl: 2    levothyroxine (SYNTHROID, LEVOTHROID) 25 MCG tablet, Take 1 tablet by mouth Daily., Disp: 90 tablet, Rfl: 3    loratadine (CLARITIN) 10 MG tablet, Take 1 tablet by mouth Daily., Disp: 90 tablet, Rfl: 3    mineral oil-hydrophilic petrolatum (AQUAPHOR) ointment, Apply 1 Application topically to the appropriate area as directed Daily., Disp: 396 g, Rfl: 3    montelukast (Singulair) 10 MG tablet, Take 1 tablet by mouth Every Night., Disp: 90 tablet, Rfl: 3    mupirocin (BACTROBAN) 2 % ointment, Apply  topically to the appropriate area as directed 2 (Two) Times a Day., Disp: 30 g, Rfl: 3    nystatin (MYCOSTATIN) 100,000 unit/mL suspension, Swish and swallow 5 mL 4 (Four) Times a Day., Disp: 120 mL, Rfl: 0    nystatin (MYCOSTATIN) 885782 UNIT/GM cream, Apply 1 Application topically to the appropriate area as directed 2 (Two) Times a Day., Disp: 60 g, Rfl: 2    nystatin (MYCOSTATIN) 868653 UNIT/GM powder, Apply  topically to the appropriate area as directed 3 (Three) Times a Day., Disp: 60 g, Rfl: 2    olopatadine (PATANOL) 0.1 % ophthalmic solution, , Disp: , Rfl:     promethazine-dextromethorphan (PROMETHAZINE-DM) 6.25-15 MG/5ML syrup, Take 5 mL by mouth 4 (Four) Times a Day As Needed for Cough., Disp: 473 mL, Rfl: 0    Sodium Fluoride 1.1 % cream, , Disp: , Rfl:     Urea 50 % suspension, Apply topically to the affected areas TID, Disp: 284 g, Rfl: 3    vitamin B-12 (CYANOCOBALAMIN) 1000 MCG tablet, Take 1 tablet by mouth Daily., Disp: 90 tablet, Rfl: 3    vitamin B-6 (PYRIDOXINE) 50 MG tablet, Take 0.5 tablets by mouth Every Other Day., Disp: 23 tablet, Rfl: 3    Zinc Oxide 16 % ointment, Apply 1 Application topically to the appropriate area as directed Daily As Needed (rash)., Disp: 113 g, Rfl: 3    Petrolatum (Petroleum Jelly) ointment, Apply 1 application  topically As Needed (prn as needed). (Patient  not taking: Reported on 2/11/2025), Disp: 368 g, Rfl: 3    Soap & Cleansers (Gentle Skin Cleanser) liquid, Apply 1 application  topically 2 (Two) Times a Day. (Patient not taking: Reported on 2/11/2025), Disp: 473 mL, Rfl: 3     Objective     Vital Signs:   There were no vitals taken for this visit.    Physical Exam  Vital Signs Reviewed   WDWN, Alert, NAD.    HEENT:  EOMI.  nares clear  Neck:  Supple, no JVD, no thyromegaly  Chest:  clear to auscultation bilaterally, no wheezes, crackles; no work of breathing noted  CV: RRR, no M/G/R, pulses 2+, equal.  Abd:  Soft, NT, ND, +BS  EXT:  no clubbing, no cyanosis, no edema  Neuro:  A&Ox3, CN grossly intact, no focal deficits.  Skin: No rashes or lesions noted       Result Review :   I have personally reviewed patient's labs and images.              Patient Active Problem List   Diagnosis    TAMIKO treated with BiPAP    Hypersomnia    Class 3 severe obesity due to excess calories without serious comorbidity with body mass index (BMI) of 60.0 to 69.9 in adult    Down's syndrome    Syncope    Sinus trouble    Pes planus    Homocysteinemia    Hidradenitis suppurativa    Foot pain, left    Fatigue    Elevated serum creatinine    DVT (deep venous thrombosis)    Disability of walking    Diabetes mellitus    Bronchitis    Athletes foot    Asthma    Arthropathy    Arthritis of right knee    Knee pain    Ankle pain    Anemia    Secondary polycythemia    Anhidrosis    Multiple pulmonary nodules       Impression and Plan    History of DVT/PE resolved  Chronic cough  Chronic hypoxic respiratory failure on supplemental oxygen  TAMIKO on CPAP  History of Down syndrome  GERD  Never smoker    -CT chest with contrast 12/4/2024.  Ordered with contrast to evaluate for any chronic thrombi.  Negative for any visible clots, though views were suboptimal.  Previously seen pulmonary nodules had resolved.  Large fat-containing right Morgagni hernia stable, mass effect on right atrium.  -Continue  wearing 2 L supplemental oxygen at bedtime  -Continue wearing CPAP nightly, managed by sleep medicine  -Continue using Pulmicort nebulizer treatments twice daily, rinse mouth after each use.   -Continue with Brovana nebulizer treatments mixed in with Pulmicort twice daily.  -Continue using albuterol inhaler or albuterol nebulizer treatments as needed.  Nebulizer machine provided April 2024.  -Continue taking Eliquis daily for history of DVT/PE  -Will order PFTs.  Unsure patient is able to tolerate/follow maneuvers.  -Patient's chronic cough likely secondary to acid reflux.  Will start omeprazole 20 mg daily.  Told him to take it 30 minutes prior to first meal of the day.  If symptoms improve after 2 weeks, can continue.  If it does not, can stop medication.    Smoking status: Reviewed  Vaccination status: Patient is up-to-date with his vaccination  Medications personally reviewed    Follow Up   No follow-ups on file.  Patient was given instructions and counseling regarding his condition or for health maintenance advice. Please see specific information pulled into the AVS if appropriate.

## 2025-02-26 ENCOUNTER — OFFICE VISIT (OUTPATIENT)
Dept: OTOLARYNGOLOGY | Facility: CLINIC | Age: 30
End: 2025-02-26
Payer: MEDICARE

## 2025-02-26 VITALS
DIASTOLIC BLOOD PRESSURE: 82 MMHG | HEIGHT: 65 IN | WEIGHT: 315 LBS | SYSTOLIC BLOOD PRESSURE: 126 MMHG | TEMPERATURE: 97.3 F | BODY MASS INDEX: 52.48 KG/M2 | HEART RATE: 92 BPM

## 2025-02-26 DIAGNOSIS — H72.01 TYMPANIC MEMBRANE CENTRAL PERFORATION, RIGHT: ICD-10-CM

## 2025-02-26 DIAGNOSIS — G47.33 OSA TREATED WITH BIPAP: ICD-10-CM

## 2025-02-26 DIAGNOSIS — H90.A12 CONDUCTIVE HEARING LOSS OF LEFT EAR WITH RESTRICTED HEARING OF RIGHT EAR: ICD-10-CM

## 2025-02-26 DIAGNOSIS — H90.0 CONDUCTIVE HEARING LOSS, BILATERAL: ICD-10-CM

## 2025-02-26 DIAGNOSIS — Q90.9 DOWN'S SYNDROME: ICD-10-CM

## 2025-02-26 DIAGNOSIS — H69.93 EUSTACHIAN TUBE DYSFUNCTION, BILATERAL: ICD-10-CM

## 2025-02-26 DIAGNOSIS — H90.A11 CONDUCTIVE HEARING LOSS OF RIGHT EAR WITH RESTRICTED HEARING OF LEFT EAR: Primary | ICD-10-CM

## 2025-02-26 NOTE — PATIENT INSTRUCTIONS
1.  Patient's left long-term good T-tube is in place and patent without drainage.  2.  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  However due to his significant eustachian tube problem and Down syndrome I recommend not repairing them at all.  3.  Has bilateral conductive hearing loss which seems to be stable on audiogram.  4.  I recommend patient keep water out of both ears.  5.  I will see patient on an annual basis for examination as well as cleaning.

## 2025-02-26 NOTE — PROGRESS NOTES
Patient Name: Tremayne Bernard   Visit Date: 02/26/2025   Patient ID: [id number]  Provider: Vargas Amezcua MD    Sex: male  Location: [facility]   YOB: 1995  Location Address: [address] [place] [address], [address] [place] [address],?[place]?[id number]    Primary Care Provider Jesus Manuel Sloan DO  Location Phone: [phone]    Referring Provider: No ref. provider found        Chief Complaint  6 month follow up ear cleaning, Hearing Loss, Cerumen Impaction, and otorrhea    History of Present Illness  Tremayne Bernard is a 29 y.o. male who presents to [facility] for 6 month follow up ear cleaning, Hearing Loss, Cerumen Impaction, and otorrhea.   Patient has history of chronic eustachian tube dysfunction with bilateral PE tubes x 2 and has also has had bilateral TM perforations with conductive hearing loss.  Patient last had audiogram on 9/26/2023 showing SRT of 30 on the right and 25 on the left with discrimination score 80% on the right and 90% on the left.  Tympanograms were type B bilaterally with bilateral conductive hearing loss.  Examination at that time on 10/24/2023 patient did have right TM perforation that was about 70% and left TM with T-tube still in place.  Bilateral cerumen impactions were cleaned.  Due to patient's chronic eustachian tube dysfunction, it was decided to hold off any repair on the tympanic membrane perforation for now.  Patient is here for 1 year follow-up with audiogram and ear cleaning.    Past Medical History:   Diagnosis Date    Anemia     Asthma     Diabetes mellitus     Disease of thyroid gland     Down's syndrome     Gout     Oxygen dependent     Nighttime Only       Past Surgical History:   Procedure Laterality Date    CYST REMOVAL      OTHER SURGICAL HISTORY Left     TUBE REPLACEMENT IN EAR         Current Outpatient Medications:     albuterol (ACCUNEB) 1.25 MG/3ML nebulizer solution, Take 3 mL by nebulization Every 6 (Six) Hours  As Needed for Wheezing., Disp: 360 mL, Rfl: 3    albuterol sulfate  (90 Base) MCG/ACT inhaler, Inhale 2 puffs Every 4 (Four) Hours As Needed for Wheezing or Shortness of Air., Disp: 8.5 g, Rfl: 3    allopurinol (Zyloprim) 300 MG tablet, Take 2 tablets by mouth Daily., Disp: 180 tablet, Rfl: 1    aluminum sulfate-calcium acetate (DOMEBORO) topical packet, Apply 1 packet topically to the appropriate area as directed 3 (Three) Times a Day., Disp: 100 each, Rfl: 3    ammonium lactate (AmLactin) 12 % lotion, Apply  topically to the appropriate area as directed 2 (Two) Times a Day., Disp: 225 g, Rfl: 11    apixaban (ELIQUIS) 5 MG tablet tablet, Take 1 tablet by mouth 2 (Two) Times a Day., Disp: 180 tablet, Rfl: 3    arformoterol (Brovana) 15 MCG/2ML nebulizer solution, Take 2 mL by nebulization 2 (Two) Times a Day., Disp: 120 mL, Rfl: 11    brompheniramine-pseudoephedrine-DM 30-2-10 MG/5ML syrup, TAKE 5 ML BY MOUTH EVERY 6 HOURS AS NEEDED, Disp: , Rfl:     budesonide (PULMICORT) 0.5 MG/2ML nebulizer solution, Take 2 mL by nebulization 2 (Two) Times a Day., Disp: 180 mL, Rfl: 3    clindamycin (Cleocin-T) 1 % external solution, Apply  topically to the appropriate area as directed 2 (Two) Times a Day., Disp: 60 mL, Rfl: 3    colchicine 0.6 MG tablet, Take 2 PO at onset of gout flare, then take 1 tablet PO daily until symptoms resolve, Disp: 30 tablet, Rfl: 3    Diclofenac Sodium (VOLTAREN) 1 % gel gel, diclofenac sodium 1 % topical gel apply 2 gram to the affected area(s) by topical route 4 times per day   Suspended, Disp: , Rfl:     docusate sodium 100 MG capsule, Take 1 capsule by mouth 2 (Two) Times a Day As Needed (constipation)., Disp: 90 each, Rfl: 3    Emollient (cetaphil) cream, Apply 1 Application topically to the appropriate area as directed 2 (Two) Times a Day., Disp: 453 g, Rfl: 3    Emollient (cetaphil) cream, Apply 1 Application topically to the appropriate area as directed As Needed for Dry Skin., Disp:  453 g, Rfl: 3    erythromycin (ROMYCIN) 5 MG/GM ophthalmic ointment, Instill 1 cm ribbon in each eye QID, Disp: 3.5 g, Rfl: 0    ferrous sulfate 325 (65 FE) MG tablet, Take 1 tablet by mouth Daily With Breakfast., Disp: 30 tablet, Rfl: 2    fluticasone (FLONASE) 50 MCG/ACT nasal spray, Administer 2 sprays into the nostril(s) as directed by provider Daily., Disp: 16 g, Rfl: 3    folic acid (FOLVITE) 1 MG tablet, Take 1 tablet by mouth Daily., Disp: 90 tablet, Rfl: 3    guaiFENesin-codeine (guaiFENesin AC) 100-10 MG/5ML solution/syrup, Take 5 mL by mouth 3 (Three) Times a Day As Needed for Cough., Disp: 140 mL, Rfl: 0    ipratropium (ATROVENT) 0.03 % nasal spray, Administer 2 sprays into the nostril(s) as directed by provider 3 (Three) Times a Day As Needed (for running)., Disp: 90 mL, Rfl: 3    ketoconazole (NIZORAL) 2 % cream, Apply  topically to the appropriate area as directed Daily., Disp: 60 g, Rfl: 2    levothyroxine (SYNTHROID, LEVOTHROID) 25 MCG tablet, Take 1 tablet by mouth Daily., Disp: 90 tablet, Rfl: 3    loratadine (CLARITIN) 10 MG tablet, Take 1 tablet by mouth Daily., Disp: 90 tablet, Rfl: 3    mineral oil-hydrophilic petrolatum (AQUAPHOR) ointment, Apply 1 Application topically to the appropriate area as directed Daily., Disp: 396 g, Rfl: 3    montelukast (Singulair) 10 MG tablet, Take 1 tablet by mouth Every Night., Disp: 90 tablet, Rfl: 3    mupirocin (BACTROBAN) 2 % ointment, Apply  topically to the appropriate area as directed 2 (Two) Times a Day., Disp: 30 g, Rfl: 3    nystatin (MYCOSTATIN) 100,000 unit/mL suspension, Swish and swallow 5 mL 4 (Four) Times a Day., Disp: 120 mL, Rfl: 0    nystatin (MYCOSTATIN) 202038 UNIT/GM cream, Apply 1 Application topically to the appropriate area as directed 2 (Two) Times a Day., Disp: 60 g, Rfl: 2    nystatin (MYCOSTATIN) 909918 UNIT/GM powder, Apply  topically to the appropriate area as directed 3 (Three) Times a Day., Disp: 60 g, Rfl: 2    olopatadine  "(PATANOL) 0.1 % ophthalmic solution, , Disp: , Rfl:     omeprazole (priLOSEC) 20 MG capsule, Take 1 capsule by mouth Daily., Disp: 30 capsule, Rfl: 5    Petrolatum (Petroleum Jelly) ointment, Apply 1 application  topically As Needed (prn as needed)., Disp: 368 g, Rfl: 3    Soap & Cleansers (Gentle Skin Cleanser) liquid, Apply 1 application  topically 2 (Two) Times a Day., Disp: 473 mL, Rfl: 3    Sodium Fluoride 1.1 % cream, , Disp: , Rfl:     Urea 50 % suspension, Apply topically to the affected areas TID, Disp: 284 g, Rfl: 3    vitamin B-12 (CYANOCOBALAMIN) 1000 MCG tablet, Take 1 tablet by mouth Daily., Disp: 90 tablet, Rfl: 3    vitamin B-6 (PYRIDOXINE) 50 MG tablet, Take 0.5 tablets by mouth Every Other Day., Disp: 23 tablet, Rfl: 3    Zinc Oxide 16 % ointment, Apply 1 Application topically to the appropriate area as directed Daily As Needed (rash)., Disp: 113 g, Rfl: 3    benzonatate (TESSALON) 200 MG capsule, Take 1 capsule by mouth 3 (Three) Times a Day As Needed for Cough. (Patient not taking: Reported on 2/26/2025), Disp: 90 capsule, Rfl: 3    promethazine-dextromethorphan (PROMETHAZINE-DM) 6.25-15 MG/5ML syrup, Take 5 mL by mouth 4 (Four) Times a Day As Needed for Cough. (Patient not taking: Reported on 2/26/2025), Disp: 473 mL, Rfl: 0     No Known Allergies    Social History     Tobacco Use    Smoking status: Never     Passive exposure: Never    Smokeless tobacco: Never   Vaping Use    Vaping status: Never Used   Substance Use Topics    Alcohol use: Never    Drug use: Defer        Objective     Vital Signs:   Vitals:    02/26/25 1521   BP: 126/82   Pulse: 92   Temp: 97.3 °F (36.3 °C)   Weight: (!) 159 kg (350 lb)   Height: 165.1 cm (65\")       Tobacco Use: Low Risk  (2/26/2025)    Patient History     Smoking Tobacco Use: Never     Smokeless Tobacco Use: Never     Passive Exposure: Never         Physical Exam    Constitutional   Appearance  well developed, well-nourished, alert and in no acute distress, " voice clear and strong    Head   Inspection  no deformities or lesions      Face   Inspection  no facial lesions; House-Brackmann I/VI bilaterally   Palpation  no TMJ crepitus nor  muscle tenderness bilaterally     Eyes/Vision   Visual Fields  extraocular movements are intact, no spontaneous or gaze-induced nystagmus  Conjunctivae  clear   Sclerae  clear   Pupils and Irises  pupils equal, round, and reactive to light.   Nystagmus  not present     Ears, Nose, Mouth and Throat  Ears  External Ears  appearance within normal limits, no lesions present   Otoscopic Examination  Right tympanic membrane perforation is on central area about 70% and dry without drainage.  Left tympanic membrane is unremarkable with long-term ventilation tube in place and patent without drainage.  Hearing  intact to conversational voice both ears   Tunning fork testing    Rinne:  [place]:    Nose  External Nose  appearance normal   Intranasal Exam  mucosa within normal limits, vestibules normal, no intranasal lesions present, septum midline, sinuses non tender to percussion   Modified [place] Test:    Oral Cavity  Oral Mucosa  oral mucosa normal without pallor or cyanosis   Lips  lip appearance normal   Teeth  normal dentition for age   Gums  gums pink, non-swollen, no bleeding present   Tongue  tongue appearance normal; normal mobility   Palate  hard palate normal, soft palate appearance normal with symmetric mobility     Throat  Oropharynx  no inflammation or lesions present, tonsils within normal limits   Hypopharynx  appearance within normal limits   Larynx  voice normal     Neck  Inspection/Palpation  normal appearance, no masses or tenderness, trachea midline; thyroid size normal, nontender, no nodules or masses present on palpation     Lymphatic  Neck  no lymphadenopathy present   Supraclavicular Nodes  no lymphadenopathy present   Preauricular Nodes  no lymphadenopathy present     Respiratory  Respiratory Effort  breathing  unlabored   Inspection of Chest  normal appearance, no retractions     Musculoskeletal   Cervical back: Normal range of motion and neck supple.      Skin and Subcutaneous Tissue  General Inspection  Regarding face and neck - there are no rashes present, no lesions present, and no areas of discoloration     Neurologic  Cranial Nerves  cranial nerves II-XII are grossly intact bilaterally   Gait and Station  normal gait, able to stand without diffculty    Psychiatric  Judgement and Insight  judgment and insight intact   Mood and Affect  mood normal, affect appropriate       RESULTS REVIEWED    I have reviewed the following information:   [x]  Previous Internal Note  [x]  Previous External Note:   [x]  Ordered Tests & Results:      Pathology:   Lab Results   Component Value Date    Microalbumin, Urine <1.2 08/09/2023       TSH   Date Value Ref Range Status   08/09/2023 2.350 0.270 - 4.200 uIU/mL Final     Free T4   Date Value Ref Range Status   08/09/2023 1.11 0.93 - 1.70 ng/dL Final     Comment:     T4 results may be falsely increased if patient taking Biotin.     PTH, Intact   Date Value Ref Range Status   02/04/2020 32.8 11.1 - 79.5 pg/mL Final     Comment:     Interpretation of intact PTH values should always take into  account serum calcium results and the interrelationship  between these two elements in various disorders involving PTH  and calcium. It is recommended that the intact PTH results  should always be interpreted with caution and with  consideration of the overall clinical manifestations even  when used in conjunction with calcium values.       Heterophilic antibodies in human serum can react with  reagent immunoglobulins, interfering with in vitro immunoassays.  Patients routinely exposed to animals or to animal serum  products can be prone to this interference and anomalous values  may be observed. Samples from patients routinely receiving high  dose biotin therapy may show falsely depressed values.        Calcium   Date Value Ref Range Status   07/03/2024 8.9 8.6 - 10.5 mg/dL Final   04/17/2018 9.4 8.4 - 10.2 mg/dL Final     25 Hydroxy, Vitamin D   Date Value Ref Range Status   02/04/2020 9.3 (L) 30.0 - 100.0 ng/mL Final     Comment:     Vitamin D Status          Range  ---------------------------------------  Deficiency                <10 ng/mL  Insufficiency             10-30 ng/mL  Sufficiency                ng/mL  Toxicity                  >100 ng/mL         CT Chest With Contrast Diagnostic    Result Date: 12/5/2024  Impression: 1. No suspicious pulmonary nodules are identified. 2. 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 Electronically Signed: Ralph Mooney MD  12/5/2024 3:23 PM EST  Workstation ID: [id number]      I have discussed the interpretation of the above results with the patient.    Procedures          Assessment and Plan   Diagnoses and all orders for this visit:    1. Conductive hearing loss of right ear with restricted hearing of left ear (Primary)    2. Conductive hearing loss of left ear with restricted hearing of right ear    3. TAMIKO treated with BiPAP    4. Down's syndrome    5. Eustachian tube dysfunction, bilateral    6. Tympanic membrane central perforation, right        (H90.A11) Conductive hearing loss of right ear with restricted hearing of left ear    (H90.A12) Conductive hearing loss of left ear with restricted hearing of right ear    (G47.33) TAMIKO treated with BiPAP    (Q90.9) Down's syndrome    (H69.93) Eustachian tube dysfunction, bilateral    (H72.01) Tympanic membrane central perforation, right     Tremayne Bernard  reports that he has never smoked. He has never been exposed to tobacco smoke. He has never used smokeless tobacco.         Plan:  Patient Instructions   1.  Patient's left long-term good T-tube is in place and patent without drainage.  2.  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  However due  to his significant eustachian tube problem and Down syndrome I recommend not repairing them at all.  3.  Has bilateral conductive hearing loss which seems to be stable on audiogram.  4.  I recommend patient keep water out of both ears.  5.  I will see patient on an annual basis for examination as well as cleaning.        Follow Up   Return in about 1 year (around 2/26/2026), or Follow-up 1 year for ear cleaning and exam..  Patient was given instructions and counseling regarding his condition or for health maintenance advice. Please see specific information pulled into the AVS if appropriate.

## 2025-03-04 ENCOUNTER — TELEPHONE (OUTPATIENT)
Dept: ONCOLOGY | Facility: HOSPITAL | Age: 30
End: 2025-03-04
Payer: MEDICARE

## 2025-03-04 NOTE — TELEPHONE ENCOUNTER
Called pt to get new ins, pt was not home, she will call us back when she can to get the new insuance info.

## 2025-03-07 ENCOUNTER — LAB (OUTPATIENT)
Dept: ONCOLOGY | Facility: HOSPITAL | Age: 30
End: 2025-03-07
Payer: MEDICARE

## 2025-03-07 ENCOUNTER — OFFICE VISIT (OUTPATIENT)
Dept: ONCOLOGY | Facility: HOSPITAL | Age: 30
End: 2025-03-07
Payer: MEDICARE

## 2025-03-07 VITALS
SYSTOLIC BLOOD PRESSURE: 123 MMHG | HEART RATE: 90 BPM | DIASTOLIC BLOOD PRESSURE: 84 MMHG | RESPIRATION RATE: 20 BRPM | HEIGHT: 65 IN | TEMPERATURE: 97.4 F | WEIGHT: 315 LBS | OXYGEN SATURATION: 96 % | BODY MASS INDEX: 52.48 KG/M2

## 2025-03-07 DIAGNOSIS — D75.1 SECONDARY POLYCYTHEMIA: ICD-10-CM

## 2025-03-07 DIAGNOSIS — D75.1 POLYCYTHEMIA, SECONDARY: Primary | ICD-10-CM

## 2025-03-07 DIAGNOSIS — E61.1 IRON DEFICIENCY: ICD-10-CM

## 2025-03-07 DIAGNOSIS — E61.1 IRON DEFICIENCY: Primary | ICD-10-CM

## 2025-03-07 LAB
ALBUMIN SERPL-MCNC: 4 G/DL (ref 3.5–5.2)
ALBUMIN/GLOB SERPL: 1.1 G/DL
ALP SERPL-CCNC: 71 U/L (ref 39–117)
ALT SERPL W P-5'-P-CCNC: 35 U/L (ref 1–41)
ANION GAP SERPL CALCULATED.3IONS-SCNC: 9.2 MMOL/L (ref 5–15)
ANISOCYTOSIS BLD QL: NORMAL
AST SERPL-CCNC: 33 U/L (ref 1–40)
BASOPHILS # BLD AUTO: 0.08 10*3/MM3 (ref 0–0.2)
BASOPHILS NFR BLD AUTO: 3.2 % (ref 0–1.5)
BILIRUB SERPL-MCNC: 0.7 MG/DL (ref 0–1.2)
BUN SERPL-MCNC: 14 MG/DL (ref 6–20)
BUN/CREAT SERPL: 8.9 (ref 7–25)
BURR CELLS BLD QL SMEAR: NORMAL
CALCIUM SPEC-SCNC: 9.1 MG/DL (ref 8.6–10.5)
CHLORIDE SERPL-SCNC: 98 MMOL/L (ref 98–107)
CO2 SERPL-SCNC: 32.8 MMOL/L (ref 22–29)
CREAT SERPL-MCNC: 1.57 MG/DL (ref 0.76–1.27)
DEPRECATED RDW RBC AUTO: 72.7 FL (ref 37–54)
EGFRCR SERPLBLD CKD-EPI 2021: 61.6 ML/MIN/1.73
EOSINOPHIL # BLD AUTO: 0.03 10*3/MM3 (ref 0–0.4)
EOSINOPHIL NFR BLD AUTO: 1.2 % (ref 0.3–6.2)
ERYTHROCYTE [DISTWIDTH] IN BLOOD BY AUTOMATED COUNT: 25.6 % (ref 12.3–15.4)
FERRITIN SERPL-MCNC: 32.16 NG/ML (ref 30–400)
GLOBULIN UR ELPH-MCNC: 3.5 GM/DL
GLUCOSE SERPL-MCNC: 98 MG/DL (ref 65–99)
HCT VFR BLD AUTO: 56.3 % (ref 37.5–51)
HGB BLD-MCNC: 17.2 G/DL (ref 13–17.7)
HOWELL-JOLLY BOD BLD QL SMEAR: PRESENT
IMM GRANULOCYTES # BLD AUTO: 0 10*3/MM3 (ref 0–0.05)
IMM GRANULOCYTES NFR BLD AUTO: 0 % (ref 0–0.5)
IRON 24H UR-MRATE: 67 MCG/DL (ref 59–158)
IRON SATN MFR SERPL: 14 % (ref 20–50)
LYMPHOCYTES # BLD AUTO: 1.17 10*3/MM3 (ref 0.7–3.1)
LYMPHOCYTES NFR BLD AUTO: 46.8 % (ref 19.6–45.3)
MACROCYTES BLD QL SMEAR: NORMAL
MCH RBC QN AUTO: 24.7 PG (ref 26.6–33)
MCHC RBC AUTO-ENTMCNC: 30.6 G/DL (ref 31.5–35.7)
MCV RBC AUTO: 81 FL (ref 79–97)
MONOCYTES # BLD AUTO: 0.38 10*3/MM3 (ref 0.1–0.9)
MONOCYTES NFR BLD AUTO: 15.2 % (ref 5–12)
NEUTROPHILS NFR BLD AUTO: 0.84 10*3/MM3 (ref 1.7–7)
NEUTROPHILS NFR BLD AUTO: 33.6 % (ref 42.7–76)
NRBC BLD AUTO-RTO: 0 /100 WBC (ref 0–0.2)
OVALOCYTES BLD QL SMEAR: NORMAL
PLATELET # BLD AUTO: 232 10*3/MM3 (ref 140–450)
PMV BLD AUTO: 10.3 FL (ref 6–12)
POIKILOCYTOSIS BLD QL SMEAR: NORMAL
POTASSIUM SERPL-SCNC: 4.6 MMOL/L (ref 3.5–5.2)
PROT SERPL-MCNC: 7.5 G/DL (ref 6–8.5)
RBC # BLD AUTO: 6.95 10*6/MM3 (ref 4.14–5.8)
SMALL PLATELETS BLD QL SMEAR: ADEQUATE
SODIUM SERPL-SCNC: 140 MMOL/L (ref 136–145)
TARGETS BLD QL SMEAR: NORMAL
TIBC SERPL-MCNC: 480 MCG/DL (ref 298–536)
TRANSFERRIN SERPL-MCNC: 322 MG/DL (ref 200–360)
WBC MORPH BLD: NORMAL
WBC NRBC COR # BLD AUTO: 2.5 10*3/MM3 (ref 3.4–10.8)

## 2025-03-07 PROCEDURE — 80053 COMPREHEN METABOLIC PANEL: CPT

## 2025-03-07 PROCEDURE — 36415 COLL VENOUS BLD VENIPUNCTURE: CPT

## 2025-03-07 PROCEDURE — G0463 HOSPITAL OUTPT CLINIC VISIT: HCPCS | Performed by: INTERNAL MEDICINE

## 2025-03-07 PROCEDURE — 84466 ASSAY OF TRANSFERRIN: CPT

## 2025-03-07 PROCEDURE — 85007 BL SMEAR W/DIFF WBC COUNT: CPT

## 2025-03-07 PROCEDURE — 83540 ASSAY OF IRON: CPT

## 2025-03-07 PROCEDURE — 85025 COMPLETE CBC W/AUTO DIFF WBC: CPT

## 2025-03-07 PROCEDURE — 82728 ASSAY OF FERRITIN: CPT

## 2025-03-07 RX ORDER — SODIUM CHLORIDE 9 MG/ML
250 INJECTION, SOLUTION INTRAVENOUS ONCE
OUTPATIENT
Start: 2025-03-11

## 2025-03-07 NOTE — PROGRESS NOTES
Chief Complaint/Care Team   Follow-up (secondary polycythemia, DVT-)    Federico Sullivan MD Loesevitz, Thomas, DO    History of Present Illness     Diagnosis: Recurrent DVT    Secondary Polycythemia (pt with TAMIKO, using machine every other night), JAK2 V617F and exon 12 mutation testing negative    H/o Down Syndrome    Current Treatment: DVT- Eliquis 5 mg p.o. twice daily (lifelong); Phlebotomy prn for Secondary Polycythemia    Previous Treatment: None    Tremayne Bernard is a 29 y.o. male who presents to [facility] for follow-up regarding history of recurrent DVT as well as secondary positive anemia from obstructive sleep apnea.  Patient also history of Down syndrome and is here with his mother who provides additional medical history.  Patient's mother reports he has been compliant with taking Eliquis.  No report of blood clots, lower extremity swelling, difficulty breathing or chest discomfort.  Patient reports overall patient is tolerating Eliquis medication well.    His mother is also attempting to have him wear his sleep apnea machine consistently.  She has not been able to follow-up with pulmonology to assess for other devices/machines that would increase his compliance with use of TAMIKO machine.  Of note, per patient's mother he states up late at night until the early morning, but her and his father has been trying to have him wear the TAMIKO mask more frequently.    Interval history: Patient here with his mother and father to follow-up regarding secondary polycythemia. According to his parents he has occasional use of CPAP machine and reports consistent use of supplemental oxygen at night. Patient's last phlebotomy was in 6/2024. Pt's stool test was negative for blood. Pt is compliant with taking oral iron. Pt reports recent ER visit due to bronchitis symptoms, he continues to follow up with pulmonology regarding chronic cough.     Review of Systems   Constitutional:   "Positive for fatigue (PT stays up all night so he feels fatigue throughout the day). Negative for appetite change, diaphoresis, fever, unexpected weight gain and unexpected weight loss.   HENT:  Positive for postnasal drip. Negative for hearing loss, mouth sores, sore throat, swollen glands, trouble swallowing and voice change.    Eyes:  Negative for blurred vision.   Respiratory:  Positive for cough and shortness of breath. Negative for wheezing.    Cardiovascular:  Negative for chest pain and palpitations.   Gastrointestinal:  Positive for vomiting. Negative for abdominal pain, blood in stool, constipation, diarrhea and nausea.   Endocrine: Negative for cold intolerance and heat intolerance.   Genitourinary:  Negative for difficulty urinating, dysuria, frequency, hematuria and urinary incontinence.   Musculoskeletal:  Positive for arthralgias. Negative for back pain and myalgias.        Right foot pain   Skin:  Negative for rash, skin lesions and wound.   Neurological:  Negative for dizziness, seizures, weakness, numbness and headache.   Hematological:  Does not bruise/bleed easily.   Psychiatric/Behavioral:  Negative for depressed mood. The patient is not nervous/anxious.    All other systems reviewed and are negative.       Oncology/Hematology History    No history exists.       Objective     Vitals:    03/07/25 1000   BP: 123/84   Pulse: 90   Resp: 20   Temp: 97.4 °F (36.3 °C)   TempSrc: Temporal   SpO2: 96%   Weight: (!) 162 kg (357 lb 6.4 oz)   Height: 165.1 cm (65\")   PainSc: 8    PainLoc: Foot  Comment: right foot pain       ECOG score: 2       BMI 58.9    PHQ-9 Total Score:         Physical Exam  Vitals reviewed. Exam conducted with a chaperone present.   Constitutional:       General: He is not in acute distress.     Appearance: Normal appearance. He is obese.   HENT:      Head: Normocephalic and atraumatic.   Eyes:      Extraocular Movements: Extraocular movements intact.   Pulmonary:      Effort: " Pulmonary effort is normal.      Breath sounds: No wheezing.   Musculoskeletal:      Cervical back: Normal range of motion and neck supple.   Skin:     General: Skin is warm and dry.   Neurological:      Mental Status: He is oriented to person, place, and time.           Past Medical History     Past Medical History:   Diagnosis Date    Anemia     Asthma     Diabetes mellitus     Disease of thyroid gland     Down's syndrome     Gout     Oxygen dependent     Nighttime Only     Current Outpatient Medications on File Prior to Visit   Medication Sig Dispense Refill    albuterol (ACCUNEB) 1.25 MG/3ML nebulizer solution Take 3 mL by nebulization Every 6 (Six) Hours As Needed for Wheezing. 360 mL 3    albuterol sulfate  (90 Base) MCG/ACT inhaler Inhale 2 puffs Every 4 (Four) Hours As Needed for Wheezing or Shortness of Air. 8.5 g 3    allopurinol (Zyloprim) 300 MG tablet Take 2 tablets by mouth Daily. 180 tablet 1    aluminum sulfate-calcium acetate (DOMEBORO) topical packet Apply 1 packet topically to the appropriate area as directed 3 (Three) Times a Day. 100 each 3    ammonium lactate (AmLactin) 12 % lotion Apply  topically to the appropriate area as directed 2 (Two) Times a Day. 225 g 11    apixaban (ELIQUIS) 5 MG tablet tablet Take 1 tablet by mouth 2 (Two) Times a Day. 180 tablet 3    arformoterol (Brovana) 15 MCG/2ML nebulizer solution Take 2 mL by nebulization 2 (Two) Times a Day. 120 mL 11    brompheniramine-pseudoephedrine-DM 30-2-10 MG/5ML syrup TAKE 5 ML BY MOUTH EVERY 6 HOURS AS NEEDED      budesonide (PULMICORT) 0.5 MG/2ML nebulizer solution Take 2 mL by nebulization 2 (Two) Times a Day. 180 mL 3    clindamycin (Cleocin-T) 1 % external solution Apply  topically to the appropriate area as directed 2 (Two) Times a Day. 60 mL 3    colchicine 0.6 MG tablet Take 2 PO at onset of gout flare, then take 1 tablet PO daily until symptoms resolve 30 tablet 3    Diclofenac Sodium (VOLTAREN) 1 % gel gel diclofenac  sodium 1 % topical gel apply 2 gram to the affected area(s) by topical route 4 times per day   Suspended      docusate sodium 100 MG capsule Take 1 capsule by mouth 2 (Two) Times a Day As Needed (constipation). 90 each 3    Emollient (cetaphil) cream Apply 1 Application topically to the appropriate area as directed 2 (Two) Times a Day. 453 g 3    Emollient (cetaphil) cream Apply 1 Application topically to the appropriate area as directed As Needed for Dry Skin. 453 g 3    erythromycin (ROMYCIN) 5 MG/GM ophthalmic ointment Instill 1 cm ribbon in each eye QID 3.5 g 0    ferrous sulfate 325 (65 FE) MG tablet Take 1 tablet by mouth Daily With Breakfast. 30 tablet 2    fluticasone (FLONASE) 50 MCG/ACT nasal spray Administer 2 sprays into the nostril(s) as directed by provider Daily. 16 g 3    folic acid (FOLVITE) 1 MG tablet Take 1 tablet by mouth Daily. 90 tablet 3    guaiFENesin-codeine (guaiFENesin AC) 100-10 MG/5ML solution/syrup Take 5 mL by mouth 3 (Three) Times a Day As Needed for Cough. 140 mL 0    ipratropium (ATROVENT) 0.03 % nasal spray Administer 2 sprays into the nostril(s) as directed by provider 3 (Three) Times a Day As Needed (for running). 90 mL 3    ketoconazole (NIZORAL) 2 % cream Apply  topically to the appropriate area as directed Daily. 60 g 2    levothyroxine (SYNTHROID, LEVOTHROID) 25 MCG tablet Take 1 tablet by mouth Daily. 90 tablet 3    loratadine (CLARITIN) 10 MG tablet Take 1 tablet by mouth Daily. 90 tablet 3    mineral oil-hydrophilic petrolatum (AQUAPHOR) ointment Apply 1 Application topically to the appropriate area as directed Daily. 396 g 3    montelukast (Singulair) 10 MG tablet Take 1 tablet by mouth Every Night. 90 tablet 3    mupirocin (BACTROBAN) 2 % ointment Apply  topically to the appropriate area as directed 2 (Two) Times a Day. 30 g 3    nystatin (MYCOSTATIN) 100,000 unit/mL suspension Swish and swallow 5 mL 4 (Four) Times a Day. 120 mL 0    nystatin (MYCOSTATIN) 846556 UNIT/GM  cream Apply 1 Application topically to the appropriate area as directed 2 (Two) Times a Day. 60 g 2    nystatin (MYCOSTATIN) 436413 UNIT/GM powder Apply  topically to the appropriate area as directed 3 (Three) Times a Day. 60 g 2    olopatadine (PATANOL) 0.1 % ophthalmic solution       omeprazole (priLOSEC) 20 MG capsule Take 1 capsule by mouth Daily. 30 capsule 5    Petrolatum (Petroleum Jelly) ointment Apply 1 application  topically As Needed (prn as needed). 368 g 3    Soap & Cleansers (Gentle Skin Cleanser) liquid Apply 1 application  topically 2 (Two) Times a Day. 473 mL 3    Sodium Fluoride 1.1 % cream       Urea 50 % suspension Apply topically to the affected areas  g 3    vitamin B-12 (CYANOCOBALAMIN) 1000 MCG tablet Take 1 tablet by mouth Daily. 90 tablet 3    vitamin B-6 (PYRIDOXINE) 50 MG tablet Take 0.5 tablets by mouth Every Other Day. 23 tablet 3    Zinc Oxide 16 % ointment Apply 1 Application topically to the appropriate area as directed Daily As Needed (rash). 113 g 3    benzonatate (TESSALON) 200 MG capsule Take 1 capsule by mouth 3 (Three) Times a Day As Needed for Cough. (Patient not taking: Reported on 3/7/2025) 90 capsule 3    promethazine-dextromethorphan (PROMETHAZINE-DM) 6.25-15 MG/5ML syrup Take 5 mL by mouth 4 (Four) Times a Day As Needed for Cough. (Patient not taking: Reported on 3/7/2025) 473 mL 0     No current facility-administered medications on file prior to visit.      No Known Allergies  Past Surgical History:   Procedure Laterality Date    CYST REMOVAL      OTHER SURGICAL HISTORY Left     TUBE REPLACEMENT IN EAR     Social History     Socioeconomic History    Marital status: Single   Tobacco Use    Smoking status: Never     Passive exposure: Never    Smokeless tobacco: Never   Vaping Use    Vaping status: Never Used   Substance and Sexual Activity    Alcohol use: Never    Drug use: Defer    Sexual activity: Defer     History reviewed. No pertinent family history.    Results      Result Review   The following data was reviewed by: Federico Sullivan MD on 04/04/2023:  Lab Results   Component Value Date    HGB 17.2 03/07/2025    HCT 56.3 (H) 03/07/2025    MCV 81.0 03/07/2025     03/07/2025    WBC 2.50 (L) 03/07/2025    NEUTROABS 0.84 (L) 03/07/2025    LYMPHSABS 1.17 03/07/2025    MONOSABS 0.38 03/07/2025    EOSABS 0.03 03/07/2025    BASOSABS 0.08 03/07/2025     Lab Results   Component Value Date    GLUCOSE 98 03/07/2025    BUN 14 03/07/2025    CREATININE 1.57 (H) 03/07/2025     03/07/2025    K 4.6 03/07/2025    CL 98 03/07/2025    CO2 32.8 (H) 03/07/2025    CALCIUM 9.1 03/07/2025    PROTEINTOT 7.5 03/07/2025    ALBUMIN 4.0 03/07/2025    BILITOT 0.7 03/07/2025    ALKPHOS 71 03/07/2025    AST 33 03/07/2025    ALT 35 03/07/2025     Lab Results   Component Value Date    MG 2.2 09/10/2022    FREET4 1.11 08/09/2023    TSH 2.350 08/09/2023           No radiology results for the last day       Assessment & Plan     Diagnoses and all orders for this visit:    1. Iron deficiency (Primary)  -     CBC & Differential; Future  -     Comprehensive Metabolic Panel; Future  -     Ferritin; Future  -     Iron Profile; Future        Tremayne Bernard is a 29 y.o. male who presents to [facility] for polycythemia and history of recurrent DVT.  He has PMH significant for Down's Syndrome, TAMIKO (has TAMIKO machine). Patient is compliant with his Eliquis medication.      Recurrent DVT  -continue Eliquis, lifelong  --Pt due to undergo repeat CT chest (ordered by his PCP) to monitor response to anticoagulant, CT chest from 12/7/2023 showed resolution of previous ground glass nodules seen on previous study and a new 6 mm nodule in each upper lobe but given waxing and waning nature of the nodules favors infectious/inflammatory process  -per pt's mother pulmonology plans to repeat imaging in 1 year, CT chest from 12/5/2024 was negative for any suspicious pulmonary  nodules.      Secondary polycythemia  -due to TAMIKO, lack of improvement with use of TAMIKO machine  -pt's mother is going to schedule pt appointment with pulmonology to assess for other options regarding TAMIKO mask and recheck TAMIKO machine settings  -recommended increased compliance with CPAP machine, use of O2 at night, and recommended weight loss  -JAK2 V617F and exon 12 mutation testing from 11/2019 were negative  -pt's last phlebotomy was in 6/2024, pt with evidence of iron deficiency on most recent labs, improved with use of oral iron, pt with significant increase in hemoglobin so will   resume phlebotomy for now monthly and encourage weight loss and more consistent use of CPAP for TAMIKO    Plan for patient follow-up in 2 months with repeat CBC, CMP, and iron studies to assess response to oral iron repletion.    Patient verbalized understanding and agreed with plan.    Please note that portions of this note were completed with a voice recognition program.      Electronically signed by Federico Sullivan MD, 03/07/25, 12:42 PM EST.          Follow Up     I spent 30 minutes caring for Tremayne on this date of service. This time includes time spent by me in the following activities:preparing for the visit, reviewing tests, obtaining and/or reviewing a separately obtained history, performing a medically appropriate examination and/or evaluation , counseling and educating the patient/family/caregiver, ordering medications, tests, or procedures, documenting information in the medical record and care coordination.    This is an acute or chronic illness that poses a threat to life or bodily function. The above treatment plan involves a high risk of complications and/or mortality of patient management.    The patient was seen and examined. Work by the provider also included review and/or ordering of lab tests, review and/or ordering of radiology tests, review and/or ordering of medicine tests, discussion with other physicians or  providers, independent review of data, obtaining old records, review/summation of old records, and/or other review.    I have reviewed the family history, social history, and past medical history for this patient. Previous information and data has been reviewed and updated as needed. I have reviewed and verified the chief complaint, history, and other documentation. The patient was interviewed and examined in the clinic and the chart reviewed. The previous observations, recommendations, and conclusions were reviewed including those of other providers.     The plan was discussed with the patient and/or family. The patient was given time to ask questions and these questions were answered. At the conclusion of their visit they had no additional questions or concerns and all questions were answered to their satisfaction.    Patient was given instructions and counseling regarding his condition or for health maintenance advice. Please see specific information pulled into the AVS if appropriate.

## 2025-03-17 ENCOUNTER — TELEPHONE (OUTPATIENT)
Dept: FAMILY MEDICINE CLINIC | Facility: CLINIC | Age: 30
End: 2025-03-17
Payer: MEDICARE

## 2025-03-17 NOTE — TELEPHONE ENCOUNTER
Caller: RAFIA NEWELL NO VERBAL FOR THIS OFFICE.     Relationship to patient: Mother    Best call back number: [phone]     Chief complaint: DISCUSS STARTING ZEPBOUND    Type of visit: OFFICE VISIT    Requested date: ASAP       Additional notes:PATIENT'S MOM STATES THAT THE PATIENT'S ONCOLOGIST ASKED THEM TO HAVE THE PATIENT DISCUSS STARTING ZEPBOUND WITH HIS PCP.        HUB UNABLE TO SCHEDULE BEFORE MAY

## 2025-03-20 ENCOUNTER — OFFICE VISIT (OUTPATIENT)
Dept: FAMILY MEDICINE CLINIC | Facility: CLINIC | Age: 30
End: 2025-03-20
Payer: MEDICARE

## 2025-03-20 VITALS
WEIGHT: 315 LBS | HEART RATE: 110 BPM | SYSTOLIC BLOOD PRESSURE: 120 MMHG | DIASTOLIC BLOOD PRESSURE: 78 MMHG | HEIGHT: 65 IN | TEMPERATURE: 97.6 F | OXYGEN SATURATION: 94 % | BODY MASS INDEX: 52.48 KG/M2

## 2025-03-20 DIAGNOSIS — Z51.81 MEDICATION MONITORING ENCOUNTER: ICD-10-CM

## 2025-03-20 DIAGNOSIS — E66.01 CLASS 3 SEVERE OBESITY WITH BODY MASS INDEX (BMI) OF 50.0 TO 59.9 IN ADULT, UNSPECIFIED OBESITY TYPE, UNSPECIFIED WHETHER SERIOUS COMORBIDITY PRESENT: Primary | ICD-10-CM

## 2025-03-20 DIAGNOSIS — E66.813 CLASS 3 SEVERE OBESITY WITH BODY MASS INDEX (BMI) OF 50.0 TO 59.9 IN ADULT, UNSPECIFIED OBESITY TYPE, UNSPECIFIED WHETHER SERIOUS COMORBIDITY PRESENT: Primary | ICD-10-CM

## 2025-03-20 RX ORDER — CLINDAMYCIN PHOSPHATE 11.9 MG/ML
SOLUTION TOPICAL 2 TIMES DAILY
Qty: 60 ML | Refills: 3 | Status: SHIPPED | OUTPATIENT
Start: 2025-03-20

## 2025-03-20 RX ORDER — COLCHICINE 0.6 MG/1
TABLET ORAL
Qty: 30 TABLET | Refills: 3 | Status: SHIPPED | OUTPATIENT
Start: 2025-03-20

## 2025-03-20 RX ORDER — ALBUTEROL SULFATE 90 UG/1
2 INHALANT RESPIRATORY (INHALATION) EVERY 4 HOURS PRN
Qty: 8.5 G | Refills: 3 | Status: SHIPPED | OUTPATIENT
Start: 2025-03-20

## 2025-03-20 RX ORDER — NYSTATIN 100000 U/G
1 CREAM TOPICAL 2 TIMES DAILY
Qty: 60 G | Refills: 2 | Status: SHIPPED | OUTPATIENT
Start: 2025-03-20

## 2025-03-20 NOTE — PROGRESS NOTES
Chief Complaint  Discuss weight loss medication    Subjective          Tremayne Bernard presents to [facility]    History of Present Illness     History of Present Illness  The patient is a 29-year-old male who presents today for a follow-up visit. He is accompanied by his father.    The patient's father has expressed interest in initiating a weight loss medication regimen for his son, specifically Zepbound. The patient's weight has been gradually decreasing, with a recent measurement of 347 pounds. He has not previously been on any weight loss medications.    The patient has a known history of prediabetes but has never been diagnosed with diabetes.    FAMILY HISTORY  The patient has no family history of medullary thyroid cancer or multiple endocrine neoplasia syndrome type II.         Current Outpatient Medications   Medication Instructions    albuterol (ACCUNEB) 1.25 mg, Nebulization, Every 6 Hours PRN    albuterol sulfate  (90 Base) MCG/ACT inhaler 2 puffs, Inhalation, Every 4 Hours PRN    allopurinol (ZYLOPRIM) 600 mg, Oral, Daily    aluminum sulfate-calcium acetate (DOMEBORO) topical packet 1 packet, Topical, 3 Times Daily    ammonium lactate (AmLactin) 12 % lotion Topical, 2 Times Daily    apixaban (ELIQUIS) 5 mg, Oral, 2 Times Daily    arformoterol (BROVANA) 15 mcg, Nebulization, 2 Times Daily - RT    benzonatate (TESSALON) 200 mg, Oral, 3 Times Daily PRN    brompheniramine-pseudoephedrine-DM 30-2-10 MG/5ML syrup TAKE 5 ML BY MOUTH EVERY 6 HOURS AS NEEDED    budesonide (PULMICORT) 0.5 mg, Nebulization, 2 Times Daily    clindamycin (Cleocin-T) 1 % external solution Topical, 2 Times Daily    colchicine 0.6 MG tablet Take 2 PO at onset of gout flare, then take 1 tablet PO daily until symptoms resolve    Diclofenac Sodium (VOLTAREN) 1 % gel gel diclofenac sodium 1 % topical gel apply 2 gram to the affected area(s) by topical route 4 times per day   Suspended    docusate  sodium 100 mg, Oral, 2 Times Daily PRN    Emollient (cetaphil) cream 1 Application, Topical, 2 Times Daily    Emollient (cetaphil) cream 1 Application, Topical, As Needed    erythromycin (ROMYCIN) 5 MG/GM ophthalmic ointment Instill 1 cm ribbon in each eye QID    ferrous sulfate 325 mg, Oral, Daily With Breakfast    fluticasone (FLONASE) 50 MCG/ACT nasal spray 2 sprays, Nasal, Daily    folic acid (FOLVITE) 1 mg, Oral, Daily    guaiFENesin-codeine (guaiFENesin AC) 100-10 MG/5ML solution/syrup 5 mL, Oral, 3 Times Daily PRN    ipratropium (ATROVENT) 0.03 % nasal spray 2 sprays, Nasal, 3 Times Daily PRN    ketoconazole (NIZORAL) 2 % cream Topical, Daily    levothyroxine (SYNTHROID, LEVOTHROID) 25 mcg, Oral, Daily    loratadine (CLARITIN) 10 mg, Oral, Daily    mineral oil-hydrophilic petrolatum (AQUAPHOR) ointment 1 Application, Topical, Daily    montelukast (SINGULAIR) 10 mg, Oral, Nightly    mupirocin (BACTROBAN) 2 % ointment Topical, 2 Times Daily    nystatin (MYCOSTATIN) 018265 UNIT/GM cream 1 Application, Topical, 2 Times Daily    nystatin (MYCOSTATIN) 414680 UNIT/GM powder Topical, 3 Times Daily    nystatin (MYCOSTATIN) 500,000 Units, Swish & Swallow, 4 Times Daily    olopatadine (PATANOL) 0.1 % ophthalmic solution No dose, route, or frequency recorded.    omeprazole (PRILOSEC) 20 mg, Oral, Daily    Petrolatum (Petroleum Jelly) ointment 1 application , Apply externally, As Needed    promethazine-dextromethorphan (PROMETHAZINE-DM) 6.25-15 MG/5ML syrup 5 mL, Oral, 4 Times Daily PRN    Soap & Cleansers (Gentle Skin Cleanser) liquid 1 application , Apply externally, 2 Times Daily    Sodium Fluoride 1.1 % cream     Tirzepatide-Weight Management (ZEPBOUND) 2.5 mg, Subcutaneous, Weekly    Urea 50 % suspension Apply topically to the affected areas TID    vitamin B-12 (CYANOCOBALAMIN) 1,000 mcg, Oral, Daily    vitamin B-6 (PYRIDOXINE) 25 mg, Oral, Every Other Day    Zinc Oxide 1 g, Topical, Daily PRN       The following  "portions of the patient's history were reviewed and updated as appropriate: allergies, current medications, past family history, past medical history, past social history, past surgical history, and problem list.    Objective   Vital Signs:   /78   Pulse 110   Temp 97.6 °F (36.4 °C) (Temporal)   Ht 165.1 cm (65\")   Wt (!) 157 kg (347 lb)   SpO2 94%   BMI 57.74 kg/m²     BP Readings from Last 3 Encounters:   03/20/25 120/78   03/07/25 123/84   02/26/25 126/82     Wt Readings from Last 3 Encounters:   03/20/25 (!) 157 kg (347 lb)   03/07/25 (!) 162 kg (357 lb 6.4 oz)   02/26/25 (!) 159 kg (350 lb)           Physical Exam  Vitals reviewed.   Constitutional:       Appearance: Normal appearance.   HENT:      Head: Normocephalic and atraumatic.      Right Ear: External ear normal.      Left Ear: External ear normal.   Eyes:      Conjunctiva/sclera: Conjunctivae normal.   Cardiovascular:      Rate and Rhythm: Normal rate and regular rhythm.      Heart sounds: No murmur heard.     No friction rub. No gallop.   Pulmonary:      Effort: Pulmonary effort is normal.      Breath sounds: Normal breath sounds. No wheezing or rhonchi.   Abdominal:      General: Bowel sounds are normal. There is no distension.      Palpations: Abdomen is soft.      Tenderness: There is no abdominal tenderness.   Skin:     General: Skin is warm and dry.   Neurological:      Mental Status: He is alert and oriented to person, place, and time.      Cranial Nerves: No cranial nerve deficit.   Psychiatric:         Mood and Affect: Mood and affect normal.         Behavior: Behavior normal.         Thought Content: Thought content normal.         Judgment: Judgment normal.            Result Review :   The following data was reviewed by: Jesus Manuel Sloan DO on 03/20/2025:  Common labs          12/4/2024    10:12 1/8/2025    10:46 3/7/2025    09:33   Common Labs   Glucose   98    BUN   14    Creatinine 1.60   1.57    Sodium   140    Potassium   " 4.6    Chloride   98    Calcium   9.1    Albumin   4.0    Total Bilirubin   0.7    Alkaline Phosphatase   71    AST (SGOT)   33    ALT (SGPT)   35    WBC  5.16  2.50    Hemoglobin  14.7  17.2    Hematocrit  51.2  56.3    Platelets  407  232             Lab Results   Component Value Date    SARSANTIGEN Not Detected 12/13/2023    FLUAAG Not Detected 12/13/2023    FLUBAG Not Detected 12/13/2023    BILIRUBINUR Negative 01/16/2025       Results  Laboratory Studies  A1c was 5.2% in July 2024.    Procedures        Assessment and Plan    Diagnoses and all orders for this visit:    1. Class 3 severe obesity with body mass index (BMI) of 50.0 to 59.9 in adult, unspecified obesity type, unspecified whether serious comorbidity present (Primary)    2. Medication monitoring encounter    Other orders  -     albuterol sulfate  (90 Base) MCG/ACT inhaler; Inhale 2 puffs Every 4 (Four) Hours As Needed for Wheezing or Shortness of Air.  Dispense: 8.5 g; Refill: 3  -     colchicine 0.6 MG tablet; Take 2 PO at onset of gout flare, then take 1 tablet PO daily until symptoms resolve  Dispense: 30 tablet; Refill: 3  -     clindamycin (Cleocin-T) 1 % external solution; Apply  topically to the appropriate area as directed 2 (Two) Times a Day.  Dispense: 60 mL; Refill: 3  -     nystatin (MYCOSTATIN) 169953 UNIT/GM cream; Apply 1 Application topically to the appropriate area as directed 2 (Two) Times a Day.  Dispense: 60 g; Refill: 2  -     Tirzepatide-Weight Management (ZEPBOUND) 2.5 MG/0.5ML solution auto-injector; Inject 0.5 mL under the skin into the appropriate area as directed 1 (One) Time Per Week.  Dispense: 2 mL; Refill: 0        Assessment & Plan  1. Weight management.  His weight has shown a slight decrease, currently at 347 pounds, down from 357 pounds on 03/07/2025. A prescription for Zepbound, a once-weekly injection, will be initiated at a dosage of 2.5 mg. After a month of weekly injections, the dosage will be increased  to 5 mg. The medication will be sent to [place]. If there are any issues with obtaining the medication, he should inform the office.    2. Prediabetes.  His A1C levels have consistently remained within the normal range, including a reading of 5.2% in July 2024. He is currently on insulin therapy.    Follow-up  The patient is scheduled for a follow-up visit in May 2025.       Medications Discontinued During This Encounter   Medication Reason    nystatin (MYCOSTATIN) 963615 UNIT/GM cream Reorder    clindamycin (Cleocin-T) 1 % external solution Reorder    colchicine 0.6 MG tablet Reorder    albuterol sulfate  (90 Base) MCG/ACT inhaler Reorder          Follow Up   Return if symptoms worsen or fail to improve.  Patient was given instructions and counseling regarding his condition or for health maintenance advice. Please see specific information pulled into the AVS if appropriate.     Patient or patient representative verbalized consent for the use of Ambient Listening during the visit with  Jesus Manuel Sloan DO for chart documentation. 3/20/2025  13:11 EDT    Jesus Manuel Sloan DO  03/20/25  13:20 EDT

## 2025-03-26 ENCOUNTER — TELEPHONE (OUTPATIENT)
Dept: FAMILY MEDICINE CLINIC | Facility: CLINIC | Age: 30
End: 2025-03-26
Payer: MEDICARE

## 2025-03-26 NOTE — TELEPHONE ENCOUNTER
Pts mother came in stating that this medications needs a PA, please advise thank you. Medication is     Tirzepatide-Weight Management (ZEPBOUND) 2.5 MG/0.5ML solution auto-injector

## 2025-04-02 ENCOUNTER — PRIOR AUTHORIZATION (OUTPATIENT)
Dept: FAMILY MEDICINE CLINIC | Facility: CLINIC | Age: 30
End: 2025-04-02
Payer: MEDICARE

## 2025-04-02 NOTE — TELEPHONE ENCOUNTER
Informed patient's mother that PA has been approved and medication was sent to [facility].

## 2025-04-02 NOTE — TELEPHONE ENCOUNTER
Prior Authorization for [facility] PA APPROVED     PA Case: [id number], Status: Approved, Coverage Starts on: 1/1/2025 12:00:00 AM, Coverage Ends on: 12/31/2025 12:00:00 AM.

## 2025-04-14 ENCOUNTER — OFFICE VISIT (OUTPATIENT)
Dept: PULMONOLOGY | Facility: CLINIC | Age: 30
End: 2025-04-14
Payer: OTHER GOVERNMENT

## 2025-04-14 VITALS
RESPIRATION RATE: 20 BRPM | HEIGHT: 65 IN | OXYGEN SATURATION: 89 % | BODY MASS INDEX: 52.48 KG/M2 | DIASTOLIC BLOOD PRESSURE: 76 MMHG | WEIGHT: 315 LBS | SYSTOLIC BLOOD PRESSURE: 122 MMHG | HEART RATE: 93 BPM | TEMPERATURE: 98.3 F

## 2025-04-14 DIAGNOSIS — J96.11 CHRONIC HYPOXIC RESPIRATORY FAILURE: Primary | ICD-10-CM

## 2025-04-14 DIAGNOSIS — Z86.711 HISTORY OF PULMONARY EMBOLISM: ICD-10-CM

## 2025-04-14 DIAGNOSIS — G47.33 OSA TREATED WITH BIPAP: ICD-10-CM

## 2025-04-14 PROCEDURE — 99214 OFFICE O/P EST MOD 30 MIN: CPT | Performed by: STUDENT IN AN ORGANIZED HEALTH CARE EDUCATION/TRAINING PROGRAM

## 2025-04-14 NOTE — PROGRESS NOTES
Primary Care Provider  Jesus Manuel Sloan DO   Referring Provider  No ref. provider found      Patient Complaint  Follow-up and Cough (Productive cough, sometimes yellow sometimes food comes back up)      Subjective          Tremayne Bernard presents to [facility]      History of Presenting Illness  Tremayne Bernard is a 29 y.o. male with history of DVT/PE, chronic hypoxic respiratory failure, asthma, Down syndrome, TAMIKO here for follow-up.    Doing well today  With his parents today  Coughing better on PPI  Uses his inhaler or nebulizer as needed  Remains a never smoker  Wears his CPAP at night  Up-to-date with his vaccinations    Review of Systems  Constitutional:  No fever. No chills. No weakness.  ENT:  No sore throat, URI symptoms.   Cardiovascular:  No chest pain. No palpitations. No lower extremity edema.  Respiratory:  No shortness of breath, +chronic cough, pleuritic chest pain. No hemoptysis. No dyspnea.   GI:  Normal appetite. No nausea, vomiting, diarrhea. No melena.  Musculoskeletal:  No arthralgias or myalgias.  Neurologic:  No weakness    History reviewed. No pertinent family history.     Social History     Socioeconomic History    Marital status: Single   Tobacco Use    Smoking status: Never     Passive exposure: Never    Smokeless tobacco: Never   Vaping Use    Vaping status: Never Used   Substance and Sexual Activity    Alcohol use: Never    Drug use: Defer    Sexual activity: Defer        Past Medical History:   Diagnosis Date    Anemia     Asthma     Diabetes mellitus     Disease of thyroid gland     Down's syndrome     Gout     Oxygen dependent     Nighttime Only        Immunization History   Administered Date(s) Administered    31-influenza Vac Quardvalent Preservativ 09/23/2016    COVID-19 (PFIZER) Purple Cap Monovalent 03/02/2021, 03/23/2021, 11/18/2021    Fluzone  >6mos 09/12/2024    Fluzone (or Fluarix & Flulaval for VFC) >6mos 10/04/2021,  09/21/2022, 09/14/2023    Fluzone Quad >6mos (Multi-dose) 11/06/2019    Influenza Injectable Mdck Pf Quad 09/21/2022    Influenza, Unspecified 09/23/2016, 09/21/2020    Tdap 03/28/2019       No Known Allergies       Current Outpatient Medications:     albuterol (ACCUNEB) 1.25 MG/3ML nebulizer solution, Take 3 mL by nebulization Every 6 (Six) Hours As Needed for Wheezing., Disp: 360 mL, Rfl: 3    albuterol sulfate  (90 Base) MCG/ACT inhaler, Inhale 2 puffs Every 4 (Four) Hours As Needed for Wheezing or Shortness of Air., Disp: 8.5 g, Rfl: 3    allopurinol (Zyloprim) 300 MG tablet, Take 2 tablets by mouth Daily., Disp: 180 tablet, Rfl: 1    aluminum sulfate-calcium acetate (DOMEBORO) topical packet, Apply 1 packet topically to the appropriate area as directed 3 (Three) Times a Day., Disp: 100 each, Rfl: 3    ammonium lactate (AmLactin) 12 % lotion, Apply  topically to the appropriate area as directed 2 (Two) Times a Day., Disp: 225 g, Rfl: 11    apixaban (ELIQUIS) 5 MG tablet tablet, Take 1 tablet by mouth 2 (Two) Times a Day., Disp: 180 tablet, Rfl: 3    arformoterol (Brovana) 15 MCG/2ML nebulizer solution, Take 2 mL by nebulization 2 (Two) Times a Day., Disp: 120 mL, Rfl: 11    benzonatate (TESSALON) 200 MG capsule, Take 1 capsule by mouth 3 (Three) Times a Day As Needed for Cough., Disp: 90 capsule, Rfl: 3    brompheniramine-pseudoephedrine-DM 30-2-10 MG/5ML syrup, TAKE 5 ML BY MOUTH EVERY 6 HOURS AS NEEDED, Disp: , Rfl:     budesonide (PULMICORT) 0.5 MG/2ML nebulizer solution, Take 2 mL by nebulization 2 (Two) Times a Day., Disp: 180 mL, Rfl: 3    clindamycin (Cleocin-T) 1 % external solution, Apply  topically to the appropriate area as directed 2 (Two) Times a Day., Disp: 60 mL, Rfl: 3    colchicine 0.6 MG tablet, Take 2 PO at onset of gout flare, then take 1 tablet PO daily until symptoms resolve, Disp: 30 tablet, Rfl: 3    Diclofenac Sodium (VOLTAREN) 1 % gel gel, diclofenac sodium 1 % topical gel apply  2 gram to the affected area(s) by topical route 4 times per day   Suspended, Disp: , Rfl:     docusate sodium 100 MG capsule, Take 1 capsule by mouth 2 (Two) Times a Day As Needed (constipation)., Disp: 90 each, Rfl: 3    Emollient (cetaphil) cream, Apply 1 Application topically to the appropriate area as directed 2 (Two) Times a Day., Disp: 453 g, Rfl: 3    Emollient (cetaphil) cream, Apply 1 Application topically to the appropriate area as directed As Needed for Dry Skin., Disp: 453 g, Rfl: 3    erythromycin (ROMYCIN) 5 MG/GM ophthalmic ointment, Instill 1 cm ribbon in each eye QID, Disp: 3.5 g, Rfl: 0    ferrous sulfate 325 (65 FE) MG tablet, Take 1 tablet by mouth Daily With Breakfast., Disp: 30 tablet, Rfl: 2    fluticasone (FLONASE) 50 MCG/ACT nasal spray, Administer 2 sprays into the nostril(s) as directed by provider Daily., Disp: 16 g, Rfl: 3    folic acid (FOLVITE) 1 MG tablet, Take 1 tablet by mouth Daily., Disp: 90 tablet, Rfl: 3    guaiFENesin-codeine (guaiFENesin AC) 100-10 MG/5ML solution/syrup, Take 5 mL by mouth 3 (Three) Times a Day As Needed for Cough., Disp: 140 mL, Rfl: 0    ipratropium (ATROVENT) 0.03 % nasal spray, Administer 2 sprays into the nostril(s) as directed by provider 3 (Three) Times a Day As Needed (for running)., Disp: 90 mL, Rfl: 3    ketoconazole (NIZORAL) 2 % cream, Apply  topically to the appropriate area as directed Daily., Disp: 60 g, Rfl: 2    levothyroxine (SYNTHROID, LEVOTHROID) 25 MCG tablet, Take 1 tablet by mouth Daily., Disp: 90 tablet, Rfl: 3    loratadine (CLARITIN) 10 MG tablet, Take 1 tablet by mouth Daily., Disp: 90 tablet, Rfl: 3    mineral oil-hydrophilic petrolatum (AQUAPHOR) ointment, Apply 1 Application topically to the appropriate area as directed Daily., Disp: 396 g, Rfl: 3    montelukast (Singulair) 10 MG tablet, Take 1 tablet by mouth Every Night., Disp: 90 tablet, Rfl: 3    mupirocin (BACTROBAN) 2 % ointment, Apply  topically to the appropriate area as  "directed 2 (Two) Times a Day., Disp: 30 g, Rfl: 3    olopatadine (PATANOL) 0.1 % ophthalmic solution, , Disp: , Rfl:     omeprazole (priLOSEC) 20 MG capsule, Take 1 capsule by mouth Daily., Disp: 30 capsule, Rfl: 5    Petrolatum (Petroleum Jelly) ointment, Apply 1 application  topically As Needed (prn as needed)., Disp: 368 g, Rfl: 3    promethazine-dextromethorphan (PROMETHAZINE-DM) 6.25-15 MG/5ML syrup, Take 5 mL by mouth 4 (Four) Times a Day As Needed for Cough., Disp: 473 mL, Rfl: 0    Soap & Cleansers (Gentle Skin Cleanser) liquid, Apply 1 application  topically 2 (Two) Times a Day., Disp: 473 mL, Rfl: 3    Sodium Fluoride 1.1 % cream, , Disp: , Rfl:     Tirzepatide-Weight Management (ZEPBOUND) 2.5 MG/0.5ML solution auto-injector, Inject 0.5 mL under the skin into the appropriate area as directed 1 (One) Time Per Week., Disp: 2 mL, Rfl: 0    Urea 50 % suspension, Apply topically to the affected areas TID, Disp: 284 g, Rfl: 3    vitamin B-12 (CYANOCOBALAMIN) 1000 MCG tablet, Take 1 tablet by mouth Daily., Disp: 90 tablet, Rfl: 3    vitamin B-6 (PYRIDOXINE) 50 MG tablet, Take 0.5 tablets by mouth Every Other Day., Disp: 23 tablet, Rfl: 3    Zinc Oxide 16 % ointment, Apply 1 Application topically to the appropriate area as directed Daily As Needed (rash)., Disp: 113 g, Rfl: 3    nystatin (MYCOSTATIN) 100,000 unit/mL suspension, Swish and swallow 5 mL 4 (Four) Times a Day., Disp: 120 mL, Rfl: 0    nystatin (MYCOSTATIN) 638157 UNIT/GM cream, Apply 1 Application topically to the appropriate area as directed 2 (Two) Times a Day., Disp: 60 g, Rfl: 2    nystatin (MYCOSTATIN) 011858 UNIT/GM powder, Apply  topically to the appropriate area as directed 3 (Three) Times a Day., Disp: 60 g, Rfl: 2     Objective     Vital Signs:   /76 (BP Location: Right arm, Patient Position: Sitting, Cuff Size: Adult)   Pulse 93   Temp 98.3 °F (36.8 °C)   Resp 20   Ht 165.1 cm (65\")   Wt (!) 163 kg (359 lb)   SpO2 (!) 89%   BMI " 59.74 kg/m²     Physical Exam  Vital Signs Reviewed   WDWN, Alert, NAD.    HEENT:  EOMI.  nares clear  Neck:  Supple, no JVD, no thyromegaly  Chest:  clear to auscultation bilaterally, no wheezes, crackles; no work of breathing noted  CV: RRR, no M/G/R, pulses 2+, equal.  Abd:  Soft, NT, ND, +BS  EXT:  no clubbing, no cyanosis, no edema  Neuro:  A&Ox3, CN grossly intact, no focal deficits.  Skin: No rashes or lesions noted       Result Review :   I have personally reviewed patient's labs and images.              Patient Active Problem List   Diagnosis    TAMIKO treated with BiPAP    Hypersomnia    Class 3 severe obesity due to excess calories without serious comorbidity with body mass index (BMI) of 60.0 to 69.9 in adult    Down's syndrome    Syncope    Sinus trouble    Pes planus    Homocysteinemia    Hidradenitis suppurativa    Foot pain, left    Fatigue    Elevated serum creatinine    DVT (deep venous thrombosis)    Disability of walking    Diabetes mellitus    Bronchitis    Athletes foot    Asthma    Arthropathy    Arthritis of right knee    Knee pain    Ankle pain    Anemia    Secondary polycythemia    Anhidrosis    Multiple pulmonary nodules    Chronic hypoxic respiratory failure    Polycythemia, secondary       Impression and Plan    DVT/PE, resolved  Chronic cough, stable  Chronic hypoxic respiratory failure on supplemental oxygen prn  TAMIKO on CPAP  Down syndrome  GERD on PPI  Never smoker    -CT chest with contrast 12/4/2024.  Ordered with contrast to evaluate for any chronic thrombi.  Negative for any visible clots, though views were suboptimal.  Previously seen pulmonary nodules had resolved.  Large fat-containing right Morgagni hernia stable, mass effect on right atrium.  -Continue wearing 2 L supplemental oxygen at bedtime prn  -Continue wearing CPAP nightly, managed by sleep medicine  -Continue using Pulmicort nebulizer treatments twice daily, rinse mouth after each use.   -Continue using albuterol inhaler  or albuterol nebulizer treatments as needed.  Nebulizer machine provided April 2024.  -Continue taking Eliquis daily for history of DVT/PE  -Has PFTs scheduled this Wednesday.   -Patient's chronic cough likely secondary to acid reflux.  Continue omeprazole 20 mg daily. This seems to help with his cough.    Smoking status: Reviewed  Vaccination status: Patient is up-to-date with his vaccination  Medications personally reviewed    Follow Up   No follow-ups on file.  Patient was given instructions and counseling regarding his condition or for health maintenance advice. Please see specific information pulled into the AVS if appropriate.

## 2025-04-15 ENCOUNTER — HOSPITAL ENCOUNTER (OUTPATIENT)
Dept: INFUSION THERAPY | Facility: HOSPITAL | Age: 30
Discharge: HOME | End: 2025-04-15
Admitting: INTERNAL MEDICINE
Payer: OTHER GOVERNMENT

## 2025-04-15 VITALS
HEIGHT: 65 IN | DIASTOLIC BLOOD PRESSURE: 73 MMHG | HEART RATE: 94 BPM | SYSTOLIC BLOOD PRESSURE: 134 MMHG | WEIGHT: 315 LBS | TEMPERATURE: 98 F | OXYGEN SATURATION: 97 % | BODY MASS INDEX: 52.48 KG/M2 | RESPIRATION RATE: 20 BRPM

## 2025-04-15 DIAGNOSIS — D75.1 POLYCYTHEMIA, SECONDARY: Primary | ICD-10-CM

## 2025-04-15 LAB
HCT VFR BLD AUTO: 55 % (ref 37.5–51)
HGB BLD-MCNC: 17.6 G/DL (ref 13–17.7)

## 2025-04-15 PROCEDURE — 99195 PHLEBOTOMY: CPT

## 2025-04-15 PROCEDURE — 85014 HEMATOCRIT: CPT | Performed by: INTERNAL MEDICINE

## 2025-04-15 PROCEDURE — 85018 HEMOGLOBIN: CPT | Performed by: INTERNAL MEDICINE

## 2025-04-15 RX ORDER — SODIUM CHLORIDE 9 MG/ML
250 INJECTION, SOLUTION INTRAVENOUS ONCE
OUTPATIENT
Start: 2025-05-11

## 2025-04-15 RX ORDER — SODIUM CHLORIDE 9 MG/ML
250 INJECTION, SOLUTION INTRAVENOUS ONCE
Status: DISCONTINUED | OUTPATIENT
Start: 2025-04-15 | End: 2025-04-17 | Stop reason: HOSPADM

## 2025-04-15 NOTE — PATIENT INSTRUCTIONS
Therapeutic Phlebotomy Education   Drink something before you leave; rehydrate well for the next 24 hours  No alcoholic beverages before you have eaten  No smoking for 20 minutes  If bleeding from site: apply pressure and raise arm  If dizziness: lie down or sit down with head between knees  If symptoms persist: contact healthcare provider immediately  Resume normal activities after one hour if you feel well enough  Avoid heavy lifting and vigorous exertion for 24 hours

## 2025-04-16 ENCOUNTER — TELEPHONE (OUTPATIENT)
Dept: FAMILY MEDICINE CLINIC | Facility: CLINIC | Age: 30
End: 2025-04-16
Payer: OTHER GOVERNMENT

## 2025-04-16 NOTE — TELEPHONE ENCOUNTER
Caller: RAFIA NEWELL    Relationship: Mother    Best call back number: [phone]     What is the best time to reach you: ANYTIME     Who are you requesting to speak with (clinical staff, provider,  specific staff member): CLINICAL       What was the call regarding: PATIENT MOTHER IS CALLING TO CHECK THE STATUS OF Tirzepatide-Weight Management (ZEPBOUND) 2.5 MG/0.5ML solution auto-injector , STATING THAT [facility] HAS APPROVED IT, PATIENT STILL HAS NOT RECEIVED MEDICATION, RECEIPT WAS CONFIRMED THROUGH [facility] ON 04/03/2025

## 2025-04-16 NOTE — TELEPHONE ENCOUNTER
Phone call placed to mother with case number and dates of coverage for Zepbound with voiced understanding.

## 2025-04-16 NOTE — TELEPHONE ENCOUNTER
Mother of pt came in requesting his orders for medical supplies be sent to [facility] instead, office number is [phone] and fax number is [phone]

## 2025-04-17 ENCOUNTER — HOSPITAL ENCOUNTER (OUTPATIENT)
Dept: RESPIRATORY THERAPY | Facility: HOSPITAL | Age: 30
Discharge: HOME | End: 2025-04-17
Payer: MEDICARE

## 2025-04-17 DIAGNOSIS — J45.909 ASTHMA, UNSPECIFIED ASTHMA SEVERITY, UNSPECIFIED WHETHER COMPLICATED, UNSPECIFIED WHETHER PERSISTENT: ICD-10-CM

## 2025-04-17 DIAGNOSIS — J96.11 CHRONIC HYPOXIC RESPIRATORY FAILURE: ICD-10-CM

## 2025-04-17 RX ORDER — ALBUTEROL SULFATE 0.83 MG/ML
2.5 SOLUTION RESPIRATORY (INHALATION) ONCE
Status: DISCONTINUED | OUTPATIENT
Start: 2025-04-17 | End: 2025-04-18 | Stop reason: HOSPADM

## 2025-04-22 NOTE — TELEPHONE ENCOUNTER
The Zepbound has been approved since 4/2/2025 and reported to mother with all information given that day. Print out of approval information given to mother today with dates and case number. Mother stated that she is taking it over to the supervisor to show them that the information she had previously was correct. Instructed mother to have them run the script through the system and it will show approved since 4/2/2025. Mother voiced appreciation.

## 2025-04-22 NOTE — TELEPHONE ENCOUNTER
Can we work on the status of the this zepbound script? Patient has not received the medication. Apparently, the faxed approval needs to be sent to carolyne Beltran.

## 2025-04-24 ENCOUNTER — PRIOR AUTHORIZATION (OUTPATIENT)
Dept: FAMILY MEDICINE CLINIC | Facility: CLINIC | Age: 30
End: 2025-04-24
Payer: OTHER GOVERNMENT

## 2025-04-24 NOTE — TELEPHONE ENCOUNTER
Prior Authorizaiton for Zepbound PA APPROVED     Message from Plan  CaseId:[id number];Status:Approved;Review Type:Prior Auth;Coverage Start Date:03/25/2025;Coverage End Date:04/24/2026;. Authorization Expiration Date: April 24, 2026.

## 2025-05-06 ENCOUNTER — OFFICE VISIT (OUTPATIENT)
Dept: FAMILY MEDICINE CLINIC | Facility: CLINIC | Age: 30
End: 2025-05-06
Payer: MEDICARE

## 2025-05-06 VITALS
BODY MASS INDEX: 52.48 KG/M2 | HEART RATE: 89 BPM | OXYGEN SATURATION: 95 % | WEIGHT: 315 LBS | HEIGHT: 65 IN | TEMPERATURE: 98.8 F | DIASTOLIC BLOOD PRESSURE: 86 MMHG | SYSTOLIC BLOOD PRESSURE: 126 MMHG

## 2025-05-06 DIAGNOSIS — I82.409 RECURRENT DEEP VEIN THROMBOSIS (DVT): ICD-10-CM

## 2025-05-06 DIAGNOSIS — M10.9 GOUT, UNSPECIFIED CAUSE, UNSPECIFIED CHRONICITY, UNSPECIFIED SITE: ICD-10-CM

## 2025-05-06 DIAGNOSIS — E66.01 CLASS 3 SEVERE OBESITY DUE TO EXCESS CALORIES WITHOUT SERIOUS COMORBIDITY WITH BODY MASS INDEX (BMI) OF 60.0 TO 69.9 IN ADULT: Primary | ICD-10-CM

## 2025-05-06 DIAGNOSIS — I26.99 OTHER PULMONARY EMBOLISM WITHOUT ACUTE COR PULMONALE, UNSPECIFIED CHRONICITY: ICD-10-CM

## 2025-05-06 DIAGNOSIS — J45.40 MODERATE PERSISTENT ASTHMA WITHOUT COMPLICATION: ICD-10-CM

## 2025-05-06 DIAGNOSIS — L73.2 HIDRADENITIS SUPPURATIVA: ICD-10-CM

## 2025-05-06 DIAGNOSIS — E66.813 CLASS 3 SEVERE OBESITY DUE TO EXCESS CALORIES WITHOUT SERIOUS COMORBIDITY WITH BODY MASS INDEX (BMI) OF 60.0 TO 69.9 IN ADULT: Primary | ICD-10-CM

## 2025-05-06 DIAGNOSIS — E03.9 HYPOTHYROIDISM, UNSPECIFIED TYPE: ICD-10-CM

## 2025-05-06 DIAGNOSIS — Z51.81 MEDICATION MONITORING ENCOUNTER: ICD-10-CM

## 2025-05-06 DIAGNOSIS — J96.11 CHRONIC HYPOXIC RESPIRATORY FAILURE: ICD-10-CM

## 2025-05-06 DIAGNOSIS — G47.33 OSA (OBSTRUCTIVE SLEEP APNEA): ICD-10-CM

## 2025-05-06 RX ORDER — COLCHICINE 0.6 MG/1
TABLET ORAL
Qty: 30 TABLET | Refills: 3 | Status: SHIPPED | OUTPATIENT
Start: 2025-05-06

## 2025-05-06 RX ORDER — MONTELUKAST SODIUM 10 MG/1
10 TABLET ORAL NIGHTLY
Qty: 90 TABLET | Refills: 3 | Status: SHIPPED | OUTPATIENT
Start: 2025-05-06

## 2025-05-06 RX ORDER — NYSTATIN 100000 U/G
1 CREAM TOPICAL 2 TIMES DAILY
Qty: 60 G | Refills: 2 | Status: SHIPPED | OUTPATIENT
Start: 2025-05-06

## 2025-05-06 RX ORDER — ALBUTEROL SULFATE 90 UG/1
2 INHALANT RESPIRATORY (INHALATION) EVERY 4 HOURS PRN
Qty: 8.5 G | Refills: 3 | Status: SHIPPED | OUTPATIENT
Start: 2025-05-06

## 2025-05-06 RX ORDER — ALLOPURINOL 300 MG/1
600 TABLET ORAL DAILY
Qty: 180 TABLET | Refills: 3 | Status: SHIPPED | OUTPATIENT
Start: 2025-05-06

## 2025-05-06 RX ORDER — CLINDAMYCIN PHOSPHATE 11.9 MG/ML
SOLUTION TOPICAL 2 TIMES DAILY
Qty: 60 ML | Refills: 3 | Status: SHIPPED | OUTPATIENT
Start: 2025-05-06

## 2025-05-06 NOTE — PROGRESS NOTES
Chief Complaint  Weight management    Subjective          Tremayne Bernard presents to [facility]    History of Present Illness     History of Present Illness  The patient is a 29-year-old male who presents today for follow-up. He is accompanied by his mother and father.    He has been experiencing weight issues since high school, with a significant increase during the COVID-19 pandemic due to limited outdoor activities. He has been adhering to a regular exercise regimen, attending the gym three times a week. His workout routine includes at least 30 minutes of aerobic exercise, followed by weight training and step exercises. He is currently on Zepbound 2.5 mg, with the second dose administered this morning. He has not yet completed a full month of this medication.    He has a history of pulmonary embolism and is under the care of a hematologist. He has an upcoming appointment with Dr. Sullivan tomorrow. He is on Eliquis.    He has asthma and uses an albuterol inhaler and Singulair.    He has chronic hypoxic respiratory failure and saw Dr. Eugene on 04/14/2025. He uses oxygen as needed and wears his CPAP regularly.    He has hidradenitis suppurativa and is on clindamycin and nystatin cream.    He has gout and has not experienced any recent gout flare-ups. He is on colchicine and allopurinol.    He is on levothyroxine.         Current Outpatient Medications   Medication Instructions    albuterol (ACCUNEB) 1.25 mg, Nebulization, Every 6 Hours PRN    albuterol sulfate  (90 Base) MCG/ACT inhaler 2 puffs, Inhalation, Every 4 Hours PRN    allopurinol (ZYLOPRIM) 600 mg, Oral, Daily    aluminum sulfate-calcium acetate (DOMEBORO) topical packet 1 packet, Topical, 3 Times Daily    ammonium lactate (AmLactin) 12 % lotion Topical, 2 Times Daily    apixaban (ELIQUIS) 5 mg, Oral, 2 Times Daily    arformoterol (BROVANA) 15 mcg, Nebulization, 2 Times Daily - RT    benzonatate (TESSALON) 200 mg,  Oral, 3 Times Daily PRN    brompheniramine-pseudoephedrine-DM 30-2-10 MG/5ML syrup TAKE 5 ML BY MOUTH EVERY 6 HOURS AS NEEDED    budesonide (PULMICORT) 0.5 mg, Nebulization, 2 Times Daily    clindamycin (Cleocin-T) 1 % external solution Topical, 2 Times Daily    colchicine 0.6 MG tablet Take 2 PO at onset of gout flare, then take 1 tablet PO daily until symptoms resolve    Diclofenac Sodium (VOLTAREN) 1 % gel gel diclofenac sodium 1 % topical gel apply 2 gram to the affected area(s) by topical route 4 times per day   Suspended    docusate sodium 100 mg, Oral, 2 Times Daily PRN    Emollient (cetaphil) cream 1 Application, Topical, 2 Times Daily    Emollient (cetaphil) cream 1 Application, Topical, As Needed    erythromycin (ROMYCIN) 5 MG/GM ophthalmic ointment Instill 1 cm ribbon in each eye QID    ferrous sulfate 325 mg, Oral, Daily With Breakfast    fluticasone (FLONASE) 50 MCG/ACT nasal spray 2 sprays, Nasal, Daily    folic acid (FOLVITE) 1 mg, Oral, Daily    guaiFENesin-codeine (guaiFENesin AC) 100-10 MG/5ML solution/syrup 5 mL, Oral, 3 Times Daily PRN    ipratropium (ATROVENT) 0.03 % nasal spray 2 sprays, Nasal, 3 Times Daily PRN    ketoconazole (NIZORAL) 2 % cream Topical, Daily    levothyroxine (SYNTHROID, LEVOTHROID) 25 mcg, Oral, Daily    loratadine (CLARITIN) 10 mg, Oral, Daily    mineral oil-hydrophilic petrolatum (AQUAPHOR) ointment 1 Application, Topical, Daily    montelukast (SINGULAIR) 10 mg, Oral, Nightly    mupirocin (BACTROBAN) 2 % ointment Topical, 2 Times Daily    nystatin (MYCOSTATIN) 938327 UNIT/GM cream 1 Application, Topical, 2 Times Daily    nystatin (MYCOSTATIN) 860500 UNIT/GM powder Topical, 3 Times Daily    nystatin (MYCOSTATIN) 500,000 Units, Swish & Swallow, 4 Times Daily    olopatadine (PATANOL) 0.1 % ophthalmic solution No dose, route, or frequency recorded.    omeprazole (PRILOSEC) 20 mg, Oral, Daily    Petrolatum (Petroleum Jelly) ointment 1 application , Apply externally, As Needed  "   promethazine-dextromethorphan (PROMETHAZINE-DM) 6.25-15 MG/5ML syrup 5 mL, Oral, 4 Times Daily PRN    Soap & Cleansers (Gentle Skin Cleanser) liquid 1 application , Apply externally, 2 Times Daily    Sodium Fluoride 1.1 % cream     Tirzepatide-Weight Management (ZEPBOUND) 5 mg, Subcutaneous, Weekly    Urea 50 % suspension Apply topically to the affected areas TID    vitamin B-12 (CYANOCOBALAMIN) 1,000 mcg, Oral, Daily    vitamin B-6 (PYRIDOXINE) 25 mg, Oral, Every Other Day    Zinc Oxide 1 g, Topical, Daily PRN       The following portions of the patient's history were reviewed and updated as appropriate: allergies, current medications, past family history, past medical history, past social history, past surgical history, and problem list.    Objective   Vital Signs:   /86   Pulse 89   Temp 98.8 °F (37.1 °C) (Oral)   Ht 165.1 cm (65\")   Wt (!) 167 kg (367 lb 4.8 oz)   SpO2 95%   BMI 61.12 kg/m²     BP Readings from Last 3 Encounters:   05/06/25 126/86   04/15/25 134/73   04/14/25 122/76     Wt Readings from Last 3 Encounters:   05/06/25 (!) 167 kg (367 lb 4.8 oz)   04/15/25 (!) 165 kg (364 lb 13.8 oz)   04/14/25 (!) 163 kg (359 lb)           Physical Exam  Vitals reviewed.   Constitutional:       Appearance: Normal appearance.   HENT:      Head: Normocephalic and atraumatic.      Right Ear: External ear normal.      Left Ear: External ear normal.   Eyes:      Conjunctiva/sclera: Conjunctivae normal.   Cardiovascular:      Rate and Rhythm: Normal rate and regular rhythm.      Heart sounds: No murmur heard.     No friction rub. No gallop.   Pulmonary:      Effort: Pulmonary effort is normal.      Breath sounds: Normal breath sounds. No wheezing or rhonchi.   Abdominal:      General: Bowel sounds are normal. There is no distension.      Palpations: Abdomen is soft.      Tenderness: There is no abdominal tenderness.   Skin:     General: Skin is warm and dry.   Neurological:      Mental Status: He is alert " and oriented to person, place, and time.      Cranial Nerves: No cranial nerve deficit.   Psychiatric:         Mood and Affect: Mood and affect normal.         Behavior: Behavior normal.         Thought Content: Thought content normal.         Judgment: Judgment normal.            Result Review :   The following data was reviewed by: Jesus Manuel Sloan DO on 05/06/2025:  Common labs          1/8/2025    10:46 3/7/2025    09:33 4/15/2025    08:49   Common Labs   Glucose  98     BUN  14     Creatinine  1.57     Sodium  140     Potassium  4.6     Chloride  98     Calcium  9.1     Albumin  4.0     Total Bilirubin  0.7     Alkaline Phosphatase  71     AST (SGOT)  33     ALT (SGPT)  35     WBC 5.16  2.50     Hemoglobin 14.7  17.2  17.6    Hematocrit 51.2  56.3  55.0    Platelets 407  232              Lab Results   Component Value Date    SARSANTIGEN Not Detected 12/13/2023    FLUAAG Not Detected 12/13/2023    FLUBAG Not Detected 12/13/2023    BILIRUBINUR Negative 01/16/2025       Results      Procedures        Assessment and Plan    Diagnoses and all orders for this visit:    1. Class 3 severe obesity due to excess calories without serious comorbidity with body mass index (BMI) of 60.0 to 69.9 in adult (Primary)    2. Moderate persistent asthma without complication    3. Other pulmonary embolism without acute cor pulmonale, unspecified chronicity    4. Recurrent deep vein thrombosis (DVT)    5. Hidradenitis suppurativa    6. Gout, unspecified cause, unspecified chronicity, unspecified site    7. Medication monitoring encounter    8. Chronic hypoxic respiratory failure    9. Hypothyroidism, unspecified type    10. TAMIKO (obstructive sleep apnea)    Other orders  -     Tirzepatide-Weight Management (ZEPBOUND) 5 MG/0.5ML solution auto-injector; Inject 0.5 mL under the skin into the appropriate area as directed 1 (One) Time Per Week.  Dispense: 2 mL; Refill: 0  -     albuterol sulfate  (90 Base) MCG/ACT inhaler; Inhale 2  puffs Every 4 (Four) Hours As Needed for Wheezing or Shortness of Air.  Dispense: 8.5 g; Refill: 3  -     apixaban (ELIQUIS) 5 MG tablet tablet; Take 1 tablet by mouth 2 (Two) Times a Day.  Dispense: 180 tablet; Refill: 3  -     clindamycin (Cleocin-T) 1 % external solution; Apply  topically to the appropriate area as directed 2 (Two) Times a Day.  Dispense: 60 mL; Refill: 3  -     montelukast (Singulair) 10 MG tablet; Take 1 tablet by mouth Every Night.  Dispense: 90 tablet; Refill: 3  -     nystatin (MYCOSTATIN) 438785 UNIT/GM cream; Apply 1 Application topically to the appropriate area as directed 2 (Two) Times a Day.  Dispense: 60 g; Refill: 2  -     colchicine 0.6 MG tablet; Take 2 PO at onset of gout flare, then take 1 tablet PO daily until symptoms resolve  Dispense: 30 tablet; Refill: 3  -     allopurinol (Zyloprim) 300 MG tablet; Take 2 tablets by mouth Daily.  Dispense: 180 tablet; Refill: 3        Assessment & Plan  1. Weight management.  - Weight remains unchanged at 367 pounds.  - Dosage of Zepbound will be increased to 5 mg after completing a month on the 2.5 mg dose.  - If weight loss is observed, the 5 mg dose will be maintained. If no weight loss is noted, he should inform the office after a month, and the dosage can be increased to 7.5 mg.  - Encouraged to continue gym workouts three times a week.    2. Asthma.  - Prescription for an albuterol inhaler will be sent to pharmacy.  - Continues to require the inhaler for asthma management.    3. Pulmonary embolism.  - Continues lifelong Eliquis therapy for recurrent blood clots and DVTs.  - Prescription for Eliquis will be sent to pharmacy.  - Follow-up appointment with hematologist scheduled for tomorrow.    4. Hidradenitis suppurativa.  - Prescription for clindamycin will be sent to pharmacy.  - Prescription for nystatin cream will be sent to pharmacy.    5. Gout.  - Prescription for colchicine will be sent to pharmacy.  - Prescription for allopurinol  will be sent to pharmacy.  - No recent gout flare-ups reported.    6. Chronic hypoxic respiratory failure.  - Uses oxygen as needed.  - Under the care of Dr. Eugene for ongoing management.    7. Thyroid management.  - Currently taking levothyroxine.  - Lab orders have been placed to monitor thyroid function.    Follow-up  - Follow up in 3 months.       Medications Discontinued During This Encounter   Medication Reason    Tirzepatide-Weight Management (ZEPBOUND) 2.5 MG/0.5ML solution auto-injector     apixaban (ELIQUIS) 5 MG tablet tablet Reorder    montelukast (Singulair) 10 MG tablet Reorder    allopurinol (Zyloprim) 300 MG tablet Reorder    albuterol sulfate  (90 Base) MCG/ACT inhaler Reorder    colchicine 0.6 MG tablet Reorder    clindamycin (Cleocin-T) 1 % external solution Reorder    nystatin (MYCOSTATIN) 906653 UNIT/GM cream Reorder          Follow Up   Return in about 3 months (around 8/6/2025) for weight management.  Patient was given instructions and counseling regarding his condition or for health maintenance advice. Please see specific information pulled into the AVS if appropriate.     Patient or patient representative verbalized consent for the use of Ambient Listening during the visit with  Jesus Maneul Sloan DO for chart documentation. 5/6/2025  11:11 EDT    Jesus Manuel Sloan DO  05/06/25  11:24 EDT

## 2025-05-07 ENCOUNTER — OFFICE VISIT (OUTPATIENT)
Dept: ONCOLOGY | Facility: HOSPITAL | Age: 30
End: 2025-05-07
Payer: OTHER GOVERNMENT

## 2025-05-07 ENCOUNTER — LAB (OUTPATIENT)
Dept: ONCOLOGY | Facility: HOSPITAL | Age: 30
End: 2025-05-07
Payer: OTHER GOVERNMENT

## 2025-05-07 VITALS
RESPIRATION RATE: 18 BRPM | DIASTOLIC BLOOD PRESSURE: 85 MMHG | WEIGHT: 315 LBS | BODY MASS INDEX: 52.48 KG/M2 | HEIGHT: 65 IN | OXYGEN SATURATION: 96 % | TEMPERATURE: 97.4 F | HEART RATE: 91 BPM | SYSTOLIC BLOOD PRESSURE: 124 MMHG

## 2025-05-07 DIAGNOSIS — D75.1 SECONDARY POLYCYTHEMIA: ICD-10-CM

## 2025-05-07 DIAGNOSIS — E61.1 IRON DEFICIENCY: ICD-10-CM

## 2025-05-07 DIAGNOSIS — D64.9 ANEMIA, UNSPECIFIED TYPE: Primary | ICD-10-CM

## 2025-05-07 LAB
ALBUMIN SERPL-MCNC: 3.9 G/DL (ref 3.5–5.2)
ALBUMIN/GLOB SERPL: 1.3 G/DL
ALP SERPL-CCNC: 75 U/L (ref 39–117)
ALT SERPL W P-5'-P-CCNC: 22 U/L (ref 1–41)
ANION GAP SERPL CALCULATED.3IONS-SCNC: 10.1 MMOL/L (ref 5–15)
AST SERPL-CCNC: 26 U/L (ref 1–40)
BASOPHILS # BLD AUTO: 0.07 10*3/MM3 (ref 0–0.2)
BASOPHILS NFR BLD AUTO: 2.1 % (ref 0–1.5)
BILIRUB SERPL-MCNC: 0.5 MG/DL (ref 0–1.2)
BUN SERPL-MCNC: 16 MG/DL (ref 6–20)
BUN/CREAT SERPL: 9.6 (ref 7–25)
CALCIUM SPEC-SCNC: 8.6 MG/DL (ref 8.6–10.5)
CHLORIDE SERPL-SCNC: 97 MMOL/L (ref 98–107)
CO2 SERPL-SCNC: 27.9 MMOL/L (ref 22–29)
CREAT SERPL-MCNC: 1.66 MG/DL (ref 0.76–1.27)
DEPRECATED RDW RBC AUTO: 55.4 FL (ref 37–54)
EGFRCR SERPLBLD CKD-EPI 2021: 57.6 ML/MIN/1.73
EOSINOPHIL # BLD AUTO: 0.03 10*3/MM3 (ref 0–0.4)
EOSINOPHIL NFR BLD AUTO: 0.9 % (ref 0.3–6.2)
ERYTHROCYTE [DISTWIDTH] IN BLOOD BY AUTOMATED COUNT: 19.7 % (ref 12.3–15.4)
FERRITIN SERPL-MCNC: 18.83 NG/ML (ref 30–400)
GLOBULIN UR ELPH-MCNC: 3 GM/DL
GLUCOSE SERPL-MCNC: 82 MG/DL (ref 65–99)
HCT VFR BLD AUTO: 51.2 % (ref 37.5–51)
HGB BLD-MCNC: 16.5 G/DL (ref 13–17.7)
IMM GRANULOCYTES # BLD AUTO: 0 10*3/MM3 (ref 0–0.05)
IMM GRANULOCYTES NFR BLD AUTO: 0 % (ref 0–0.5)
IRON 24H UR-MRATE: 38 MCG/DL (ref 59–158)
IRON SATN MFR SERPL: 8 % (ref 20–50)
LYMPHOCYTES # BLD AUTO: 1.27 10*3/MM3 (ref 0.7–3.1)
LYMPHOCYTES NFR BLD AUTO: 37.4 % (ref 19.6–45.3)
MCH RBC QN AUTO: 26.8 PG (ref 26.6–33)
MCHC RBC AUTO-ENTMCNC: 32.2 G/DL (ref 31.5–35.7)
MCV RBC AUTO: 83.1 FL (ref 79–97)
MONOCYTES # BLD AUTO: 0.58 10*3/MM3 (ref 0.1–0.9)
MONOCYTES NFR BLD AUTO: 17.1 % (ref 5–12)
NEUTROPHILS NFR BLD AUTO: 1.45 10*3/MM3 (ref 1.7–7)
NEUTROPHILS NFR BLD AUTO: 42.5 % (ref 42.7–76)
NRBC BLD AUTO-RTO: 0 /100 WBC (ref 0–0.2)
PLATELET # BLD AUTO: 269 10*3/MM3 (ref 140–450)
PMV BLD AUTO: 10.3 FL (ref 6–12)
POTASSIUM SERPL-SCNC: 4.7 MMOL/L (ref 3.5–5.2)
PROT SERPL-MCNC: 6.9 G/DL (ref 6–8.5)
RBC # BLD AUTO: 6.16 10*6/MM3 (ref 4.14–5.8)
SODIUM SERPL-SCNC: 135 MMOL/L (ref 136–145)
TIBC SERPL-MCNC: 468 MCG/DL (ref 298–536)
TRANSFERRIN SERPL-MCNC: 314 MG/DL (ref 200–360)
WBC NRBC COR # BLD AUTO: 3.4 10*3/MM3 (ref 3.4–10.8)

## 2025-05-07 PROCEDURE — 83540 ASSAY OF IRON: CPT

## 2025-05-07 PROCEDURE — 80053 COMPREHEN METABOLIC PANEL: CPT

## 2025-05-07 PROCEDURE — 36415 COLL VENOUS BLD VENIPUNCTURE: CPT

## 2025-05-07 PROCEDURE — G0463 HOSPITAL OUTPT CLINIC VISIT: HCPCS | Performed by: INTERNAL MEDICINE

## 2025-05-07 PROCEDURE — 84466 ASSAY OF TRANSFERRIN: CPT

## 2025-05-07 PROCEDURE — 85025 COMPLETE CBC W/AUTO DIFF WBC: CPT

## 2025-05-07 PROCEDURE — 82728 ASSAY OF FERRITIN: CPT

## 2025-05-07 RX ORDER — FERROUS SULFATE 325(65) MG
325 TABLET ORAL
Qty: 90 TABLET | Refills: 1 | Status: CANCELLED | OUTPATIENT
Start: 2025-05-07

## 2025-05-07 NOTE — PROGRESS NOTES
Chief Complaint/Care Team   secondary polycythemia, DVT    Federico Sullivan MD Loesevitz, Thomas, DO    History of Present Illness     Diagnosis: Recurrent DVT    Secondary Polycythemia (pt with TAMIKO, using machine every other night), JAK2 [id number] and exon 12 mutation testing negative    H/o Down Syndrome    Current Treatment: DVT- Eliquis 5 mg p.o. twice daily (lifelong); Phlebotomy prn for Secondary Polycythemia    Previous Treatment: None    Tremayne Bernard is a 29 y.o. male who presents to [facility]EMATOLOGY [facility] ONCOL[facility]Y for follow-up regarding history of recurrent DVT as well as secondary positive anemia from obstructive sleep apnea.  Patient also history of Down syndrome and is here with his mother who provides additional medical history.  Patient's mother reports he has been compliant with taking Eliquis.  No report of blood clots, lower extremity swelling, difficulty breathing or chest discomfort.  Patient reports overall patient is tolerating Eliquis medication well.    His mother is also attempting to have him wear his sleep apnea machine consistently.  She has not been able to follow-up with pulmonology to assess for other devices/machines that would increase his compliance with use of TAMIKO machine.  Of note, per patient's mother he states up late at night until the early morning, but her and his father has been trying to have him wear the TAMIKO mask more frequently.    Interval history: Patient here with his mother and father to follow-up regarding secondary polycythemia. According to his parents he has increased his use of CPAP machine and reports consistent use of supplemental oxygen at night. Patient's last phlebotomy was in 4/2025, which he tolerated well. Pt's stool test was negative for blood. Pt is compliant with taking oral iron.     Review of Systems   Constitutional:  Positive for fatigue (PT stays up all night so he feels fatigue throughout the day). Negative for appetite change,  "diaphoresis, fever, unexpected weight gain and unexpected weight loss.   HENT:  Positive for postnasal drip. Negative for hearing loss, mouth sores, sore throat, swollen glands, trouble swallowing and voice change.    Eyes:  Negative for blurred vision.   Respiratory:  Positive for cough and shortness of breath. Negative for wheezing.    Cardiovascular:  Negative for chest pain and palpitations.   Gastrointestinal:  Positive for vomiting. Negative for abdominal pain, blood in stool, constipation, diarrhea and nausea.   Endocrine: Negative for cold intolerance and heat intolerance.   Genitourinary:  Negative for difficulty urinating, dysuria, frequency, hematuria and urinary incontinence.   Musculoskeletal:  Positive for arthralgias. Negative for back pain and myalgias.        Right foot pain   Skin:  Negative for rash, skin lesions and wound.   Neurological:  Negative for dizziness, seizures, weakness, numbness and headache.   Hematological:  Does not bruise/bleed easily.   Psychiatric/Behavioral:  Negative for depressed mood. The patient is not nervous/anxious.    All other systems reviewed and are negative.       Oncology/Hematology History    No history exists.       Objective     Vitals:    05/07/25 0955 05/07/25 1056   BP: 102/54 124/85   Pulse: 91    Resp: 18    Temp: 97.4 °F (36.3 °C)    TempSrc: Temporal    SpO2: 96%    Weight: (!) 166 kg (366 lb 13.5 oz)    Height: 165.1 cm (65\")    PainSc: 10-Worst pain ever    PainLoc: Foot          ECOG score: 0       BMI 58.9    PHQ-9 Total Score:         Physical Exam  Vitals reviewed. Exam conducted with a chaperone present.   Constitutional:       General: He is not in acute distress.     Appearance: Normal appearance. He is obese.   HENT:      Head: Normocephalic and atraumatic.   Eyes:      Extraocular Movements: Extraocular movements intact.   Pulmonary:      Effort: Pulmonary effort is normal.      Breath sounds: No wheezing.   Musculoskeletal:      Cervical " back: Normal range of motion and neck supple.   Skin:     General: Skin is warm and dry.   Neurological:      Mental Status: He is oriented to person, place, and time.           Past Medical History     Past Medical History:   Diagnosis Date    Anemia     Asthma     Diabetes mellitus     Disease of thyroid gland     Down's syndrome     Gout     Oxygen dependent     Nighttime Only     Current Outpatient Medications on File Prior to Visit   Medication Sig Dispense Refill    albuterol (ACCUNEB) 1.25 MG/3ML nebulizer solution Take 3 mL by nebulization Every 6 (Six) Hours As Needed for Wheezing. 360 mL 3    albuterol sulfate  (90 Base) MCG/ACT inhaler Inhale 2 puffs Every 4 (Four) Hours As Needed for Wheezing or Shortness of Air. 8.5 g 3    allopurinol (Zyloprim) 300 MG tablet Take 2 tablets by mouth Daily. 180 tablet 3    aluminum sulfate-calcium acetate (DOMEBORO) topical packet Apply 1 packet topically to the appropriate area as directed 3 (Three) Times a Day. 100 each 3    ammonium lactate (AmLactin) 12 % lotion Apply  topically to the appropriate area as directed 2 (Two) Times a Day. 225 g 11    apixaban (ELIQUIS) 5 MG tablet tablet Take 1 tablet by mouth 2 (Two) Times a Day. 180 tablet 3    arformoterol (Brovana) 15 MCG/2ML nebulizer solution Take 2 mL by nebulization 2 (Two) Times a Day. 120 mL 11    benzonatate (TESSALON) 200 MG capsule Take 1 capsule by mouth 3 (Three) Times a Day As Needed for Cough. 90 capsule 3    brompheniramine-pseudoephedrine-DM 30-2-10 MG/5ML syrup TAKE 5 ML BY MOUTH EVERY 6 HOURS AS NEEDED      budesonide (PULMICORT) 0.5 MG/2ML nebulizer solution Take 2 mL by nebulization 2 (Two) Times a Day. 180 mL 3    clindamycin (Cleocin-T) 1 % external solution Apply  topically to the appropriate area as directed 2 (Two) Times a Day. 60 mL 3    colchicine 0.6 MG tablet Take 2 PO at onset of gout flare, then take 1 tablet PO daily until symptoms resolve 30 tablet 3    Diclofenac Sodium  (VOLTAREN) 1 % gel gel diclofenac sodium 1 % topical gel apply 2 gram to the affected area(s) by topical route 4 times per day   Suspended      docusate sodium 100 MG capsule Take 1 capsule by mouth 2 (Two) Times a Day As Needed (constipation). 90 each 3    Emollient (cetaphil) cream Apply 1 Application topically to the appropriate area as directed 2 (Two) Times a Day. 453 g 3    Emollient (cetaphil) cream Apply 1 Application topically to the appropriate area as directed As Needed for Dry Skin. 453 g 3    erythromycin (ROMYCIN) 5 MG/GM ophthalmic ointment Instill 1 cm ribbon in each eye QID 3.5 g 0    ferrous sulfate 325 (65 FE) MG tablet Take 1 tablet by mouth Daily With Breakfast. 30 tablet 2    fluticasone (FLONASE) 50 MCG/ACT nasal spray Administer 2 sprays into the nostril(s) as directed by provider Daily. 16 g 3    folic acid (FOLVITE) 1 MG tablet Take 1 tablet by mouth Daily. 90 tablet 3    guaiFENesin-codeine (guaiFENesin AC) 100-10 MG/5ML solution/syrup Take 5 mL by mouth 3 (Three) Times a Day As Needed for Cough. 140 mL 0    ipratropium (ATROVENT) 0.03 % nasal spray Administer 2 sprays into the nostril(s) as directed by provider 3 (Three) Times a Day As Needed (for running). 90 mL 3    ketoconazole (NIZORAL) 2 % cream Apply  topically to the appropriate area as directed Daily. 60 g 2    levothyroxine (SYNTHROID, LEVOTHROID) 25 MCG tablet Take 1 tablet by mouth Daily. 90 tablet 3    loratadine (CLARITIN) 10 MG tablet Take 1 tablet by mouth Daily. 90 tablet 3    mineral oil-hydrophilic petrolatum (AQUAPHOR) ointment Apply 1 Application topically to the appropriate area as directed Daily. 396 g 3    montelukast (Singulair) 10 MG tablet Take 1 tablet by mouth Every Night. 90 tablet 3    mupirocin (BACTROBAN) 2 % ointment Apply  topically to the appropriate area as directed 2 (Two) Times a Day. 30 g 3    nystatin (MYCOSTATIN) 100,000 unit/mL suspension Swish and swallow 5 mL 4 (Four) Times a Day. 120 mL 0     nystatin (MYCOSTATIN) 827860 UNIT/GM cream Apply 1 Application topically to the appropriate area as directed 2 (Two) Times a Day. 60 g 2    nystatin (MYCOSTATIN) 209075 UNIT/GM powder Apply  topically to the appropriate area as directed 3 (Three) Times a Day. 60 g 2    olopatadine (PATANOL) 0.1 % ophthalmic solution       omeprazole (priLOSEC) 20 MG capsule Take 1 capsule by mouth Daily. 30 capsule 5    Petrolatum (Petroleum Jelly) ointment Apply 1 application  topically As Needed (prn as needed). 368 g 3    Soap & Cleansers (Gentle Skin Cleanser) liquid Apply 1 application  topically 2 (Two) Times a Day. 473 mL 3    Sodium Fluoride 1.1 % cream       Tirzepatide-Weight Management (ZEPBOUND) 5 MG/0.5ML solution auto-injector Inject 0.5 mL under the skin into the appropriate area as directed 1 (One) Time Per Week. 2 mL 0    Urea 50 % suspension Apply topically to the affected areas  g 3    vitamin B-12 (CYANOCOBALAMIN) 1000 MCG tablet Take 1 tablet by mouth Daily. 90 tablet 3    vitamin B-6 (PYRIDOXINE) 50 MG tablet Take 0.5 tablets by mouth Every Other Day. 23 tablet 3    Zinc Oxide 16 % ointment Apply 1 Application topically to the appropriate area as directed Daily As Needed (rash). 113 g 3    promethazine-dextromethorphan (PROMETHAZINE-DM) 6.25-15 MG/5ML syrup Take 5 mL by mouth 4 (Four) Times a Day As Needed for Cough. (Patient not taking: Reported on 5/7/2025) 473 mL 0     No current facility-administered medications on file prior to visit.      No Known Allergies  Past Surgical History:   Procedure Laterality Date    CYST REMOVAL      OTHER SURGICAL HISTORY Left     TUBE REPLACEMENT IN EAR     Social History     Socioeconomic History    Marital status: Single   Tobacco Use    Smoking status: Never     Passive exposure: Never    Smokeless tobacco: Never   Vaping Use    Vaping status: Never Used   Substance and Sexual Activity    Alcohol use: Never    Drug use: Defer    Sexual activity: Defer     History  reviewed. No pertinent family history.    Results     Result Review   The following data was reviewed by: Federico Sullivan MD on 04/04/2023:  Lab Results   Component Value Date    HGB 16.5 05/07/2025    HCT 51.2 (H) 05/07/2025    MCV 83.1 05/07/2025     05/07/2025    WBC 3.40 05/07/2025    NEUTROABS 1.45 (L) 05/07/2025    LYMPHSABS 1.27 05/07/2025    MONOSABS 0.58 05/07/2025    EOSABS 0.03 05/07/2025    BASOSABS 0.07 05/07/2025     Lab Results   Component Value Date    GLUCOSE 82 05/07/2025    BUN 16 05/07/2025    CREATININE 1.66 (H) 05/07/2025     (L) 05/07/2025    K 4.7 05/07/2025    CL 97 (L) 05/07/2025    CO2 27.9 05/07/2025    CALCIUM 8.6 05/07/2025    PROTEINTOT 6.9 05/07/2025    ALBUMIN 3.9 05/07/2025    BILITOT 0.5 05/07/2025    ALKPHOS 75 05/07/2025    AST 26 05/07/2025    ALT 22 05/07/2025     Lab Results   Component Value Date    MG 2.2 09/10/2022    FREET4 1.11 08/09/2023    TSH 2.350 08/09/2023           No radiology results for the last day       Assessment & Plan     Diagnoses and all orders for this visit:    1. Anemia, unspecified type (Primary)  -     CBC & Differential; Future  -     Comprehensive Metabolic Panel; Future  -     Ferritin; Future  -     Iron Profile; Future          Tremayne Bernard is a 29 y.o. male who presents to [facility] HEMATOLOGY & ONCOLOGY for polycythemia and history of recurrent DVT.  He has PMH significant for Down's Syndrome, TAMIKO (has TAMIKO machine). Patient is compliant with his Eliquis medication.      Recurrent DVT  -continue Eliquis, lifelong  --Pt due to undergo repeat CT chest (ordered by his PCP) to monitor response to anticoagulant, CT chest from 12/7/2023 showed resolution of previous ground glass nodules seen on previous study and a new 6 mm nodule in each upper lobe but given waxing and waning nature of the nodules favors infectious/inflammatory process  -per pt's mother pulmonology plans to repeat imaging in 1 year, CT chest from  12/5/2024 was negative for any suspicious pulmonary nodules.      Secondary polycythemia  -due to TAMIKO, lack of improvement with use of TAMIKO machine  -pt's mother is going to schedule pt appointment with pulmonology to assess for other options regarding TAMIKO mask and recheck TAMIKO machine settings  -recommended increased compliance with CPAP machine, use of O2 at night, and recommended weight loss  -JAK2 V617F and exon 12 mutation testing from 11/2019 were negative  -pt's last phlebotomy was in 4/2025, pt with evidence of iron deficiency on most recent labs, recommend pt increase intake of oral iron to daily, pt with significant decrease in hemoglobin so will   have pt undergo phlebotomy in 6/2025 (will skip this in 5/2025), encourage weight loss and more consistent use of CPAP for TAMIKO    Plan for patient follow-up in 2 months with repeat CBC, CMP, and iron studies to assess response to oral iron repletion and to phlebotomy.    Patient verbalized understanding and agreed with plan.    Please note that portions of this note were completed with a voice recognition program.      Electronically signed by Federico Sullivan MD, 05/07/25, 5:09 PM EDT.            Follow Up     I spent 30 minutes caring for Tremayne on this date of service. This time includes time spent by me in the following activities:preparing for the visit, reviewing tests, obtaining and/or reviewing a separately obtained history, performing a medically appropriate examination and/or evaluation , counseling and educating the patient/family/caregiver, ordering medications, tests, or procedures, documenting information in the medical record and care coordination.    This is an acute or chronic illness that poses a threat to life or bodily function. The above treatment plan involves a high risk of complications and/or mortality of patient management.    The patient was seen and examined. Work by the provider also included review and/or ordering of lab tests, review  and/or ordering of radiology tests, review and/or ordering of medicine tests, discussion with other physicians or providers, independent review of data, obtaining old records, review/summation of old records, and/or other review.    I have reviewed the family history, social history, and past medical history for this patient. Previous information and data has been reviewed and updated as needed. I have reviewed and verified the chief complaint, history, and other documentation. The patient was interviewed and examined in the clinic and the chart reviewed. The previous observations, recommendations, and conclusions were reviewed including those of other providers.     The plan was discussed with the patient and/or family. The patient was given time to ask questions and these questions were answered. At the conclusion of their visit they had no additional questions or concerns and all questions were answered to their satisfaction.    Patient was given instructions and counseling regarding his condition or for health maintenance advice. Please see specific information pulled into the AVS if appropriate.

## 2025-05-19 ENCOUNTER — TELEPHONE (OUTPATIENT)
Dept: FAMILY MEDICINE CLINIC | Facility: CLINIC | Age: 30
End: 2025-05-19

## 2025-05-19 NOTE — TELEPHONE ENCOUNTER
Caller: BRENDA SURESH    Relationship: Other    Best call back number: [phone] EXTENSION [id number]    What orders are you requesting (i.e. lab or imaging):     C-PAP MASK AND SUPPLIES    In what timeframe would the patient need to come in: ASA    Where will you receive your lab/imaging services: [facility]    Additional notes: PLEASE FAX ORDER OVER ASAP -[phone] MIMA SURESH.     PER CALLER THIS HAS BEEN REQUESTED BY THEM AND THE PATIENT SEVERAL TIMES WITH NO RESPONSE. PATIENT MUST USE [facility] DUE TO INSURANCE.    CONTACT CALLER WITH ANY ADDITIONAL QUESTIONS.

## 2025-05-25 ENCOUNTER — APPOINTMENT (OUTPATIENT)
Dept: GENERAL RADIOLOGY | Facility: HOSPITAL | Age: 30
End: 2025-05-25
Payer: MEDICARE

## 2025-05-25 ENCOUNTER — HOSPITAL ENCOUNTER (EMERGENCY)
Facility: HOSPITAL | Age: 30
Discharge: HOME | End: 2025-05-25
Attending: EMERGENCY MEDICINE | Admitting: EMERGENCY MEDICINE
Payer: MEDICARE

## 2025-05-25 VITALS
OXYGEN SATURATION: 94 % | SYSTOLIC BLOOD PRESSURE: 112 MMHG | WEIGHT: 315 LBS | DIASTOLIC BLOOD PRESSURE: 81 MMHG | TEMPERATURE: 98.5 F | BODY MASS INDEX: 55.81 KG/M2 | HEART RATE: 99 BPM | HEIGHT: 63 IN | RESPIRATION RATE: 21 BRPM

## 2025-05-25 DIAGNOSIS — J45.901 EXACERBATION OF ASTHMA, UNSPECIFIED ASTHMA SEVERITY, UNSPECIFIED WHETHER PERSISTENT: Primary | ICD-10-CM

## 2025-05-25 DIAGNOSIS — J20.9 ACUTE BRONCHITIS, UNSPECIFIED ORGANISM: ICD-10-CM

## 2025-05-25 LAB
ALBUMIN SERPL-MCNC: 3.8 G/DL (ref 3.5–5.2)
ALBUMIN/GLOB SERPL: 1.2 G/DL
ALP SERPL-CCNC: 67 U/L (ref 39–117)
ALT SERPL W P-5'-P-CCNC: 27 U/L (ref 1–41)
ANION GAP SERPL CALCULATED.3IONS-SCNC: 10.2 MMOL/L (ref 5–15)
ANISOCYTOSIS BLD QL: NORMAL
AST SERPL-CCNC: 42 U/L (ref 1–40)
BASOPHILS # BLD AUTO: 0.09 10*3/MM3 (ref 0–0.2)
BASOPHILS NFR BLD AUTO: 2.4 % (ref 0–1.5)
BILIRUB SERPL-MCNC: 1 MG/DL (ref 0–1.2)
BUN SERPL-MCNC: 15 MG/DL (ref 6–20)
BUN/CREAT SERPL: 9.6 (ref 7–25)
CALCIUM SPEC-SCNC: 8.7 MG/DL (ref 8.6–10.5)
CHLORIDE SERPL-SCNC: 100 MMOL/L (ref 98–107)
CO2 SERPL-SCNC: 25.8 MMOL/L (ref 22–29)
CREAT SERPL-MCNC: 1.56 MG/DL (ref 0.76–1.27)
DEPRECATED RDW RBC AUTO: 57.7 FL (ref 37–54)
EGFRCR SERPLBLD CKD-EPI 2021: 61.7 ML/MIN/1.73
EOSINOPHIL # BLD AUTO: 0.06 10*3/MM3 (ref 0–0.4)
EOSINOPHIL NFR BLD AUTO: 1.6 % (ref 0.3–6.2)
ERYTHROCYTE [DISTWIDTH] IN BLOOD BY AUTOMATED COUNT: 19.9 % (ref 12.3–15.4)
GLOBULIN UR ELPH-MCNC: 3.3 GM/DL
GLUCOSE SERPL-MCNC: 94 MG/DL (ref 65–99)
HCT VFR BLD AUTO: 50.2 % (ref 37.5–51)
HGB BLD-MCNC: 16.3 G/DL (ref 13–17.7)
HOLD SPECIMEN: NORMAL
HOLD SPECIMEN: NORMAL
IMM GRANULOCYTES # BLD AUTO: 0.01 10*3/MM3 (ref 0–0.05)
IMM GRANULOCYTES NFR BLD AUTO: 0.3 % (ref 0–0.5)
LYMPHOCYTES # BLD AUTO: 1.07 10*3/MM3 (ref 0.7–3.1)
LYMPHOCYTES NFR BLD AUTO: 29.1 % (ref 19.6–45.3)
MCH RBC QN AUTO: 27.3 PG (ref 26.6–33)
MCHC RBC AUTO-ENTMCNC: 32.5 G/DL (ref 31.5–35.7)
MCV RBC AUTO: 84.2 FL (ref 79–97)
MONOCYTES # BLD AUTO: 0.6 10*3/MM3 (ref 0.1–0.9)
MONOCYTES NFR BLD AUTO: 16.3 % (ref 5–12)
NEUTROPHILS NFR BLD AUTO: 1.85 10*3/MM3 (ref 1.7–7)
NEUTROPHILS NFR BLD AUTO: 50.3 % (ref 42.7–76)
NRBC BLD AUTO-RTO: 0 /100 WBC (ref 0–0.2)
NT-PROBNP SERPL-MCNC: <36 PG/ML (ref 0–450)
PLATELET # BLD AUTO: 212 10*3/MM3 (ref 140–450)
PMV BLD AUTO: 10.1 FL (ref 6–12)
POLYCHROMASIA BLD QL SMEAR: NORMAL
POTASSIUM SERPL-SCNC: 4.2 MMOL/L (ref 3.5–5.2)
PROT SERPL-MCNC: 7.1 G/DL (ref 6–8.5)
QT INTERVAL: 348 MS
QTC INTERVAL: 438 MS
RBC # BLD AUTO: 5.96 10*6/MM3 (ref 4.14–5.8)
SMALL PLATELETS BLD QL SMEAR: ADEQUATE
SODIUM SERPL-SCNC: 136 MMOL/L (ref 136–145)
TROPONIN T SERPL HS-MCNC: 13 NG/L
WBC MORPH BLD: NORMAL
WBC NRBC COR # BLD AUTO: 3.68 10*3/MM3 (ref 3.4–10.8)
WHOLE BLOOD HOLD COAG: NORMAL
WHOLE BLOOD HOLD SPECIMEN: NORMAL

## 2025-05-25 PROCEDURE — 99284 EMERGENCY DEPT VISIT MOD MDM: CPT

## 2025-05-25 PROCEDURE — 84484 ASSAY OF TROPONIN QUANT: CPT | Performed by: EMERGENCY MEDICINE

## 2025-05-25 PROCEDURE — 71045 X-RAY EXAM CHEST 1 VIEW: CPT

## 2025-05-25 PROCEDURE — 94799 UNLISTED PULMONARY SVC/PX: CPT

## 2025-05-25 PROCEDURE — 85025 COMPLETE CBC W/AUTO DIFF WBC: CPT | Performed by: EMERGENCY MEDICINE

## 2025-05-25 PROCEDURE — 93005 ELECTROCARDIOGRAM TRACING: CPT

## 2025-05-25 PROCEDURE — 94640 AIRWAY INHALATION TREATMENT: CPT

## 2025-05-25 PROCEDURE — 85007 BL SMEAR W/DIFF WBC COUNT: CPT | Performed by: EMERGENCY MEDICINE

## 2025-05-25 PROCEDURE — 96374 THER/PROPH/DIAG INJ IV PUSH: CPT

## 2025-05-25 PROCEDURE — 93005 ELECTROCARDIOGRAM TRACING: CPT | Performed by: EMERGENCY MEDICINE

## 2025-05-25 PROCEDURE — 83880 ASSAY OF NATRIURETIC PEPTIDE: CPT | Performed by: EMERGENCY MEDICINE

## 2025-05-25 PROCEDURE — 80053 COMPREHEN METABOLIC PANEL: CPT | Performed by: EMERGENCY MEDICINE

## 2025-05-25 PROCEDURE — 36415 COLL VENOUS BLD VENIPUNCTURE: CPT

## 2025-05-25 PROCEDURE — 25010000002 METHYLPREDNISOLONE PER 125 MG: Performed by: EMERGENCY MEDICINE

## 2025-05-25 RX ORDER — IPRATROPIUM BROMIDE AND ALBUTEROL SULFATE 2.5; .5 MG/3ML; MG/3ML
3 SOLUTION RESPIRATORY (INHALATION) ONCE
Status: COMPLETED | OUTPATIENT
Start: 2025-05-25 | End: 2025-05-25

## 2025-05-25 RX ORDER — SODIUM CHLORIDE 0.9 % (FLUSH) 0.9 %
10 SYRINGE (ML) INJECTION AS NEEDED
Status: DISCONTINUED | OUTPATIENT
Start: 2025-05-25 | End: 2025-05-25 | Stop reason: HOSPADM

## 2025-05-25 RX ORDER — PREDNISONE 20 MG/1
TABLET ORAL
Qty: 10 TABLET | Refills: 0 | Status: SHIPPED | OUTPATIENT
Start: 2025-05-25

## 2025-05-25 RX ADMIN — METHYLPREDNISOLONE SODIUM SUCCINATE 125 MG: 125 INJECTION, POWDER, LYOPHILIZED, FOR SOLUTION INTRAMUSCULAR; INTRAVENOUS at 18:31

## 2025-05-25 RX ADMIN — IPRATROPIUM BROMIDE AND ALBUTEROL SULFATE 3 ML: .5; 3 SOLUTION RESPIRATORY (INHALATION) at 17:50

## 2025-05-25 NOTE — ED PROVIDER NOTES
Time: 5:30 PM EDT  Date of encounter:  5/25/2025  Independent Historian/Clinical History and Information was obtained by:   Patient and Family    History is limited by: N/A    Chief Complaint: Cough, shortness of breath and wheezing      History of Present Illness:  Patient is a 30 y.o. year old male who presents to the emergency department for evaluation of cough, shortness of breath and wheezing.  This patient has had the symptoms for the last several days.  He was at [place] earlier in the day he was diagnosed with pneumonia.  The patient does have Down syndrome, obesity and he is oxygen dependent for his asthma.  He has had no fever or chest pain      Patient Care Team  Primary Care Provider: Jesus Manuel Sloan DO    Past Medical History:     No Known Allergies  Past Medical History:   Diagnosis Date    Anemia     Asthma     Diabetes mellitus     Disease of thyroid gland     Down's syndrome     Gout     Oxygen dependent     Nighttime Only     Past Surgical History:   Procedure Laterality Date    CYST REMOVAL      OTHER SURGICAL HISTORY Left     TUBE REPLACEMENT IN EAR     History reviewed. No pertinent family history.    Home Medications:  Prior to Admission medications    Medication Sig Start Date End Date Taking? Authorizing Provider   albuterol (ACCUNEB) 1.25 MG/3ML nebulizer solution Take 3 mL by nebulization Every 6 (Six) Hours As Needed for Wheezing. 5/10/23   Jesus Manuel Sloan DO   albuterol sulfate  (90 Base) MCG/ACT inhaler Inhale 2 puffs Every 4 (Four) Hours As Needed for Wheezing or Shortness of Air. 5/6/25   Jesus Manuel Sloan DO   allopurinol (Zyloprim) 300 MG tablet Take 2 tablets by mouth Daily. 5/6/25   Jesus Manuel Sloan DO   aluminum sulfate-calcium acetate (DOMEBORO) topical packet Apply 1 packet topically to the appropriate area as directed 3 (Three) Times a Day. 12/2/24   Jesus Manuel Sloan DO   ammonium lactate (AmLactin) 12 % lotion Apply  topically to the appropriate area as  directed 2 (Two) Times a Day. 12/2/24   Jesus Manuel Sloan DO   apixaban (ELIQUIS) 5 MG tablet tablet Take 1 tablet by mouth 2 (Two) Times a Day. 5/6/25   Jesus Manuel Sloan DO   arformoterol (Brovana) 15 MCG/2ML nebulizer solution Take 2 mL by nebulization 2 (Two) Times a Day. 1/21/25   Mayra Pathak APRN   benzonatate (TESSALON) 200 MG capsule Take 1 capsule by mouth 3 (Three) Times a Day As Needed for Cough. 2/6/25   Jesus Manuel Sloan DO   brompheniramine-pseudoephedrine-DM 30-2-10 MG/5ML syrup TAKE 5 ML BY MOUTH EVERY 6 HOURS AS NEEDED 12/30/24   Provider, MD [place]   budesonide (PULMICORT) 0.5 MG/2ML nebulizer solution Take 2 mL by nebulization 2 (Two) Times a Day. 10/1/24   Mayra Pathak APRN   clindamycin (Cleocin-T) 1 % external solution Apply  topically to the appropriate area as directed 2 (Two) Times a Day. 5/6/25   Jesus Manuel Sloan DO   colchicine 0.6 MG tablet Take 2 PO at onset of gout flare, then take 1 tablet PO daily until symptoms resolve 5/6/25   Jesus Manuel Sloan DO   Diclofenac Sodium (VOLTAREN) 1 % gel gel diclofenac sodium 1 % topical gel apply 2 gram to the affected area(s) by topical route 4 times per day   Suspended    Provider, MD [place]   docusate sodium 100 MG capsule Take 1 capsule by mouth 2 (Two) Times a Day As Needed (constipation). 1/17/23   Jesus Manuel Sloan DO   Emollient (cetaphil) cream Apply 1 Application topically to the appropriate area as directed 2 (Two) Times a Day. 2/6/25   Jesus Manuel Sloan DO   Emollient (cetaphil) cream Apply 1 Application topically to the appropriate area as directed As Needed for Dry Skin. 2/6/25   Jesus Manuel Sloan DO   erythromycin (ROMYCIN) 5 MG/GM ophthalmic ointment Instill 1 cm ribbon in each eye QID 9/6/22   Jesus Manuel Sloan DO   ferrous sulfate 325 (65 FE) MG tablet Take 1 tablet by mouth Daily With Breakfast. 1/15/25   Federico Sullivan MD   fluticasone (FLONASE) 50 MCG/ACT nasal spray Administer 2 sprays into the nostril(s)  as directed by provider Daily. 12/2/24   Jesus Manuel Sloan DO   folic acid (FOLVITE) 1 MG tablet Take 1 tablet by mouth Daily. 6/1/22   Jesus Manuel Sloan DO   guaiFENesin-codeine (guaiFENesin AC) 100-10 MG/5ML solution/syrup Take 5 mL by mouth 3 (Three) Times a Day As Needed for Cough. 2/6/25   Jesus Manuel Sloan DO   ipratropium (ATROVENT) 0.03 % nasal spray Administer 2 sprays into the nostril(s) as directed by provider 3 (Three) Times a Day As Needed (for running). 12/2/24   Jesus Manuel Sloan DO   ketoconazole (NIZORAL) 2 % cream Apply  topically to the appropriate area as directed Daily. 12/2/24   Jesus Manuel Sloan DO   levothyroxine (SYNTHROID, LEVOTHROID) 25 MCG tablet Take 1 tablet by mouth Daily. 9/6/22   Jesus Manuel Sloan DO   loratadine (CLARITIN) 10 MG tablet Take 1 tablet by mouth Daily. 7/2/24   Jesus Manuel Sloan DO   mineral oil-hydrophilic petrolatum (AQUAPHOR) ointment Apply 1 Application topically to the appropriate area as directed Daily. 2/6/25   Jesus Manuel Sloan DO   montelukast (Singulair) 10 MG tablet Take 1 tablet by mouth Every Night. 5/6/25   Jesus Manuel Sloan DO   mupirocin (BACTROBAN) 2 % ointment Apply  topically to the appropriate area as directed 2 (Two) Times a Day. 2/6/25   Jesus Manuel Sloan DO   nystatin (MYCOSTATIN) 100,000 unit/mL suspension Swish and swallow 5 mL 4 (Four) Times a Day. 12/2/24   Jesus Manuel Sloan DO   nystatin (MYCOSTATIN) 539289 UNIT/GM cream Apply 1 Application topically to the appropriate area as directed 2 (Two) Times a Day. 5/6/25   Jesus Manuel Sloan DO   nystatin (MYCOSTATIN) 613020 UNIT/GM powder Apply  topically to the appropriate area as directed 3 (Three) Times a Day. 12/2/24   Jesus Manuel Sloan DO   olopatadine (PATANOL) 0.1 % ophthalmic solution     Provider, MD [place]   omeprazole (priLOSEC) 20 MG capsule Take 1 capsule by mouth Daily. 2/11/25   Dawood Euegne MD   Petrolatum (Petroleum Jelly) ointment Apply 1 application  topically As  Needed (prn as needed). 10/23/24   Jesus Manuel Sloan DO   promethazine-dextromethorphan (PROMETHAZINE-DM) 6.25-15 MG/5ML syrup Take 5 mL by mouth 4 (Four) Times a Day As Needed for Cough.  Patient not taking: Reported on 5/7/2025 10/23/24   Jesus Manuel Sloan DO   Soap & Cleansers (Gentle Skin Cleanser) liquid Apply 1 application  topically 2 (Two) Times a Day. 12/2/24   Jesus Manuel Sloan DO   Sodium Fluoride 1.1 % cream  9/15/23   Provider, MD [place]   Tirzepatide-Weight Management (ZEPBOUND) 5 MG/0.5ML solution auto-injector Inject 0.5 mL under the skin into the appropriate area as directed 1 (One) Time Per Week. 5/6/25   Jesus Manuel Sloan DO   Urea 50 % suspension Apply topically to the affected areas TID 5/10/23   Jesus Manuel Sloan DO   vitamin B-12 (CYANOCOBALAMIN) 1000 MCG tablet Take 1 tablet by mouth Daily. 7/2/24   Jesus Manuel Sloan DO   vitamin B-6 (PYRIDOXINE) 50 MG tablet Take 0.5 tablets by mouth Every Other Day. 9/6/22   Jesus Manuel Sloan DO   Zinc Oxide 16 % ointment Apply 1 Application topically to the appropriate area as directed Daily As Needed (rash). 5/23/24   Jesus Manuel Sloan DO        Social History:   Social History     Tobacco Use    Smoking status: Never     Passive exposure: Never    Smokeless tobacco: Never   Vaping Use    Vaping status: Never Used   Substance Use Topics    Alcohol use: Never    Drug use: Defer         Review of Systems:  Review of Systems   Constitutional:  Negative for chills and fever.   HENT:  Negative for congestion, ear pain and sore throat.    Eyes:  Negative for pain.   Respiratory:  Positive for cough, shortness of breath and wheezing. Negative for chest tightness.    Cardiovascular:  Negative for chest pain.   Gastrointestinal:  Negative for abdominal pain, diarrhea, nausea and vomiting.   Genitourinary:  Negative for flank pain and hematuria.   Musculoskeletal:  Negative for joint swelling.   Skin:  Negative for pallor.   Neurological:  Negative for  "seizures and headaches.   All other systems reviewed and are negative.       Physical Exam:  /81 (BP Location: Left arm, Patient Position: Sitting)   Pulse 99   Temp 98.5 °F (36.9 °C) (Oral)   Resp 21   Ht 160 cm (63\")   Wt (!) 166 kg (365 lb 8.4 oz)   SpO2 94%   BMI 64.75 kg/m²     Physical Exam  Vitals and nursing note reviewed.   Constitutional:       General: He is not in acute distress.     Appearance: Normal appearance. He is obese. He is not toxic-appearing.   HENT:      Head: Normocephalic and atraumatic.      Mouth/Throat:      Mouth: Mucous membranes are moist.   Eyes:      General: No scleral icterus.  Cardiovascular:      Rate and Rhythm: Normal rate and regular rhythm.      Pulses: Normal pulses.      Heart sounds: Normal heart sounds.   Pulmonary:      Effort: Pulmonary effort is normal. No respiratory distress.      Breath sounds: Examination of the right-middle field reveals wheezing. Examination of the left-middle field reveals wheezing. Wheezing present.   Abdominal:      General: Abdomen is flat.      Palpations: Abdomen is soft.      Tenderness: There is no abdominal tenderness.   Musculoskeletal:         General: Normal range of motion.      Cervical back: Normal range of motion and neck supple.   Skin:     General: Skin is warm and dry.   Neurological:      Mental Status: He is alert and oriented to person, place, and time. Mental status is at baseline.                    Medical Decision Making:      Comorbidities that affect care:    Asthma, Obesity    External Notes reviewed:    Previous Clinic Note: Office visit with hematology for anemia      The following orders were placed and all results were independently analyzed by me:  Orders Placed This Encounter   Procedures    XR Chest 1 View    [place] Draw    Comprehensive Metabolic Panel    BNP    High Sensitivity Troponin T    CBC Auto Differential    Scan Slide    Undress & Gown    Continuous Pulse Oximetry    Vital Signs    ECG " 12 Lead ED Triage Standing Order; SOA    CBC & Differential    Green Top (Gel)    Lavender Top    Gold Top - SST    Light Blue Top       Medications Given in the Emergency Department:  Medications   ipratropium-albuterol (DUO-NEB) nebulizer solution 3 mL (3 mL Nebulization Given 5/25/25 1750)   methylPREDNISolone sodium succinate (SOLU-Medrol) 125 mg in sterile water (preservative free) 2 mL (125 mg Intravenous Given 5/25/25 1831)        ED Course:         EKG: Sinus rhythm rate of 95 bpm  No acute ischemia      Labs:    Lab Results (last 24 hours)       Procedure Component Value Units Date/Time    CBC & Differential [[id number]]  (Abnormal) Collected: 05/25/25 1553    Specimen: Blood Updated: 05/25/25 1632    Narrative:      The following orders were created for panel order CBC & Differential.  Procedure                               Abnormality         Status                     ---------                               -----------         ------                     CBC Auto Differential[[id number]]        Abnormal            Final result               Scan Slide[[id number]]                                       Final result                 Please view results for these tests on the individual orders.    Comprehensive Metabolic Panel [[id number]]  (Abnormal) Collected: 05/25/25 1553    Specimen: Blood Updated: 05/25/25 1637     Glucose 94 mg/dL      BUN 15 mg/dL      Creatinine 1.56 mg/dL      Sodium 136 mmol/L      Potassium 4.2 mmol/L      Chloride 100 mmol/L      CO2 25.8 mmol/L      Calcium 8.7 mg/dL      Total Protein 7.1 g/dL      Albumin 3.8 g/dL      ALT (SGPT) 27 U/L      AST (SGOT) 42 U/L      Alkaline Phosphatase 67 U/L      Total Bilirubin 1.0 mg/dL      Globulin 3.3 gm/dL      A/G Ratio 1.2 g/dL      BUN/Creatinine Ratio 9.6     Anion Gap 10.2 mmol/L      eGFR 61.7 mL/min/1.73     Narrative:      GFR Categories in Chronic Kidney Disease (CKD)              GFR Category          GFR (mL/min/1.73)     Interpretation  G1                    90 or greater        Normal or high (1)  G2                    60-89                Mild decrease (1)  G3a                   45-59                Mild to moderate decrease  G3b                   30-44                Moderate to severe decrease  G4                    15-29                Severe decrease  G5                    14 or less           Kidney failure    (1)In the absence of evidence of kidney disease, neither GFR category G1 or G2 fulfill the criteria for CKD.    eGFR calculation 2021 CKD-EPI creatinine equation, which does not include race as a factor    BNP [[id number]]  (Normal) Collected: 05/25/25 1553    Specimen: Blood Updated: 05/25/25 1634     proBNP <36.0 pg/mL     Narrative:      This assay is used as an aid in the diagnosis of individuals suspected of having heart failure. It can be used as an aid in the diagnosis of acute decompensated heart failure (ADHF) in patients presenting with signs and symptoms of ADHF to the emergency department (ED). In addition, NT-proBNP of <300 pg/mL indicates ADHF is not likely.    Age Range Result Interpretation  NT-proBNP Concentration (pg/mL:      <50             Positive            >450                   Gray                 300-450                    Negative             <300    50-75           Positive            >900                  Gray                300-900                  Negative            <300      >75             Positive            >1800                  Gray                300-1800                  Negative            <300    High Sensitivity Troponin T [[id number]]  (Normal) Collected: 05/25/25 1553    Specimen: Blood Updated: 05/25/25 1636     HS Troponin T 13 ng/L     Narrative:      High Sensitive Troponin T Reference Range:  <14.0 ng/L- Negative Female for AMI  <22.0 ng/L- Negative Male for AMI  >=14 - Abnormal Female indicating possible myocardial injury.  >=22 - Abnormal Male indicating possible  myocardial injury.   Clinicians would have to utilize clinical acumen, EKG, Troponin, and serial changes to determine if it is an Acute Myocardial Infarction or myocardial injury due to an underlying chronic condition.         CBC Auto Differential [[id number]]  (Abnormal) Collected: 05/25/25 1553    Specimen: Blood Updated: 05/25/25 1632     WBC 3.68 10*3/mm3      RBC 5.96 10*6/mm3      Hemoglobin 16.3 g/dL      Hematocrit 50.2 %      MCV 84.2 fL      MCH 27.3 pg      MCHC 32.5 g/dL      RDW 19.9 %      RDW-SD 57.7 fl      MPV 10.1 fL      Platelets 212 10*3/mm3      Neutrophil % 50.3 %      Lymphocyte % 29.1 %      Monocyte % 16.3 %      Eosinophil % 1.6 %      Basophil % 2.4 %      Immature Grans % 0.3 %      Neutrophils, Absolute 1.85 10*3/mm3      Lymphocytes, Absolute 1.07 10*3/mm3      Monocytes, Absolute 0.60 10*3/mm3      Eosinophils, Absolute 0.06 10*3/mm3      Basophils, Absolute 0.09 10*3/mm3      Immature Grans, Absolute 0.01 10*3/mm3      nRBC 0.0 /100 WBC     Scan Slide [[id number]] Collected: 05/25/25 1553    Specimen: Blood Updated: 05/25/25 1632     Anisocytosis Slight/1+     Polychromasia Slight/1+     WBC Morphology Normal     Platelet Estimate Adequate             Imaging:    XR Chest 1 View  Result Date: 5/25/2025  XR CHEST 1 VW Date of Exam: 5/25/2025 4:10 PM EDT Indication: SOA Triage Protocol Comparison: Chest CT 19782 24, chest x-ray 7/3/2024 Findings: Suboptimal demonstration of intrathoracic structures due to overlying soft tissue attenuation. Large density overlying the medial inferior right chest correlates with a large epicardial fat pad on the comparison chest CT. Heart size is likely normal when  accounting for projection. Visualized lungs are grossly clear, with the left lower lung suboptimally visualized due to combination of heart shadow and superficial soft tissues     Impression: Limited exam demonstrating no gross acute pulmonary abnormality Electronically Signed: Mynor Esposito   5/25/2025 4:24 PM EDT  Workstation ID: [id number]        Differential Diagnosis and Discussion:    Cough: Differential diagnosis includes but is not limited to pneumonia, acute bronchitis, upper respiratory infection, ACE inhibitor use, allergic reaction, epiglottitis, seasonal allergies, chemical irritants, exercise-induced asthma, viral syndrome.    PROCEDURES:    Labs were collected in the emergency department and all labs were reviewed and interpreted by me.  X-ray were performed in the emergency department and all X-ray impressions were independently interpreted by me.  An EKG was performed and the EKG was interpreted by me.    ECG 12 Lead ED Triage Standing Order; SOA   Preliminary Result   HEART RATE=95  bpm   RR Bmzebahw=011  ms   MT Lgnrsxqp=889  ms   P Horizontal Axis=18  deg   P Front Axis=68  deg   QRSD Interval=98  ms   QT Wffxfanb=270  ms   VHiV=503  ms   QRS Axis=69  deg   T Wave Axis=-8  deg   - BORDERLINE ECG -   Sinus rhythm   Borderline T abnormalities, inferior leads   Date and Time of Study:2025-05-25 15:53:22          Procedures    MDM     Amount and/or Complexity of Data Reviewed  Clinical lab tests: reviewed  Tests in the radiology section of CPT®: reviewed  Tests in the medicine section of CPT®: reviewed                       Patient Care Considerations:    CT CHEST: I considered ordering a CT scan of the chest, however the patient has no signs of pulmonary embolism.      Consultants/Shared Management Plan:    None    Social Determinants of Health:    Patient has presented with family members who are responsible, reliable and will ensure follow up care.      Disposition and Care Coordination:    Discharged: I considered escalation of care by admitting this patient to the hospital, however the patient is improved after treatment in the ED and has no signs of respiratory distress and no signs of pneumonia or sepsis.    I have explained discharge medications and the need for follow up with the  patient/caretakers. This was also printed in the discharge instructions. Patient was discharged with the following medications and follow up:      Medication List        New Prescriptions      predniSONE 20 MG tablet  Commonly known as: DELTASONE  Take 2 p.o. daily for 5 days.               Where to Get Your Medications        These medications were sent to [facility] - [place], [place] - [address] [place] [address] - [phone]  - [phone]   [address] [place] [address]      Phone: [phone]   predniSONE 20 MG tablet      Jesus Manuel Sloan,   [address]  [facility] [address]  [phone]    In 2 days         Final diagnoses:   Exacerbation of asthma, unspecified asthma severity, unspecified whether persistent   Acute bronchitis, unspecified organism        ED Disposition       ED Disposition   Discharge    Condition   Stable    Comment   --               This medical record created using voice recognition software.             Gennaro Conway DO  05/25/25 0029

## 2025-05-25 NOTE — DISCHARGE INSTRUCTIONS
Drink plenty of fluids.  Take previously prescribed antibiotic as directed.  Use home breathing treatments as directed.  Take prednisone as directed.  Return for worsening symptoms.  Follow-up with your doctor in 2 days if no better

## 2025-05-28 ENCOUNTER — OFFICE VISIT (OUTPATIENT)
Dept: FAMILY MEDICINE CLINIC | Facility: CLINIC | Age: 30
End: 2025-05-28
Payer: MEDICARE

## 2025-05-28 ENCOUNTER — LAB (OUTPATIENT)
Facility: HOSPITAL | Age: 30
End: 2025-05-28
Payer: MEDICARE

## 2025-05-28 VITALS
WEIGHT: 315 LBS | SYSTOLIC BLOOD PRESSURE: 124 MMHG | DIASTOLIC BLOOD PRESSURE: 72 MMHG | HEIGHT: 63 IN | BODY MASS INDEX: 55.81 KG/M2 | HEART RATE: 90 BPM | OXYGEN SATURATION: 96 % | TEMPERATURE: 98.3 F

## 2025-05-28 DIAGNOSIS — R06.00 DYSPNEA, UNSPECIFIED TYPE: ICD-10-CM

## 2025-05-28 DIAGNOSIS — E53.8 FOLATE DEFICIENCY: ICD-10-CM

## 2025-05-28 DIAGNOSIS — E03.9 HYPOTHYROIDISM, UNSPECIFIED TYPE: ICD-10-CM

## 2025-05-28 DIAGNOSIS — E11.43 TYPE 2 DIABETES MELLITUS WITH DIABETIC AUTONOMIC NEUROPATHY, WITHOUT LONG-TERM CURRENT USE OF INSULIN: ICD-10-CM

## 2025-05-28 DIAGNOSIS — Q79.0 MORGAGNI HERNIA: Primary | ICD-10-CM

## 2025-05-28 DIAGNOSIS — E53.8 B12 DEFICIENCY: ICD-10-CM

## 2025-05-28 DIAGNOSIS — M10.9 GOUT, UNSPECIFIED CAUSE, UNSPECIFIED CHRONICITY, UNSPECIFIED SITE: ICD-10-CM

## 2025-05-28 LAB
ALBUMIN SERPL-MCNC: 4.1 G/DL (ref 3.5–5.2)
ALBUMIN UR-MCNC: <1.2 MG/DL
ALBUMIN/GLOB SERPL: 1.2 G/DL
ALP SERPL-CCNC: 69 U/L (ref 39–117)
ALT SERPL W P-5'-P-CCNC: 31 U/L (ref 1–41)
ANION GAP SERPL CALCULATED.3IONS-SCNC: 8.7 MMOL/L (ref 5–15)
AST SERPL-CCNC: 30 U/L (ref 1–40)
BASOPHILS # BLD AUTO: 0.03 10*3/MM3 (ref 0–0.2)
BASOPHILS NFR BLD AUTO: 0.6 % (ref 0–1.5)
BILIRUB SERPL-MCNC: 0.7 MG/DL (ref 0–1.2)
BUN SERPL-MCNC: 19 MG/DL (ref 6–20)
BUN/CREAT SERPL: 11.9 (ref 7–25)
CALCIUM SPEC-SCNC: 9.3 MG/DL (ref 8.6–10.5)
CHLORIDE SERPL-SCNC: 101 MMOL/L (ref 98–107)
CO2 SERPL-SCNC: 28.3 MMOL/L (ref 22–29)
CREAT SERPL-MCNC: 1.6 MG/DL (ref 0.76–1.27)
CREAT UR-MCNC: 89.1 MG/DL
DEPRECATED RDW RBC AUTO: 57.1 FL (ref 37–54)
EGFRCR SERPLBLD CKD-EPI 2021: 59.8 ML/MIN/1.73
EOSINOPHIL # BLD AUTO: 0.01 10*3/MM3 (ref 0–0.4)
EOSINOPHIL NFR BLD AUTO: 0.2 % (ref 0.3–6.2)
ERYTHROCYTE [DISTWIDTH] IN BLOOD BY AUTOMATED COUNT: 19.8 % (ref 12.3–15.4)
GLOBULIN UR ELPH-MCNC: 3.5 GM/DL
GLUCOSE SERPL-MCNC: 82 MG/DL (ref 65–99)
HBA1C MFR BLD: 5.7 % (ref 4.8–5.6)
HCT VFR BLD AUTO: 54 % (ref 37.5–51)
HGB BLD-MCNC: 17 G/DL (ref 13–17.7)
HOLD SPECIMEN: NORMAL
IMM GRANULOCYTES # BLD AUTO: 0.01 10*3/MM3 (ref 0–0.05)
IMM GRANULOCYTES NFR BLD AUTO: 0.2 % (ref 0–0.5)
LYMPHOCYTES # BLD AUTO: 1.65 10*3/MM3 (ref 0.7–3.1)
LYMPHOCYTES NFR BLD AUTO: 33.3 % (ref 19.6–45.3)
MCH RBC QN AUTO: 26.9 PG (ref 26.6–33)
MCHC RBC AUTO-ENTMCNC: 31.5 G/DL (ref 31.5–35.7)
MCV RBC AUTO: 85.4 FL (ref 79–97)
MICROALBUMIN/CREAT UR: NORMAL MG/G{CREAT}
MONOCYTES # BLD AUTO: 0.53 10*3/MM3 (ref 0.1–0.9)
MONOCYTES NFR BLD AUTO: 10.7 % (ref 5–12)
NEUTROPHILS NFR BLD AUTO: 2.72 10*3/MM3 (ref 1.7–7)
NEUTROPHILS NFR BLD AUTO: 55 % (ref 42.7–76)
NRBC BLD AUTO-RTO: 0 /100 WBC (ref 0–0.2)
PLATELET # BLD AUTO: 260 10*3/MM3 (ref 140–450)
PMV BLD AUTO: 10.2 FL (ref 6–12)
POTASSIUM SERPL-SCNC: 4.1 MMOL/L (ref 3.5–5.2)
PROT SERPL-MCNC: 7.6 G/DL (ref 6–8.5)
RBC # BLD AUTO: 6.32 10*6/MM3 (ref 4.14–5.8)
SODIUM SERPL-SCNC: 138 MMOL/L (ref 136–145)
T4 FREE SERPL-MCNC: 1.2 NG/DL (ref 0.92–1.68)
TSH SERPL DL<=0.05 MIU/L-ACNC: 2.71 UIU/ML (ref 0.27–4.2)
URATE SERPL-MCNC: 7.5 MG/DL (ref 3.4–7)
VIT B12 BLD-MCNC: 729 PG/ML (ref 211–946)
WBC NRBC COR # BLD AUTO: 4.95 10*3/MM3 (ref 3.4–10.8)

## 2025-05-28 PROCEDURE — 80053 COMPREHEN METABOLIC PANEL: CPT | Performed by: FAMILY MEDICINE

## 2025-05-28 PROCEDURE — 36415 COLL VENOUS BLD VENIPUNCTURE: CPT | Performed by: FAMILY MEDICINE

## 2025-05-28 PROCEDURE — 82746 ASSAY OF FOLIC ACID SERUM: CPT | Performed by: FAMILY MEDICINE

## 2025-05-28 PROCEDURE — 85025 COMPLETE CBC W/AUTO DIFF WBC: CPT | Performed by: FAMILY MEDICINE

## 2025-05-28 PROCEDURE — 83036 HEMOGLOBIN GLYCOSYLATED A1C: CPT | Performed by: FAMILY MEDICINE

## 2025-05-28 PROCEDURE — 84443 ASSAY THYROID STIM HORMONE: CPT | Performed by: FAMILY MEDICINE

## 2025-05-28 PROCEDURE — 82043 UR ALBUMIN QUANTITATIVE: CPT | Performed by: FAMILY MEDICINE

## 2025-05-28 PROCEDURE — 82570 ASSAY OF URINE CREATININE: CPT | Performed by: FAMILY MEDICINE

## 2025-05-28 PROCEDURE — 82607 VITAMIN B-12: CPT | Performed by: FAMILY MEDICINE

## 2025-05-28 PROCEDURE — 84439 ASSAY OF FREE THYROXINE: CPT | Performed by: FAMILY MEDICINE

## 2025-05-28 PROCEDURE — 84550 ASSAY OF BLOOD/URIC ACID: CPT | Performed by: FAMILY MEDICINE

## 2025-05-28 RX ORDER — AZITHROMYCIN 250 MG/1
TABLET, FILM COATED ORAL
COMMUNITY
Start: 2025-05-25

## 2025-05-28 NOTE — PROGRESS NOTES
"Chief Complaint  Pneumonia and Follow-up    Subjective      Tremayne Bernard is a 30 y.o. male who presents to [facility]   History of Present Illness  The patient is a 30-year-old male with Down syndrome presenting for ER follow-up, accompanied by his parents.    Recently diagnosed with respiratory illness and treated with Z-Tang and prednisone, resulting in partial improvement. Initially diagnosed with pneumonia in [place] ER, but follow-up chest x-ray here was negative. 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 No evaluation for potential congestive heart failure related to this condition previously. Increased symptoms of shortness of breath, worsening cough, and history of pulmonary emboli and DVT raise concern for right atrial mass effect contributing to symptoms.        Patient Care Team:  Jesus Manuel Sloan DO as PCP - General (Family Medicine)  Long Davidson MD as Consulting Physician (General Surgery)  Mayra Pathak APRN as Nurse Practitioner (Pulmonary Disease)    Objective   Vital Signs:   Vitals:    05/28/25 1027   BP: 124/72   Pulse: 90   Temp: 98.3 °F (36.8 °C)   SpO2: 96%   Weight: (!) 160 kg (352 lb 1.6 oz)   Height: 160 cm (63\")     Body mass index is 62.37 kg/m².    Wt Readings from Last 3 Encounters:   05/28/25 (!) 160 kg (352 lb 1.6 oz)   05/25/25 (!) 166 kg (365 lb 8.4 oz)   05/07/25 (!) 166 kg (366 lb 13.5 oz)     BP Readings from Last 3 Encounters:   05/28/25 124/72   05/25/25 112/81   05/07/25 124/85       Health Maintenance   Topic Date Due    DIABETIC FOOT EXAM  Never done    DIABETIC EYE EXAM  Never done    URINE MICROALBUMIN-CREATININE RATIO (uACR)  Never done    Hepatitis B (1 of 3 - 19+ 3-dose series) Never done    COVID-19 Vaccine (4 - 2024-25 season) 09/01/2024    HEMOGLOBIN A1C  01/03/2025    Pneumococcal Vaccine 0-49 (1 of 2 - " PCV) 10/11/2025 (Originally 5/22/2016)    INFLUENZA VACCINE  07/01/2025    ANNUAL WELLNESS VISIT  12/02/2025    TDAP/TD VACCINES (2 - Td or Tdap) 03/28/2029    HEPATITIS C SCREENING  Completed       Lab Results (last 24 hours)       Procedure Component Value Units Date/Time    Microalbumin / Creatinine Urine Ratio - Urine, Clean Catch [[id number]] Collected: 05/28/25 1216    Specimen: Urine, Clean Catch Updated: 05/28/25 1216    CBC & Differential [[id number]] Collected: 05/28/25 1314    Specimen: Blood from Arm, Right Updated: 05/28/25 1326    Narrative:      The following orders were created for panel order CBC & Differential.  Procedure                               Abnormality         Status                     ---------                               -----------         ------                     CBC Auto Differential[[id number]]                            In process                   Please view results for these tests on the individual orders.    Comprehensive Metabolic Panel [[id number]] Collected: 05/28/25 1314    Specimen: Blood from Arm, Right Updated: 05/28/25 1326    Hemoglobin A1c [[id number]] Collected: 05/28/25 1314    Specimen: Blood from Arm, Right Updated: 05/28/25 1326    TSH+Free T4 [[id number]] Collected: 05/28/25 1314    Specimen: Blood from Arm, Right Updated: 05/28/25 1326    Vitamin B12 [[id number]] Collected: 05/28/25 1314    Specimen: Blood from Arm, Right Updated: 05/28/25 1326    Folate [[id number]] Collected: 05/28/25 1314    Specimen: Blood from Arm, Right Updated: 05/28/25 1326    Uric Acid [[id number]] Collected: 05/28/25 1314    Specimen: Blood from Arm, Right Updated: 05/28/25 1326    CBC Auto Differential [[id number]] Collected: 05/28/25 1314    Specimen: Blood from Arm, Right Updated: 05/28/25 1326               Physical Exam     Physical Exam  General Appearance: Down's syndrome facial characteristics noted, Morbid obesity noted with BMI of >60.  Vital signs: Within normal limits.  HEENT:  Within normal limits.  Respiratory: Decreased right lower lung sounds (consistent with location of hernia).  Cardiovascular: Distant heart sounds.  Skin: Warm and dry, no rash.  Neurological: Normal.  Psychiatric:   Other observations:       Result Review   The following data was reviewed by: Meaghan Dobbs MD on 05/28/2025:  [x]  Tests & Results  []  Hospitalization/Emergency Department/Urgent Care  []  Internal/External Consultant Notes    Results  - Imaging:    - Chest x-ray: Negative for pneumonia    - December CT scan: Showed stable large fat-containing right Morgagni hernia with mass effect on the right atrium      Procedures          ASSESSMENT/PLAN  Diagnoses and all orders for this visit:    1. Morgagni hernia (Primary)  -     Adult Transthoracic Echo Complete W/ Cont if Necessary Per Protocol; Future  -     Extra Tubes    2. Dyspnea, unspecified type  -     Adult Transthoracic Echo Complete W/ Cont if Necessary Per Protocol; Future  -     Extra Tubes    3. Type 2 diabetes mellitus with diabetic autonomic neuropathy, without long-term current use of insulin  -     CBC & Differential  -     Comprehensive Metabolic Panel  -     Hemoglobin A1c  -     Microalbumin / Creatinine Urine Ratio - Urine, Clean Catch  -     Extra Tubes    4. Hypothyroidism, unspecified type  -     TSH+Free T4  -     Extra Tubes    5. B12 deficiency  -     Vitamin B12  -     Extra Tubes    6. Folate deficiency  -     Folate  -     Extra Tubes    7. Gout, unspecified cause, unspecified chronicity, unspecified site  -     Uric Acid  -     Extra Tubes        Assessment & Plan  1. Respiratory illness.  Recently diagnosed with respiratory illness and shortness of breath, treated with Z-Tang and prednisone with partial improvement. Follow-up chest x-ray was negative for pneumonia.                Tremayne Bernard  reports that he has never smoked. He has never been exposed to tobacco smoke. He has never used smokeless tobacco.               FOLLOW UP  No follow-ups on file.  Patient was given instructions and counseling regarding his condition or for health maintenance advice. Please see specific information pulled into the AVS if appropriate.       Meaghan Dobbs MD  05/28/25  13:30 EDT    Part of this note may be an electronic transcription/translation of spoken language to printed text using the Dragon Dictation System.    Patient or patient representative verbalized consent for the use of Ambient Listening during the visit with  Meaghan Dobbs MD for chart documentation. 5/28/2025  13:31 EDT

## 2025-05-29 LAB — FOLATE SERPL-MCNC: 11 NG/ML (ref 4.78–24.2)

## 2025-06-04 LAB
QT INTERVAL: 348 MS
QTC INTERVAL: 438 MS

## 2025-06-05 NOTE — TELEPHONE ENCOUNTER
Please let patient's parents know that I am refilling the Zepbound.  Please have him take this for an additional months.  If the Zepbound is not helping him lose weight then we will increase the dose.  Please have them contact us and let us know.

## 2025-06-05 NOTE — TELEPHONE ENCOUNTER
Rx Refill Note  Requested Prescriptions     Pending Prescriptions Disp Refills    Tirzepatide-Weight Management (ZEPBOUND) 5 MG/0.5ML solution auto-injector 2 mL 0     Sig: Inject 0.5 mL under the skin into the appropriate area as directed 1 (One) Time Per Week.      Last office visit with prescribing clinician: 5/6/2025   Last telemedicine visit with prescribing clinician: Visit date not found   Next office visit with prescribing clinician: 8/12/2025                         Would you like a call back once the refill request has been completed: [] Yes [] No    If the office needs to give you a call back, can they leave a voicemail: [] Yes [] No    Mark Gutierrez Rep  06/05/25, 13:53 EDT

## 2025-06-09 ENCOUNTER — HOSPITAL ENCOUNTER (OUTPATIENT)
Dept: INFUSION THERAPY | Facility: HOSPITAL | Age: 30
Discharge: HOME | End: 2025-06-09
Admitting: INTERNAL MEDICINE
Payer: OTHER GOVERNMENT

## 2025-06-09 VITALS
RESPIRATION RATE: 20 BRPM | TEMPERATURE: 98.4 F | OXYGEN SATURATION: 98 % | DIASTOLIC BLOOD PRESSURE: 61 MMHG | SYSTOLIC BLOOD PRESSURE: 100 MMHG | HEART RATE: 86 BPM

## 2025-06-09 DIAGNOSIS — D75.1 POLYCYTHEMIA, SECONDARY: Primary | ICD-10-CM

## 2025-06-09 LAB
HCT VFR BLD AUTO: 60.4 % (ref 37.5–51)
HGB BLD-MCNC: 19.5 G/DL (ref 13–17.7)

## 2025-06-09 PROCEDURE — 36415 COLL VENOUS BLD VENIPUNCTURE: CPT

## 2025-06-09 PROCEDURE — 85014 HEMATOCRIT: CPT | Performed by: INTERNAL MEDICINE

## 2025-06-09 PROCEDURE — 85018 HEMOGLOBIN: CPT | Performed by: INTERNAL MEDICINE

## 2025-06-09 PROCEDURE — 99195 PHLEBOTOMY: CPT

## 2025-06-09 RX ORDER — SODIUM CHLORIDE 9 MG/ML
250 INJECTION, SOLUTION INTRAVENOUS ONCE
OUTPATIENT
Start: 2025-07-06

## 2025-06-09 RX ORDER — SODIUM CHLORIDE 9 MG/ML
250 INJECTION, SOLUTION INTRAVENOUS ONCE
Status: DISCONTINUED | OUTPATIENT
Start: 2025-06-09 | End: 2025-06-11 | Stop reason: HOSPADM

## 2025-06-11 ENCOUNTER — HOSPITAL ENCOUNTER (OUTPATIENT)
Facility: HOSPITAL | Age: 30
Discharge: HOME | End: 2025-06-11
Admitting: FAMILY MEDICINE
Payer: MEDICARE

## 2025-06-11 DIAGNOSIS — Q79.0 MORGAGNI HERNIA: ICD-10-CM

## 2025-06-11 DIAGNOSIS — R06.00 DYSPNEA, UNSPECIFIED TYPE: ICD-10-CM

## 2025-06-11 PROCEDURE — 25010000002 SULFUR HEXAFLUORIDE MICROSPH 60.7-25 MG RECONSTITUTED SUSPENSION: Performed by: FAMILY MEDICINE

## 2025-06-11 PROCEDURE — 93306 TTE W/DOPPLER COMPLETE: CPT

## 2025-06-11 RX ADMIN — SULFUR HEXAFLUORIDE 5 ML: KIT at 11:10

## 2025-06-12 LAB
BH CV ECHO MEAS - AO ROOT DIAM: 1.9 CM
BH CV ECHO MEAS - IVS/LVPW: 0.56 CM
BH CV ECHO MEAS - IVSD: 0.9 CM
BH CV ECHO MEAS - LVIDD: 4.4 CM
BH CV ECHO MEAS - LVIDS: 3 CM
BH CV ECHO MEAS - LVOT AREA: 3.8 CM2
BH CV ECHO MEAS - LVOT DIAM: 2.2 CM
BH CV ECHO MEAS - LVPWD: 1.6 CM
BH CV ECHO MEAS - RVDD: 2.8 CM

## 2025-07-08 ENCOUNTER — HOSPITAL ENCOUNTER (OUTPATIENT)
Dept: INFUSION THERAPY | Facility: HOSPITAL | Age: 30
Discharge: HOME | End: 2025-07-08
Admitting: INTERNAL MEDICINE
Payer: MEDICARE

## 2025-07-08 VITALS
BODY MASS INDEX: 55.81 KG/M2 | WEIGHT: 315 LBS | HEART RATE: 92 BPM | HEIGHT: 63 IN | RESPIRATION RATE: 20 BRPM | SYSTOLIC BLOOD PRESSURE: 108 MMHG | DIASTOLIC BLOOD PRESSURE: 73 MMHG | OXYGEN SATURATION: 96 % | TEMPERATURE: 98.6 F

## 2025-07-08 DIAGNOSIS — D75.1 POLYCYTHEMIA, SECONDARY: Primary | ICD-10-CM

## 2025-07-08 LAB
HCT VFR BLD AUTO: 56.6 % (ref 37.5–51)
HGB BLD-MCNC: 19.1 G/DL (ref 13–17.7)

## 2025-07-08 PROCEDURE — 85014 HEMATOCRIT: CPT | Performed by: PHYSICIAN ASSISTANT

## 2025-07-08 PROCEDURE — 85018 HEMOGLOBIN: CPT | Performed by: PHYSICIAN ASSISTANT

## 2025-07-08 PROCEDURE — 99195 PHLEBOTOMY: CPT

## 2025-07-08 PROCEDURE — 36415 COLL VENOUS BLD VENIPUNCTURE: CPT

## 2025-07-08 RX ORDER — SODIUM CHLORIDE 9 MG/ML
250 INJECTION, SOLUTION INTRAVENOUS ONCE
OUTPATIENT
Start: 2025-08-03

## 2025-07-09 NOTE — TELEPHONE ENCOUNTER
Rx Refill Note  Requested Prescriptions     Pending Prescriptions Disp Refills    Tirzepatide-Weight Management (ZEPBOUND) 5 MG/0.5ML solution auto-injector 2 mL 0     Sig: Inject 0.5 mL under the skin into the appropriate area as directed 1 (One) Time Per Week.    mineral oil-hydrophilic petrolatum (AQUAPHOR) ointment 396 g 3     Sig: Apply 1 Application topically to the appropriate area as directed Daily.      Last office visit with prescribing clinician: 5/6/2025   Last telemedicine visit with prescribing clinician: Visit date not found   Next office visit with prescribing clinician: 8/12/2025                         Would you like a call back once the refill request has been completed: [] Yes [] No    If the office needs to give you a call back, can they leave a voicemail: [] Yes [] No    aMrk Morales Rep  07/09/25, 13:34 EDT

## 2025-07-11 RX ORDER — MINERAL OIL/HYDROPHIL PETROLAT
1 OINTMENT (GRAM) TOPICAL DAILY
Qty: 396 G | Refills: 3 | Status: SHIPPED | OUTPATIENT
Start: 2025-07-11

## 2025-07-18 ENCOUNTER — OFFICE VISIT (OUTPATIENT)
Dept: ONCOLOGY | Facility: HOSPITAL | Age: 30
End: 2025-07-18
Payer: MEDICAID

## 2025-07-18 ENCOUNTER — LAB (OUTPATIENT)
Dept: ONCOLOGY | Facility: HOSPITAL | Age: 30
End: 2025-07-18
Payer: MEDICAID

## 2025-07-18 VITALS
WEIGHT: 315 LBS | BODY MASS INDEX: 55.81 KG/M2 | RESPIRATION RATE: 18 BRPM | DIASTOLIC BLOOD PRESSURE: 72 MMHG | HEART RATE: 88 BPM | SYSTOLIC BLOOD PRESSURE: 98 MMHG | OXYGEN SATURATION: 92 % | TEMPERATURE: 98.1 F | HEIGHT: 63 IN

## 2025-07-18 DIAGNOSIS — E61.1 IRON DEFICIENCY: ICD-10-CM

## 2025-07-18 DIAGNOSIS — D64.9 ANEMIA, UNSPECIFIED TYPE: ICD-10-CM

## 2025-07-18 DIAGNOSIS — I82.4Y9 DEEP VEIN THROMBOSIS (DVT) OF PROXIMAL LOWER EXTREMITY, UNSPECIFIED CHRONICITY, UNSPECIFIED LATERALITY: ICD-10-CM

## 2025-07-18 DIAGNOSIS — D75.1 SECONDARY POLYCYTHEMIA: Primary | ICD-10-CM

## 2025-07-18 LAB
ALBUMIN SERPL-MCNC: 3.9 G/DL (ref 3.5–5.2)
ALBUMIN/GLOB SERPL: 1.3 G/DL
ALP SERPL-CCNC: 74 U/L (ref 39–117)
ALT SERPL W P-5'-P-CCNC: 26 U/L (ref 1–41)
ANION GAP SERPL CALCULATED.3IONS-SCNC: 8.6 MMOL/L (ref 5–15)
ANISOCYTOSIS BLD QL: NORMAL
AST SERPL-CCNC: 27 U/L (ref 1–40)
BASOPHILS # BLD AUTO: 0.07 10*3/MM3 (ref 0–0.2)
BASOPHILS NFR BLD AUTO: 2.8 % (ref 0–1.5)
BILIRUB SERPL-MCNC: 0.6 MG/DL (ref 0–1.2)
BUN SERPL-MCNC: 10.6 MG/DL (ref 6–20)
BUN/CREAT SERPL: 6.8 (ref 7–25)
BURR CELLS BLD QL SMEAR: NORMAL
CALCIUM SPEC-SCNC: 9.3 MG/DL (ref 8.6–10.5)
CHLORIDE SERPL-SCNC: 101 MMOL/L (ref 98–107)
CO2 SERPL-SCNC: 27.4 MMOL/L (ref 22–29)
CREAT SERPL-MCNC: 1.55 MG/DL (ref 0.76–1.27)
DEPRECATED RDW RBC AUTO: 57.3 FL (ref 37–54)
EGFRCR SERPLBLD CKD-EPI 2021: 62.1 ML/MIN/1.73
EOSINOPHIL # BLD AUTO: 0.04 10*3/MM3 (ref 0–0.4)
EOSINOPHIL NFR BLD AUTO: 1.6 % (ref 0.3–6.2)
ERYTHROCYTE [DISTWIDTH] IN BLOOD BY AUTOMATED COUNT: 19.2 % (ref 12.3–15.4)
FERRITIN SERPL-MCNC: 38.4 NG/ML (ref 30–400)
GLOBULIN UR ELPH-MCNC: 3 GM/DL
GLUCOSE SERPL-MCNC: 98 MG/DL (ref 65–99)
HCT VFR BLD AUTO: 54.8 % (ref 37.5–51)
HGB BLD-MCNC: 18 G/DL (ref 13–17.7)
IMM GRANULOCYTES # BLD AUTO: 0 10*3/MM3 (ref 0–0.05)
IMM GRANULOCYTES NFR BLD AUTO: 0 % (ref 0–0.5)
IRON 24H UR-MRATE: 190 MCG/DL (ref 59–158)
IRON SATN MFR SERPL: 45 % (ref 20–50)
LARGE PLATELETS: NORMAL
LYMPHOCYTES # BLD AUTO: 1.43 10*3/MM3 (ref 0.7–3.1)
LYMPHOCYTES NFR BLD AUTO: 57 % (ref 19.6–45.3)
MCH RBC QN AUTO: 28.1 PG (ref 26.6–33)
MCHC RBC AUTO-ENTMCNC: 32.8 G/DL (ref 31.5–35.7)
MCV RBC AUTO: 85.6 FL (ref 79–97)
MONOCYTES # BLD AUTO: 0.47 10*3/MM3 (ref 0.1–0.9)
MONOCYTES NFR BLD AUTO: 18.7 % (ref 5–12)
NEUTROPHILS NFR BLD AUTO: 0.5 10*3/MM3 (ref 1.7–7)
NEUTROPHILS NFR BLD AUTO: 19.9 % (ref 42.7–76)
NRBC BLD AUTO-RTO: 0 /100 WBC (ref 0–0.2)
PLATELET # BLD AUTO: 195 10*3/MM3 (ref 140–450)
PMV BLD AUTO: 9.9 FL (ref 6–12)
POIKILOCYTOSIS BLD QL SMEAR: NORMAL
POLYCHROMASIA BLD QL SMEAR: NORMAL
POTASSIUM SERPL-SCNC: 4.9 MMOL/L (ref 3.5–5.2)
PROT SERPL-MCNC: 6.9 G/DL (ref 6–8.5)
RBC # BLD AUTO: 6.4 10*6/MM3 (ref 4.14–5.8)
SMALL PLATELETS BLD QL SMEAR: ADEQUATE
SODIUM SERPL-SCNC: 137 MMOL/L (ref 136–145)
TARGETS BLD QL SMEAR: NORMAL
TIBC SERPL-MCNC: 423 MCG/DL (ref 298–536)
TRANSFERRIN SERPL-MCNC: 284 MG/DL (ref 200–360)
WBC MORPH BLD: NORMAL
WBC NRBC COR # BLD AUTO: 2.51 10*3/MM3 (ref 3.4–10.8)

## 2025-07-18 PROCEDURE — 80053 COMPREHEN METABOLIC PANEL: CPT

## 2025-07-18 PROCEDURE — 85007 BL SMEAR W/DIFF WBC COUNT: CPT

## 2025-07-18 PROCEDURE — 83540 ASSAY OF IRON: CPT

## 2025-07-18 PROCEDURE — 84466 ASSAY OF TRANSFERRIN: CPT

## 2025-07-18 PROCEDURE — 36415 COLL VENOUS BLD VENIPUNCTURE: CPT

## 2025-07-18 PROCEDURE — 85025 COMPLETE CBC W/AUTO DIFF WBC: CPT

## 2025-07-18 PROCEDURE — 82728 ASSAY OF FERRITIN: CPT

## 2025-07-18 NOTE — PROGRESS NOTES
Chief Complaint/Care Team   Pt here to follow up regarding DVT and polycythemia    Federico Sullivan MD Loesevitz, Thomas, DO    History of Present Illness     Diagnosis: Recurrent DVT    Secondary Polycythemia (pt with TAMIKO, using machine every other night), JAK2 V617F and exon 12 mutation testing negative    H/o Down Syndrome    Current Treatment: DVT- Eliquis 5 mg p.o. twice daily (lifelong); Phlebotomy prn for Secondary Polycythemia    Hem/Onc History/Previous Treatment:     Tremayne Bernard is a 30 y.o. male who presents to [facility] for follow-up regarding history of recurrent DVT as well as secondary positive anemia from obstructive sleep apnea.  Patient also history of Down syndrome and is here with his mother who provides additional medical history.  Patient's mother reports he has been compliant with taking Eliquis.  No report of blood clots, lower extremity swelling, difficulty breathing or chest discomfort.  Patient reports overall patient is tolerating Eliquis medication well.    His mother is also attempting to have him wear his sleep apnea machine consistently.  She has not been able to follow-up with pulmonology to assess for other devices/machines that would increase his compliance with use of TAMIKO machine.  Of note, per patient's mother he states up late at night until the early morning, but her and his father has been trying to have him wear the TAMIKO mask more frequently.    Interval history: Patient here with his mother to follow-up regarding secondary polycythemia. According to his parents he has increased his use of CPAP machine and reports consistent use of supplemental oxygen at night. Patient's last phlebotomy was in 7/2025, which he tolerated well. Pt is compliant with taking oral iron. Pt started on zepbound and has lost 4 lbs.     Review of Systems   Constitutional:  Positive for fatigue (PT stays up all night so he feels fatigue throughout the day).  "Negative for appetite change, diaphoresis, fever, unexpected weight gain and unexpected weight loss.   HENT:  Positive for postnasal drip. Negative for hearing loss, mouth sores, sore throat, swollen glands, trouble swallowing and voice change.    Eyes:  Negative for blurred vision.   Respiratory:  Positive for cough and shortness of breath. Negative for wheezing.    Cardiovascular:  Negative for chest pain and palpitations.   Gastrointestinal:  Positive for vomiting. Negative for abdominal pain, blood in stool, constipation, diarrhea and nausea.   Endocrine: Negative for cold intolerance and heat intolerance.   Genitourinary:  Negative for difficulty urinating, dysuria, frequency, hematuria and urinary incontinence.   Musculoskeletal:  Positive for arthralgias. Negative for back pain and myalgias.        Right foot pain   Skin:  Negative for rash, skin lesions and wound.   Neurological:  Negative for dizziness, seizures, weakness, numbness and headache.   Hematological:  Does not bruise/bleed easily.   Psychiatric/Behavioral:  Negative for depressed mood. The patient is not nervous/anxious.    All other systems reviewed and are negative.       Oncology/Hematology History    No history exists.       Objective     Vitals:    07/18/25 1352   BP: 98/72   Pulse: 88   Resp: 18   Temp: 98.1 °F (36.7 °C)   TempSrc: Oral   SpO2: 92%   Weight: (!) 156 kg (344 lb 12.8 oz)   Height: 160 cm (62.99\")   PainSc: 9    PainLoc: Foot           ECOG score: 0       BMI 61.1    PHQ-9 Total Score:         Physical Exam  Vitals reviewed. Exam conducted with a chaperone present.   Constitutional:       General: He is not in acute distress.     Appearance: Normal appearance. He is obese.   HENT:      Head: Normocephalic and atraumatic.   Eyes:      Extraocular Movements: Extraocular movements intact.   Pulmonary:      Effort: Pulmonary effort is normal.      Breath sounds: No wheezing.   Musculoskeletal:      Cervical back: Normal range of " motion and neck supple.   Skin:     General: Skin is warm and dry.   Neurological:      Mental Status: He is oriented to person, place, and time.           Past Medical History     Past Medical History:   Diagnosis Date    Anemia     Asthma     Diabetes mellitus     Disease of thyroid gland     Down's syndrome     Gout     Oxygen dependent     Nighttime Only     Current Outpatient Medications on File Prior to Visit   Medication Sig Dispense Refill    albuterol (ACCUNEB) 1.25 MG/3ML nebulizer solution Take 3 mL by nebulization Every 6 (Six) Hours As Needed for Wheezing. 360 mL 3    albuterol sulfate  (90 Base) MCG/ACT inhaler Inhale 2 puffs Every 4 (Four) Hours As Needed for Wheezing or Shortness of Air. 8.5 g 3    allopurinol (Zyloprim) 300 MG tablet Take 2 tablets by mouth Daily. 180 tablet 3    aluminum sulfate-calcium acetate (DOMEBORO) topical packet Apply 1 packet topically to the appropriate area as directed 3 (Three) Times a Day. 100 each 3    ammonium lactate (AmLactin) 12 % lotion Apply  topically to the appropriate area as directed 2 (Two) Times a Day. 225 g 11    apixaban (ELIQUIS) 5 MG tablet tablet Take 1 tablet by mouth 2 (Two) Times a Day. 180 tablet 3    arformoterol (Brovana) 15 MCG/2ML nebulizer solution Take 2 mL by nebulization 2 (Two) Times a Day. 120 mL 11    azithromycin (ZITHROMAX) 250 MG tablet       benzonatate (TESSALON) 200 MG capsule Take 1 capsule by mouth 3 (Three) Times a Day As Needed for Cough. 90 capsule 3    brompheniramine-pseudoephedrine-DM 30-2-10 MG/5ML syrup TAKE 5 ML BY MOUTH EVERY 6 HOURS AS NEEDED      budesonide (PULMICORT) 0.5 MG/2ML nebulizer solution Take 2 mL by nebulization 2 (Two) Times a Day. 180 mL 3    clindamycin (Cleocin-T) 1 % external solution Apply  topically to the appropriate area as directed 2 (Two) Times a Day. 60 mL 3    colchicine 0.6 MG tablet Take 2 PO at onset of gout flare, then take 1 tablet PO daily until symptoms resolve 30 tablet 3     Diclofenac Sodium (VOLTAREN) 1 % gel gel diclofenac sodium 1 % topical gel apply 2 gram to the affected area(s) by topical route 4 times per day   Suspended      docusate sodium 100 MG capsule Take 1 capsule by mouth 2 (Two) Times a Day As Needed (constipation). 90 each 3    Emollient (cetaphil) cream Apply 1 Application topically to the appropriate area as directed 2 (Two) Times a Day. 453 g 3    Emollient (cetaphil) cream Apply 1 Application topically to the appropriate area as directed As Needed for Dry Skin. 453 g 3    erythromycin (ROMYCIN) 5 MG/GM ophthalmic ointment Instill 1 cm ribbon in each eye QID 3.5 g 0    ferrous sulfate 325 (65 FE) MG tablet Take 1 tablet by mouth Daily With Breakfast. 30 tablet 2    fluticasone (FLONASE) 50 MCG/ACT nasal spray Administer 2 sprays into the nostril(s) as directed by provider Daily. 16 g 3    folic acid (FOLVITE) 1 MG tablet Take 1 tablet by mouth Daily. 90 tablet 3    guaiFENesin-codeine (guaiFENesin AC) 100-10 MG/5ML solution/syrup Take 5 mL by mouth 3 (Three) Times a Day As Needed for Cough. 140 mL 0    ipratropium (ATROVENT) 0.03 % nasal spray Administer 2 sprays into the nostril(s) as directed by provider 3 (Three) Times a Day As Needed (for running). 90 mL 3    ketoconazole (NIZORAL) 2 % cream Apply  topically to the appropriate area as directed Daily. 60 g 2    levothyroxine (SYNTHROID, LEVOTHROID) 25 MCG tablet Take 1 tablet by mouth Daily. 90 tablet 3    loratadine (CLARITIN) 10 MG tablet Take 1 tablet by mouth Daily. 90 tablet 3    mineral oil-hydrophilic petrolatum (AQUAPHOR) ointment Apply 1 Application topically to the appropriate area as directed Daily. 396 g 3    montelukast (Singulair) 10 MG tablet Take 1 tablet by mouth Every Night. 90 tablet 3    mupirocin (BACTROBAN) 2 % ointment Apply  topically to the appropriate area as directed 2 (Two) Times a Day. 30 g 3    nystatin (MYCOSTATIN) 100,000 unit/mL suspension Swish and swallow 5 mL 4 (Four) Times a  Day. 120 mL 0    nystatin (MYCOSTATIN) 122320 UNIT/GM cream Apply 1 Application topically to the appropriate area as directed 2 (Two) Times a Day. 60 g 2    nystatin (MYCOSTATIN) 342511 UNIT/GM powder Apply  topically to the appropriate area as directed 3 (Three) Times a Day. 60 g 2    olopatadine (PATANOL) 0.1 % ophthalmic solution       omeprazole (priLOSEC) 20 MG capsule Take 1 capsule by mouth Daily. 30 capsule 5    Petrolatum (Petroleum Jelly) ointment Apply 1 application  topically As Needed (prn as needed). 368 g 3    predniSONE (DELTASONE) 20 MG tablet Take 2 p.o. daily for 5 days. 10 tablet 0    promethazine-dextromethorphan (PROMETHAZINE-DM) 6.25-15 MG/5ML syrup Take 5 mL by mouth 4 (Four) Times a Day As Needed for Cough. 473 mL 0    Soap & Cleansers (Gentle Skin Cleanser) liquid Apply 1 application  topically 2 (Two) Times a Day. 473 mL 3    Sodium Fluoride 1.1 % cream       Tirzepatide-Weight Management (ZEPBOUND) 5 MG/0.5ML solution auto-injector Inject 0.5 mL under the skin into the appropriate area as directed 1 (One) Time Per Week. 2 mL 0    Urea 50 % suspension Apply topically to the affected areas  g 3    vitamin B-12 (CYANOCOBALAMIN) 1000 MCG tablet Take 1 tablet by mouth Daily. 90 tablet 3    vitamin B-6 (PYRIDOXINE) 50 MG tablet Take 0.5 tablets by mouth Every Other Day. 23 tablet 3    Zinc Oxide 16 % ointment Apply 1 Application topically to the appropriate area as directed Daily As Needed (rash). 113 g 3     No current facility-administered medications on file prior to visit.      No Known Allergies  Past Surgical History:   Procedure Laterality Date    CYST REMOVAL      OTHER SURGICAL HISTORY Left     TUBE REPLACEMENT IN EAR     Social History     Socioeconomic History    Marital status: Single   Tobacco Use    Smoking status: Never     Passive exposure: Never    Smokeless tobacco: Never   Vaping Use    Vaping status: Never Used   Substance and Sexual Activity    Alcohol use: Never     Drug use: Defer    Sexual activity: Defer     History reviewed. No pertinent family history.    Results     Result Review   The following data was reviewed by: Federico Sullivan MD   Lab Results   Component Value Date    HGB 18.0 (H) 07/18/2025    HCT 54.8 (H) 07/18/2025    MCV 85.6 07/18/2025     07/18/2025    WBC 2.51 (L) 07/18/2025    NEUTROABS 0.50 (L) 07/18/2025    LYMPHSABS 1.43 07/18/2025    MONOSABS 0.47 07/18/2025    EOSABS 0.04 07/18/2025    BASOSABS 0.07 07/18/2025     Lab Results   Component Value Date    GLUCOSE 98 07/18/2025    BUN 10.6 07/18/2025    CREATININE 1.55 (H) 07/18/2025     07/18/2025    K 4.9 07/18/2025     07/18/2025    CO2 27.4 07/18/2025    CALCIUM 9.3 07/18/2025    PROTEINTOT 6.9 07/18/2025    ALBUMIN 3.9 07/18/2025    BILITOT 0.6 07/18/2025    ALKPHOS 74 07/18/2025    AST 27 07/18/2025    ALT 26 07/18/2025     Lab Results   Component Value Date    MG 2.2 09/10/2022    FREET4 1.20 05/28/2025    TSH 2.710 05/28/2025           No radiology results for the last day       Assessment & Plan     Diagnoses and all orders for this visit:    1. Secondary polycythemia (Primary)  -     CBC & Differential; Future  -     Comprehensive Metabolic Panel; Future  -     Iron Profile w/o Ferritin; Future  -     Ferritin; Future    2. Iron deficiency  -     Iron Profile w/o Ferritin; Future  -     Ferritin; Future    3. Deep vein thrombosis (DVT) of proximal lower extremity, unspecified chronicity, unspecified laterality            Tremayne Bernard is a 30 y.o. male who presents to [facility]EMAT[facility] ON[facility] for polycythemia and history of recurrent DVT.  He has PMH significant for Down's Syndrome, TAMIKO (has TAMIKO machine). Patient is compliant with his Eliquis medication.      Recurrent DVT  -recommend pt continue Eliquis 5 mg PO BID lifelong  --Pt due to undergo repeat CT chest (ordered by his PCP) to monitor response to anticoagulant, CT chest from 12/7/2023 showed  resolution of previous ground glass nodules seen on previous study and a new 6 mm nodule in each upper lobe but given waxing and waning nature of the nodules favors infectious/inflammatory process  -per pt's mother pulmonology plans to repeat imaging in 1 year, CT chest from 12/5/2024 was negative for any suspicious pulmonary nodules.  -recommend continued follow up with pulmonology      Secondary polycythemia  -due to TAMIKO, lack of improvement with use of TAMIKO machine  -pt's mother is going to schedule pt appointment with pulmonology to assess for other options regarding TAMIKO mask and recheck TAMIKO machine settings  -recommended increased compliance with CPAP machine, use of O2 at night, and recommended weight loss  -JAK2 V617F and exon 12 mutation testing from 11/2019 were negative  -pt's last phlebotomy was in 7/2025, tolerated it well  -Discussed results of labs from 7/18/2025 which showed increase in iron level, this recommend patient decrease iron tablet down to 1 tablet 3 times a week.  - encourage weight loss and more consistent use of CPAP for TAMIKO    Plan for patient follow-up in 3 months with repeat CBC, CMP, and iron studies to assess response to oral iron repletion and to phlebotomy.    Patient verbalized understanding and agreed with plan.    Please note that portions of this note were completed with a voice recognition program.        Electronically signed by Federico Sullivan MD, 07/18/25, 4:15 PM EDT.            Follow Up     I spent 30 includes time spent by me in the following activities:preparing for the visit, reviewing tests, obtaining and/or reviewing a separately obtained history, performing a medically appropriate examination and/or evaluation , counseling and educating the patient/family/caregiver, ordering medications, tests, or procedures, documenting information in the medical record and care coordination.    The plan was discussed with the patient and/or family. The patient was given time to ask  questions and these questions were answered. At the conclusion of their visit they had no additional questions or concerns and all questions were answered to their satisfaction.    Patient was given instructions and counseling regarding his condition or for health maintenance advice. Please see specific information pulled into the AVS if appropriate.

## 2025-07-21 DIAGNOSIS — D75.1 SECONDARY POLYCYTHEMIA: ICD-10-CM

## 2025-07-21 DIAGNOSIS — D75.1 POLYCYTHEMIA, SECONDARY: Primary | ICD-10-CM

## 2025-07-28 ENCOUNTER — HOSPITAL ENCOUNTER (OUTPATIENT)
Dept: INFUSION THERAPY | Facility: HOSPITAL | Age: 30
Discharge: HOME | End: 2025-07-28
Admitting: INTERNAL MEDICINE
Payer: MEDICARE

## 2025-07-28 VITALS
WEIGHT: 315 LBS | TEMPERATURE: 98.5 F | HEIGHT: 63 IN | HEART RATE: 94 BPM | SYSTOLIC BLOOD PRESSURE: 99 MMHG | OXYGEN SATURATION: 93 % | RESPIRATION RATE: 20 BRPM | DIASTOLIC BLOOD PRESSURE: 61 MMHG | BODY MASS INDEX: 55.81 KG/M2

## 2025-07-28 DIAGNOSIS — D75.1 POLYCYTHEMIA, SECONDARY: Primary | ICD-10-CM

## 2025-07-28 LAB
HCT VFR BLD AUTO: 53.3 % (ref 37.5–51)
HGB BLD-MCNC: 17.8 G/DL (ref 13–17.7)

## 2025-07-28 PROCEDURE — 36415 COLL VENOUS BLD VENIPUNCTURE: CPT

## 2025-07-28 PROCEDURE — 99195 PHLEBOTOMY: CPT

## 2025-07-28 PROCEDURE — 85018 HEMOGLOBIN: CPT | Performed by: INTERNAL MEDICINE

## 2025-07-28 PROCEDURE — 85014 HEMATOCRIT: CPT | Performed by: INTERNAL MEDICINE

## 2025-07-28 RX ORDER — SODIUM CHLORIDE 9 MG/ML
250 INJECTION, SOLUTION INTRAVENOUS ONCE
OUTPATIENT
Start: 2025-08-05

## 2025-07-30 ENCOUNTER — OFFICE VISIT (OUTPATIENT)
Dept: PULMONOLOGY | Facility: CLINIC | Age: 30
End: 2025-07-30
Payer: MEDICARE

## 2025-07-30 VITALS
BODY MASS INDEX: 55.81 KG/M2 | DIASTOLIC BLOOD PRESSURE: 80 MMHG | OXYGEN SATURATION: 91 % | WEIGHT: 315 LBS | TEMPERATURE: 97.6 F | HEART RATE: 90 BPM | SYSTOLIC BLOOD PRESSURE: 147 MMHG | HEIGHT: 63 IN | RESPIRATION RATE: 16 BRPM

## 2025-07-30 DIAGNOSIS — J96.11 CHRONIC HYPOXIC RESPIRATORY FAILURE: ICD-10-CM

## 2025-07-30 DIAGNOSIS — R91.8 MULTIPLE PULMONARY NODULES: ICD-10-CM

## 2025-07-30 DIAGNOSIS — G47.33 OSA TREATED WITH BIPAP: Primary | ICD-10-CM

## 2025-07-30 PROCEDURE — 99214 OFFICE O/P EST MOD 30 MIN: CPT | Performed by: STUDENT IN AN ORGANIZED HEALTH CARE EDUCATION/TRAINING PROGRAM

## 2025-07-30 NOTE — PROGRESS NOTES
Primary Care Provider  Jesus Manuel Sloan DO   Referring Provider  No ref. provider found      Patient Complaint  Follow-up (3 month), Cough (results), and Sleep Apnea      Subjective          Tremayne Bernard presents to [facility]      History of Presenting Illness  Tremayne Bernard is a 30 y.o. male with history of DVT/PE, chronic hypoxic respiratory failure, asthma, Down syndrome, TAMIKO here for follow-up.    Doing well today  With his mom today  Cough has improved significantly   Still does spit up some stuff from time to time; overall improved  Uses his inhaler or nebulizer as needed  Wears his CPAP at night  Trying to exercise more      Review of Systems  Constitutional:  No fever. No chills. No weakness.  ENT:  No sore throat, URI symptoms.   Cardiovascular:  No chest pain. No palpitations. No lower extremity edema.  Respiratory:  No shortness of breath, +chronic cough, improved; no pleuritic chest pain. No hemoptysis. No dyspnea.   GI:  Normal appetite. No nausea, vomiting, diarrhea. No melena.  Musculoskeletal:  No arthralgias or myalgias.  Neurologic:  No weakness    History reviewed. No pertinent family history.     Social History     Socioeconomic History    Marital status: Single   Tobacco Use    Smoking status: Never     Passive exposure: Never    Smokeless tobacco: Never   Vaping Use    Vaping status: Never Used   Substance and Sexual Activity    Alcohol use: Never    Drug use: Defer    Sexual activity: Defer        Past Medical History:   Diagnosis Date    Anemia     Asthma     Diabetes mellitus     Disease of thyroid gland     Down's syndrome     Gout     Oxygen dependent     Nighttime Only        Immunization History   Administered Date(s) Administered    31-influenza Vac Quardvalent Preservativ 09/23/2016    COVID-19 (PFIZER) Purple Cap Monovalent 03/02/2021, 03/23/2021, 11/18/2021    Fluzone  >6mos 09/12/2024    Fluzone (or Fluarix & Flulaval  for VFC) >6mos 10/04/2021, 09/21/2022, 09/14/2023    Fluzone Quad >6mos (Multi-dose) 11/06/2019    Influenza Injectable Mdck Pf Quad 09/21/2022    Influenza, Unspecified 09/23/2016, 09/21/2020    Tdap 03/28/2019       No Known Allergies       Current Outpatient Medications:     albuterol (ACCUNEB) 1.25 MG/3ML nebulizer solution, Take 3 mL by nebulization Every 6 (Six) Hours As Needed for Wheezing., Disp: 360 mL, Rfl: 3    albuterol sulfate  (90 Base) MCG/ACT inhaler, Inhale 2 puffs Every 4 (Four) Hours As Needed for Wheezing or Shortness of Air., Disp: 8.5 g, Rfl: 3    allopurinol (Zyloprim) 300 MG tablet, Take 2 tablets by mouth Daily., Disp: 180 tablet, Rfl: 3    aluminum sulfate-calcium acetate (DOMEBORO) topical packet, Apply 1 packet topically to the appropriate area as directed 3 (Three) Times a Day., Disp: 100 each, Rfl: 3    ammonium lactate (AmLactin) 12 % lotion, Apply  topically to the appropriate area as directed 2 (Two) Times a Day., Disp: 225 g, Rfl: 11    apixaban (ELIQUIS) 5 MG tablet tablet, Take 1 tablet by mouth 2 (Two) Times a Day., Disp: 180 tablet, Rfl: 3    arformoterol (Brovana) 15 MCG/2ML nebulizer solution, Take 2 mL by nebulization 2 (Two) Times a Day., Disp: 120 mL, Rfl: 11    budesonide (PULMICORT) 0.5 MG/2ML nebulizer solution, Take 2 mL by nebulization 2 (Two) Times a Day., Disp: 180 mL, Rfl: 3    clindamycin (Cleocin-T) 1 % external solution, Apply  topically to the appropriate area as directed 2 (Two) Times a Day., Disp: 60 mL, Rfl: 3    colchicine 0.6 MG tablet, Take 2 PO at onset of gout flare, then take 1 tablet PO daily until symptoms resolve, Disp: 30 tablet, Rfl: 3    Diclofenac Sodium (VOLTAREN) 1 % gel gel, diclofenac sodium 1 % topical gel apply 2 gram to the affected area(s) by topical route 4 times per day   Suspended, Disp: , Rfl:     docusate sodium 100 MG capsule, Take 1 capsule by mouth 2 (Two) Times a Day As Needed (constipation)., Disp: 90 each, Rfl: 3     Emollient (cetaphil) cream, Apply 1 Application topically to the appropriate area as directed As Needed for Dry Skin., Disp: 453 g, Rfl: 3    erythromycin (ROMYCIN) 5 MG/GM ophthalmic ointment, Instill 1 cm ribbon in each eye QID, Disp: 3.5 g, Rfl: 0    ferrous sulfate 325 (65 FE) MG tablet, Take 1 tablet by mouth Daily With Breakfast., Disp: 30 tablet, Rfl: 2    fluticasone (FLONASE) 50 MCG/ACT nasal spray, Administer 2 sprays into the nostril(s) as directed by provider Daily., Disp: 16 g, Rfl: 3    folic acid (FOLVITE) 1 MG tablet, Take 1 tablet by mouth Daily., Disp: 90 tablet, Rfl: 3    ipratropium (ATROVENT) 0.03 % nasal spray, Administer 2 sprays into the nostril(s) as directed by provider 3 (Three) Times a Day As Needed (for running)., Disp: 90 mL, Rfl: 3    ketoconazole (NIZORAL) 2 % cream, Apply  topically to the appropriate area as directed Daily., Disp: 60 g, Rfl: 2    levothyroxine (SYNTHROID, LEVOTHROID) 25 MCG tablet, Take 1 tablet by mouth Daily., Disp: 90 tablet, Rfl: 3    loratadine (CLARITIN) 10 MG tablet, Take 1 tablet by mouth Daily., Disp: 90 tablet, Rfl: 3    mineral oil-hydrophilic petrolatum (AQUAPHOR) ointment, Apply 1 Application topically to the appropriate area as directed Daily., Disp: 396 g, Rfl: 3    montelukast (Singulair) 10 MG tablet, Take 1 tablet by mouth Every Night., Disp: 90 tablet, Rfl: 3    mupirocin (BACTROBAN) 2 % ointment, Apply  topically to the appropriate area as directed 2 (Two) Times a Day., Disp: 30 g, Rfl: 3    nystatin (MYCOSTATIN) 100,000 unit/mL suspension, Swish and swallow 5 mL 4 (Four) Times a Day., Disp: 120 mL, Rfl: 0    nystatin (MYCOSTATIN) 889371 UNIT/GM cream, Apply 1 Application topically to the appropriate area as directed 2 (Two) Times a Day., Disp: 60 g, Rfl: 2    nystatin (MYCOSTATIN) 210254 UNIT/GM powder, Apply  topically to the appropriate area as directed 3 (Three) Times a Day., Disp: 60 g, Rfl: 2    olopatadine (PATANOL) 0.1 % ophthalmic  solution, , Disp: , Rfl:     omeprazole (priLOSEC) 20 MG capsule, Take 1 capsule by mouth Daily., Disp: 30 capsule, Rfl: 5    Soap & Cleansers (Gentle Skin Cleanser) liquid, Apply 1 application  topically 2 (Two) Times a Day., Disp: 473 mL, Rfl: 3    Sodium Fluoride 1.1 % cream, , Disp: , Rfl:     Tirzepatide-Weight Management (ZEPBOUND) 5 MG/0.5ML solution auto-injector, Inject 0.5 mL under the skin into the appropriate area as directed 1 (One) Time Per Week., Disp: 2 mL, Rfl: 0    Urea 50 % suspension, Apply topically to the affected areas TID, Disp: 284 g, Rfl: 3    vitamin B-12 (CYANOCOBALAMIN) 1000 MCG tablet, Take 1 tablet by mouth Daily., Disp: 90 tablet, Rfl: 3    vitamin B-6 (PYRIDOXINE) 50 MG tablet, Take 0.5 tablets by mouth Every Other Day., Disp: 23 tablet, Rfl: 3    Zinc Oxide 16 % ointment, Apply 1 Application topically to the appropriate area as directed Daily As Needed (rash)., Disp: 113 g, Rfl: 3    azithromycin (ZITHROMAX) 250 MG tablet, , Disp: , Rfl:     benzonatate (TESSALON) 200 MG capsule, Take 1 capsule by mouth 3 (Three) Times a Day As Needed for Cough. (Patient not taking: Reported on 7/30/2025), Disp: 90 capsule, Rfl: 3    brompheniramine-pseudoephedrine-DM 30-2-10 MG/5ML syrup, TAKE 5 ML BY MOUTH EVERY 6 HOURS AS NEEDED (Patient not taking: Reported on 7/30/2025), Disp: , Rfl:     Emollient (cetaphil) cream, Apply 1 Application topically to the appropriate area as directed 2 (Two) Times a Day. (Patient not taking: Reported on 7/30/2025), Disp: 453 g, Rfl: 3    guaiFENesin-codeine (guaiFENesin AC) 100-10 MG/5ML solution/syrup, Take 5 mL by mouth 3 (Three) Times a Day As Needed for Cough. (Patient not taking: Reported on 7/30/2025), Disp: 140 mL, Rfl: 0    Petrolatum (Petroleum Jelly) ointment, Apply 1 application  topically As Needed (prn as needed). (Patient not taking: Reported on 7/30/2025), Disp: 368 g, Rfl: 3    predniSONE (DELTASONE) 20 MG tablet, Take 2 p.o. daily for 5 days.  "(Patient not taking: Reported on 7/30/2025), Disp: 10 tablet, Rfl: 0    promethazine-dextromethorphan (PROMETHAZINE-DM) 6.25-15 MG/5ML syrup, Take 5 mL by mouth 4 (Four) Times a Day As Needed for Cough. (Patient not taking: Reported on 7/30/2025), Disp: 473 mL, Rfl: 0     Objective     Vital Signs:   /80 (BP Location: Right arm, Patient Position: Sitting, Cuff Size: Adult)   Pulse 90   Temp 97.6 °F (36.4 °C) (Tympanic)   Resp 16   Ht 160 cm (62.99\")   Wt (!) 155 kg (340 lb 12.8 oz)   SpO2 91% Comment: room air  BMI 60.39 kg/m²     Physical Exam  Vital Signs Reviewed   WDWN, Alert, NAD.    HEENT:  EOMI.  nares clear  Neck:  Supple, no JVD, no thyromegaly  Chest:  clear to auscultation bilaterally, no wheezes, crackles; no work of breathing noted  CV: RRR, no M/G/R, pulses 2+, equal.  Abd:  Soft, NT, ND, +BS  EXT:  no clubbing, no cyanosis, no edema  Neuro:  A&Ox3, CN grossly intact, no focal deficits.  Skin: No rashes or lesions noted       Result Review :   I have personally reviewed patient's labs and images.              Patient Active Problem List   Diagnosis    TAMIKO treated with BiPAP    Hypersomnia    Class 3 severe obesity due to excess calories without serious comorbidity with body mass index (BMI) of 60.0 to 69.9 in adult    Down's syndrome    Syncope    Sinus trouble    Pes planus    Homocysteinemia    Hidradenitis suppurativa    Foot pain, left    Fatigue    Elevated serum creatinine    DVT (deep venous thrombosis)    Disability of walking    Diabetes mellitus    Bronchitis    Athletes foot    Asthma    Arthropathy    Arthritis of right knee    Knee pain    Ankle pain    Anemia    Secondary polycythemia    Anhidrosis    Multiple pulmonary nodules    Chronic hypoxic respiratory failure    Polycythemia, secondary    History of pulmonary embolism       Impression and Plan    DVT/PE, resolved  Chronic cough, stable  Chronic hypoxic respiratory failure on supplemental oxygen prn  TAMIKO on CPAP  Down " syndrome  GERD on PPI  Never smoker    -CT chest with contrast 12/4/2024.  Ordered with contrast to evaluate for any chronic thrombi.  Negative for any visible clots, though views were suboptimal.  Previously seen pulmonary nodules had resolved.  Large fat-containing right Morgagni hernia stable, mass effect on right atrium.  -Continue wearing 2 L supplemental oxygen at bedtime prn  -Continue wearing CPAP nightly, managed by sleep medicine  -Continue using Pulmicort nebulizer treatments twice daily, rinse mouth after each use.   -Continue using albuterol inhaler or albuterol nebulizer treatments as needed.  Nebulizer machine provided April 2024.  -Continue taking Eliquis daily for history of DVT/PE  -PFTs ordered but not done as he's unlikely to follow the instructions to perform the manuevers  -Patient's chronic cough likely secondary to acid reflux. This has improved on PPI. Continue omeprazole 20 mg daily.    Smoking status: Reviewed  Vaccination status: Patient is up-to-date with his vaccination  Medications personally reviewed    Follow Up   No follow-ups on file.  Patient was given instructions and counseling regarding his condition or for health maintenance advice. Please see specific information pulled into the AVS if appropriate.

## 2025-08-12 ENCOUNTER — OFFICE VISIT (OUTPATIENT)
Dept: FAMILY MEDICINE CLINIC | Facility: CLINIC | Age: 30
End: 2025-08-12
Payer: MEDICARE

## 2025-08-12 VITALS
TEMPERATURE: 98.4 F | OXYGEN SATURATION: 96 % | WEIGHT: 315 LBS | SYSTOLIC BLOOD PRESSURE: 112 MMHG | HEIGHT: 63 IN | DIASTOLIC BLOOD PRESSURE: 72 MMHG | HEART RATE: 84 BPM | BODY MASS INDEX: 55.81 KG/M2

## 2025-08-12 DIAGNOSIS — D75.1 SECONDARY POLYCYTHEMIA: ICD-10-CM

## 2025-08-12 DIAGNOSIS — I35.1 AORTIC VALVE INSUFFICIENCY, ETIOLOGY OF CARDIAC VALVE DISEASE UNSPECIFIED: ICD-10-CM

## 2025-08-12 DIAGNOSIS — E78.5 HYPERLIPIDEMIA, UNSPECIFIED HYPERLIPIDEMIA TYPE: ICD-10-CM

## 2025-08-12 DIAGNOSIS — J45.40 MODERATE PERSISTENT ASTHMA WITHOUT COMPLICATION: ICD-10-CM

## 2025-08-12 DIAGNOSIS — E53.8 B12 DEFICIENCY: ICD-10-CM

## 2025-08-12 DIAGNOSIS — J96.11 CHRONIC HYPOXIC RESPIRATORY FAILURE: ICD-10-CM

## 2025-08-12 DIAGNOSIS — E66.813 CLASS 3 SEVERE OBESITY DUE TO EXCESS CALORIES WITHOUT SERIOUS COMORBIDITY WITH BODY MASS INDEX (BMI) OF 60.0 TO 69.9 IN ADULT: Primary | ICD-10-CM

## 2025-08-12 DIAGNOSIS — E03.9 HYPOTHYROIDISM, UNSPECIFIED TYPE: ICD-10-CM

## 2025-08-12 DIAGNOSIS — I82.409 RECURRENT DEEP VEIN THROMBOSIS (DVT): ICD-10-CM

## 2025-08-12 DIAGNOSIS — R73.03 PREDIABETES: ICD-10-CM

## 2025-08-12 DIAGNOSIS — M10.9 GOUT, UNSPECIFIED CAUSE, UNSPECIFIED CHRONICITY, UNSPECIFIED SITE: ICD-10-CM

## 2025-08-12 DIAGNOSIS — E61.1 IRON DEFICIENCY: ICD-10-CM

## 2025-08-12 DIAGNOSIS — E53.8 FOLATE DEFICIENCY: ICD-10-CM

## 2025-08-12 DIAGNOSIS — Q79.0 MORGAGNI HERNIA: ICD-10-CM

## 2025-08-12 RX ORDER — MONTELUKAST SODIUM 10 MG/1
10 TABLET ORAL NIGHTLY
Qty: 90 TABLET | Refills: 3 | Status: SHIPPED | OUTPATIENT
Start: 2025-08-12

## 2025-08-12 RX ORDER — GLY/DIMETH/PETROLAT,WHT/WATER
1 CREAM (GRAM) TOPICAL 2 TIMES DAILY
Qty: 453 G | Refills: 3 | Status: SHIPPED | OUTPATIENT
Start: 2025-08-12

## 2025-08-12 RX ORDER — ALBUTEROL SULFATE 90 UG/1
2 INHALANT RESPIRATORY (INHALATION) EVERY 4 HOURS PRN
Qty: 8.5 G | Refills: 3 | Status: SHIPPED | OUTPATIENT
Start: 2025-08-12

## 2025-08-12 RX ORDER — LEVOTHYROXINE SODIUM 25 UG/1
25 TABLET ORAL DAILY
Qty: 90 TABLET | Refills: 3 | Status: SHIPPED | OUTPATIENT
Start: 2025-08-12

## 2025-08-19 ENCOUNTER — OFFICE VISIT (OUTPATIENT)
Dept: SURGERY | Facility: CLINIC | Age: 30
End: 2025-08-19
Payer: MEDICARE

## 2025-08-19 VITALS
OXYGEN SATURATION: 96 % | SYSTOLIC BLOOD PRESSURE: 121 MMHG | BODY MASS INDEX: 55.81 KG/M2 | HEIGHT: 63 IN | WEIGHT: 315 LBS | HEART RATE: 89 BPM | DIASTOLIC BLOOD PRESSURE: 79 MMHG

## 2025-08-19 DIAGNOSIS — Q79.0 MORGAGNI HERNIA: Primary | ICD-10-CM

## 2025-08-19 DIAGNOSIS — E66.9 OBESE BODY HABITUS: ICD-10-CM

## 2025-08-19 PROCEDURE — 1159F MED LIST DOCD IN RCRD: CPT | Performed by: SURGERY

## 2025-08-19 PROCEDURE — 1160F RVW MEDS BY RX/DR IN RCRD: CPT | Performed by: SURGERY

## 2025-08-19 PROCEDURE — 99212 OFFICE O/P EST SF 10 MIN: CPT | Performed by: SURGERY

## 2025-08-25 ENCOUNTER — HOSPITAL ENCOUNTER (OUTPATIENT)
Dept: INFUSION THERAPY | Facility: HOSPITAL | Age: 30
Discharge: HOME | End: 2025-08-25
Admitting: INTERNAL MEDICINE
Payer: MEDICARE

## 2025-08-25 VITALS
RESPIRATION RATE: 20 BRPM | BODY MASS INDEX: 55.81 KG/M2 | DIASTOLIC BLOOD PRESSURE: 56 MMHG | SYSTOLIC BLOOD PRESSURE: 129 MMHG | OXYGEN SATURATION: 97 % | TEMPERATURE: 98.4 F | WEIGHT: 315 LBS | HEIGHT: 63 IN | HEART RATE: 87 BPM

## 2025-08-25 DIAGNOSIS — D75.1 POLYCYTHEMIA, SECONDARY: Primary | ICD-10-CM

## 2025-08-25 LAB
HCT VFR BLD AUTO: 51.6 % (ref 37.5–51)
HGB BLD-MCNC: 17.3 G/DL (ref 13–17.7)

## 2025-08-25 PROCEDURE — 36416 COLLJ CAPILLARY BLOOD SPEC: CPT

## 2025-08-25 PROCEDURE — 85014 HEMATOCRIT: CPT | Performed by: INTERNAL MEDICINE

## 2025-08-25 PROCEDURE — 85018 HEMOGLOBIN: CPT | Performed by: INTERNAL MEDICINE

## 2025-08-25 PROCEDURE — 99195 PHLEBOTOMY: CPT

## 2025-08-25 RX ORDER — SODIUM CHLORIDE 9 MG/ML
250 INJECTION, SOLUTION INTRAVENOUS ONCE
OUTPATIENT
Start: 2025-09-01